# Patient Record
Sex: MALE | Race: WHITE | NOT HISPANIC OR LATINO
[De-identification: names, ages, dates, MRNs, and addresses within clinical notes are randomized per-mention and may not be internally consistent; named-entity substitution may affect disease eponyms.]

---

## 2018-11-28 ENCOUNTER — APPOINTMENT (OUTPATIENT)
Dept: GASTROENTEROLOGY | Facility: CLINIC | Age: 53
End: 2018-11-28

## 2018-12-01 ENCOUNTER — OUTPATIENT (OUTPATIENT)
Dept: OUTPATIENT SERVICES | Facility: HOSPITAL | Age: 53
LOS: 1 days | End: 2018-12-01
Payer: MEDICAID

## 2018-12-01 ENCOUNTER — EMERGENCY (EMERGENCY)
Facility: HOSPITAL | Age: 53
LOS: 1 days | Discharge: HOME | End: 2018-12-01
Attending: EMERGENCY MEDICINE
Payer: MEDICAID

## 2018-12-01 VITALS
WEIGHT: 179.9 LBS | HEIGHT: 72 IN | SYSTOLIC BLOOD PRESSURE: 148 MMHG | TEMPERATURE: 97 F | RESPIRATION RATE: 16 BRPM | DIASTOLIC BLOOD PRESSURE: 102 MMHG | OXYGEN SATURATION: 99 % | HEART RATE: 94 BPM

## 2018-12-01 PROCEDURE — 99285 EMERGENCY DEPT VISIT HI MDM: CPT

## 2018-12-01 PROCEDURE — G9001: CPT

## 2018-12-01 RX ORDER — DIPHENHYDRAMINE HCL 50 MG
25 CAPSULE ORAL ONCE
Qty: 0 | Refills: 0 | Status: COMPLETED | OUTPATIENT
Start: 2018-12-01 | End: 2018-12-01

## 2018-12-01 RX ORDER — HALOPERIDOL DECANOATE 100 MG/ML
5 INJECTION INTRAMUSCULAR ONCE
Qty: 0 | Refills: 0 | Status: COMPLETED | OUTPATIENT
Start: 2018-12-01 | End: 2018-12-01

## 2018-12-01 RX ADMIN — Medication 2 MILLIGRAM(S): at 16:17

## 2018-12-01 RX ADMIN — Medication 25 MILLIGRAM(S): at 16:17

## 2018-12-01 RX ADMIN — HALOPERIDOL DECANOATE 5 MILLIGRAM(S): 100 INJECTION INTRAMUSCULAR at 16:17

## 2018-12-01 NOTE — ED ADULT TRIAGE NOTE - CHIEF COMPLAINT QUOTE
Patient BIBA to ED today after being found on the side of the road intoxicated.  Patient has history of HTN.

## 2018-12-01 NOTE — ED PROVIDER NOTE - ENMT, MLM
Airway patent, Nasal mucosa clear. Mouth with normal mucosa. Throat has no vesicles, no oropharyngeal exudates and uvula is midline. No external signs of head trauma

## 2018-12-01 NOTE — ED ADULT NURSE NOTE - OBJECTIVE STATEMENT
Patient BIBA from the side of the road, per EMS they were called for a person laying on the side of the road. Patient intoxicated at this time, respirations are even and non labored. No signs of trauma noted. Patient states that he is homeless. Patient is screaming and cursing at staff at this time. Patient placed in a yellow gown and belongings secured and placed in locker in  by ELI Salcido. Patient continues to be loud with staff, security at the bedside for patient to be medicated. IV placed, MD at the bedside, patient medicated for agitation. Patient resting, respirations remain even and non labored. Will continue to monitor.

## 2018-12-01 NOTE — ED ADULT NURSE NOTE - NSIMPLEMENTINTERV_GEN_ALL_ED
Implemented All Universal Safety Interventions:  [place] to call system. Call bell, personal items and telephone within reach. Instruct patient to call for assistance. Room bathroom lighting operational. Non-slip footwear when patient is off stretcher. Physically safe environment: no spills, clutter or unnecessary equipment. Stretcher in lowest position, wheels locked, appropriate side rails in place.

## 2018-12-01 NOTE — ED PROVIDER NOTE - CARE PLAN
Principal Discharge DX:	Alcoholic intoxication without complication Principal Discharge DX:	Alcoholic intoxication without complication  Secondary Diagnosis:	Alcoholic hepatitis, unspecified whether ascites present

## 2018-12-01 NOTE — ED PROVIDER NOTE - OBJECTIVE STATEMENT
BIBA for public intoxication, pt uncooperative, admits to being homeless and drinking alcohol. Belligerent, cursing and agitated.

## 2018-12-01 NOTE — ED PROVIDER NOTE - PROGRESS NOTE DETAILS
now sedated 1:1 discontinued recd sign out  monitored  tolerated po well, monitored. recd librium, follow up discussed pt wanting to leave, Now indicates he is going to stay at his mother's house. pt able to stand, unsteady, gait unsteady, agreeing to stay a while longer. will feed, labs and imaging ordered by Dr. Davis pending. gait now steady, more cooperative.  reviewed labs, suggested imaging.  Pt does not want to stay.  will f/u with his pmd. given copy of labs

## 2018-12-01 NOTE — ED PROVIDER NOTE - MUSCULOSKELETAL, MLM
Spine appears normal, range of motion is not limited, no muscle or joint tenderness. no extremity trauma

## 2018-12-02 VITALS
OXYGEN SATURATION: 97 % | HEART RATE: 86 BPM | TEMPERATURE: 99 F | DIASTOLIC BLOOD PRESSURE: 79 MMHG | RESPIRATION RATE: 17 BRPM | SYSTOLIC BLOOD PRESSURE: 127 MMHG

## 2018-12-02 LAB
ALBUMIN SERPL ELPH-MCNC: 3.6 G/DL — SIGNIFICANT CHANGE UP (ref 3.3–5.2)
ALP SERPL-CCNC: 260 U/L — HIGH (ref 40–120)
ALT FLD-CCNC: 191 U/L — HIGH
AMPHET UR-MCNC: NEGATIVE — SIGNIFICANT CHANGE UP
ANION GAP SERPL CALC-SCNC: 11 MMOL/L — SIGNIFICANT CHANGE UP (ref 5–17)
APPEARANCE UR: CLEAR — SIGNIFICANT CHANGE UP
AST SERPL-CCNC: 282 U/L — HIGH
BARBITURATES UR SCN-MCNC: NEGATIVE — SIGNIFICANT CHANGE UP
BASOPHILS # BLD AUTO: 0.1 K/UL — SIGNIFICANT CHANGE UP (ref 0–0.2)
BASOPHILS NFR BLD AUTO: 1.3 % — SIGNIFICANT CHANGE UP (ref 0–2)
BENZODIAZ UR-MCNC: NEGATIVE — SIGNIFICANT CHANGE UP
BILIRUB SERPL-MCNC: 2.7 MG/DL — HIGH (ref 0.4–2)
BILIRUB UR-MCNC: NEGATIVE — SIGNIFICANT CHANGE UP
BUN SERPL-MCNC: 10 MG/DL — SIGNIFICANT CHANGE UP (ref 8–20)
CALCIUM SERPL-MCNC: 8.5 MG/DL — LOW (ref 8.6–10.2)
CHLORIDE SERPL-SCNC: 101 MMOL/L — SIGNIFICANT CHANGE UP (ref 98–107)
CO2 SERPL-SCNC: 27 MMOL/L — SIGNIFICANT CHANGE UP (ref 22–29)
COCAINE METAB.OTHER UR-MCNC: NEGATIVE — SIGNIFICANT CHANGE UP
COLOR SPEC: YELLOW — SIGNIFICANT CHANGE UP
CREAT SERPL-MCNC: 0.6 MG/DL — SIGNIFICANT CHANGE UP (ref 0.5–1.3)
DIFF PNL FLD: ABNORMAL
EOSINOPHIL # BLD AUTO: 0.2 K/UL — SIGNIFICANT CHANGE UP (ref 0–0.5)
EOSINOPHIL NFR BLD AUTO: 2.5 % — SIGNIFICANT CHANGE UP (ref 0–5)
ETHANOL SERPL-MCNC: 233 MG/DL — SIGNIFICANT CHANGE UP
GLUCOSE SERPL-MCNC: 106 MG/DL — SIGNIFICANT CHANGE UP (ref 70–115)
GLUCOSE UR QL: NEGATIVE MG/DL — SIGNIFICANT CHANGE UP
HCT VFR BLD CALC: 39.2 % — LOW (ref 42–52)
HGB BLD-MCNC: 13.3 G/DL — LOW (ref 14–18)
KETONES UR-MCNC: NEGATIVE — SIGNIFICANT CHANGE UP
LEUKOCYTE ESTERASE UR-ACNC: ABNORMAL
LIDOCAIN IGE QN: 85 U/L — HIGH (ref 22–51)
LYMPHOCYTES # BLD AUTO: 2.7 K/UL — SIGNIFICANT CHANGE UP (ref 1–4.8)
LYMPHOCYTES # BLD AUTO: 45.2 % — SIGNIFICANT CHANGE UP (ref 20–55)
MAGNESIUM SERPL-MCNC: 1.7 MG/DL — SIGNIFICANT CHANGE UP (ref 1.6–2.6)
MCHC RBC-ENTMCNC: 26.7 PG — LOW (ref 27–31)
MCHC RBC-ENTMCNC: 33.9 G/DL — SIGNIFICANT CHANGE UP (ref 32–36)
MCV RBC AUTO: 78.7 FL — LOW (ref 80–94)
METHADONE UR-MCNC: NEGATIVE — SIGNIFICANT CHANGE UP
MONOCYTES # BLD AUTO: 0.7 K/UL — SIGNIFICANT CHANGE UP (ref 0–0.8)
MONOCYTES NFR BLD AUTO: 11.1 % — HIGH (ref 3–10)
NEUTROPHILS # BLD AUTO: 2.3 K/UL — SIGNIFICANT CHANGE UP (ref 1.8–8)
NEUTROPHILS NFR BLD AUTO: 39.2 % — SIGNIFICANT CHANGE UP (ref 37–73)
NITRITE UR-MCNC: NEGATIVE — SIGNIFICANT CHANGE UP
OPIATES UR-MCNC: NEGATIVE — SIGNIFICANT CHANGE UP
PCP SPEC-MCNC: SIGNIFICANT CHANGE UP
PCP UR-MCNC: NEGATIVE — SIGNIFICANT CHANGE UP
PH UR: 5 — SIGNIFICANT CHANGE UP (ref 5–8)
PHOSPHATE SERPL-MCNC: 4.3 MG/DL — SIGNIFICANT CHANGE UP (ref 2.4–4.7)
PLATELET # BLD AUTO: 120 K/UL — LOW (ref 150–400)
POTASSIUM SERPL-MCNC: 4.6 MMOL/L — SIGNIFICANT CHANGE UP (ref 3.5–5.3)
POTASSIUM SERPL-SCNC: 4.6 MMOL/L — SIGNIFICANT CHANGE UP (ref 3.5–5.3)
PROT SERPL-MCNC: 6.8 G/DL — SIGNIFICANT CHANGE UP (ref 6.6–8.7)
PROT UR-MCNC: 30 MG/DL
RBC # BLD: 4.98 M/UL — SIGNIFICANT CHANGE UP (ref 4.6–6.2)
RBC # FLD: 16.8 % — HIGH (ref 11–15.6)
RBC CASTS # UR COMP ASSIST: SIGNIFICANT CHANGE UP /HPF (ref 0–4)
SODIUM SERPL-SCNC: 139 MMOL/L — SIGNIFICANT CHANGE UP (ref 135–145)
SP GR SPEC: 1.02 — SIGNIFICANT CHANGE UP (ref 1.01–1.02)
THC UR QL: POSITIVE
TSH SERPL-MCNC: 2.56 UIU/ML — SIGNIFICANT CHANGE UP (ref 0.27–4.2)
UROBILINOGEN FLD QL: 4 MG/DL
WBC # BLD: 6 K/UL — SIGNIFICANT CHANGE UP (ref 4.8–10.8)
WBC # FLD AUTO: 6 K/UL — SIGNIFICANT CHANGE UP (ref 4.8–10.8)
WBC UR QL: SIGNIFICANT CHANGE UP

## 2018-12-02 PROCEDURE — 93010 ELECTROCARDIOGRAM REPORT: CPT

## 2018-12-02 PROCEDURE — 70450 CT HEAD/BRAIN W/O DYE: CPT | Mod: 26

## 2018-12-02 PROCEDURE — 84100 ASSAY OF PHOSPHORUS: CPT

## 2018-12-02 PROCEDURE — 83735 ASSAY OF MAGNESIUM: CPT

## 2018-12-02 PROCEDURE — 96372 THER/PROPH/DIAG INJ SC/IM: CPT | Mod: XU

## 2018-12-02 PROCEDURE — 82962 GLUCOSE BLOOD TEST: CPT

## 2018-12-02 PROCEDURE — 72125 CT NECK SPINE W/O DYE: CPT | Mod: 26

## 2018-12-02 PROCEDURE — 83690 ASSAY OF LIPASE: CPT

## 2018-12-02 PROCEDURE — 96374 THER/PROPH/DIAG INJ IV PUSH: CPT

## 2018-12-02 PROCEDURE — 71045 X-RAY EXAM CHEST 1 VIEW: CPT | Mod: 26

## 2018-12-02 PROCEDURE — 93005 ELECTROCARDIOGRAM TRACING: CPT

## 2018-12-02 PROCEDURE — 96375 TX/PRO/DX INJ NEW DRUG ADDON: CPT

## 2018-12-02 PROCEDURE — 84443 ASSAY THYROID STIM HORMONE: CPT

## 2018-12-02 PROCEDURE — 36415 COLL VENOUS BLD VENIPUNCTURE: CPT

## 2018-12-02 PROCEDURE — 70450 CT HEAD/BRAIN W/O DYE: CPT

## 2018-12-02 PROCEDURE — 71045 X-RAY EXAM CHEST 1 VIEW: CPT

## 2018-12-02 PROCEDURE — 85027 COMPLETE CBC AUTOMATED: CPT

## 2018-12-02 PROCEDURE — 81001 URINALYSIS AUTO W/SCOPE: CPT

## 2018-12-02 PROCEDURE — 72125 CT NECK SPINE W/O DYE: CPT

## 2018-12-02 PROCEDURE — 80307 DRUG TEST PRSMV CHEM ANLYZR: CPT

## 2018-12-02 PROCEDURE — 80053 COMPREHEN METABOLIC PANEL: CPT

## 2018-12-02 PROCEDURE — 99285 EMERGENCY DEPT VISIT HI MDM: CPT | Mod: 25

## 2018-12-02 RX ORDER — SODIUM CHLORIDE 9 MG/ML
1000 INJECTION, SOLUTION INTRAVENOUS
Qty: 0 | Refills: 0 | Status: DISCONTINUED | OUTPATIENT
Start: 2018-12-02 | End: 2018-12-02

## 2018-12-02 RX ORDER — SODIUM CHLORIDE 9 MG/ML
1000 INJECTION, SOLUTION INTRAVENOUS
Qty: 0 | Refills: 0 | Status: COMPLETED | OUTPATIENT
Start: 2018-12-02 | End: 2018-12-02

## 2018-12-02 RX ORDER — SODIUM CHLORIDE 9 MG/ML
2200 INJECTION INTRAMUSCULAR; INTRAVENOUS; SUBCUTANEOUS ONCE
Qty: 0 | Refills: 0 | Status: DISCONTINUED | OUTPATIENT
Start: 2018-12-02 | End: 2018-12-02

## 2018-12-02 RX ADMIN — SODIUM CHLORIDE 250 MILLILITER(S): 9 INJECTION, SOLUTION INTRAVENOUS at 08:44

## 2018-12-02 RX ADMIN — Medication 1 MILLIGRAM(S): at 01:00

## 2018-12-02 NOTE — ED ADULT NURSE REASSESSMENT NOTE - NS ED NURSE REASSESS COMMENT FT1
plan was dc pt, pt was unable to stand and have steady gait, addition lab sent, ct and blood work results pending, pt wants to leave little restless, redirected, and reoriented explained plan of care to pt.

## 2018-12-02 NOTE — ED ADULT NURSE REASSESSMENT NOTE - NS ED NURSE REASSESS COMMENT FT1
pt appears to be awake alert oriented x 2 states he would like to go home. pt is breathing on own. lungs clear equal bilaterally urinating on own with periods of incontinence. pt unsteady on feet. shaking hands. MD Bradford called to bedside to re-evaluate patient. patient unable to understand who is speaking to him at this point. IVF to be administered and a re-evaluation of sobriety to be done.

## 2018-12-10 ENCOUNTER — INPATIENT (INPATIENT)
Facility: HOSPITAL | Age: 53
LOS: 9 days | Discharge: NURSING, REHAB OR LONG-TERM CARE | DRG: 480 | End: 2018-12-20
Attending: INTERNAL MEDICINE | Admitting: HOSPITALIST
Payer: MEDICAID

## 2018-12-10 VITALS
OXYGEN SATURATION: 97 % | RESPIRATION RATE: 20 BRPM | DIASTOLIC BLOOD PRESSURE: 74 MMHG | SYSTOLIC BLOOD PRESSURE: 134 MMHG | HEIGHT: 67 IN | HEART RATE: 84 BPM | WEIGHT: 175.05 LBS | TEMPERATURE: 97 F

## 2018-12-10 DIAGNOSIS — G45.9 TRANSIENT CEREBRAL ISCHEMIC ATTACK, UNSPECIFIED: ICD-10-CM

## 2018-12-10 PROBLEM — I10 ESSENTIAL (PRIMARY) HYPERTENSION: Chronic | Status: ACTIVE | Noted: 2018-12-01

## 2018-12-10 LAB
ALBUMIN SERPL ELPH-MCNC: 3.3 G/DL — SIGNIFICANT CHANGE UP (ref 3.3–5.2)
ALP SERPL-CCNC: 247 U/L — HIGH (ref 40–120)
ALT FLD-CCNC: 92 U/L — HIGH
AMMONIA BLD-MCNC: 43 UMOL/L — SIGNIFICANT CHANGE UP (ref 11–55)
ANION GAP SERPL CALC-SCNC: 19 MMOL/L — HIGH (ref 5–17)
ANISOCYTOSIS BLD QL: SLIGHT — SIGNIFICANT CHANGE UP
APAP SERPL-MCNC: <7.5 UG/ML — LOW (ref 10–26)
APTT BLD: 26.9 SEC — LOW (ref 27.5–36.3)
AST SERPL-CCNC: 139 U/L — HIGH
BASOPHILS # BLD AUTO: 0 K/UL — SIGNIFICANT CHANGE UP (ref 0–0.2)
BASOPHILS NFR BLD AUTO: 0.4 % — SIGNIFICANT CHANGE UP (ref 0–2)
BILIRUB SERPL-MCNC: 4.1 MG/DL — HIGH (ref 0.4–2)
BUN SERPL-MCNC: 9 MG/DL — SIGNIFICANT CHANGE UP (ref 8–20)
CALCIUM SERPL-MCNC: 8.6 MG/DL — SIGNIFICANT CHANGE UP (ref 8.6–10.2)
CHLORIDE SERPL-SCNC: 92 MMOL/L — LOW (ref 98–107)
CO2 SERPL-SCNC: 22 MMOL/L — SIGNIFICANT CHANGE UP (ref 22–29)
CREAT SERPL-MCNC: 0.84 MG/DL — SIGNIFICANT CHANGE UP (ref 0.5–1.3)
EOSINOPHIL # BLD AUTO: 0 K/UL — SIGNIFICANT CHANGE UP (ref 0–0.5)
EOSINOPHIL NFR BLD AUTO: 0.1 % — SIGNIFICANT CHANGE UP (ref 0–5)
ETHANOL SERPL-MCNC: <10 MG/DL — SIGNIFICANT CHANGE UP
GLUCOSE SERPL-MCNC: 94 MG/DL — SIGNIFICANT CHANGE UP (ref 70–115)
HCT VFR BLD CALC: 31.2 % — LOW (ref 42–52)
HCT VFR BLD CALC: 31.5 % — LOW (ref 42–52)
HGB BLD-MCNC: 9.8 G/DL — LOW (ref 14–18)
HGB BLD-MCNC: 9.8 G/DL — LOW (ref 14–18)
HYPOCHROMIA BLD QL: SLIGHT — SIGNIFICANT CHANGE UP
INR BLD: 0.97 RATIO — SIGNIFICANT CHANGE UP (ref 0.88–1.16)
LYMPHOCYTES # BLD AUTO: 19.3 % — LOW (ref 20–55)
LYMPHOCYTES # BLD AUTO: 2.4 K/UL — SIGNIFICANT CHANGE UP (ref 1–4.8)
MACROCYTES BLD QL: SLIGHT — SIGNIFICANT CHANGE UP
MCHC RBC-ENTMCNC: 26.2 PG — LOW (ref 27–31)
MCHC RBC-ENTMCNC: 31.4 G/DL — LOW (ref 32–36)
MCV RBC AUTO: 83.4 FL — SIGNIFICANT CHANGE UP (ref 80–94)
MONOCYTES # BLD AUTO: 1.6 K/UL — HIGH (ref 0–0.8)
MONOCYTES NFR BLD AUTO: 12.9 % — HIGH (ref 3–10)
NEUTROPHILS # BLD AUTO: 8 K/UL — SIGNIFICANT CHANGE UP (ref 1.8–8)
NEUTROPHILS NFR BLD AUTO: 65 % — SIGNIFICANT CHANGE UP (ref 37–73)
OB PNL STL: NEGATIVE — SIGNIFICANT CHANGE UP
PLAT MORPH BLD: NORMAL — SIGNIFICANT CHANGE UP
PLATELET # BLD AUTO: 172 K/UL — SIGNIFICANT CHANGE UP (ref 150–400)
POIKILOCYTOSIS BLD QL AUTO: SLIGHT — SIGNIFICANT CHANGE UP
POLYCHROMASIA BLD QL SMEAR: SLIGHT — SIGNIFICANT CHANGE UP
POTASSIUM SERPL-MCNC: 3.8 MMOL/L — SIGNIFICANT CHANGE UP (ref 3.5–5.3)
POTASSIUM SERPL-SCNC: 3.8 MMOL/L — SIGNIFICANT CHANGE UP (ref 3.5–5.3)
PROT SERPL-MCNC: 6.8 G/DL — SIGNIFICANT CHANGE UP (ref 6.6–8.7)
PROTHROM AB SERPL-ACNC: 11.1 SEC — SIGNIFICANT CHANGE UP (ref 10–12.9)
RBC # BLD: 3.74 M/UL — LOW (ref 4.6–6.2)
RBC # FLD: 17.5 % — HIGH (ref 11–15.6)
RBC BLD AUTO: ABNORMAL
SALICYLATES SERPL-MCNC: <0.6 MG/DL — LOW (ref 10–20)
SODIUM SERPL-SCNC: 133 MMOL/L — LOW (ref 135–145)
TARGETS BLD QL SMEAR: SIGNIFICANT CHANGE UP
TROPONIN T SERPL-MCNC: <0.01 NG/ML — SIGNIFICANT CHANGE UP (ref 0–0.06)
WBC # BLD: 12.3 K/UL — HIGH (ref 4.8–10.8)
WBC # FLD AUTO: 12.3 K/UL — HIGH (ref 4.8–10.8)

## 2018-12-10 PROCEDURE — 70450 CT HEAD/BRAIN W/O DYE: CPT | Mod: 26

## 2018-12-10 PROCEDURE — 71045 X-RAY EXAM CHEST 1 VIEW: CPT | Mod: 26

## 2018-12-10 PROCEDURE — 99223 1ST HOSP IP/OBS HIGH 75: CPT

## 2018-12-10 PROCEDURE — 73502 X-RAY EXAM HIP UNI 2-3 VIEWS: CPT | Mod: 26,RT

## 2018-12-10 PROCEDURE — 99285 EMERGENCY DEPT VISIT HI MDM: CPT

## 2018-12-10 PROCEDURE — 93010 ELECTROCARDIOGRAM REPORT: CPT

## 2018-12-10 PROCEDURE — 72125 CT NECK SPINE W/O DYE: CPT | Mod: 26

## 2018-12-10 RX ORDER — THIAMINE MONONITRATE (VIT B1) 100 MG
100 TABLET ORAL DAILY
Qty: 0 | Refills: 0 | Status: DISCONTINUED | OUTPATIENT
Start: 2018-12-11 | End: 2018-12-12

## 2018-12-10 RX ORDER — MORPHINE SULFATE 50 MG/1
4 CAPSULE, EXTENDED RELEASE ORAL ONCE
Qty: 0 | Refills: 0 | Status: DISCONTINUED | OUTPATIENT
Start: 2018-12-10 | End: 2018-12-10

## 2018-12-10 RX ORDER — MORPHINE SULFATE 50 MG/1
2 CAPSULE, EXTENDED RELEASE ORAL EVERY 4 HOURS
Qty: 0 | Refills: 0 | Status: DISCONTINUED | OUTPATIENT
Start: 2018-12-10 | End: 2018-12-12

## 2018-12-10 RX ORDER — SODIUM CHLORIDE 9 MG/ML
1000 INJECTION INTRAMUSCULAR; INTRAVENOUS; SUBCUTANEOUS
Qty: 0 | Refills: 0 | Status: DISCONTINUED | OUTPATIENT
Start: 2018-12-10 | End: 2018-12-10

## 2018-12-10 RX ORDER — FOLIC ACID 0.8 MG
1 TABLET ORAL DAILY
Qty: 0 | Refills: 0 | Status: DISCONTINUED | OUTPATIENT
Start: 2018-12-11 | End: 2018-12-12

## 2018-12-10 RX ORDER — PANTOPRAZOLE SODIUM 20 MG/1
40 TABLET, DELAYED RELEASE ORAL ONCE
Qty: 0 | Refills: 0 | Status: COMPLETED | OUTPATIENT
Start: 2018-12-11 | End: 2018-12-11

## 2018-12-10 RX ORDER — LIDOCAINE 4 G/100G
1 CREAM TOPICAL DAILY
Qty: 0 | Refills: 0 | Status: DISCONTINUED | OUTPATIENT
Start: 2018-12-10 | End: 2018-12-12

## 2018-12-10 RX ORDER — SODIUM CHLORIDE 9 MG/ML
1000 INJECTION INTRAMUSCULAR; INTRAVENOUS; SUBCUTANEOUS ONCE
Qty: 0 | Refills: 0 | Status: COMPLETED | OUTPATIENT
Start: 2018-12-10 | End: 2018-12-10

## 2018-12-10 RX ORDER — ACETAMINOPHEN 500 MG
1000 TABLET ORAL ONCE
Qty: 0 | Refills: 0 | Status: DISCONTINUED | OUTPATIENT
Start: 2018-12-10 | End: 2018-12-11

## 2018-12-10 RX ORDER — SODIUM CHLORIDE 9 MG/ML
1000 INJECTION, SOLUTION INTRAVENOUS
Qty: 0 | Refills: 0 | Status: DISCONTINUED | OUTPATIENT
Start: 2018-12-10 | End: 2018-12-11

## 2018-12-10 RX ADMIN — SODIUM CHLORIDE 1000 MILLILITER(S): 9 INJECTION INTRAMUSCULAR; INTRAVENOUS; SUBCUTANEOUS at 19:27

## 2018-12-10 RX ADMIN — MORPHINE SULFATE 4 MILLIGRAM(S): 50 CAPSULE, EXTENDED RELEASE ORAL at 18:39

## 2018-12-10 RX ADMIN — MORPHINE SULFATE 4 MILLIGRAM(S): 50 CAPSULE, EXTENDED RELEASE ORAL at 18:26

## 2018-12-10 RX ADMIN — Medication 2 MILLIGRAM(S): at 19:38

## 2018-12-10 RX ADMIN — Medication 50 MILLIGRAM(S): at 18:16

## 2018-12-10 RX ADMIN — Medication 50 MILLIGRAM(S): at 21:29

## 2018-12-10 RX ADMIN — Medication 2 MILLIGRAM(S): at 20:15

## 2018-12-10 RX ADMIN — Medication 2 MILLIGRAM(S): at 21:32

## 2018-12-10 NOTE — ED ADULT NURSE NOTE - ED STAT RN HANDOFF DETAILS
pt safely transported to [facility] on  with , RN bedside with iv infusing per md orders. Pt safely transferred from ed stretcher to hospital bed with recieveing RN bedside

## 2018-12-10 NOTE — ED ADULT NURSE NOTE - CHPI ED NUR SYMPTOMS NEG
no weakness/no tingling/no vomiting/no bleeding/no abrasion/no numbness/no loss of consciousness/no confusion/no fever

## 2018-12-10 NOTE — H&P ADULT - ASSESSMENT
Pt is a 52 yo M w/ PMH of alcohol abuse and HTN came to [facility] ER by ambulance , s/p fall , found to have R-hip fracture and alcohol withdraw syndrome .     1-Alcohol withdraw syndrome   -admit to medicine service   -falls precautions , aspiration precautions, sz precautions  -s/o librium 50 mg x 1, ativan 2 mg x 2 in the ER  -CIWA protocol   -librium sanaz   -ativan for sx trigger   -Banana bag, than c/w multivitamin and vit B1.  - consult   -NPO for now   -CT of head and C-spine negative for stroke and Fx .     2-Right hip fracture   -s/p fall at his mother house   -Right hip XR Positive for Fx.  -orhto consulted: recommend NPO for now , avoid AC and medical optimization for surgery   -bed rest   -pain control w/ morphine and lidocaine patch     3- Abnormal LFT  -most likely 2/2 to alcohol abuse and possible cirrhosis   -ast= 139 and alt= 92 ,bili = 4.1  -will order hepatitis panel   -abdominal US     4-Normocytis anemia  -previous hb= 13.3 on 12/2/18 , recent Hb= 9.8  -guaic negative   -will check anemia panel     5-Preventive measure   -DVT prophy: VCD boots for now, pt w/ R-hip fx and possible surgical intervention   - Pt is a 54 yo M w/ PMH of alcohol abuse and HTN came to [facility] ER by ambulance , s/p fall , found to have R-hip fracture and alcohol withdraw syndrome .     1-Alcohol withdraw syndrome   -admit to medicine service to SDU  -falls precautions , aspiration precautions, sz precautions  -s/o librium 50 mg x 1, ativan 2 mg x 2 in the ER  -CIWA protocol   -librium sanaz   -ativan for sx trigger   -Banana bag, then c/w multivitamin and vit B1, folic acid   - consult   -NPO for now   -CT of head and C-spine negative for stroke and Fx .     2-Right hip fracture   -s/p fall at his mother house   -Right hip XR Positive for Fx.  -orhto consulted: recommend NPO for now , avoid AC and medical optimization for surgery   -bed rest   -pain control w/ morphine and lidocaine patch     3- Abnormal LFT  -most likely 2/2 to alcohol abuse and possible cirrhosis   -ast= 139 and alt= 92 ,bili = 4.1  -will order hepatitis panel   -abdominal US     4-Normocytis anemia  -previous hb= 13.3 on 12/2/18 , recent Hb= 9.8  -guaic negative   -will check anemia panel     5-Preventive measure   -DVT prophy: VCD boots for now, pt w/ R-hip fx and possible surgical intervention   - Pt is a 52 yo M w/ PMH of alcohol abuse and HTN came to [facility] ER by ambulance , s/p fall , found to have R-hip fracture and alcohol withdraw syndrome .     1-Alcohol withdraw syndrome   -admit to medicine service to SDU  -falls precautions , aspiration precautions, sz precautions  -s/o librium 50 mg x 1, ativan 2 mg x 2 in the ER  -CIWA protocol   -librium sanaz   -ativan for sx trigger   -Banana bag, then c/w multivitamin and vit B1, folic acid   - consult   -NPO for now   -CT of head and C-spine negative for stroke and Fx .     2-Right hip fracture   -s/p fall at his mother house   -Right hip XR Positive for Fx.  -orhto consulted: recommend NPO for now , avoid AC and medical optimization for surgery   -bed rest   -pain control w/ morphine and lidocaine patch     3- Abnormal LFT  -most likely 2/2 to alcohol abuse and possible cirrhosis   -ast= 139 and alt= 92 ,bili = 4.1  Will trend LFT   -abdominal US     4-Normocytis anemia  -previous hb= 13.3 on 12/2/18 , recent Hb= 9.8  -guaic negative   -will check anemia panel     5-Preventive measure   -DVT prophy: VCD boots for now, pt w/ R-hip fx and possible surgical intervention   -

## 2018-12-10 NOTE — ED ADULT NURSE REASSESSMENT NOTE - NS ED NURSE REASSESS COMMENT FT1
pt midly anxious, with visible tremors, cooperative, a and o to self and place. ciwa now a 9. pt remains tachycardiac in 120's. dr. paul informed of pt's current state. will continue to monitor.

## 2018-12-10 NOTE — ED PROVIDER NOTE - OBJECTIVE STATEMENT
Pt states he developed weakness right side of body and fell to ground. Now complains of pain to his "right side."  Can't bare wt.  Painful to move right hip. States strength coming back in his right arm. symptoms started Saturday evening. Denies drinking alcohol at that time. did not drink yesterday.  Had 1 beer today.  Denies withdrawal.  Admits to alcoholism. Pt states he developed weakness right side of body and fell to ground. No LOC. No headache or neck pain.  Now complains of pain to his "right side."  Can't bare wt.  Painful to move right hip. States strength coming back in his right arm. symptoms started Saturday evening. Denies drinking alcohol at that time. did not drink yesterday.  Had 1 beer today.  Denies withdrawal.  Admits to alcoholism.

## 2018-12-10 NOTE — ED PROVIDER NOTE - SECONDARY DIAGNOSIS.
Fall at home, initial encounter Closed fracture of right hip, initial encounter Alcohol withdrawal syndrome, with delirium

## 2018-12-10 NOTE — CONSULT NOTE ADULT - SUBJECTIVE AND OBJECTIVE BOX
52 yr old male w alcohol abuse, HTN came to [facility] ED by ambulance c/o R sided weakness before falling on the ground.    Patient is not able to provide a hx to me or to the resident physicians.    Ambulance call report reviewed:  Pt reported that he has had hx of falls in the past.  Felt R sided weakness and then fell on 12-08.  Thought he was having a stroke then. Now unable to bear weight on R leg.  Claimed not to have been drinking at that time.  No head injury or neck pain or LOC.  Do not know why pt did not come to hospital then    ED note reviewed  In ED was found to be tachycardic w .  Given 1 L NS.  NIHSS 0.  Neuro contacted and will see pt tomorrow.  Has had morphine for pain then librium 50 mg for alcohol withdrawl given CIWA 19.  Head CT and Cspine CT pending.  Hip film + R hip fx; ortho aware    HAd been in ED when he was found intoxicated and was transported to ED 12-01 and discharges 12-      PAST MEDICAL HX  Alcohol use  HTN    PAST SURGICAL HX /FAM HX/ SOCIAL HX  unknown and unable to obtain      ROS:  unable to obtain as pt is very sedated    T(C): 36.8 (12-10-18 @ 19:14), Max: 36.8 (12-10-18 @ 19:14)  HR: 118 (12-10-18 @ 20:09) (84 - 122)  BP: 139/96 (12-10-18 @ 20:09) (134/74 - 142/103)  RR: 16 (12-10-18 @ 20:09) (16 - 20)  SpO2: 97% (12-10-18 @ 20:09) (96% - 97%)    PHYSICAL EXAM: evaluation precludes physical exam. Pertinent physical exam findings as per video conference with  resident physicians at bedside are as follows:    black male lying on stretcher in no apparent distress  +scleral icterus  HEENT no evidence for trauma as head NC/AT/ pupils reactive, TM and canals negative for blood  Neck not in collar, trachea midline  Chest clear bilaterally  Cor tachy  Abd + bowel sounds present, soft, nontender, no surgical scars noted  Extrem, R leg shortened and rotated  DERm no wounds or STS  Neuro lethargic/sedated w only occasional interaction if consistently prompted    CARDIAC MARKERS ( 10 Dec 2018 18:38 )  x     / <0.01 ng/mL / x     / x     / x                      9.8    12.3  )-----------( 172      ( 10 Dec 2018 18:38 )             31.2     10 Dec 2018 18:38    133    |  92     |  9.0    ----------------------------<  94     3.8     |  22.0   |  0.84     Ca    8.6        10 Dec 2018 18:38    TPro  6.8    /  Alb  3.3    /  TBili  4.1    /  DBili  x      /  AST  139    /  ALT  92     /  AlkPhos  247    10 Dec 2018 18:38    PT/INR - ( 10 Dec 2018 18:38 )   PT: 11.1 sec;   INR: 0.97 ratio         PTT - ( 10 Dec 2018 18:38 )  PTT:26.9 sec  CAPILLARY BLOOD GLUCOSE    LIVER FUNCTIONS - ( 10 Dec 2018 18:38 )  Alb: 3.3 g/dL / Pro: 6.8 g/dL / ALK PHOS: 247 U/L / ALT: 92 U/L / AST: 139 U/L / GGT: x               EKG ST @ 116 moderate LVH w NSST-T changes  prior ekg 12-02 SR @99 w freq PVCs          RADIOLOGY 52 yr old male w alcohol abuse, HTN came to [facility] ED by ambulance c/o R sided weakness before falling on the ground.    Patient is not able to provide a hx to me or to the resident physicians.    Ambulance call report reviewed:  Pt reported that he has had hx of falls in the past.  Felt R sided weakness and then fell on 12-08.  Thought he was having a stroke then. Now unable to bear weight on R leg.  Claimed not to have been drinking at that time.  No head injury or neck pain or LOC.  Do not know why pt did not come to hospital then    ED note reviewed  In ED was found to be tachycardic w .  Given 1 L NS.  NIHSS 0.  Neuro contacted and will see pt tomorrow.  Has had morphine for pain then librium 50 mg for alcohol withdrawl given CIWA 19.  Head CT and Cspine CT no acute change.  CXR, femur/Hip film pending; + R hip fx due to R leg shortening and rotation; ortho contacted by ED    HAd been in ED when he was found intoxicated and was transported to ED 12-01 and discharges 12-      PAST MEDICAL HX  Alcohol use  HTN    PAST SURGICAL HX /FAM HX/ SOCIAL HX  unknown and unable to obtain      ROS:  unable to obtain as pt is very sedated    T(C): 36.8 (12-10-18 @ 19:14), Max: 36.8 (12-10-18 @ 19:14)  HR: 118 (12-10-18 @ 20:09) (84 - 122)  BP: 139/96 (12-10-18 @ 20:09) (134/74 - 142/103)  RR: 16 (12-10-18 @ 20:09) (16 - 20)  SpO2: 97% (12-10-18 @ 20:09) (96% - 97%)    PHYSICAL EXAM: evaluation precludes physical exam. Pertinent physical exam findings as per video conference with  resident physicians at bedside are as follows:    black male lying on stretcher in no apparent distress  +scleral icterus  HEENT no evidence for trauma as head NC/AT/ pupils reactive, TM and canals negative for blood  Neck not in collar, trachea midline  Chest clear bilaterally  Cor tachy  Abd + bowel sounds present, soft, nontender, no surgical scars noted  Extrem, R leg shortened and rotated  DERm no wounds or STS  Neuro lethargic/sedated w only occasional interaction if consistently prompted    CARDIAC MARKERS ( 10 Dec 2018 18:38 )  x     / <0.01 ng/mL / x     / x     / x                      9.8    12.3  )-----------( 172      ( 10 Dec 2018 18:38 )             31.2     10 Dec 2018 18:38    133    |  92     |  9.0    ----------------------------<  94     3.8     |  22.0   |  0.84     Ca    8.6        10 Dec 2018 18:38    TPro  6.8    /  Alb  3.3    /  TBili  4.1    /  DBili  x      /  AST  139    /  ALT  92     /  AlkPhos  247    10 Dec 2018 18:38    PT/INR - ( 10 Dec 2018 18:38 )   PT: 11.1 sec;   INR: 0.97 ratio         PTT - ( 10 Dec 2018 18:38 )  PTT:26.9 sec  CAPILLARY BLOOD GLUCOSE    LIVER FUNCTIONS - ( 10 Dec 2018 18:38 )  Alb: 3.3 g/dL / Pro: 6.8 g/dL / ALK PHOS: 247 U/L / ALT: 92 U/L / AST: 139 U/L / GGT: x               EKG ST @ 116 moderate LVH w NSST-T changes  prior ekg 12-02 SR @99 w freq PVCs          RADIOLOGY    XAM:  CT BRAIN                         EXAM:  CT CERVICAL SPINE                          PROCEDURE DATE:  12/10/2018          INTERPRETATION:  HISTORY: Headache. Neck pain.    COMPARISON: None     TECHNIQUE: Axial noncontrast CT images of the head and cervical spine   were obtained and submitted for interpretation. Sagittal and coronal   reformatted images of the cervical spine were provided. Bone and soft   tissue windows were evaluated.    FINDINGS:     CT BRAIN:     There is no acute intracranial mass-effect, hemorrhage, midline shift, or   abnormal extra-axial fluid collection.     Moderate chronic microvascular ischemic changes are noted.    Ventricles, sulci, and cisterns are normal in size for the patient's age   without evidence of hydrocephalus. Basal cisterns are patent.     Bilateral maxillary sinus polyps or retention cysts are noted. Remaining   sinuses and mastoid air cells are clear. The calvarium is intact.     CT CERVICAL SPINE:     Reversal of the cervical lordosis is associated with advanced multilevel   spondylosis. Advanced left C3-C4 hypertrophic facet degeneration is   present. Prominent anterior osteophyte formation in the mid and lower   cervical spine with endplate irregularity is present.     There is no prevertebral soft tissue swelling. Vertebral body heights and   alignment are maintained.    The atlantodental and atlanto-occipital joints are maintained. Articular   facets and posterior elements alignment is maintained.     The partially imaged lung apices are clear. Atheromatous calcifications   along the carotid bulbs are present.    IMPRESSION:     CT BRAIN: No acute intracranial bleeding, mass effect, or shift. Moderate   chronic microvascular ischemic changes.    CT CERVICAL SPINE: No fracture     Reversed lordosis associated with advanced multilevel spondylosis. 53 yr old male w alcohol abuse, HTN came to [facility] ED by ambulance c/o R sided weakness before falling on the ground.    Patient is not able to provide a hx to me or to the resident physicians.    Ambulance call report reviewed:  Pt reported that he has had hx of falls in the past.  Felt R sided weakness and then fell on 12-08.  Thought he was having a stroke then. Now unable to bear weight on R leg.  Claimed not to have been drinking at that time.  No head injury or neck pain or LOC.  Do not know why pt did not come to hospital then    ED note reviewed  In ED was found to be tachycardic w .  Given 1 L NS.  NIHSS 0.  Neuro contacted and will see pt tomorrow.  Has had morphine for pain then librium 50 mg for alcohol withdrawl given CIWA 19.  Head CT and Cspine CT no acute change.  CXR, femur/Hip film pending; + R hip fx due to R leg shortening and rotation; ortho contacted by ED    HAd been in ED when he was found intoxicated and was transported to ED 12-01 and discharges 12-      PAST MEDICAL HX  Alcohol use  HTN    PAST SURGICAL HX /FAM HX/ SOCIAL HX  unknown and unable to obtain      ROS:  unable to obtain as pt is very sedated    T(C): 36.8 (12-10-18 @ 19:14), Max: 36.8 (12-10-18 @ 19:14)  HR: 118 (12-10-18 @ 20:09) (84 - 122)  BP: 139/96 (12-10-18 @ 20:09) (134/74 - 142/103)  RR: 16 (12-10-18 @ 20:09) (16 - 20)  SpO2: 97% (12-10-18 @ 20:09) (96% - 97%)    PHYSICAL EXAM: evaluation precludes physical exam. Pertinent physical exam findings as per video conference with  resident physicians at bedside are as follows:    black male lying on stretcher in no apparent distress  +scleral icterus  HEENT no evidence for trauma as head NC/AT/ pupils reactive, TM and canals negative for blood  Neck not in collar, trachea midline  Chest clear bilaterally  Cor tachy  Abd + bowel sounds present, soft, nontender, no surgical scars noted  Extrem, R leg shortened and rotated  DERm no wounds or STS  Neuro lethargic/sedated w only occasional interaction if consistently prompted    CARDIAC MARKERS ( 10 Dec 2018 18:38 )  x     / <0.01 ng/mL / x     / x     / x                      9.8    12.3  )-----------( 172      ( 10 Dec 2018 18:38 )             31.2     10 Dec 2018 18:38    133    |  92     |  9.0    ----------------------------<  94     3.8     |  22.0   |  0.84     Ca    8.6        10 Dec 2018 18:38    TPro  6.8    /  Alb  3.3    /  TBili  4.1    /  DBili  x      /  AST  139    /  ALT  92     /  AlkPhos  247    10 Dec 2018 18:38    PT/INR - ( 10 Dec 2018 18:38 )   PT: 11.1 sec;   INR: 0.97 ratio         PTT - ( 10 Dec 2018 18:38 )  PTT:26.9 sec  CAPILLARY BLOOD GLUCOSE    LIVER FUNCTIONS - ( 10 Dec 2018 18:38 )  Alb: 3.3 g/dL / Pro: 6.8 g/dL / ALK PHOS: 247 U/L / ALT: 92 U/L / AST: 139 U/L / GGT: x               EKG ST @ 116 moderate LVH w NSST-T changes  prior ekg 12-02 SR @99 w freq PVCs          RADIOLOGY    XAM:  CT BRAIN                         EXAM:  CT CERVICAL SPINE                          PROCEDURE DATE:  12/10/2018          INTERPRETATION:  HISTORY: Headache. Neck pain.    COMPARISON: None     TECHNIQUE: Axial noncontrast CT images of the head and cervical spine   were obtained and submitted for interpretation. Sagittal and coronal   reformatted images of the cervical spine were provided. Bone and soft   tissue windows were evaluated.    FINDINGS:     CT BRAIN:     There is no acute intracranial mass-effect, hemorrhage, midline shift, or   abnormal extra-axial fluid collection.     Moderate chronic microvascular ischemic changes are noted.    Ventricles, sulci, and cisterns are normal in size for the patient's age   without evidence of hydrocephalus. Basal cisterns are patent.     Bilateral maxillary sinus polyps or retention cysts are noted. Remaining   sinuses and mastoid air cells are clear. The calvarium is intact.     CT CERVICAL SPINE:     Reversal of the cervical lordosis is associated with advanced multilevel   spondylosis. Advanced left C3-C4 hypertrophic facet degeneration is   present. Prominent anterior osteophyte formation in the mid and lower   cervical spine with endplate irregularity is present.     There is no prevertebral soft tissue swelling. Vertebral body heights and   alignment are maintained.    The atlantodental and atlanto-occipital joints are maintained. Articular   facets and posterior elements alignment is maintained.     The partially imaged lung apices are clear. Atheromatous calcifications   along the carotid bulbs are present.    IMPRESSION:     CT BRAIN: No acute intracranial bleeding, mass effect, or shift. Moderate   chronic microvascular ischemic changes.    CT CERVICAL SPINE: No fracture     Reversed lordosis associated with advanced multilevel spondylosis.

## 2018-12-10 NOTE — ED ADULT NURSE NOTE - NSIMPLEMENTINTERV_GEN_ALL_ED
Implemented All Universal Safety Interventions:  [place] to call system. Call bell, personal items and telephone within reach. Instruct patient to call for assistance. Room bathroom lighting operational. Non-slip footwear when patient is off stretcher. Physically safe environment: no spills, clutter or unnecessary equipment. Stretcher in lowest position, wheels locked, appropriate side rails in place.

## 2018-12-10 NOTE — ED ADULT NURSE REASSESSMENT NOTE - NS ED NURSE REASSESS COMMENT FT1
dr. paul informed pt hear rate in 120's. appears to be sinus tach on cm. pt medicated as ordered. will hang ns bolus as ordered. no other new orders at this time. will continue to monitor.

## 2018-12-10 NOTE — H&P ADULT - ATTENDING COMMENTS
52 yo M w/ PMH of alcohol abuse and HTN came to [facility] ER by ambulance , s/p fall , found to have R-hip fracture and alcohol withdraw syndrome. [facility] protocol. Orthopedic on board, will probably go to OR in AM.       I was physically present for the key portions of the evaluation and management service provided, and agree with the history, physical exam and plan which I have reviewed and edited where appropriate.

## 2018-12-10 NOTE — ED ADULT NURSE REASSESSMENT NOTE - NS ED NURSE REASSESS COMMENT FT1
pt ciwa 19, mildly confused, anxious, agitated. alert and able to state name. diaphoretic. easily redirected. dr. paul called. will medicate as ordered and reassess.

## 2018-12-10 NOTE — CONSULT NOTE ADULT - ASSESSMENT
52 yr old male w alcohol abuse , HTN presented after c/o R sided weakness and mechanical fall 12-08 w/o LOC    1) R sided weakness possible TIA  altered mentation kim after medication  1. admit to stepdown  2. neuro check q 4  3. HEAD CT pending  4. CSpine CT pending  5. continuous pulse ox  6. UDS +THC  7. alcohol level  8. acetaminophen/salicylate  9. NPO until more awake  10. Neuro    2) Acute hip fracture s/p mechanical fall  1. ortho  2. bedrest  3. pain meds acetaminophen/ lidoderm/ morphine  4. fx precautions    3) alcohol withdrawl  1. librium as per protocol  2. fall and sz precations  3. thiamine /folate /MVI IV  4. BMP, Mg, P  5. can empirically give Mg 2 grams IV    4) abn LFTs w elevated alc phos and scleral icterus  1. trend LFT  2. to consider US GB    5) Anemia  Hb dropped from 13.1 to 9.8  1. stool guaiac on rectal  2. trend CBC  3. protonix    6) HTN  meds if any unknown  1. monitor  2. prn meds if BP sys > 180 or BP diast > 100 53 yr old male w alcohol abuse , HTN presented after c/o R sided weakness and mechanical fall 12-08 w/o LOC    1) R sided weakness possible TIA  altered mentation kim after medication  1. admit to stepdown  2. neuro check q 4  3. HEAD CT pending  4. CSpine CT pending  5. continuous pulse ox  6. UDS +THC  7. alcohol level  8. acetaminophen/salicylate  9. NPO until more awake  10. Neuro    2) Acute hip fracture s/p mechanical fall  1. ortho  2. bedrest  3. pain meds acetaminophen/ lidoderm/ morphine  4. fx precautions    3) alcohol withdrawl  1. librium as per protocol  2. fall and sz precations  3. thiamine /folate /MVI IV  4. BMP, Mg, P  5. can empirically give Mg 2 grams IV    4) abn LFTs w elevated alc phos and scleral icterus  1. trend LFT  2. to consider US GB    5) Anemia  Hb dropped from 13.1 to 9.8  1. stool guaiac on rectal  2. trend CBC  3. protonix    6) HTN  meds if any unknown  1. monitor  2. prn meds if BP sys > 180 or BP diast > 100      VTE  scds pending cbc trend

## 2018-12-10 NOTE — H&P ADULT - NSHPPHYSICALEXAM_GEN_ALL_CORE
Vital Signs Last 24 Hrs  T(C): 36.8 (10 Dec 2018 19:14), Max: 36.8 (10 Dec 2018 19:14)  T(F): 98.2 (10 Dec 2018 19:14), Max: 98.2 (10 Dec 2018 19:14)  HR: 109 (11 Dec 2018 01:42) (84 - 122)  BP: 143/86 (11 Dec 2018 00:30) (134/74 - 165/87)  RR: 18 (11 Dec 2018 01:42) (16 - 20)  SpO2: 99% (11 Dec 2018 01:42) (93% - 99%)    GENERAL: NAD, well-groomed, well-developed  HEAD:  Atraumatic, Normocephalic  EYES: EOMI, PERRLA, positive scleral icterus   ENMT: No tonsillar erythema, exudates, or enlargement; Moist mucous membranes,  NECK: Supple, No JVD  Respiratory: Clear to auscultation bilaterally; No rales, rhonchi, wheezing, or rubs  CV: Regular rate and rhythm; No murmurs, rubs, or gallops  Gastrointestinal: Soft, Nontender, Nondistended; Bowel sounds present  EXTREMITIES:  2+ Peripheral Pulses, No clubbing, cyanosis, or edema. RLE shorter and externally rotated , pain to palpation over R- hip and pain w/ active movement of RLE.   LYMPH: No lymphadenopathy noted  RECTAL: No masses, prostate normal size and smooth, Guiac negative    NERVOUS SYSTEM:  lethargy ; Motor Strength 5/5 B/L upper and LLE extremities; unable to asses RLE due to pain , sensation intact .  SKIN: No rashes or lesions

## 2018-12-10 NOTE — ED ADULT NURSE NOTE - OBJECTIVE STATEMENT
pt reports trip and fall from standing height. pt reports hx of frequent falls while intoxicated. - loc. - blood thinners. pt reports etoh use everyday. pt has deformity to rt leg, shortening and external rotation. no other external signs of trauma noted. pt reports rt hip and thigh pain otherwise no complaints. pt is unkempt, caked in layers of dirt, [place], sometimes stays with family. fell while in mothers house. a and o x3. breathing even and unlabored. sinus tach on cm. + and = peripheral pulses. pt has mild tremors. will continue to monitor.

## 2018-12-10 NOTE — CONSULT NOTE ADULT - ATTENDING COMMENTS
-videoconference done w residents and patient who had limited interaction  Hx, exam and data as well as A/P reviewed shortly after  -unable to do med rec as current med(s) unknown  -VTE SCD  -social work consult re discharge planning -videoconference done w resident Dr. Thurston and patient who had limited interaction  Hx, exam and data as well as A/P reviewed shortly after  -unable to do med rec as current med(s) unknown  -VTE SCD  -social work consult re discharge planning

## 2018-12-10 NOTE — H&P ADULT - HISTORY OF PRESENT ILLNESS
Pt is a 52 yo M w/ PMH of alcohol abuse and HTN. Pt was Pt is a 52 yo M w/ PMH of alcohol abuse and HTN. Pt is homeless and has been drinking alcohol for more than 20 yrs , as per ER attending last drink was today ( one beer ). Pt was visiting his mother yesterday and he developed right side of his body weakness and then he fell to the ground , no LOC or Head trauma , when pt arrived to ER was complaining of headache and back pain . After the fall pt was unable to bear wt due pain in his lower RLE. In the ER pt was found to be agitated having alcohol withdraw  sx , he was tx w/ librium 50 mg x 1 and ativan 2 mg x 2 , he also received morphine for pain . Unable to get  full ROS due to pt mental status.

## 2018-12-10 NOTE — CONSULT NOTE ADULT - SUBJECTIVE AND OBJECTIVE BOX
Asked by the ED attending to evaluate a 53 year old male who has been complaining of Right hip pain since this weekend.  Patient is found laying on the stretcher, but unable to give history or injury.  Patient is presently withdrawing from ETOH.  History is obtained from chart.  Chart states patient fell at home after complaining of weakness.  After fall was unable to bear weight on extremity.  X-rays taken by the ED shows patient to have a fracture Right HIp.  Patient will be admitted to medicine for optimization for surgery and for ETOH withdrawal treatment.      PMHx:  Alcohol abuse    Hypertension, unspecified type    PSHx:  Unable to obtain    Allergies:  Penicillin  Shellfish    Review of Systems:  Muscular Skeletal: Right Hip Pain following fall  Remaining systems were reviewed and found to be negative    Physical;  Vital Signs Last 24 Hrs  T(C): 36.8 (10 Dec 2018 19:14), Max: 36.8 (10 Dec 2018 19:14)  T(F): 98.2 (10 Dec 2018 19:14), Max: 98.2 (10 Dec 2018 19:14)  HR: 112 (10 Dec 2018 21:30) (84 - 122)  BP: 165/87 (10 Dec 2018 21:30) (134/74 - 165/87)  BP(mean): --  RR: 16 (10 Dec 2018 21:30) (16 - 20)  SpO2: 96% (10 Dec 2018 21:30) (96% - 97%)    Right Lower Extremity:  No signs of skin breakdown  Extremity in position of comfort  Deferred ROM of hip secondary to fracture  2+ Pedal Pulse, brisk capillary refill  + Sensation    X-Rays: Right HIp: + Right IT Hip Fracture    A/P: Right Hip IT Fracture  - Bed rest, NPO  - hold anti-coagulation for possible surgery 12/11/2018  - obtain medical optimization for surgery  - Reviewed case with Orthopedic Attending

## 2018-12-10 NOTE — CONSULT NOTE ADULT - ATTENDING COMMENTS
Ortho Trauma Attending:  Agree with above PA note.  Note edited where necessary.  Plan for OR when medically stable. Patient currently unsafe for surgery. Will continue to follow.    Eris Anthony MD  Orthopaedic Trauma Surgery

## 2018-12-10 NOTE — ED PROVIDER NOTE - CARDIAC, MLM
Normal rate, regular rhythm.  Heart sounds S1, S2.  No murmurs, rubs or gallops. Rapid rate, regular rhythm.  Heart sounds S1, S2.  No murmurs, rubs or gallops.

## 2018-12-10 NOTE — ED PROVIDER NOTE - CARE PLAN
Principal Discharge DX:	TIA (transient ischemic attack)  Secondary Diagnosis:	Fall at home, initial encounter  Secondary Diagnosis:	Closed fracture of right hip, initial encounter  Secondary Diagnosis:	Alcohol withdrawal syndrome, with delirium

## 2018-12-10 NOTE — ED PROVIDER NOTE - MEDICAL DECISION MAKING DETAILS
presents with mild alcohol withdrawal, tachycardia and tremor suggestive of alcohol withdrawal.  Fall possibly due to TIA with resolution of weakness on right side however right lower ext not testable due to mobility issue from likely right hip fracture, which has been confirmed on xray. will admit for stroke/TIA work up, neuro consult requested, morphine for pain, initially received librium 50mg PO on my initial eval, pt had transient delirium, improved with ativan, waiting on CT which is delayed because family reports to ems pt has and admitted to bed bugs in past, not present,  no visible insects on physical exam, does not need isolation.  Requested charge nurse and pt nurse facilitate CT scanning, ortho has been called and will see pt, Neuro Dr. Greenberg call placed by .

## 2018-12-10 NOTE — H&P ADULT - NSHPLABSRESULTS_GEN_ALL_CORE
9.8    x     )-----------( x        ( 10 Dec 2018 22:47 )             31.5       12-10    133<L>  |  92<L>  |  9.0  ----------------------------<  94  3.8   |  22.0  |  0.84    Ca    8.6      10 Dec 2018 18:38    TPro  6.8  /  Alb  3.3  /  TBili  4.1<H>  /  DBili  x   /  AST  139<H>  /  ALT  92<H>  /  AlkPhos  247<H>  12-10      PT/INR - ( 10 Dec 2018 18:38 )   PT: 11.1 sec;   INR: 0.97 ratio         PTT - ( 10 Dec 2018 18:38 )  PTT:26.9 sec    Troponin T, Serum (12.10.18 @ 18:38)    Troponin T, Serum: <0.01 ng/mL    < from: CT Head No Cont (12.10.18 @ 21:27) >    IMPRESSION:     CT BRAIN: No acute intracranial bleeding, mass effect, or shift. Moderate   chronic microvascular ischemic changes.    CT CERVICAL SPINE: No fracture       < end of copied text >    < from: Xray Chest 1 View-PORTABLE IMMEDIATE (12.02.18 @ 08:32) >    IMPRESSION:    Clear lungs. No acute disease.      < end of copied text >

## 2018-12-11 ENCOUNTER — TRANSCRIPTION ENCOUNTER (OUTPATIENT)
Age: 53
End: 2018-12-11

## 2018-12-11 DIAGNOSIS — Z71.89 OTHER SPECIFIED COUNSELING: ICD-10-CM

## 2018-12-11 LAB
AMPHET UR-MCNC: NEGATIVE — SIGNIFICANT CHANGE UP
ANION GAP SERPL CALC-SCNC: 15 MMOL/L — SIGNIFICANT CHANGE UP (ref 5–17)
APPEARANCE UR: CLEAR — SIGNIFICANT CHANGE UP
BACTERIA # UR AUTO: ABNORMAL
BARBITURATES UR SCN-MCNC: NEGATIVE — SIGNIFICANT CHANGE UP
BENZODIAZ UR-MCNC: POSITIVE
BILIRUB UR-MCNC: ABNORMAL
BUN SERPL-MCNC: 7 MG/DL — LOW (ref 8–20)
CALCIUM SERPL-MCNC: 8.7 MG/DL — SIGNIFICANT CHANGE UP (ref 8.6–10.2)
CHLORIDE SERPL-SCNC: 97 MMOL/L — LOW (ref 98–107)
CO2 SERPL-SCNC: 23 MMOL/L — SIGNIFICANT CHANGE UP (ref 22–29)
COCAINE METAB.OTHER UR-MCNC: NEGATIVE — SIGNIFICANT CHANGE UP
COLOR SPEC: ABNORMAL
CREAT SERPL-MCNC: 0.56 MG/DL — SIGNIFICANT CHANGE UP (ref 0.5–1.3)
DIFF PNL FLD: ABNORMAL
EPI CELLS # UR: SIGNIFICANT CHANGE UP
FOLATE SERPL-MCNC: >20 NG/ML — SIGNIFICANT CHANGE UP
GLUCOSE SERPL-MCNC: 105 MG/DL — SIGNIFICANT CHANGE UP (ref 70–115)
GLUCOSE UR QL: NEGATIVE MG/DL — SIGNIFICANT CHANGE UP
HCT VFR BLD CALC: 30.2 % — LOW (ref 42–52)
HGB BLD-MCNC: 9.4 G/DL — LOW (ref 14–18)
IRON SATN MFR SERPL: 41 % — SIGNIFICANT CHANGE UP (ref 16–55)
IRON SATN MFR SERPL: 76 UG/DL — SIGNIFICANT CHANGE UP (ref 59–158)
KETONES UR-MCNC: ABNORMAL
LEUKOCYTE ESTERASE UR-ACNC: ABNORMAL
MAGNESIUM SERPL-MCNC: 1.8 MG/DL — SIGNIFICANT CHANGE UP (ref 1.6–2.6)
MCHC RBC-ENTMCNC: 26.3 PG — LOW (ref 27–31)
MCHC RBC-ENTMCNC: 31.1 G/DL — LOW (ref 32–36)
MCV RBC AUTO: 84.6 FL — SIGNIFICANT CHANGE UP (ref 80–94)
METHADONE UR-MCNC: NEGATIVE — SIGNIFICANT CHANGE UP
NITRITE UR-MCNC: POSITIVE
OPIATES UR-MCNC: POSITIVE
PCP SPEC-MCNC: SIGNIFICANT CHANGE UP
PCP UR-MCNC: NEGATIVE — SIGNIFICANT CHANGE UP
PH UR: 6 — SIGNIFICANT CHANGE UP (ref 5–8)
PHOSPHATE SERPL-MCNC: 2.7 MG/DL — SIGNIFICANT CHANGE UP (ref 2.4–4.7)
PLATELET # BLD AUTO: 154 K/UL — SIGNIFICANT CHANGE UP (ref 150–400)
POTASSIUM SERPL-MCNC: 3.9 MMOL/L — SIGNIFICANT CHANGE UP (ref 3.5–5.3)
POTASSIUM SERPL-SCNC: 3.9 MMOL/L — SIGNIFICANT CHANGE UP (ref 3.5–5.3)
PREALB SERPL-MCNC: 6 MG/DL — LOW (ref 18–38)
PROCALCITONIN SERPL-MCNC: 0.62 NG/ML — HIGH (ref 0–0.04)
PROT UR-MCNC: 30 MG/DL
RBC # BLD: 3.57 M/UL — LOW (ref 4.6–6.2)
RBC # FLD: 17.7 % — HIGH (ref 11–15.6)
RBC CASTS # UR COMP ASSIST: SIGNIFICANT CHANGE UP /HPF (ref 0–4)
SODIUM SERPL-SCNC: 135 MMOL/L — SIGNIFICANT CHANGE UP (ref 135–145)
SP GR SPEC: 1.02 — SIGNIFICANT CHANGE UP (ref 1.01–1.02)
THC UR QL: POSITIVE
TIBC SERPL-MCNC: 187 UG/DL — LOW (ref 220–430)
TRANSFERRIN SERPL-MCNC: 131 MG/DL — LOW (ref 180–329)
TROPONIN T SERPL-MCNC: <0.01 NG/ML — SIGNIFICANT CHANGE UP (ref 0–0.06)
UROBILINOGEN FLD QL: 12 MG/DL
VIT B12 SERPL-MCNC: >2000 PG/ML — HIGH (ref 232–1245)
WBC # BLD: 7.5 K/UL — SIGNIFICANT CHANGE UP (ref 4.8–10.8)
WBC # FLD AUTO: 7.5 K/UL — SIGNIFICANT CHANGE UP (ref 4.8–10.8)
WBC UR QL: SIGNIFICANT CHANGE UP

## 2018-12-11 PROCEDURE — 73552 X-RAY EXAM OF FEMUR 2/>: CPT | Mod: 26,RT

## 2018-12-11 PROCEDURE — 99233 SBSQ HOSP IP/OBS HIGH 50: CPT | Mod: GC

## 2018-12-11 PROCEDURE — 99223 1ST HOSP IP/OBS HIGH 75: CPT

## 2018-12-11 PROCEDURE — 70551 MRI BRAIN STEM W/O DYE: CPT | Mod: 26

## 2018-12-11 PROCEDURE — 93306 TTE W/DOPPLER COMPLETE: CPT | Mod: 26

## 2018-12-11 PROCEDURE — 76700 US EXAM ABDOM COMPLETE: CPT | Mod: 26

## 2018-12-11 PROCEDURE — 73502 X-RAY EXAM HIP UNI 2-3 VIEWS: CPT | Mod: 26,RT

## 2018-12-11 PROCEDURE — 99024 POSTOP FOLLOW-UP VISIT: CPT

## 2018-12-11 RX ORDER — ENOXAPARIN SODIUM 100 MG/ML
40 INJECTION SUBCUTANEOUS ONCE
Qty: 0 | Refills: 0 | Status: COMPLETED | OUTPATIENT
Start: 2018-12-11 | End: 2018-12-11

## 2018-12-11 RX ORDER — SODIUM CHLORIDE 9 MG/ML
1000 INJECTION, SOLUTION INTRAVENOUS
Qty: 0 | Refills: 0 | Status: DISCONTINUED | OUTPATIENT
Start: 2018-12-11 | End: 2018-12-11

## 2018-12-11 RX ORDER — NICOTINE POLACRILEX 2 MG
1 GUM BUCCAL DAILY
Qty: 0 | Refills: 0 | Status: DISCONTINUED | OUTPATIENT
Start: 2018-12-11 | End: 2018-12-12

## 2018-12-11 RX ORDER — NITROFURANTOIN MACROCRYSTAL 50 MG
100 CAPSULE ORAL
Qty: 0 | Refills: 0 | Status: DISCONTINUED | OUTPATIENT
Start: 2018-12-11 | End: 2018-12-12

## 2018-12-11 RX ORDER — SODIUM CHLORIDE 9 MG/ML
1000 INJECTION, SOLUTION INTRAVENOUS
Qty: 0 | Refills: 0 | Status: DISCONTINUED | OUTPATIENT
Start: 2018-12-11 | End: 2018-12-12

## 2018-12-11 RX ORDER — MORPHINE SULFATE 50 MG/1
4 CAPSULE, EXTENDED RELEASE ORAL EVERY 6 HOURS
Qty: 0 | Refills: 0 | Status: DISCONTINUED | OUTPATIENT
Start: 2018-12-11 | End: 2018-12-12

## 2018-12-11 RX ADMIN — Medication 1 PATCH: at 18:07

## 2018-12-11 RX ADMIN — MORPHINE SULFATE 2 MILLIGRAM(S): 50 CAPSULE, EXTENDED RELEASE ORAL at 02:14

## 2018-12-11 RX ADMIN — Medication 1 MILLIGRAM(S): at 11:02

## 2018-12-11 RX ADMIN — Medication 1 PATCH: at 12:01

## 2018-12-11 RX ADMIN — PANTOPRAZOLE SODIUM 40 MILLIGRAM(S): 20 TABLET, DELAYED RELEASE ORAL at 00:29

## 2018-12-11 RX ADMIN — LIDOCAINE 1 PATCH: 4 CREAM TOPICAL at 23:14

## 2018-12-11 RX ADMIN — SODIUM CHLORIDE 1000 MILLILITER(S): 9 INJECTION INTRAMUSCULAR; INTRAVENOUS; SUBCUTANEOUS at 01:58

## 2018-12-11 RX ADMIN — MORPHINE SULFATE 2 MILLIGRAM(S): 50 CAPSULE, EXTENDED RELEASE ORAL at 15:05

## 2018-12-11 RX ADMIN — SODIUM CHLORIDE 100 MILLILITER(S): 9 INJECTION, SOLUTION INTRAVENOUS at 18:27

## 2018-12-11 RX ADMIN — SODIUM CHLORIDE 100 MILLILITER(S): 9 INJECTION, SOLUTION INTRAVENOUS at 02:11

## 2018-12-11 RX ADMIN — Medication 100 MILLIGRAM(S): at 11:02

## 2018-12-11 RX ADMIN — MORPHINE SULFATE 2 MILLIGRAM(S): 50 CAPSULE, EXTENDED RELEASE ORAL at 14:55

## 2018-12-11 RX ADMIN — Medication 1 TABLET(S): at 11:02

## 2018-12-11 RX ADMIN — Medication 100 MILLIGRAM(S): at 21:19

## 2018-12-11 RX ADMIN — Medication 50 MILLIGRAM(S): at 06:55

## 2018-12-11 RX ADMIN — LIDOCAINE 1 PATCH: 4 CREAM TOPICAL at 18:07

## 2018-12-11 RX ADMIN — ENOXAPARIN SODIUM 40 MILLIGRAM(S): 100 INJECTION SUBCUTANEOUS at 21:19

## 2018-12-11 RX ADMIN — Medication 50 MILLIGRAM(S): at 11:08

## 2018-12-11 RX ADMIN — Medication 50 MILLIGRAM(S): at 21:19

## 2018-12-11 RX ADMIN — LIDOCAINE 1 PATCH: 4 CREAM TOPICAL at 11:01

## 2018-12-11 RX ADMIN — Medication 2 MILLIGRAM(S): at 03:32

## 2018-12-11 NOTE — PROGRESS NOTE ADULT - ASSESSMENT
Pt is a 54 yo M w/ PMH of alcohol abuse and HTN came to [facility] ER by ambulance , s/p fall , found to have R-hip fracture and alcohol withdraw syndrome .     1-Alcohol withdraw syndrome   -falls precautions , aspiration precautions, sz precautions  -s/p librium 50 mg x 1, ativan 2 mg x 2 in the ER  -CIWA protocol   -CIWA= 0 this morning   -librium sanaz   -ativan for sx trigger   -Banana bag, then c/w multivitamin and vit B1, folic acid   - consult   -NPO for now until improving in mental status    -CT of head and C-spine negative for stroke and Fx .     2-Right hip fracture   -s/p fall at his mother house   -Right hip XR Positive for Fx.  -orhto consulted: recommend NPO for now , avoid AC and medical optimization for surgery   -bed rest   -pain control w/ morphine and lidocaine patch     3- Abnormal LFT  -most likely 2/2 to alcohol abuse and possible cirrhosis   -ast= 139 and alt= 92 ,bili = 4.1  Will trend LFT   -will consider hepatitis panel   -abdominal US ordered     4-Normocytis anemia  -previous hb= 13.3 on 12/2/18 , recent Hb= 9.8 , repeat Hb= 9.8 last night   -guaic negative   -will check anemia panel     5-Preventive measure   -DVT prophy: VCD boots for now, pt w/ R-hip fx and possible surgical intervention   -

## 2018-12-11 NOTE — PROGRESS NOTE ADULT - SUBJECTIVE AND OBJECTIVE BOX
Pt Name: CATHY REYES    MRN: [id number]      Patient is a 54 yo M being followed for R IT hip fracture. No acute events reported overnight. Patient was seen and evaluated at bedside this AM. Patient feels well. Patient denies hip pain at this time. No additional orthopaedic complaints expressed at this time.       PAST MEDICAL & SURGICAL HISTORY:  Poor historian  Alcohol abuse  Hypertension, unspecified type  No significant past surgical history      Allergies: penicillins (Unknown)  shellfish (Unknown)      Medications: chlordiazePOXIDE   Oral   chlordiazePOXIDE 50 milliGRAM(s) Oral every 8 hours  enalaprilat Injectable 1.25 milliGRAM(s) IV Push every 6 hours PRN  folic acid 1 milliGRAM(s) Oral daily  lactated ringers. 1000 milliLiter(s) IV Continuous <Continuous>  lidocaine   Patch 1 Patch Transdermal daily  LORazepam   Injectable 2 milliGRAM(s) IV Push every 1 hour PRN  morphine  - Injectable 2 milliGRAM(s) IV Push every 4 hours PRN  morphine  - Injectable 4 milliGRAM(s) IV Push every 6 hours PRN  multivitamin 1 Tablet(s) Oral daily  nicotine - 21 mG/24Hr(s) Patch 1 patch Transdermal daily  thiamine 100 milliGRAM(s) Oral daily                        9.8    x     )-----------( x        ( 10 Dec 2018 22:47 )             31.5     12-10    133<L>  |  92<L>  |  9.0  ----------------------------<  94  3.8   |  22.0  |  0.84    Ca    8.6      10 Dec 2018 18:38    TPro  6.8  /  Alb  3.3  /  TBili  4.1<H>  /  DBili  x   /  AST  139<H>  /  ALT  92<H>  /  AlkPhos  247<H>  12-10      PHYSICAL EXAM:    Vital Signs Last 24 Hrs  T(C): 36.7 (11 Dec 2018 08:00), Max: 37.1 (11 Dec 2018 03:27)  T(F): 98.1 (11 Dec 2018 08:00), Max: 98.7 (11 Dec 2018 03:27)  HR: 88 (11 Dec 2018 08:00) (84 - 122)  BP: 133/93 (11 Dec 2018 08:00) (130/95 - 178/118)  BP(mean): 105 (11 Dec 2018 08:00) (105 - 105)  RR: 14 (11 Dec 2018 08:00) (14 - 20)  SpO2: 100% (11 Dec 2018 08:) (93% - 100%)  Daily Height in cm: 170.18 (10 Dec 2018 14:22)    Daily Weight in k.4 (11 Dec 2018 04:55)    Appearance: Alert, responsive, in no acute distress.    Skin: no rash on visible skin. Skin is clean, dry and intact. No bleeding. No abrasions. No ulcerations.    Musculoskeletal:         Left Lower Extremity: Calf supple and nontender.        Right Lower Extremity: Skin warm and intact. Hip held in flexion. No gross deformity, erythema, swelling, or ecchymosis. No TTP of groin, GT, femoral shaft, or knee. Calf supple and nontender. Hip/knee ROM not assessed secondary to fracture. SILT distally throughout. TA/GSC/EHL/FHL intact.       A/P:  Pt is a 53y Male with R IT fracture.     PLAN:   -Pain control.  -NWB RLE/bedrest.  -Plan for surgical fixation when patient is medically optimized.   -Orthopaedic Imaging reviewed.  -DVT PPx as per primary team.  -Remaining care as per primary team.  -Ortho to continue to follow.

## 2018-12-11 NOTE — CONSULT NOTE ADULT - SUBJECTIVE AND OBJECTIVE BOX
[facility]                                        Neurology at [place]                                  Abbie Putnam, & D[address]                                      [address]                                           [place], [place], [address]                                                [phone]        CC: fall, alcohol withdrawal.     HISTORY:  The patient is a 53y Male who was admitted after a fall. There was some concern for right sided weakness prior to the fall. He does not recall this.   He reportedly was unable to bear weight on the right leg after the fall.   He was noted to be agitated in the ER and was felt to be in withdrawal from alcohol.   He denies any right sided difficulty today.     PAST MEDICAL & SURGICAL HISTORY:  Poor historian  Alcohol abuse  Hypertension, unspecified type  No significant past surgical history    MEDICATION PRIOR TO ADMISSION:  None.    MEDICATIONS  (STANDING):  chlordiazePOXIDE 50 milliGRAM(s) Oral every 6 hours  chlordiazePOXIDE   Oral   chlordiazePOXIDE 50 milliGRAM(s) Oral every 8 hours  folic acid 1 milliGRAM(s) Oral daily  lidocaine   Patch 1 Patch Transdermal daily  multivitamin 1 Tablet(s) Oral daily  thiamine 100 milliGRAM(s) Oral daily    MEDICATIONS  (PRN):  enalaprilat Injectable 1.25 milliGRAM(s) IV Push every 6 hours PRN BP SYS >180 or diast >100  LORazepam   Injectable 2 milliGRAM(s) IV Push every 1 hour PRN Symptom-triggered: each CIWA -Ar score 8 or GREATER  morphine  - Injectable 2 milliGRAM(s) IV Push every 4 hours PRN Moderate Pain (4 - 6)  morphine  - Injectable 4 milliGRAM(s) IV Push every 6 hours PRN Severe Pain (7 - 10)      Allergies  penicillins (Unknown)  shellfish (Unknown)    SOCIAL HISTORY:  Smoker.  Alcohol abuse.   Marijuana.    FAMILY HISTORY:  No pertinent family history in first degree relatives.  Patient unable to provide any family history.  Mother/father unknown history.     ROS:  Constitutional: The patient denies fevers or weight changes.  Neuro: As per HPI.  Eyes: Denies blurry vision.  Ears/nose/throat: Denies Tinnitus.   Cardiac: Denies chest pain. Denies palpitations.  Respiratory: Denies shortness of breath.  GI: Denies abdominal pain, nausea, or vomiting.  : Denies change in urinary pattern.  Integumentary: Denies rash.  Psych: Denies recent mood changes.  Heme: denies easy bleeding/bruising.    Exam:  Vital Signs Last 24 Hrs  T(C): 36.7 (11 Dec 2018 08:00), Max: 37.1 (11 Dec 2018 03:27)  T(F): 98.1 (11 Dec 2018 08:00), Max: 98.7 (11 Dec 2018 03:27)  HR: 88 (11 Dec 2018 08:00) (84 - 122)  BP: 133/93 (11 Dec 2018 08:00) (130/95 - 178/118)  BP(mean): 105 (11 Dec 2018 08:00) (105 - 105)  RR: 14 (11 Dec 2018 08:00) (14 - 20)  SpO2: 100% (11 Dec 2018 08:00) (93% - 100%)  General: NAD.   Carotid bruits absent.     Mental status: The patient is awake, alert, and oriented to self and hospital. Cannot give day/date. There is no aphasia. There is mild dysarthria.    Cranial nerves: There is no papilledema. Pupils react symmetrically to light. There is no visual field deficit to confrontation. Extraocular motion is full with no nystagmus. There is no ptosis. Facial sensation is intact. Facial musculature is symmetric. Palate elevates symmetrically. Tongue is midline.    Motor: There is normal bulk and tone.  Strength is 5/5 in the right arm and leg.   Strength is 5/5 in the left arm and leg.    Sensation: Intact to light touch and pin.    Reflexes: 2+ throughout and plantar responses are flexor.    Cerebellar: There is no dysmetria on finger to nose testing.    LABS:                         9.8    x     )-----------( x        ( 10 Dec 2018 22:47 )             31.5       12-10    133<L>  |  92<L>  |  9.0  ----------------------------<  94  3.8   |  22.0  |  0.84    Ca    8.6      10 Dec 2018 18:38    TPro  6.8  /  Alb  3.3  /  TBili  4.1<H>  /  DBili  x   /  AST  139<H>  /  ALT  92<H>  /  AlkPhos  247<H>  12-10      PT/INR - ( 10 Dec 2018 18:38 )   PT: 11.1 sec;   INR: 0.97 ratio         PTT - ( 10 Dec 2018 18:38 )  PTT:26.9 sec    RADIOLOGY   CT head images reviewed (and concur with report): There is no acute pathology.

## 2018-12-11 NOTE — ED ADULT NURSE REASSESSMENT NOTE - NS ED NURSE REASSESS COMMENT FT1
Pt stable for xray, Transported to ED x-ray by this RN, Pt tolerate x-ray well. Appears in no apparent distress @ this time, transported back to [address] by RN. HR 99 on cm, saO2 100%. Calm and cooperative, call bell provided, no s/s sz @ this time

## 2018-12-11 NOTE — PROVIDER CONTACT NOTE (OTHER) - REASON
Chart Reviewed. MD plans for Surgical Intervention. PT requests updated MD orders for Post op PT Intervention.

## 2018-12-11 NOTE — PROGRESS NOTE ADULT - SUBJECTIVE AND OBJECTIVE BOX
Pt is a 52 yo M w/ PMH of alcohol abuse and HTN came to [facility] ER by ambulance , s/p fall , found to have R-hip fracture and alcohol withdraw syndrome .      Subjective : Patient seen and examined at bedside, No acute overnight events. Pt is more awake this morning , report pain in his RLE.   Pt is not ambulating due to right hip fx.    ROS: Denies fever, chest pain, SOB, abdominal pain, diarrhea, constipation, calf pain.    Cardiac monitor reviewed; SR    VS:   Vital Signs Last 24 Hrs  T(C): 36.8 (11 Dec 2018 04:55), Max: 37.1 (11 Dec 2018 03:27)  T(F): 98.3 (11 Dec 2018 04:55), Max: 98.7 (11 Dec 2018 03:27)  HR: 101 (11 Dec 2018 06:53) (84 - 122)  BP: 156/117 (11 Dec 2018 06:53) (130/95 - 178/118)  RR: 14 (11 Dec 2018 04:23) (14 - 20)  SpO2: 100% (11 Dec 2018 04:23) (93% - 100%)    PHYSICAL EXAM:  GENERAL: NAD  	HEAD:  Atraumatic, Normocephalic  	EYES: EOMI, PERRLA, positive scleral icterus   	ENMT: No tonsillar erythema, exudates, or enlargement; Moist mucous membranes,  	NECK: Supple  	Respiratory: Clear to auscultation bilaterally; No rales, rhonchi, wheezing, or rubs  	CV: Regular rate and rhythm; No murmurs, rubs, or gallops  	Gastrointestinal: Soft, Nontender, Nondistended; Bowel sounds present  	EXTREMITIES:  2+ Peripheral Pulses, No clubbing, cyanosis, or edema. RLE shorter and externally rotated , pain to palpation over R- hip and pain w/ active movement of RLE.   	NERVOUS SYSTEM:  lethargy ; Motor Strength 5/5 B/L upper and LLE extremities; unable to asses RLE due to pain , sensation intact .  SKIN: No rashes or lesions        Labs:                        9.8    x     )-----------( x        ( 10 Dec 2018 22:47 )             31.5   12-10    133<L>  |  92<L>  |  9.0  ----------------------------<  94  3.8   |  22.0  |  0.84    Ca    8.6      10 Dec 2018 18:38    TPro  6.8  /  Alb  3.3  /  TBili  4.1<H>  /  DBili  x   /  AST  139<H>  /  ALT  92<H>  /  AlkPhos  247<H>  12-10      12-10 @ 07:01  -  12-11 @ 06:55  --------------------------------------------------------  IN: 300 mL / OUT: 0 mL / NET: 300 mL        Radiology:  < from: Xray Chest 1 View-PORTABLE IMMEDIATE (12.02.18 @ 08:32) >  The cardiac, hilar and mediastinal contours are unremarkable. The lungs   are clear. The osseous structures demonstrate no acute abnormality.         IMPRESSION:    Clear lungs. No acute disease.    < end of copied text >    Right hip xr: positive intertrochanteric Fx    Medications:  MEDICATIONS  (STANDING):  chlordiazePOXIDE 50 milliGRAM(s) Oral every 6 hours  chlordiazePOXIDE   Oral   chlordiazePOXIDE 50 milliGRAM(s) Oral every 8 hours  folic acid 1 milliGRAM(s) Oral daily  lidocaine   Patch 1 Patch Transdermal daily  multivitamin 1 Tablet(s) Oral daily  sodium chloride 0.9% 1000 milliLiter(s) (100 mL/Hr) IV Continuous <Continuous>  thiamine 100 milliGRAM(s) Oral daily    MEDICATIONS  (PRN):  enalaprilat Injectable 1.25 milliGRAM(s) IV Push every 6 hours PRN BP SYS >180 or diast >100  LORazepam   Injectable 2 milliGRAM(s) IV Push every 1 hour PRN Symptom-triggered: each CIWA -Ar score 8 or GREATER  morphine  - Injectable 2 milliGRAM(s) IV Push every 4 hours PRN Moderate Pain (4 - 6)  morphine  - Injectable 4 milliGRAM(s) IV Push every 6 hours PRN Severe Pain (7 - 10)

## 2018-12-11 NOTE — CONSULT NOTE ADULT - ASSESSMENT
The patient is a 53y Male with alcohol withdrawal.   He had possible right sided weakness prior to arrival.     TIA.  Agree with checking MRI brain.   If any CVA then further vascular work up will be needed.     Alcohol withdrawal   [facility] protocol.    Case discussed with PA covering Stepdown unit.

## 2018-12-12 ENCOUNTER — TRANSCRIPTION ENCOUNTER (OUTPATIENT)
Age: 53
End: 2018-12-12

## 2018-12-12 LAB
ANION GAP SERPL CALC-SCNC: 11 MMOL/L — SIGNIFICANT CHANGE UP (ref 5–17)
BASOPHILS # BLD AUTO: 0.1 K/UL — SIGNIFICANT CHANGE UP (ref 0–0.2)
BASOPHILS NFR BLD AUTO: 0.8 % — SIGNIFICANT CHANGE UP (ref 0–2)
BLD GP AB SCN SERPL QL: SIGNIFICANT CHANGE UP
BUN SERPL-MCNC: 5 MG/DL — LOW (ref 8–20)
CALCIUM SERPL-MCNC: 8.4 MG/DL — LOW (ref 8.6–10.2)
CHLORIDE SERPL-SCNC: 96 MMOL/L — LOW (ref 98–107)
CO2 SERPL-SCNC: 25 MMOL/L — SIGNIFICANT CHANGE UP (ref 22–29)
CREAT SERPL-MCNC: 0.54 MG/DL — SIGNIFICANT CHANGE UP (ref 0.5–1.3)
CULTURE RESULTS: SIGNIFICANT CHANGE UP
EOSINOPHIL # BLD AUTO: 0.1 K/UL — SIGNIFICANT CHANGE UP (ref 0–0.5)
EOSINOPHIL NFR BLD AUTO: 1.4 % — SIGNIFICANT CHANGE UP (ref 0–5)
FERRITIN SERPL-MCNC: 1836 NG/ML — HIGH (ref 30–400)
GLUCOSE SERPL-MCNC: 117 MG/DL — HIGH (ref 70–115)
HAV IGM SER-ACNC: SIGNIFICANT CHANGE UP
HBV CORE AB SER-ACNC: SIGNIFICANT CHANGE UP
HBV CORE IGM SER-ACNC: SIGNIFICANT CHANGE UP
HBV SURFACE AB SER-ACNC: 12.4 MIU/ML — SIGNIFICANT CHANGE UP
HBV SURFACE AG SER-ACNC: SIGNIFICANT CHANGE UP
HCT VFR BLD CALC: 25.5 % — LOW (ref 42–52)
HCV AB S/CO SERPL IA: 0.05 S/CO — SIGNIFICANT CHANGE UP
HCV AB SERPL-IMP: SIGNIFICANT CHANGE UP
HGB BLD-MCNC: 7.9 G/DL — LOW (ref 14–18)
LYMPHOCYTES # BLD AUTO: 1.6 K/UL — SIGNIFICANT CHANGE UP (ref 1–4.8)
LYMPHOCYTES # BLD AUTO: 21 % — SIGNIFICANT CHANGE UP (ref 20–55)
MCHC RBC-ENTMCNC: 26 PG — LOW (ref 27–31)
MCHC RBC-ENTMCNC: 31 G/DL — LOW (ref 32–36)
MCV RBC AUTO: 83.9 FL — SIGNIFICANT CHANGE UP (ref 80–94)
MONOCYTES # BLD AUTO: 1.3 K/UL — HIGH (ref 0–0.8)
MONOCYTES NFR BLD AUTO: 17.4 % — HIGH (ref 3–10)
NEUTROPHILS # BLD AUTO: 4.4 K/UL — SIGNIFICANT CHANGE UP (ref 1.8–8)
NEUTROPHILS NFR BLD AUTO: 57.6 % — SIGNIFICANT CHANGE UP (ref 37–73)
PLATELET # BLD AUTO: 137 K/UL — LOW (ref 150–400)
POTASSIUM SERPL-MCNC: 3.1 MMOL/L — LOW (ref 3.5–5.3)
POTASSIUM SERPL-SCNC: 3.1 MMOL/L — LOW (ref 3.5–5.3)
RBC # BLD: 3.04 M/UL — LOW (ref 4.6–6.2)
RBC # FLD: 17.6 % — HIGH (ref 11–15.6)
SODIUM SERPL-SCNC: 132 MMOL/L — LOW (ref 135–145)
SPECIMEN SOURCE: SIGNIFICANT CHANGE UP
TYPE + AB SCN PNL BLD: SIGNIFICANT CHANGE UP
WBC # BLD: 7.7 K/UL — SIGNIFICANT CHANGE UP (ref 4.8–10.8)
WBC # FLD AUTO: 7.7 K/UL — SIGNIFICANT CHANGE UP (ref 4.8–10.8)

## 2018-12-12 PROCEDURE — 99233 SBSQ HOSP IP/OBS HIGH 50: CPT | Mod: GC

## 2018-12-12 PROCEDURE — 27245 TREAT THIGH FRACTURE: CPT | Mod: RT

## 2018-12-12 PROCEDURE — 73502 X-RAY EXAM HIP UNI 2-3 VIEWS: CPT | Mod: 26,RT

## 2018-12-12 PROCEDURE — 27245 TREAT THIGH FRACTURE: CPT | Mod: AS,RT

## 2018-12-12 PROCEDURE — 99233 SBSQ HOSP IP/OBS HIGH 50: CPT

## 2018-12-12 PROCEDURE — 99232 SBSQ HOSP IP/OBS MODERATE 35: CPT | Mod: 57

## 2018-12-12 RX ORDER — POTASSIUM CHLORIDE 20 MEQ
40 PACKET (EA) ORAL ONCE
Qty: 0 | Refills: 0 | Status: COMPLETED | OUTPATIENT
Start: 2018-12-12 | End: 2018-12-12

## 2018-12-12 RX ORDER — HYDROMORPHONE HYDROCHLORIDE 2 MG/ML
0.5 INJECTION INTRAMUSCULAR; INTRAVENOUS; SUBCUTANEOUS
Qty: 0 | Refills: 0 | Status: DISCONTINUED | OUTPATIENT
Start: 2018-12-12 | End: 2018-12-16

## 2018-12-12 RX ORDER — HYDROMORPHONE HYDROCHLORIDE 2 MG/ML
0.5 INJECTION INTRAMUSCULAR; INTRAVENOUS; SUBCUTANEOUS
Qty: 0 | Refills: 0 | Status: DISCONTINUED | OUTPATIENT
Start: 2018-12-12 | End: 2018-12-12

## 2018-12-12 RX ORDER — OXYCODONE HYDROCHLORIDE 5 MG/1
5 TABLET ORAL
Qty: 0 | Refills: 0 | Status: DISCONTINUED | OUTPATIENT
Start: 2018-12-12 | End: 2018-12-13

## 2018-12-12 RX ORDER — DEXAMETHASONE 0.5 MG/5ML
4 ELIXIR ORAL ONCE
Qty: 0 | Refills: 0 | Status: DISCONTINUED | OUTPATIENT
Start: 2018-12-12 | End: 2018-12-12

## 2018-12-12 RX ORDER — DOCUSATE SODIUM 100 MG
100 CAPSULE ORAL THREE TIMES A DAY
Qty: 0 | Refills: 0 | Status: DISCONTINUED | OUTPATIENT
Start: 2018-12-12 | End: 2018-12-20

## 2018-12-12 RX ORDER — HYDROMORPHONE HYDROCHLORIDE 2 MG/ML
2 INJECTION INTRAMUSCULAR; INTRAVENOUS; SUBCUTANEOUS
Qty: 0 | Refills: 0 | Status: DISCONTINUED | OUTPATIENT
Start: 2018-12-12 | End: 2018-12-16

## 2018-12-12 RX ORDER — CEFAZOLIN SODIUM 1 G
2000 VIAL (EA) INJECTION
Qty: 0 | Refills: 0 | Status: COMPLETED | OUTPATIENT
Start: 2018-12-12 | End: 2018-12-13

## 2018-12-12 RX ORDER — ENOXAPARIN SODIUM 100 MG/ML
30 INJECTION SUBCUTANEOUS
Qty: 0 | Refills: 0 | Status: DISCONTINUED | OUTPATIENT
Start: 2018-12-13 | End: 2018-12-13

## 2018-12-12 RX ORDER — KETOROLAC TROMETHAMINE 30 MG/ML
15 SYRINGE (ML) INJECTION ONCE
Qty: 0 | Refills: 0 | Status: DISCONTINUED | OUTPATIENT
Start: 2018-12-12 | End: 2018-12-16

## 2018-12-12 RX ORDER — ACETAMINOPHEN 500 MG
650 TABLET ORAL EVERY 6 HOURS
Qty: 0 | Refills: 0 | Status: DISCONTINUED | OUTPATIENT
Start: 2018-12-12 | End: 2018-12-12

## 2018-12-12 RX ORDER — SODIUM CHLORIDE 9 MG/ML
1000 INJECTION, SOLUTION INTRAVENOUS
Qty: 0 | Refills: 0 | Status: DISCONTINUED | OUTPATIENT
Start: 2018-12-12 | End: 2018-12-12

## 2018-12-12 RX ORDER — POTASSIUM CHLORIDE 20 MEQ
40 PACKET (EA) ORAL ONCE
Qty: 0 | Refills: 0 | Status: DISCONTINUED | OUTPATIENT
Start: 2018-12-12 | End: 2018-12-12

## 2018-12-12 RX ORDER — OXYCODONE HYDROCHLORIDE 5 MG/1
10 TABLET ORAL
Qty: 0 | Refills: 0 | Status: DISCONTINUED | OUTPATIENT
Start: 2018-12-12 | End: 2018-12-19

## 2018-12-12 RX ORDER — MAGNESIUM HYDROXIDE 400 MG/1
30 TABLET, CHEWABLE ORAL DAILY
Qty: 0 | Refills: 0 | Status: DISCONTINUED | OUTPATIENT
Start: 2018-12-12 | End: 2018-12-20

## 2018-12-12 RX ORDER — ONDANSETRON 8 MG/1
4 TABLET, FILM COATED ORAL EVERY 6 HOURS
Qty: 0 | Refills: 0 | Status: DISCONTINUED | OUTPATIENT
Start: 2018-12-12 | End: 2018-12-20

## 2018-12-12 RX ORDER — ACETAMINOPHEN 500 MG
650 TABLET ORAL EVERY 6 HOURS
Qty: 0 | Refills: 0 | Status: DISCONTINUED | OUTPATIENT
Start: 2018-12-12 | End: 2018-12-20

## 2018-12-12 RX ORDER — SODIUM CHLORIDE 9 MG/ML
1000 INJECTION, SOLUTION INTRAVENOUS
Qty: 0 | Refills: 0 | Status: DISCONTINUED | OUTPATIENT
Start: 2018-12-12 | End: 2018-12-16

## 2018-12-12 RX ORDER — ONDANSETRON 8 MG/1
4 TABLET, FILM COATED ORAL ONCE
Qty: 0 | Refills: 0 | Status: DISCONTINUED | OUTPATIENT
Start: 2018-12-12 | End: 2018-12-12

## 2018-12-12 RX ORDER — ACETAMINOPHEN 500 MG
1000 TABLET ORAL ONCE
Qty: 0 | Refills: 0 | Status: COMPLETED | OUTPATIENT
Start: 2018-12-12 | End: 2018-12-12

## 2018-12-12 RX ORDER — FENTANYL CITRATE 50 UG/ML
25 INJECTION INTRAVENOUS
Qty: 0 | Refills: 0 | Status: DISCONTINUED | OUTPATIENT
Start: 2018-12-12 | End: 2018-12-12

## 2018-12-12 RX ADMIN — Medication 40 MILLIEQUIVALENT(S): at 11:09

## 2018-12-12 RX ADMIN — Medication 50 MILLIGRAM(S): at 07:16

## 2018-12-12 RX ADMIN — MORPHINE SULFATE 2 MILLIGRAM(S): 50 CAPSULE, EXTENDED RELEASE ORAL at 07:45

## 2018-12-12 RX ADMIN — Medication 100 MILLIGRAM(S): at 23:09

## 2018-12-12 RX ADMIN — Medication 1 PATCH: at 10:23

## 2018-12-12 RX ADMIN — SODIUM CHLORIDE 125 MILLILITER(S): 9 INJECTION, SOLUTION INTRAVENOUS at 21:07

## 2018-12-12 RX ADMIN — SODIUM CHLORIDE 100 MILLILITER(S): 9 INJECTION, SOLUTION INTRAVENOUS at 05:58

## 2018-12-12 RX ADMIN — Medication 50 MILLIGRAM(S): at 13:22

## 2018-12-12 RX ADMIN — Medication 1 PATCH: at 13:50

## 2018-12-12 RX ADMIN — Medication 400 MILLIGRAM(S): at 22:46

## 2018-12-12 RX ADMIN — Medication 1000 MILLIGRAM(S): at 23:08

## 2018-12-12 NOTE — DISCHARGE NOTE ADULT - ADDITIONAL INSTRUCTIONS
Please follow up at with your PMD at [facility] within 1 week of discharge  Please follow up with your orthopedic surgeon 2-3 weeks post op for a wound check Please follow up at with your Primary care physician at [facility] within 1 week of discharge  Please follow up with your orthopedic surgeon 2-3 weeks post op for a wound check.

## 2018-12-12 NOTE — PROGRESS NOTE ADULT - SUBJECTIVE AND OBJECTIVE BOX
Pt is a 52 yo M w/ PMH of alcohol abuse and HTN came to [facility] ER by ambulance , s/p fall , found to have R-hip fracture and alcohol withdraw syndrome .      Subjective : Patient seen and examined at bedside, No acute overnight events. Pt was wake and alert this morning. Reports he can't move his R leg but denies any pain. Understands that he is NPO for surgery today. Agrees with plan. Pt has not had a BM for 2 days but is actively voiding. No other complaints at this time  Pt is not ambulating due to right hip fx.    ROS: Denies fever, chest pain, SOB, abdominal pain, diarrhea, constipation, calf pain.    Vital Signs Last 24 Hrs  T(C): 36.3 (12 Dec 2018 05:16), Max: 37.2 (11 Dec 2018 13:21)  T(F): 97.4 (12 Dec 2018 05:16), Max: 99 (11 Dec 2018 16:26)  HR: 105 (12 Dec 2018 05:16) (80 - 109)  BP: 158/94 (12 Dec 2018 05:16) (133/93 - 170/107)  BP(mean): 110 (12 Dec 2018 00:38) (105 - 115)  RR: 18 (12 Dec 2018 05:16) (14 - 18)  SpO2: 98% (12 Dec 2018 05:16) (98% - 100%)    PHYSICAL EXAM:  GENERAL: NAD  HEAD:  Atraumatic, Normocephalic  EYES: EOMI, PERRLA, positive scleral icterus   ENMT: No tonsillar erythema, exudates, or enlargement; Moist mucous membranes,  NECK: Supple  Respiratory: Clear to auscultation bilaterally; No rales, rhonchi, wheezing, or rubs  CV: Regular rate and rhythm; No murmurs, rubs, or gallops  Gastrointestinal: Soft, Nontender, Nondistended; Bowel sounds present  EXTREMITIES:  2+ Peripheral Pulses, No clubbing, cyanosis, or edema. RLE shorter and externally rotated , pain to palpation over R- hip and pain w/ active movement of RLE.   NERVOUS SYSTEM:  lethargy ; Motor Strength 5/5 B/L upper and LLE extremities; unable to asses RLE due to pain , sensation intact .  SKIN: No rashes or lesions. Crusting and dryness of plantar aspect of b/l feet.                            9.4    7.5   )-----------( 154      ( 11 Dec 2018 11:58 )             30.2       135  |  97<L>  |  7.0<L>  ----------------------------<  105  3.9   |  23.0  |  0.56    Ca    8.7      11 Dec 2018 11:58  Phos  2.7       Mg     1.8         TPro  6.8  /  Alb  3.3  /  TBili  4.1<H>  /  DBili  x   /  AST  139<H>  /  ALT  92<H>  /  AlkPhos  247<H>  12-10    MRI head: Cerebral and cerebellar atrophy. Chronic white matter changes.  Encephalomalacia and gliosis in the mid anika from prior injury.    EE. Normal left atrial size.   2. Normal wall motion. Left ventricular ejection fraction, by visual   estimation, is 55 to 60%.Grade I diastolic dysfunction.   3. The right atrium is normal in size.   4. Normal right ventricular size and function.   5. No significant valvular abnormality.   6. There is no evidence of pericardial effusion.    Radiology:  < from: Xray Chest 1 View-PORTABLE IMMEDIATE (18 @ 08:32) >  The cardiac, hilar and mediastinal contours are unremarkable. The lungs   are clear. The osseous structures demonstrate no acute abnormality.         IMPRESSION:    Clear lungs. No acute disease.    < end of copied text >    Right hip xr: positive intertrochanteric Fx    MEDICATIONS  (STANDING):  chlordiazePOXIDE   Oral   chlordiazePOXIDE 50 milliGRAM(s) Oral every 8 hours  dextrose 5% + lactated ringers. 1000 milliLiter(s) (100 mL/Hr) IV Continuous <Continuous>  folic acid 1 milliGRAM(s) Oral daily  lidocaine   Patch 1 Patch Transdermal daily  multivitamin 1 Tablet(s) Oral daily  nicotine - 21 mG/24Hr(s) Patch 1 patch Transdermal daily  nitrofurantoin monohydrate/macrocrystals (MACROBID) 100 milliGRAM(s) Oral two times a day with meals  thiamine 100 milliGRAM(s) Oral daily    MEDICATIONS  (PRN):  enalaprilat Injectable 1.25 milliGRAM(s) IV Push every 6 hours PRN BP SYS >180 or diast >100  LORazepam   Injectable 2 milliGRAM(s) IV Push every 1 hour PRN Symptom-triggered: each CIWA -Ar score 8 or GREATER  morphine  - Injectable 2 milliGRAM(s) IV Push every 4 hours PRN Moderate Pain (4 - 6)  morphine  - Injectable 4 milliGRAM(s) IV Push every 6 hours PRN Severe Pain (7 - 10) Pt is a 52 yo M w/ PMH of alcohol abuse and HTN came to [facility] ER by ambulance , s/p fall , found to have R-hip fracture and alcohol withdraw syndrome .      Subjective : Patient seen and examined at bedside, No acute overnight events. Pt was wake and alert this morning. Reports he can't move his R leg but denies any pain. Understands that he is NPO for surgery today. Agrees with plan. Pt has not had a BM for 2 days but is actively voiding. No other complaints at this time  Pt is not ambulating due to right hip fx.    ROS: Denies fever, chest pain, SOB, abdominal pain, diarrhea, constipation, calf pain.    Vital Signs Last 24 Hrs  T(C): 36.3 (12 Dec 2018 05:16), Max: 37.2 (11 Dec 2018 13:21)  T(F): 97.4 (12 Dec 2018 05:16), Max: 99 (11 Dec 2018 16:26)  HR: 105 (12 Dec 2018 05:16) (80 - 109)  BP: 158/94 (12 Dec 2018 05:16) (133/93 - 170/107)  BP(mean): 110 (12 Dec 2018 00:38) (105 - 115)  RR: 18 (12 Dec 2018 05:16) (14 - 18)  SpO2: 98% (12 Dec 2018 05:16) (98% - 100%)    PHYSICAL EXAM:  GENERAL: NAD  HEAD:  Atraumatic, Normocephalic  EYES: EOMI, PERRLA, positive scleral icterus   ENMT: No tonsillar erythema, exudates, or enlargement; Moist mucous membranes,  NECK: Supple  Respiratory: Clear to auscultation bilaterally; No rales, rhonchi, wheezing, or rubs  CV: Regular rate and rhythm; No murmurs, rubs, or gallops  Gastrointestinal: Soft, Nontender, Nondistended; Bowel sounds present  EXTREMITIES:  2+ Peripheral Pulses, No clubbing, cyanosis, or edema. RLE shorter and externally rotated , pain to palpation over R- hip and pain w/ active movement of RLE.   NERVOUS SYSTEM:  lethargy ; Motor Strength 5/5 B/L upper and LLE extremities; unable to asses RLE due to pain , sensation intact .  SKIN: No rashes or lesions. Crusting and dryness of plantar aspect of b/l feet.                                     7.9    7.7   )-----------( 137      ( 12 Dec 2018 08:24 )             25.5     12-    132<L>  |  96<L>  |  5.0<L>  ----------------------------<  117<H>  3.1<L>   |  25.0  |  0.54    Ca    8.4<L>      12 Dec 2018 08:24  Phos  2.7       Mg     1.8         TPro  6.8  /  Alb  3.3  /  TBili  4.1<H>  /  DBili  x   /  AST  139<H>  /  ALT  92<H>  /  AlkPhos  247<H>  12-10      MRI head: Cerebral and cerebellar atrophy. Chronic white matter changes.  Encephalomalacia and gliosis in the mid anika from prior injury.    EE. Normal left atrial size.   2. Normal wall motion. Left ventricular ejection fraction, by visual   estimation, is 55 to 60%.Grade I diastolic dysfunction.   3. The right atrium is normal in size.   4. Normal right ventricular size and function.   5. No significant valvular abnormality.   6. There is no evidence of pericardial effusion.    Radiology:  < from: Xray Chest 1 View-PORTABLE IMMEDIATE (18 @ 08:32) >  The cardiac, hilar and mediastinal contours are unremarkable. The lungs   are clear. The osseous structures demonstrate no acute abnormality.         IMPRESSION:    Clear lungs. No acute disease.    < end of copied text >    Right hip xr: positive intertrochanteric Fx    MEDICATIONS  (STANDING):  chlordiazePOXIDE   Oral   chlordiazePOXIDE 50 milliGRAM(s) Oral every 8 hours  dextrose 5% + lactated ringers. 1000 milliLiter(s) (100 mL/Hr) IV Continuous <Continuous>  folic acid 1 milliGRAM(s) Oral daily  lidocaine   Patch 1 Patch Transdermal daily  multivitamin 1 Tablet(s) Oral daily  nicotine - 21 mG/24Hr(s) Patch 1 patch Transdermal daily  nitrofurantoin monohydrate/macrocrystals (MACROBID) 100 milliGRAM(s) Oral two times a day with meals  thiamine 100 milliGRAM(s) Oral daily    MEDICATIONS  (PRN):  enalaprilat Injectable 1.25 milliGRAM(s) IV Push every 6 hours PRN BP SYS >180 or diast >100  LORazepam   Injectable 2 milliGRAM(s) IV Push every 1 hour PRN Symptom-triggered: each CIWA -Ar score 8 or GREATER  morphine  - Injectable 2 milliGRAM(s) IV Push every 4 hours PRN Moderate Pain (4 - 6)  morphine  - Injectable 4 milliGRAM(s) IV Push every 6 hours PRN Severe Pain (7 - 10) Pt is a 54 yo M w/ PMH of alcohol abuse and HTN came to [facility] ER by ambulance , s/p fall , found to have R-hip fracture and alcohol withdraw syndrome .      Chief complaint: Rt hip pain    Subjective : Patient seen and examined at bedside, No acute overnight events. Pt was wake and alert this morning. Reports he can't move his R leg but denies any pain. Understands that he is NPO for surgery today. Agrees with plan. Pt has not had a BM for 2 days but is actively voiding. No other complaints at this time  Pt is not ambulating due to right hip fx.    ROS: Denies fever, chest pain, SOB, abdominal pain, diarrhea, constipation, calf pain.    Vital Signs Last 24 Hrs  T(C): 36.3 (12 Dec 2018 05:16), Max: 37.2 (11 Dec 2018 13:21)  T(F): 97.4 (12 Dec 2018 05:16), Max: 99 (11 Dec 2018 16:26)  HR: 105 (12 Dec 2018 05:16) (80 - 109)  BP: 158/94 (12 Dec 2018 05:16) (133/93 - 170/107)  BP(mean): 110 (12 Dec 2018 00:38) (105 - 115)  RR: 18 (12 Dec 2018 05:16) (14 - 18)  SpO2: 98% (12 Dec 2018 05:16) (98% - 100%)    PHYSICAL EXAM:  GENERAL: NAD  HEAD:  Atraumatic, Normocephalic  EYES: EOMI, PERRLA, positive scleral icterus   ENMT: No tonsillar erythema, exudates, or enlargement; Moist mucous membranes,  NECK: Supple  Respiratory: Clear to auscultation bilaterally; No rales, rhonchi, wheezing, or rubs  CV: Regular rate and rhythm; No murmurs, rubs, or gallops  Gastrointestinal: Soft, Nontender, Nondistended; Bowel sounds present  EXTREMITIES:  2+ Peripheral Pulses, No clubbing, cyanosis, or edema. RLE shorter and externally rotated , pain to palpation over R- hip and pain w/ active movement of RLE.   NERVOUS SYSTEM:  lethargy ; Motor Strength 5/5 B/L upper and LLE extremities; unable to asses RLE due to pain , sensation intact .  SKIN: No rashes or lesions. Crusting and dryness of plantar aspect of b/l feet.                                     7.9    7.7   )-----------( 137      ( 12 Dec 2018 08:24 )             25.5     12-    132<L>  |  96<L>  |  5.0<L>  ----------------------------<  117<H>  3.1<L>   |  25.0  |  0.54    Ca    8.4<L>      12 Dec 2018 08:24  Phos  2.7     12  Mg     1.8         TPro  6.8  /  Alb  3.3  /  TBili  4.1<H>  /  DBili  x   /  AST  139<H>  /  ALT  92<H>  /  AlkPhos  247<H>  12-10      MRI head: Cerebral and cerebellar atrophy. Chronic white matter changes.  Encephalomalacia and gliosis in the mid anika from prior injury.    EE. Normal left atrial size.   2. Normal wall motion. Left ventricular ejection fraction, by visual   estimation, is 55 to 60%.Grade I diastolic dysfunction.   3. The right atrium is normal in size.   4. Normal right ventricular size and function.   5. No significant valvular abnormality.   6. There is no evidence of pericardial effusion.    Radiology:  < from: Xray Chest 1 View-PORTABLE IMMEDIATE (18 @ 08:32) >  The cardiac, hilar and mediastinal contours are unremarkable. The lungs   are clear. The osseous structures demonstrate no acute abnormality.         IMPRESSION:    Clear lungs. No acute disease.    < end of copied text >    Right hip xr: positive intertrochanteric Fx    MEDICATIONS  (STANDING):  chlordiazePOXIDE   Oral   chlordiazePOXIDE 50 milliGRAM(s) Oral every 8 hours  dextrose 5% + lactated ringers. 1000 milliLiter(s) (100 mL/Hr) IV Continuous <Continuous>  folic acid 1 milliGRAM(s) Oral daily  lidocaine   Patch 1 Patch Transdermal daily  multivitamin 1 Tablet(s) Oral daily  nicotine - 21 mG/24Hr(s) Patch 1 patch Transdermal daily  nitrofurantoin monohydrate/macrocrystals (MACROBID) 100 milliGRAM(s) Oral two times a day with meals  thiamine 100 milliGRAM(s) Oral daily    MEDICATIONS  (PRN):  enalaprilat Injectable 1.25 milliGRAM(s) IV Push every 6 hours PRN BP SYS >180 or diast >100  LORazepam   Injectable 2 milliGRAM(s) IV Push every 1 hour PRN Symptom-triggered: each CIWA -Ar score 8 or GREATER  morphine  - Injectable 2 milliGRAM(s) IV Push every 4 hours PRN Moderate Pain (4 - 6)  morphine  - Injectable 4 milliGRAM(s) IV Push every 6 hours PRN Severe Pain (7 - 10)

## 2018-12-12 NOTE — DISCHARGE NOTE ADULT - PLAN OF CARE
Fracture Fixation, Decrease of Pain, Return Patient to Normal Activities ORTHOPEDIC DISCHARGE RECOMMENDATIONS:    The patient will be seen in the office between 2-3 weeks for wound check. Sutures/Tape will be removed at that time. Patient may shower after post-op day #5. The dressing is to be removed on day # post-op day #9(12/21/2018).  The patient will contact the office if the wound becomes red, has increasing pain, develops bleeding or discharge, an injury occurs, or has other concerns. The patient will continue PT for gait training.  The patient will continue LOVENOX for 4 weeks and then begin ASPIRIN for DVTP. The patient will take the prescribed medication for pain control and titrate according to prescription and patient needs. The patient is FULL weight bearing. Be sure to abstain completely from alcohol. Although it is a legal substance, its risk for addiction and complications can be life altering, dangerous, irreversible, and unfortunately even deadly. We recommend that you participate in either or both inpatient and outpatient rehab programs in order to start or continue your journey to sobriety. stable Be sure to follow a low salt diet. If you have been prescribed antihypertensive medications to control your blood pressure, be sure to take them every day as prescribed and do not miss any doses, the medications do not work if they are not taken consistently. Follow up with your Primary Care Doctor and have your Blood Pressure checked. ORTHOPEDIC DISCHARGE RECOMMENDATIONS:  The patient will be seen in the office between 2-3 weeks for wound check. Sutures/Tape will be removed at that time. Patient may shower after post-op day #5. The dressing is to be removed on day # post-op day #9(12/21/2018).  The patient will contact the office if the wound becomes red, has increasing pain, develops bleeding or discharge, an injury occurs, or has other concerns. The patient will continue PT for gait training.  The patient will continue LOVENOX for 4 weeks and then begin ASPIRIN for DVTP. The patient will take the prescribed medication for pain control and titrate according to prescription and patient needs. The patient is FULL weight bearing. Patient has a baseline hgb post op of 8-10. Please monitor periodically. Continue with multivitamin.

## 2018-12-12 NOTE — PROGRESS NOTE ADULT - SUBJECTIVE AND OBJECTIVE BOX
PA - Note    Ortho Post Op Check    Name: CATHY REYES    MR #: [id number]    Procedure: Right Hip IM Nailing  Surgeon: Eris Anthony    Pt comfortable without complaints, pain controlled  Denies CP, SOB, N/V, numbness/tingling     General Exam:  Vital Signs Last 24 Hrs  T(C): 36.7 (12-12-18 @ 19:59), Max: 36.9 (12-12-18 @ 17:44)  T(F): 98.1 (12-12-18 @ 19:59), Max: 98.5 (12-12-18 @ 17:44)  HR: 94 (12-12-18 @ 19:59) (91 - 96)  BP: 132/76 (12-12-18 @ 19:59) (132/76 - 167/95)  BP(mean): --  RR: 14 (12-12-18 @ 19:59) (14 - 16)  SpO2: 100% (12-12-18 @ 19:59) (96% - 100%)    General: Pt Alert and oriented, NAD, controlled pain.  Dressings C/D/I. No bleeding.  Pulses: Cap refill < 2 sec.  Sensation: Grossly intact to light touch without deficit.  Motor: + ROM of toes                          7.9    7.7   )-----------( 137      ( 12 Dec 2018 08:24 )             25.5   12 Dec 2018 08:24    132    |  96     |  5.0    ----------------------------<  117    3.1     |  25.0   |  0.54     Phos  2.7       11 Dec 2018 11:58  Mg     1.8       11 Dec 2018 11:58        Post-op X-Ray:   Pelvis & hip films reviewed. Implants are in appropriate position.      A/P: 53yMale POD#0 s/p RIght HIp IM Nailing  - Stable  - Pain Control  - DVT ppx: Lovenox	  - Post op abx: Ancef  - PT eval pending  - Weight bearing status: WBAT

## 2018-12-12 NOTE — PROGRESS NOTE ADULT - SUBJECTIVE AND OBJECTIVE BOX
Ortho Preop Note    Patient is a 53y old  Male with a Right hip IT fx and alcohol withdrawal  and fall (12 Dec 2018 07:06)    Diagnosis: Right hip IT fx  Procedure: Planned IM nail  Surgeon: Dr Bal                          9.4    7.5   )-----------( 154      ( 11 Dec 2018 11:58 )             30.2     12-11    135  |  97<L>  |  7.0<L>  ----------------------------<  105  3.9   |  23.0  |  0.56    Ca    8.7      11 Dec 2018 11:58  Phos  2.7     12-  Mg     1.8     -    TPro  6.8  /  Alb  3.3  /  TBili  4.1<H>  /  DBili  x   /  AST  139<H>  /  ALT  92<H>  /  AlkPhos  247<H>  12-10    PT/INR - ( 10 Dec 2018 18:38 )   PT: 11.1 sec;   INR: 0.97 ratio         PTT - ( 10 Dec 2018 18:38 )  PTT:26.9 sec  Urinalysis Basic - ( 11 Dec 2018 16:29 )    Color: Crystal / Appearance: Clear / S.025 / pH: x  Gluc: x / Ketone: Large  / Bili: Moderate / Urobili: 12 mg/dL   Blood: x / Protein: 30 mg/dL / Nitrite: Positive   Leuk Esterase: Trace / RBC: 0-2 /HPF / WBC 0-2   Sq Epi: x / Non Sq Epi: Occasional / Bacteria: Occasional               Assessment & Plan:  53yMale with Right Hip IT fx  -For OR today  - Medical notes reviewed and clearances appreciated   - Keep pt NPO  -1 unit PRBC on hold for OR

## 2018-12-12 NOTE — PRE-OP CHECKLIST - COMMENTS
as per floor nurse pt had full glass of water with 40 meq klor powder at 1100 today anesthesia aware

## 2018-12-12 NOTE — BRIEF OPERATIVE NOTE - COMMENTS
post-op plan: WBAT, ancef per SCIP, LMWH or equivalent x 4 weeks (or per medicine service), PT/OT, f/u ortho ~12/28

## 2018-12-12 NOTE — PROGRESS NOTE ADULT - ASSESSMENT
Pt is a 54 yo M w/ PMH of alcohol abuse and HTN came to [facility] ER by ambulance , s/p fall , found to have R-hip fracture and alcohol withdraw syndrome .     1-Alcohol withdraw syndrome   -CIWA protocol   -CIWA= 0 this morning   -librium sanaz   -ativan for sx trigger   -c/w multivitamin and vit B1, folic acid   - consult   -NPO for now surgery  -CT of head and C-spine negative for stroke and Fx .   -MRI Head noted- no acute changes  -TTE: EF 55-60%. Grade 1 diastolic dysfunction    2-Right hip fracture   -s/p fall at his mother house   -Right hip XR Positive for Fx.  -pain control w/ morphine and lidocaine patch   -NPO, Surgery likely today    3- UTI  -f/u urine cultures  -Macrobid 100mg BID x7 days (Day 2) (pcn/sulfa allergy)    4- Abnormal LFT  -most likely 2/2 to alcohol abuse and possible cirrhosis   -ast= 139 and alt= 92 ,bili = 4.1  Will trend LFT   -hepatitis panel noted     5-Normocytis anemia  -previous hb= 13.3 on 12/2/18 , recent Hb= 9.8 , repeat Hb= 9.8 last night   -guaic negative   -anemia panel noted    6-Preventive measure   -DVT prophy: VCD boots for now, pt w/ R-hip fx and possible surgical intervention   - Pt is a 54 yo M w/ PMH of alcohol abuse and HTN came to [facility] ER by ambulance , s/p fall , found to have R-hip fracture and alcohol withdraw syndrome .     1-Alcohol withdraw syndrome   -CIWA protocol   -CIWA= 0 this morning   -librium sanaz   -ativan for sx trigger   -c/w multivitamin and vit B1, folic acid   - consult   -NPO for now surgery  -CT of head and C-spine negative for stroke and Fx .   -MRI Head noted- no acute changes  -TTE: EF 55-60%. Grade 1 diastolic dysfunction    2-Right hip fracture   -s/p fall at his mother house   -Right hip XR Positive for Fx.  -pain control w/ morphine and lidocaine patch   -NPO, Surgery in afternoon as per Ortho    3- UTI  -possible colonization of urethra, no WBCs in UA  -f/u urine cultures  -s.p 1 dose Macrobid 100mg. Hold for now.  -will monitor for symptoms of UTI    4- Abnormal LFT  -most likely 2/2 to alcohol abuse and possible cirrhosis   -ast= 139 and alt= 92 ,bili = 4.1  Will trend LFT   -hepatitis panel noted     5-Normocytic anemia  -previous hb= 13.3 on 12/2/18 , recent Hb= 9.4 ,Hb today: 7.9  -scheduled for OR today. Will be given blood products for surgery.  -will continue to monitor as patient is asymptomatic  -guaic negative   -anemia panel noted, possible AOCD  -f/u ferritin levels    6- Severe protein calorie malnutrition  -f/u nutrition consult after sx  -c/w full diet    7-Preventive measure   -DVT prophy: VCD boots for now, pt w/ R-hip fx and possible surgical intervention   - Pt is a 54 yo M w/ PMH of alcohol abuse and HTN came to [facility] ER by ambulance , s/p fall , found to have R-hip fracture and alcohol withdraw syndrome .     1-Alcohol withdraw syndrome   -CIWA protocol   -CIWA= 0 this morning   -librium sanaz   -ativan for sx trigger   -c/w multivitamin and vit B1, folic acid   - consult   -NPO for now surgery  -CT of head and C-spine negative for stroke and Fx .   -MRI Head noted- no acute changes  -TTE: EF 55-60%. Grade 1 diastolic dysfunction    2-Right hip fracture   -s/p fall at his mother house   -Right hip XR Positive for Fx.  -pain control w/ morphine and lidocaine patch   -NPO, Surgery in afternoon as per Ortho    3- UTI  -possible colonization of urethra, no WBCs in UA  -f/u urine cultures  -s.p 1 dose Macrobid 100mg. Hold for now.  -will monitor for symptoms of UTI    4- Abnormal LFT  -most likely 2/2 to alcohol abuse and possible cirrhosis   -ast= 139 and alt= 92 ,bili = 4.1  Will trend LFT   -hepatitis panel noted     5-Normocytic anemia  -previous hb= 13.3 on 12/2/18 , recent Hb= 9.4 ,Hb today: 7.9  -scheduled for OR today. Will be given blood products for surgery.  -will continue to monitor as patient is asymptomatic  -guaic negative   -anemia panel noted, possible AOCD  -f/u ferritin levels    6- Severe protein calorie malnutrition  -f/u nutrition consult after sx  -c/w full diet    7- Hypokalemia (3.1)  -s/p K 40meq powder and 40meq tablet  -f/u K levels    7-Preventive measure   -DVT prophy: VCD boots for now, pt w/ R-hip fx and possible surgical intervention   - Pt is a 52 yo M w/ PMH of alcohol abuse and HTN came to [facility] ER by ambulance , s/p fall , found to have R-hip fracture and alcohol withdraw syndrome .     Post traumatic Complete Comminuted Fx of Rt Femoral Neck extending to Lesser Trochanter   -s/p fall at his mother house prior to admit   -Right hip XR Positive for Fx.  -pain control w/ morphine and lidocaine patch, tylenol, controlled   -ekg with suspicion for LVH with some leads showing potential areas of prior infarct however TTE: EF 55-60%. Grade 1 diastolic dysfunction, no signs of wall motion abn or other structural abn to preclude ability to proceed w/ sx procedure as planned  -NPO, Surgery in afternoon as per Ortho, will fu post procedure  -wt bearing per ortho post op w/ pt eval + dvt ppx per them  -pt is homeless, will likely need to go to [facility]    Normocytic anemia  -asymptomatic, worsening  -previous hb= 13.3 on 12/2/18 , recent Hb= 9.4 ,Hb today: 7.9, trending down  -scheduled for OR today, type + screen in place, may need prbc during or post op depending on blood loss  -will continue to monitor as patient is asymptomatic  -guaic negative   -anemia panel noted, thus far w/u consistent with AOCD  -serial h/h, transfuse for hgb < 7 or symptom onset    Hypokalemia (3.1)  -mild, asymptomatic  -s/p K 40meq powder and 40meq tablet  -f/u K levels    Alcohol withdraw syndrome   -improved  -CIWA protocol   -CIWA= 0 this morning   -librium sanaz   -ativan for sx trigger   -c/w multivitamin and vit B1, folic acid   - consult     Asymptomatic Bacteriuria   -possible colonization of urethra, no WBCs in UA, pt asymptoamtic  -f/u urine cultures  -s.p 1 dose Macrobid 100mg. Hold for now  -will monitor for symptoms of UTI    Abnormal LFT  -most likely 2/2 to alcohol abuse  -abd us w/ difuse steatosis, no signs of cirrhosis   -hepatitis panel neg   -etoh abstinence, low fat diet when no longer npo + outpt fu required to cont monitoring    Severe protein calorie malnutrition  -sec to poor nutrition in setting of etoh abuse  -f/u nutrition consult after sx  -low fat diet w/ prostat + ensure supplementation when taking po    Rt Sided MSK Pain  -resolved w/ exception of hip pain from fx  -pt with reports of sudden onset upper + lower Rt sided msk pain prior to admit prompting fall  -tia + cva w/u done on presentation, CTH + MR Head unremarkable, CT spine also unremarkable  -unlikely to have had a neurovascular event causing symptoms  -symptomatic pain control as above, PT as above    Preventive measure   -DVT prophy: VCD boots for now, dvt ppx post op

## 2018-12-12 NOTE — DISCHARGE NOTE ADULT - PROVIDER TOKENS
LYDIA:'[id number]' TOKEN:'[id number]',FREE:[LAST:[[facility] AIDEN],PHONE:[phone]],FAX:[(   )    -],ADDRESS:[[address], [place] [address]]]

## 2018-12-12 NOTE — PROGRESS NOTE ADULT - ASSESSMENT
The patient is a 53y Male with alcohol withdrawal.   He had possible right sided weakness prior to arrival.     TIA.  Question right side weakness, probavbly related to hip fracture  MRI brain no stroke  No further stroke work up    Alcohol withdrawal   CIWA protocol for now  long term cessation is suggested    no further neuro work up is needed    Thank you for allowing me to participate in the care of your patient    Reagan Leiva MD, PhD   [id number]

## 2018-12-12 NOTE — DISCHARGE NOTE ADULT - PATIENT PORTAL LINK FT
You can access the [facility] Patient Portal, offered by [facility], by registering with the following website: [email or web address]

## 2018-12-12 NOTE — BRIEF OPERATIVE NOTE - POST-OP DX
Closed displaced basicervical fracture of right femur, initial encounter  12/12/2018    Active  Eris Anthony

## 2018-12-12 NOTE — DISCHARGE NOTE ADULT - HOSPITAL COURSE
Pt is a 52 yo M w/ PMH of alcohol abuse and HTN came to [facility] ER by ambulance , s/p fall , and was found to have R-hip fracture and alcohol withdraw syndrome. Pt was found to have a post traumatic complete fracture of the R femoral neck extending to lesser trochanter confirmed by x ray. Ekg showed suspicion for LVH with some leads showing potential areas of prior infarct. TTE showed a EF 55-60%. Grade 1 diastolic dysfunction, no signs of wall motion abn or other structural abn to preclude ability to proceed with surgery. Pain was controlled with tylenol, morphine, and lidocaine patch. Pt had a surgery on 12/12/18 and had a pt eval afterwards. Ortho continued to follow. Pt will likely go to [facility] and follow up with ortho outpatient ~12/28.    Normocytic anemia  -asymptomatic, worsening  -previous hb= 13.3 on 12/2/18 , recent Hb= 9.4 ,Hb today: 7.9, trending down  -100 milliLiter estimated blood loss during surgery yesterday  -will continue to monitor as patient is asymptomatic  -guaic negative   -anemia panel noted, thus far w/u consistent with AOCD  -serial h/h, transfuse for hgb < 7 or symptom onset    Hypokalemia (3.1)  -mild, asymptomatic  -s/p K 40meq powder and 40meq tablet  -f/u K levels    Alcohol withdraw syndrome   -improved  -CIWA protocol   -CIWA= 0 this morning   -librium sanaz   -ativan for sx trigger   -c/w multivitamin and vit B1, folic acid   - consult     Asymptomatic Bacteriuria   -possible colonization of urethra, no WBCs in UA, pt asymptoamtic  -Urine culture: <10,000 CFR/ml Gram Negative rods  -s.p 1 dose Macrobid 100mg. Hold for now  -will monitor for symptoms of UTI 54 yo M w/ hx of alcohol abuse and HTN came to [facility] ER by ambulance s/p fall and noted on imaging to have R comminuted femoral neck fx s/p IMN, with concern for acute blood loss anemia after procedure due to hgb drop. DVT ppx was held, hgb remained stable 8-9 and dvt ppx restarted thereafter. Ortho continued to follow the pt and cleared the pt for dispo. Pt also noted with alcohol withdrawal on admission s/p ciwa protocol and librium taper now stable. Pt was initially uncooperative with PT/PMNR due to pain, now with improvement in pain and participation with PT. Recommended for [facility], pending disposition; noted insurance issues/ homeless. 52 yo M w/ hx of alcohol abuse and HTN came to [facility] ER by ambulance s/p fall and noted on imaging to have R comminuted femoral neck fx. He was followed by ortho, requiring IMN. Post op course complicated by acute blood loss and drop in hb/hct. The patient was monitored closely and DVT ppx was held. His Hgb/hct increased and remained stable at 8-9, after which DVT ppx was restarted. Ortho continued to follow the pt and cleared the pt for dispo with follow up 2-3 weeks post op for wound check. Pt also noted with alcohol withdrawal on admission s/p ciwa protocol and librium taper, thiamine, MVT and folic acid supplementation, now stable with consistent CIWA of 0. Patients blood pressure also monitored closely, with elevated BPs likely 2/2 pain at times, a trial of low dose norvasc (5 mg) was given but patient did not tolerate, noted by rapid drop in BP, norvasc was discontinued and BPs were monitored closely, with no acute changes.   Pt was initially uncooperative with PT/PMNR due to pain, pain control optimized and patient was educated about PRN medication availability, now with improvement in pain and participation with PT. Recommended for A[facility], pending disposition; noted insurance issues/ homeless.   Patient medically optimized for discharge.   Time spent 45 minutes. 54 yo M w/ hx of alcohol abuse and HTN came to [facility] ER by ambulance s/p mechanical fall and noted on imaging to have R comminuted femoral neck fx. He was followed by ortho, requiring IMN. Post op course complicated by acute blood loss and drop in hb to 7.3, after which he was transfused 1 unit PRBC, his hgb responded appropriately to transfusion. The patient was monitored closely and DVT ppx was held. His Hgb/hct increased and remained stable at 8-9, after which DVT ppx was restarted. Ortho continued to follow the pt and cleared the pt for dispo with follow up 2-3 weeks post op for wound check. Pt also noted with alcohol withdrawal on admission s/p ciwa protocol and librium taper, and continued on thiamine, MVT and folic acid supplementation, now stable with consistent CIWA of 0. Patients blood pressure also monitored closely, with elevated BPs likely 2/2 pain at times, a trial of low dose norvasc (5 mg) was given but patient did not tolerate, noted by rapid drop in BP, norvasc was discontinued and BPs were monitored closely, with no acute changes.   Pt was initially uncooperative with PT/PMNR due to pain, pain control optimized and patient was educated about PRN medication availability, now with improvement in pain and participation with PT who recommended for ABE, pending disposition; noted insurance issues/ homeless.   Patient medically optimized for discharge.   Time spent 45 minutes. 54 yo M w/ hx of alcohol abuse and HTN came to [facility] ER by ambulance s/p mechanical fall and noted on imaging to have R comminuted femoral neck fx. He was followed by ortho, requiring IMN. Post op course complicated by acute blood loss and drop in hb to 7.3, after which he was transfused 1 unit PRBC, his hgb responded appropriately to transfusion. The patient was monitored closely and DVT ppx was held. His Hgb/hct increased and remained stable at 8-9, after which DVT ppx was restarted. Ortho continued to follow the pt and cleared the pt for dispo with follow up 2-3 weeks post op for wound check. Pt also noted with alcohol withdrawal on admission s/p ciwa protocol and librium taper, and continued on thiamine, MVT and folic acid supplementation, now stable with consistent CIWA of 0. Patients blood pressure also monitored closely, with elevated BPs likely 2/2 pain at times, a trial of low dose norvasc (5 mg) was given but patient did not tolerate, noted by rapid drop in BP, norvasc was discontinued and BPs were monitored closely, with no acute changes.   Pt was initially uncooperative with PT/PMNR due to pain, pain control optimized and patient was educated about PRN medication availability, now with improvement in pain and participation with PT who recommended discharge to [facility]  Patient medically optimized for discharge.   Time spent 45 minutes. 54 yo M w/ hx of alcohol abuse and HTN came to [facility] ER by ambulance s/p mechanical fall and noted on imaging to have R comminuted femoral neck fx. He was followed by ortho, requiring IMN. Post op course complicated by acute blood loss and drop in hb to 7.3, after which he was transfused 1 unit PRBC, his hgb responded appropriately to transfusion. The patient was monitored closely and DVT ppx was held. His Hgb/hct increased and remained stable at 8-9, after which DVT ppx was restarted and should be continued for 4 more weeks. Ortho continued to follow the pt and cleared the pt for dispo with follow up 2-3 weeks post op for wound check. Pt also noted with alcohol withdrawal on admission s/p ciwa protocol and librium taper, and continued on thiamine, MVT and folic acid supplementation, now stable with consistent CIWA of 0. Patients blood pressure also monitored closely, with elevated BPs likely 2/2 pain at times, a trial of low dose norvasc (5 mg) was given but patient did not tolerate, noted by rapid drop in BP, norvasc was discontinued and BPs were monitored closely, with no acute changes. Pt to remain off of bp meds. Pt was initially uncooperative with PT/PMNR due to pain, pain control optimized and patient was educated about PRN medication availability, now with improvement in pain and participation with PT who recommended discharge to [facility]  Patient medically optimized for discharge.   Time spent 45 minutes.

## 2018-12-12 NOTE — DISCHARGE NOTE ADULT - MEDICATION SUMMARY - MEDICATIONS TO TAKE
I will START or STAY ON the medications listed below when I get home from the hospital:    enoxaparin  -- 40 milligram(s) subcutaneous once a day  -- Indication: For DVT prophylaxis    Multiple Vitamins oral tablet  -- 1 tab(s) by mouth once a day  -- Indication: For MVT    cholecalciferol oral tablet  -- 1000 unit(s) by mouth once a day  -- Indication: For vitamin    folic acid 1 mg oral tablet  -- 1 tab(s) by mouth once a day  -- Indication: For vitamin    thiamine 100 mg oral tablet  -- 1 tab(s) by mouth once a day  -- Indication: For vitamin I will START or STAY ON the medications listed below when I get home from the hospital:    enoxaparin  -- 40 milligram(s) subcutaneous once a day  -- Indication: For DVT prophylaxis x 4 weeks     Multiple Vitamins oral tablet  -- 1 tab(s) by mouth once a day  -- Indication: For supplementation     cholecalciferol oral tablet  -- 1000 unit(s) by mouth once a day  -- Indication: For vitamin    folic acid 1 mg oral tablet  -- 1 tab(s) by mouth once a day  -- Indication: For vitamin    thiamine 100 mg oral tablet  -- 1 tab(s) by mouth once a day  -- Indication: For vitamin

## 2018-12-12 NOTE — PROGRESS NOTE ADULT - SUBJECTIVE AND OBJECTIVE BOX
[facility]                                        Ne[facility] [place]                                  Abbie Putnam, & Dionicio                                      [address]                                           [place], [place], [address]                                                [phone]        CC: fall, alcohol withdrawal.     HISTORY:  The patient is a 53y Male who was admitted after a fall. There was some concern for right sided weakness prior to the fall. He does not recall this.   He reportedly was unable to bear weight on the right leg after the fall.   He was noted to be agitated in the ER and was felt to be in withdrawal from alcohol.   He denies any right sided difficulty today. (JW)    Interval history: still with right hip pain, dx with hip fracture, for OR    ROS neurology: Denies headache or dizziness. Denies weakness/numbness.  Denies speech/language deficits. Denies diplopia/blurred vision.  Denies confusion (+) right leg pain    MEDICATIONS  (STANDING):  chlordiazePOXIDE   Oral   dextrose 5% + lactated ringers. 1000 milliLiter(s) (100 mL/Hr) IV Continuous <Continuous>  folic acid 1 milliGRAM(s) Oral daily  lidocaine   Patch 1 Patch Transdermal daily  multivitamin 1 Tablet(s) Oral daily  nicotine - 21 mG/24Hr(s) Patch 1 patch Transdermal daily  potassium chloride    Tablet ER 40 milliEquivalent(s) Oral once  thiamine 100 milliGRAM(s) Oral daily    MEDICATIONS  (PRN):  acetaminophen   Tablet .. 650 milliGRAM(s) Oral every 6 hours PRN Temp greater or equal to 38C (100.4F), Mild Pain (1 - 3)  enalaprilat Injectable 1.25 milliGRAM(s) IV Push every 6 hours PRN BP SYS >180 or diast >100  LORazepam   Injectable 2 milliGRAM(s) IV Push every 1 hour PRN Symptom-triggered: each CIWA -Ar score 8 or GREATER  morphine  - Injectable 2 milliGRAM(s) IV Push every 4 hours PRN Moderate Pain (4 - 6)  morphine  - Injectable 4 milliGRAM(s) IV Push every 6 hours PRN Severe Pain (7 - 10)      Vital Signs Last 24 Hrs  T(C): 38.4 (12 Dec 2018 14:42), Max: 38.4 (12 Dec 2018 14:42)  T(F): 101.1 (12 Dec 2018 14:42), Max: 101.1 (12 Dec 2018 14:42)  HR: 97 (12 Dec 2018 14:42) (88 - 109)  BP: 148/89 (12 Dec 2018 14:42) (137/84 - 160/110)  BP(mean): 110 (12 Dec 2018 00:38) (110 - 115)  RR: 16 (12 Dec 2018 14:42) (15 - 100)  SpO2: 97% (12 Dec 2018 14:42) (97% - 100%)    Detailed neuro exam:    Mental status: The patient is awake, alert, and oriented to self and hospital. Cannot give day/date, oriented to december 2018.. There is no aphasia. There is mild dysarthria.    Cranial nerves: Pupils react symmetrically to light. There is no visual field deficit to confrontation. Extraocular motion is full with no nystagmus. There is no ptosis. Facial sensation is intact. Facial musculature is symmetric. Palate elevates symmetrically. Tongue is midline.    Motor: There is normal bulk and tone.  Strength is 5/5 in the right arm and 3/5 in proximal right and 4 to 4+/5 in distal right leg.   Strength is 5/5 in the left arm and leg.    Sensation: Intact to light touch and pin.    Reflexes: 1+ throughout and plantar responses are flexor.    Cerebellar: There is no dysmetria on finger to nose testing.    LABS:                                    7.9    7.7   )-----------( 137      ( 12 Dec 2018 08:24 )             25.5     12-12    132<L>  |  96<L>  |  5.0<L>  ----------------------------<  117<H>  3.1<L>   |  25.0  |  0.54    Ca    8.4<L>      12 Dec 2018 08:24  Phos  2.7     12-11  Mg     1.8     12-11    TPro  6.8  /  Alb  3.3  /  TBili  4.1<H>  /  DBili  x   /  AST  139<H>  /  ALT  92<H>  /  AlkPhos  247<H>  12-10    LIVER FUNCTIONS - ( 10 Dec 2018 18:38 )  Alb: 3.3 g/dL / Pro: 6.8 g/dL / ALK PHOS: 247 U/L / ALT: 92 U/L / AST: 139 U/L / GGT: x           PT/INR - ( 10 Dec 2018 18:38 )   PT: 11.1 sec;   INR: 0.97 ratio         PTT - ( 10 Dec 2018 18:38 )  PTT:26.9 sec    RADIOLOGY   Recent neurological studies (images reviewed independently):  MRI brain no acute CVA, mass or blood,  (+) cerebral and cerebellar atrophy

## 2018-12-12 NOTE — PRE-OP CHECKLIST - WEIGHT IN LBS
Cardiology 1 Progress Note    Samir Rodriguez MRN# [id number]   Age: 48 year old YOB: 1968   Date of Admission: 1/12/2017           Assessment and Plan:   Samir Rodriguez is a 48 year old  male with past medical history of Rheumatic Heart Disease s/p mechanical MVR x 2 (1980s and 1992) on warfarin (INR goal 2.5-3.5), biventricular CHF (EF 25-30%) s/p dual chamber ICD 2008, chronic afib on amiodarone and previously on digoxine (stopped 1/16/17 due to concern for toxicity), WPW ablation at age 13, CKD III, hypothyroid,  who was transferred from [facility] on 01/12 for further eval of CHF and concern of hemolysis in setting of mechanical valve.    Today:  - hold diuretics and metoprolol due to hypotension preventing EDEL  - NPO @ midnight for EDEL tomorrow  - optometry consult pending  - possible tooth extraction tomorrow; waiting to hear from dentistry  - GI consult for cirrhosis    # Non-ischemic dilated cardiomyopathy 2/2 valvular heart disease  # Acute on chronic systolic heart failure, EF 37% (12/2016) s/p dual chamber ICD 2008  NYHA class III  - d/c bumex and metoprolol due to hypotension  - lisinopril held on 1/23  - will restart neurohormonal blockers when BP can tolerate  - Daily weights; Strict I/O    # Mechanical mitral valve (MV diastolic gradient 6.5)  # Aortic Insufficiency (mod - severe)  # TR (moderate)  History of BiV failure attributed to valvular disease. Echocardiogram on admission demonstrated moderate-severe aortic insufficiency which is his most acute cardiac pathology. His tricuspid regurgitation is likely due to his signficantly fluid overloaded state. Mechanical valve maintained on warfarin w/ INR 2.5-3.5 prior to this admission. Angiogram performed on 01/20 revealed nonobstructive CAD.  - Obtain EDEL tomorrow  - Dental advising mandibular teeth extraction before valve surgery  -> procedure planning appointment scheduled for 01/25 @  1100; needs INR and Amoxicillin 2g 1 hr before procedure  - Cardiovascular surgery consulted; appreciate recommendations -> planning for outpatient rehab stay and tooth extraction prior to surgery    # Anemia and thrombocytopenia  # Right Arm Hematoma   Blood smear: blood smear- RBCs show some target cells, hypochromia, increased polychromasia, no schistocytes or spherocytes. Low haptoglobin, high LDH, and plasma free hemoglobin concerning for intra-vascular hemolysis possibly due to valvular disease. No evidence of overt infection causing DIC. EPO inappropriately low w/ recent ARF. Etiology of hematoma likely due to supratherapeutic INR- 4.6 on arrival, now improved.  - warfarin w/ goal INR 2.5-3.0    #. Cirrhosis and liver nodularity  Demonstrated on CT, although u/s was normal. CT read is concerning for amiodarone toxicity  - GI consulted, appreciate recommendations  - decreased scheduled tylenol dose    # Paroxsymal afib:  digoxin level 0.7 on 1/12 and 1/17. 1/16/17 device interrogation: atrial tachycardia to 150s with AV block. Lower rate setting changed to 80bpm from 60bmp and device changed from DDDR to VVIR on 01/16; atrial tachycardia and AV block and V pacing. The patient has been in an atrial flutter that is undersensed, hence the device was switched to VVI mode. There is no point continuing amiodarone at this stage especially with concern for toxicity  - stopped amiodarone.    # Hyperthyroid: Will need repeat TSH/T4 1-2 months post DC. Continue levothyroxine 224mcg   # Anxiety and insomnia: Increased clonazepam to bid    # Right tibia fx and R knee trauma:  Fall in November prompted decompensation. Xray tibia and knee no fx this admission.  - Ortho evaluated; planning for outpatient follow up with orthopedic surgery in 4-6 weeks  - PT/OT    FEN: cardiac diet   PPX: on warfarin    Code Status: Full CODE      Patient discussed with staff attending, Dr. Burks.     Gary Silva MD  Cardiovascular  Medicine Fellow, PGY-5  [phone]          Interval History/Subjective   No acute events overnight. Headache slightly improved. Blood pressure is low today after being NPO for half the day yesterday in anticipation for EDEL. 250ml of NS given. He remains asymptomatic denying lightheadedness, chest pain, SOB.          Objective   Temp:  [98.1  F (36.7  C)-98.4  F (36.9  C)] 98.1  F (36.7  C)  Heart Rate:  [79-80] 80  Resp:  [16] 16  BP: (69-87)/(40-52) 82/43 mmHg  SpO2:  [95 %-98 %] 95 %    Physical exam:  Gen: AA&Ox3, no acute distress  HEENT: NACT, poor dentition   PULM: Clear lungs  CV: RRR; mechanical s2 w/ 2+ systolic murmur at LUSB and LISB: JVP of 6cm  ABD:  soft, nontender, nondistended.    EXT: trace to +1 sonam edema to knees, no clubbing or cyanosis. Right arm ecchymosis significantly improved  SKIN: Right arm resolving ecchymosis outlined w/ marker   NEURO: alert and oriented; speech intact         Data:   CBC    Recent Labs  Lab 01/24/17  0726 01/23/17  0715 01/22/17  0806 01/21/17  0730   WBC 3.8* 4.5 5.4 5.6   RBC 2.75* 2.63* 2.83* 2.82*   HGB 8.7* 8.4* 8.9* 8.9*   HCT 26.7* 25.3* 27.1* 26.9*   MCV 97 96 96 95   MCH 31.6 31.9 31.4 31.6   MCHC 32.6 33.2 32.8 33.1   RDW 20.3* 19.9* 19.9* 19.7*   PLT 73* 72* 82* 87*     CMP    Recent Labs  Lab 01/24/17  0726 01/23/17  0715 01/22/17  0806 01/21/17  0730  01/18/17  0707   * 126* 127* 127*  < > 131*   POTASSIUM 4.5 4.0 3.8 3.6  < > 3.6   CHLORIDE 93* 90* 91* 91*  < > 91*   CO2 28 30 30 30  < > 31   ANIONGAP 7 6 6 7  < > 9   GLC 73 88 76 88  < > 77   BUN 16 16 16 19  < > 26   CR 1.43* 1.50* 1.48* 1.59*  < > 1.80*   GFRESTIMATED 53* 50* 51* 47*  < > 41*   GFRESTBLACK 64 61 61 57*  < > 49*   DELFINO 7.6* 7.2* 7.4* 7.4*  < > 6.9*   MAG 2.0 2.1 1.9 2.1  < >  --    PHOS  --   --  2.4* 2.2*  --  2.8   PROTTOTAL 5.1*  --   --   --   --  5.1*   ALBUMIN 1.8*  --   --   --   --  2.1*   BILITOTAL 1.9*  --   --   --   --  3.8*   ALKPHOS 121  --   --   --   --  128    AST 60*  --   --   --   --  60*   ALT 58  --   --   --   --  102*   < > = values in this interval not displayed.  INR    Recent Labs  Lab 01/24/17  0726 01/23/17  0715 01/22/17  0806 01/21/17  0730   INR 3.50* 3.47* 2.73* 2.25*             Medications:     Current Facility-Administered Medications   Medication     oxyCODONE (ROXICODONE) IR tablet 5-10 mg     acetaminophen (TYLENOL) tablet 650 mg     warfarin-No DOSE today     lidocaine (LIDODERM) 5 % Patch 1-3 patch    And     lidocaine (LIDODERM) patch REMOVAL    And     lidocaine (LIDODERM) Patch in Place     sodium chloride (PF) 0.9% PF flush 3 mL     sodium chloride (PF) 0.9% PF flush 3 mL     May take oral meds with a sip of water, the morning of EDEL procedure.     HOLD: Insulin - REGULAR AM of procedure IF diabetic     HOLD: Insulin - RAPID/SHORT acting AM of procedure IF diabetic     HOLD: Oral hypoglycemics AM of procedure IF diabetic     Give   of usual dose of LONG ACTING insulin AM of procedure IF diabetic     ondansetron (ZOFRAN-ODT) ODT tab 4 mg     Warfarin Therapy Reminder (Check START DATE - warfarin may be starting in the FUTURE)     lidocaine 1 % 1 mL     lidocaine (LMX4) kit     sodium chloride (PF) 0.9% PF flush 3 mL     sodium chloride (PF) 0.9% PF flush 3 mL     clonazePAM (klonoPIN) half-tab 0.25 mg     docusate sodium (COLACE) capsule 100 mg     potassium chloride SA (K-DUR/KLOR-CON M) CR tablet 20-40 mEq     potassium chloride (KLOR-CON) Packet 20-40 mEq     potassium chloride 10 mEq in 100 mL intermittent infusion     potassium chloride 10 mEq in 100 mL intermittent infusion with 10 mg lidocaine     potassium chloride 20 mEq in 50 mL intermittent infusion     magnesium sulfate 2 g in NS intermittent infusion (PharMEDium or FV Cmpd)     magnesium sulfate 4 g in 100 mL sterile water (premade)     potassium phosphate 15 mmol in D5W 250 mL intermittent infusion     potassium phosphate 20 mmol in D5W 500 mL intermittent infusion     potassium  phosphate 20 mmol in D5W 250 mL intermittent infusion     potassium phosphate 25 mmol in D5W 500 mL intermittent infusion     multivitamin, therapeutic with minerals (THERA-VIT-M) tablet 1 tablet     melatonin tablet 3 mg     miconazole (MICATIN; MICRO GUARD) 2 % powder     lidocaine 1 % 1 mL     lidocaine (LMX4) kit     sodium chloride (PF) 0.9% PF flush 3 mL     sodium chloride (PF) 0.9% PF flush 3 mL     medication instruction     nitroglycerin (NITROSTAT) sublingual tablet 0.4 mg     alum & mag hydroxide-simethicone (MYLANTA ES/MAALOX  ES) suspension 15-30 mL     polyethylene glycol (MIRALAX/GLYCOLAX) Packet 17 g     albuterol (PROAIR HFA/PROVENTIL HFA/VENTOLIN HFA) Inhaler 2 puff     sennosides (SENOKOT) tablet 8.6 mg     naloxone (NARCAN) injection 0.1-0.4 mg     levothyroxine (SYNTHROID/LEVOTHROID) tablet 224 mcg     omeprazole (priLOSEC) CR capsule 20 mg     methocarbamol (ROBAXIN) tablet 750 mg     I very much appreciated the opportunity to see and assess Mr Samir Rodriguez in the hospital with CV Fellow Dr Valerio Silva and Cards 1 resident team.  We reviewed the reasons for delaying EDEL further given his low BP. He may be somewhat over diuresed, He voiced understanding.  I indicated that Dr Tovar would be back the next day to discuss further plans.     I agree with the note above summarizes my findings and current recommendations.  Please do not hesitate to contact my office if you have any questions or concerns.      Enrique Burks MD  Cardiac Arrhythmia Service  [facility]  [phone]     130

## 2018-12-12 NOTE — DISCHARGE NOTE ADULT - CARE PROVIDER_API CALL
Eris Anthony), Orthopaedic Surgery  [address] [place] [address]  Phone: [phone]  Fax: [phone] Eris Anthony), Orthopaedic Surgery  [address] [place] [address]  Phone: [phone]  Fax: [phone]    [facility] [address]   [facility] [address], [place] [address]  Phone: [phone]  Fax: (   )    -

## 2018-12-12 NOTE — DISCHARGE NOTE ADULT - CARE PLAN
Principal Discharge DX:	Closed fracture of right hip, initial encounter  Goal:	Fracture Fixation, Decrease of Pain, Return Patient to Normal Activities  Assessment and plan of treatment:	ORTHOPEDIC DISCHARGE RECOMMENDATIONS:    The patient will be seen in the office between 2-3 weeks for wound check. Sutures/Tape will be removed at that time. Patient may shower after post-op day #5. The dressing is to be removed on day # post-op day #9(12/21/2018).  The patient will contact the office if the wound becomes red, has increasing pain, develops bleeding or discharge, an injury occurs, or has other concerns. The patient will continue PT for gait training.  The patient will continue LOVENOX for 4 weeks and then begin ASPIRIN for DVTP. The patient will take the prescribed medication for pain control and titrate according to prescription and patient needs. The patient is FULL weight bearing. Principal Discharge DX:	Closed fracture of right hip, initial encounter  Goal:	Fracture Fixation, Decrease of Pain, Return Patient to Normal Activities  Assessment and plan of treatment:	ORTHOPEDIC DISCHARGE RECOMMENDATIONS:    The patient will be seen in the office between 2-3 weeks for wound check. Sutures/Tape will be removed at that time. Patient may shower after post-op day #5. The dressing is to be removed on day # post-op day #9(12/21/2018).  The patient will contact the office if the wound becomes red, has increasing pain, develops bleeding or discharge, an injury occurs, or has other concerns. The patient will continue PT for gait training.  The patient will continue LOVENOX for 4 weeks and then begin ASPIRIN for DVTP. The patient will take the prescribed medication for pain control and titrate according to prescription and patient needs. The patient is FULL weight bearing.  Secondary Diagnosis:	Alcohol abuse  Assessment and plan of treatment:	Be sure to abstain completely from alcohol. Although it is a legal substance, its risk for addiction and complications can be life altering, dangerous, irreversible, and unfortunately even deadly. We recommend that you participate in either or both inpatient and outpatient rehab programs in order to start or continue your journey to sobriety.  Secondary Diagnosis:	Hypertension, unspecified type  Goal:	stable  Assessment and plan of treatment:	Be sure to follow a low salt diet. If you have been prescribed antihypertensive medications to control your blood pressure, be sure to take them every day as prescribed and do not miss any doses, the medications do not work if they are not taken consistently. Follow up with your Primary Care Doctor and have your Blood Pressure checked. Principal Discharge DX:	Closed fracture of right hip, initial encounter  Goal:	Fracture Fixation, Decrease of Pain, Return Patient to Normal Activities  Assessment and plan of treatment:	ORTHOPEDIC DISCHARGE RECOMMENDATIONS:    The patient will be seen in the office between 2-3 weeks for wound check. Sutures/Tape will be removed at that time. Patient may shower after post-op day #5. The dressing is to be removed on day # post-op day #9(12/21/2018).  The patient will contact the office if the wound becomes red, has increasing pain, develops bleeding or discharge, an injury occurs, or has other concerns. The patient will continue PT for gait training.  The patient will continue LOVENOX for 4 weeks and then begin ASPIRIN for DVTP. The patient will take the prescribed medication for pain control and titrate according to prescription and patient needs. The patient is FULL weight bearing.  Secondary Diagnosis:	Alcohol abuse  Goal:	stable  Assessment and plan of treatment:	Be sure to abstain completely from alcohol. Although it is a legal substance, its risk for addiction and complications can be life altering, dangerous, irreversible, and unfortunately even deadly. We recommend that you participate in either or both inpatient and outpatient rehab programs in order to start or continue your journey to sobriety.  Secondary Diagnosis:	Hypertension, unspecified type  Goal:	stable  Assessment and plan of treatment:	Be sure to follow a low salt diet. If you have been prescribed antihypertensive medications to control your blood pressure, be sure to take them every day as prescribed and do not miss any doses, the medications do not work if they are not taken consistently. Follow up with your Primary Care Doctor and have your Blood Pressure checked. Principal Discharge DX:	Closed fracture of right hip, initial encounter  Goal:	Fracture Fixation, Decrease of Pain, Return Patient to Normal Activities  Assessment and plan of treatment:	ORTHOPEDIC DISCHARGE RECOMMENDATIONS:  The patient will be seen in the office between 2-3 weeks for wound check. Sutures/Tape will be removed at that time. Patient may shower after post-op day #5. The dressing is to be removed on day # post-op day #9(12/21/2018).  The patient will contact the office if the wound becomes red, has increasing pain, develops bleeding or discharge, an injury occurs, or has other concerns. The patient will continue PT for gait training.  The patient will continue LOVENOX for 4 weeks and then begin ASPIRIN for DVTP. The patient will take the prescribed medication for pain control and titrate according to prescription and patient needs. The patient is FULL weight bearing.  Secondary Diagnosis:	Alcohol abuse  Goal:	stable  Assessment and plan of treatment:	Be sure to abstain completely from alcohol. Although it is a legal substance, its risk for addiction and complications can be life altering, dangerous, irreversible, and unfortunately even deadly. We recommend that you participate in either or both inpatient and outpatient rehab programs in order to start or continue your journey to sobriety.  Secondary Diagnosis:	Hypertension, unspecified type  Goal:	stable  Assessment and plan of treatment:	Be sure to follow a low salt diet. If you have been prescribed antihypertensive medications to control your blood pressure, be sure to take them every day as prescribed and do not miss any doses, the medications do not work if they are not taken consistently. Follow up with your Primary Care Doctor and have your Blood Pressure checked. Principal Discharge DX:	Closed fracture of right hip, initial encounter  Goal:	Fracture Fixation, Decrease of Pain, Return Patient to Normal Activities  Assessment and plan of treatment:	ORTHOPEDIC DISCHARGE RECOMMENDATIONS:  The patient will be seen in the office between 2-3 weeks for wound check. Sutures/Tape will be removed at that time. Patient may shower after post-op day #5. The dressing is to be removed on day # post-op day #9(12/21/2018).  The patient will contact the office if the wound becomes red, has increasing pain, develops bleeding or discharge, an injury occurs, or has other concerns. The patient will continue PT for gait training.  The patient will continue LOVENOX for 4 weeks and then begin ASPIRIN for DVTP. The patient will take the prescribed medication for pain control and titrate according to prescription and patient needs. The patient is FULL weight bearing.  Secondary Diagnosis:	Alcohol abuse  Goal:	stable  Assessment and plan of treatment:	Be sure to abstain completely from alcohol. Although it is a legal substance, its risk for addiction and complications can be life altering, dangerous, irreversible, and unfortunately even deadly. We recommend that you participate in either or both inpatient and outpatient rehab programs in order to start or continue your journey to sobriety.  Secondary Diagnosis:	Hypertension, unspecified type  Goal:	stable  Assessment and plan of treatment:	Be sure to follow a low salt diet. If you have been prescribed antihypertensive medications to control your blood pressure, be sure to take them every day as prescribed and do not miss any doses, the medications do not work if they are not taken consistently. Follow up with your Primary Care Doctor and have your Blood Pressure checked.  Secondary Diagnosis:	Anemia of chronic disease  Assessment and plan of treatment:	Patient has a baseline hgb post op of 8-10. Please monitor periodically. Continue with multivitamin.

## 2018-12-12 NOTE — BRIEF OPERATIVE NOTE - PROCEDURE
<<-----Click on this checkbox to enter Procedure Intertrochanteric hip fracture surgery  12/12/2018  TFN with cement augment  Active  MLINN

## 2018-12-12 NOTE — DISCHARGE NOTE ADULT - CARE PROVIDERS DIRECT ADDRESSES
,[email or web address],DirectAddress_Unknown

## 2018-12-13 LAB
ANION GAP SERPL CALC-SCNC: 9 MMOL/L — SIGNIFICANT CHANGE UP (ref 5–17)
BUN SERPL-MCNC: 4 MG/DL — LOW (ref 8–20)
CALCIUM SERPL-MCNC: 8.2 MG/DL — LOW (ref 8.6–10.2)
CHLORIDE SERPL-SCNC: 97 MMOL/L — LOW (ref 98–107)
CO2 SERPL-SCNC: 26 MMOL/L — SIGNIFICANT CHANGE UP (ref 22–29)
CREAT SERPL-MCNC: 0.51 MG/DL — SIGNIFICANT CHANGE UP (ref 0.5–1.3)
GLUCOSE SERPL-MCNC: 115 MG/DL — SIGNIFICANT CHANGE UP (ref 70–115)
HCT VFR BLD CALC: 25.8 % — LOW (ref 42–52)
HGB BLD-MCNC: 8 G/DL — LOW (ref 14–18)
POTASSIUM SERPL-MCNC: 3.4 MMOL/L — LOW (ref 3.5–5.3)
POTASSIUM SERPL-SCNC: 3.4 MMOL/L — LOW (ref 3.5–5.3)
SODIUM SERPL-SCNC: 132 MMOL/L — LOW (ref 135–145)

## 2018-12-13 PROCEDURE — 99232 SBSQ HOSP IP/OBS MODERATE 35: CPT | Mod: GC

## 2018-12-13 RX ORDER — POTASSIUM CHLORIDE 20 MEQ
40 PACKET (EA) ORAL EVERY 4 HOURS
Qty: 0 | Refills: 0 | Status: COMPLETED | OUTPATIENT
Start: 2018-12-13 | End: 2018-12-13

## 2018-12-13 RX ORDER — ENOXAPARIN SODIUM 100 MG/ML
40 INJECTION SUBCUTANEOUS DAILY
Qty: 0 | Refills: 0 | Status: DISCONTINUED | OUTPATIENT
Start: 2018-12-13 | End: 2018-12-14

## 2018-12-13 RX ADMIN — Medication 100 MILLIGRAM(S): at 16:16

## 2018-12-13 RX ADMIN — OXYCODONE HYDROCHLORIDE 10 MILLIGRAM(S): 5 TABLET ORAL at 10:10

## 2018-12-13 RX ADMIN — OXYCODONE HYDROCHLORIDE 5 MILLIGRAM(S): 5 TABLET ORAL at 00:49

## 2018-12-13 RX ADMIN — Medication 100 MILLIGRAM(S): at 11:57

## 2018-12-13 RX ADMIN — ENOXAPARIN SODIUM 30 MILLIGRAM(S): 100 INJECTION SUBCUTANEOUS at 06:01

## 2018-12-13 RX ADMIN — OXYCODONE HYDROCHLORIDE 10 MILLIGRAM(S): 5 TABLET ORAL at 11:54

## 2018-12-13 RX ADMIN — OXYCODONE HYDROCHLORIDE 5 MILLIGRAM(S): 5 TABLET ORAL at 21:23

## 2018-12-13 RX ADMIN — ENOXAPARIN SODIUM 40 MILLIGRAM(S): 100 INJECTION SUBCUTANEOUS at 16:16

## 2018-12-13 RX ADMIN — Medication 100 MILLIGRAM(S): at 06:00

## 2018-12-13 RX ADMIN — Medication 100 MILLIGRAM(S): at 21:23

## 2018-12-13 RX ADMIN — Medication 1 TABLET(S): at 11:57

## 2018-12-13 RX ADMIN — OXYCODONE HYDROCHLORIDE 5 MILLIGRAM(S): 5 TABLET ORAL at 10:07

## 2018-12-13 RX ADMIN — Medication 40 MILLIEQUIVALENT(S): at 16:16

## 2018-12-13 NOTE — CHART NOTE - NSCHARTNOTEFT_GEN_A_CORE
Upon Nutritional Assessment by the Registered Dietitian your patient was determined to meet criteria / has evidence of the following diagnosis/diagnoses:            [X]  Moderate Acute Protein Calorie Malnutrition        [X] Underweight / BMI <19    Findings as based on:  •  Comprehensive nutrition assessment and consultation  •  Calorie counts (nutrient intake analysis)  •  Moderate temple wasting observed  •  BMI 17.7  •  Pt meeting <75% of estimated protein-energy needs x 7 days  •  Intervention secondary to interdisciplinary rounds  •   concerns    Pt at high nutrition risk secondary to malnutrition (moderate acute) related to increased physiological demand for healing and ETOH use/abuse as evidenced by BMI 17.7, meeting <75% of estimated protein-energy needs x 7 days, moderate muscle wasting temples. Pt refused interview at this time to obtain weight history and complete full NFPE. RD to remain available and follow up.      Treatment:    The following diet has been recommended:  1) Advance diet as tolerated to regular   2) Obtain/provide food preferences to inc PO intake    PROVIDER Section:     By signing this assessment you are acknowledging and agree with the diagnosis/diagnoses assigned by the Registered Dietitian    Comments: Upon Nutritional Assessment by the Registered Dietitian your patient was determined to meet criteria / has evidence of the following diagnosis/diagnoses:            [X]  Moderate Acute Protein Calorie Malnutrition        [X] Underweight / BMI <19    Findings as based on:  •  Comprehensive nutrition assessment and consultation  •  Calorie counts (nutrient intake analysis)  •  Moderate temple wasting observed  •  BMI 17.7  •  Pt meeting <75% of estimated protein-energy needs x 7 days  •  Intervention secondary to interdisciplinary rounds  •   concerns    Pt at high nutrition risk secondary to malnutrition (moderate acute) related to increased physiological demand for healing and ETOH use/abuse as evidenced by BMI 17.7, meeting <75% of estimated protein-energy needs x 7 days, moderate muscle wasting temples. Pt refused interview at this time to obtain weight history and complete full NFPE. RD to remain available and follow up.      Treatment:    The following diet has been recommended:  1) Advance diet as tolerated to lowfat  2) Obtain/provide food preferences to inc PO intake    PROVIDER Section:     By signing this assessment you are acknowledging and agree with the diagnosis/diagnoses assigned by the Registered Dietitian    Comments:

## 2018-12-13 NOTE — OCCUPATIONAL THERAPY INITIAL EVALUATION ADULT - NS ASR FOLLOW COMMAND OT EVAL
75% of the time/able to follow single-step instructions/pt requires increased time and repetition for processing of commands; pt requires cues to attend to session with external distractions/unable to follow multi-step instructions

## 2018-12-13 NOTE — CONSULT NOTE ADULT - REASON FOR ADMISSION
alcohol withdrawal  and fall
Hip fx, alcohol withdrawl, possible TIA

## 2018-12-13 NOTE — DIETITIAN INITIAL EVALUATION ADULT. - OTHER INFO
Pt admit with cerebral ischemia, fx to R. hip. Pt noted as poor historian with hx of ETOH use and likely difficulty with procurement/preparation due to being homeless. Pt refused nutrition interview at this time unable to obtain weight history, moderate temple wasting noted.

## 2018-12-13 NOTE — PHYSICAL THERAPY INITIAL EVALUATION ADULT - CRITERIA FOR SKILLED THERAPEUTIC INTERVENTIONS
functional limitations in following categories/risk reduction/prevention/impairments found/anticipated discharge recommendation/therapy frequency/anticipated equipment needs at discharge/rehab potential

## 2018-12-13 NOTE — PHYSICAL THERAPY INITIAL EVALUATION ADULT - PASSIVE RANGE OF MOTION EXAMINATION, REHAB EVAL
bilateral upper extremity Passive ROM was WFL (within functional limits)/right hip flex to 90 degrees in sitting, unable to get right hip to neutral in supine due to guarding,knee/ankle WFL/Left LE Passive ROM was WFL (within functional limits)

## 2018-12-13 NOTE — PROGRESS NOTE ADULT - SUBJECTIVE AND OBJECTIVE BOX
Pt is a 54 yo M w/ PMH of alcohol abuse and HTN came to [facility] ER by ambulance , s/p fall , found to have R-hip fracture and alcohol withdraw syndrome .      Chief complaint: Rt hip pain    Subjective : Patient seen and examined at bedside. Overnight nurse stated that pt refused IV access initially. He received one dose of percocet and one IV tylenol. Pt was wake and alert this morning. States he had mild pain overnight for which he requested medication. Pain is currently adequately controlled. Pt has not had a BM for 2 days but is actively voiding. No other complaints at this time.  Pt is not ambulating due to right hip fx.    ROS: Denies fever, chest pain, SOB, abdominal pain, diarrhea, constipation, calf pain.    Vital Signs Last 24 Hrs  T(C): 36.9 (12 Dec 2018 23:32), Max: 38.4 (12 Dec 2018 14:42)  T(F): 98.5 (12 Dec 2018 23:32), Max: 101.1 (12 Dec 2018 14:42)  HR: 100 (12 Dec 2018 23:32) (88 - 106)  BP: 112/73 (12 Dec 2018 23:32) (112/73 - 167/95)  BP(mean): --  RR: 19 (12 Dec 2018 23:32) (14 - 100)  SpO2: 99% (12 Dec 2018 23:32) (96% - 100%)    PHYSICAL EXAM:  GENERAL: NAD  HEAD:  Atraumatic, Normocephalic  EYES: EOMI, PERRLA, positive scleral icterus   ENMT: No tonsillar erythema, exudates, or enlargement; Moist mucous membranes,  NECK: Supple  Respiratory: Clear to auscultation bilaterally; No rales, rhonchi, wheezing, or rubs  CV: Regular rate and rhythm; No murmurs, rubs, or gallops  Gastrointestinal: Soft, Nontender, Nondistended; Bowel sounds present  EXTREMITIES:  2+ Peripheral Pulses, No clubbing, cyanosis, or edema.Pain to palpation over R- hip and pain w/ active movement of RLE. Surgical dressing on R buttock  NERVOUS SYSTEM:  lethargy ; Motor Strength 5/5 B/L upper and LLE extremities; unable to asses RLE due to pain , sensation intact .  SKIN: No rashes or lesions. Crusting and dryness of plantar aspect of b/l feet.                                                         7.9    7.7   )-----------( 137      ( 12 Dec 2018 08:24 )             25.5     12-12    132<L>  |  96<L>  |  5.0<L>  ----------------------------<  117<H>  3.1<L>   |  25.0  |  0.54    Ca    8.4<L>      12 Dec 2018 08:24  Phos  2.7     12-  Mg     1.8     12-          MRI head: Cerebral and cerebellar atrophy. Chronic white matter changes.  Encephalomalacia and gliosis in the mid anika from prior injury.    EE. Normal left atrial size.   2. Normal wall motion. Left ventricular ejection fraction, by visual   estimation, is 55 to 60%.Grade I diastolic dysfunction.   3. The right atrium is normal in size.   4. Normal right ventricular size and function.   5. No significant valvular abnormality.   6. There is no evidence of pericardial effusion.    Radiology:  < from: Xray Chest 1 View-PORTABLE IMMEDIATE (18 @ 08:32) >  The cardiac, hilar and mediastinal contours are unremarkable. The lungs   are clear. The osseous structures demonstrate no acute abnormality.         IMPRESSION:    Clear lungs. No acute disease.    < end of copied text >    Right hip xr: positive intertrochanteric Fx    MEDICATIONS  (STANDING):  ceFAZolin   IVPB 2000 milliGRAM(s) IV Intermittent <User Schedule>  docusate sodium 100 milliGRAM(s) Oral three times a day  enoxaparin Injectable 30 milliGRAM(s) SubCutaneous two times a day  lactated ringers. 1000 milliLiter(s) (125 mL/Hr) IV Continuous <Continuous>  multivitamin 1 Tablet(s) Oral daily    MEDICATIONS  (PRN):  acetaminophen   Tablet .. 650 milliGRAM(s) Oral every 6 hours PRN Mild Pain (1 - 3)  HYDROmorphone   Tablet 2 milliGRAM(s) Oral every 3 hours PRN Severe Pain (7 - 10)  HYDROmorphone  IVPB 0.5 milliGRAM(s) IV Intermittent every 3 hours PRN Severe Pain (7 - 10)  ketorolac   Injectable 15 milliGRAM(s) IV Push once PRN Moderate Pain (4 - 6)  magnesium hydroxide Suspension 30 milliLiter(s) Oral daily PRN Constipation  ondansetron Injectable 4 milliGRAM(s) IV Push every 6 hours PRN Nausea and/or Vomiting  oxyCODONE    IR 10 milliGRAM(s) Oral every 3 hours PRN Moderate Pain (4 - 6)  oxyCODONE    IR 5 milliGRAM(s) Oral every 3 hours PRN Mild Pain (1 - 3) Pt is a 52 yo M w/ PMH of alcohol abuse and HTN came to [facility] ER by ambulance , s/p fall , found to have R-hip fracture and alcohol withdraw syndrome .      Chief complaint: Rt hip pain    Subjective : Patient seen and examined at bedside. Overnight nurse stated that pt refused IV access initially. He received one dose of percocet and one IV tylenol. Pt was wake and alert this morning. States he had mild pain overnight for which he requested medication. Pain is currently adequately controlled. Pt has not had a BM for 2 days but is actively voiding. No other complaints at this time. Pt is not ambulating due to right hip surgery.    ROS: Denies fever, chest pain, SOB, abdominal pain, diarrhea, constipation, calf pain.    Vital Signs Last 24 Hrs  T(C): 36.3 (13 Dec 2018 08:32), Max: 38.4 (12 Dec 2018 14:42)  T(F): 97.4 (13 Dec 2018 08:32), Max: 101.1 (12 Dec 2018 14:42)  HR: 108 (13 Dec 2018 08:32) (88 - 108)  BP: 145/92 (13 Dec 2018 08:32) (112/73 - 167/95)  RR: 18 (13 Dec 2018 08:32) (14 - 19)  SpO2: 98% (13 Dec 2018 08:32) (96% - 100%)    PHYSICAL EXAM:  GENERAL: NAD  HEAD:  Atraumatic, Normocephalic  EYES: EOMI, PERRLA, positive scleral icterus   ENMT: No tonsillar erythema, exudates, or enlargement; Moist mucous membranes,  NECK: Supple  Respiratory: Clear to auscultation bilaterally; No rales, rhonchi, wheezing, or rubs  CV: Regular rate and rhythm; No murmurs, rubs, or gallops  Gastrointestinal: Soft, Nontender, Nondistended; Bowel sounds present  EXTREMITIES:  2+ Peripheral Pulses, No clubbing, cyanosis, or edema.Pain to palpation over R- hip and pain w/ active movement of RLE. Surgical dressing on R buttock  NERVOUS SYSTEM:  lethargy ; Motor Strength 5/5 B/L upper and LLE extremities; unable to asses RLE due to pain , sensation intact .  SKIN: No rashes or lesions. Crusting and dryness of plantar aspect of b/l feet.                                                         7.9    7.7   )-----------( 137      ( 12 Dec 2018 08:24 )             25.5     12-12    132<L>  |  96<L>  |  5.0<L>  ----------------------------<  117<H>  3.1<L>   |  25.0  |  0.54    Ca    8.4<L>      12 Dec 2018 08:24  Phos  2.7     12-  Mg     1.8     12-        MRI head: Cerebral and cerebellar atrophy. Chronic white matter changes.  Encephalomalacia and gliosis in the mid anika from prior injury.    EE. Normal left atrial size.   2. Normal wall motion. Left ventricular ejection fraction, by visual   estimation, is 55 to 60%.Grade I diastolic dysfunction.   3. The right atrium is normal in size.   4. Normal right ventricular size and function.   5. No significant valvular abnormality.   6. There is no evidence of pericardial effusion.    Radiology:  < from: Xray Chest 1 View-PORTABLE IMMEDIATE (18 @ 08:32) >  The cardiac, hilar and mediastinal contours are unremarkable. The lungs   are clear. The osseous structures demonstrate no acute abnormality.         IMPRESSION:    Clear lungs. No acute disease.    < end of copied text >    Right hip xr: positive intertrochanteric Fx    MEDICATIONS  (STANDING):  ceFAZolin   IVPB 2000 milliGRAM(s) IV Intermittent <User Schedule>  docusate sodium 100 milliGRAM(s) Oral three times a day  enoxaparin Injectable 30 milliGRAM(s) SubCutaneous two times a day  lactated ringers. 1000 milliLiter(s) (125 mL/Hr) IV Continuous <Continuous>  multivitamin 1 Tablet(s) Oral daily    MEDICATIONS  (PRN):  acetaminophen   Tablet .. 650 milliGRAM(s) Oral every 6 hours PRN Mild Pain (1 - 3)  HYDROmorphone   Tablet 2 milliGRAM(s) Oral every 3 hours PRN Severe Pain (7 - 10)  HYDROmorphone  IVPB 0.5 milliGRAM(s) IV Intermittent every 3 hours PRN Severe Pain (7 - 10)  ketorolac   Injectable 15 milliGRAM(s) IV Push once PRN Moderate Pain (4 - 6)  magnesium hydroxide Suspension 30 milliLiter(s) Oral daily PRN Constipation  ondansetron Injectable 4 milliGRAM(s) IV Push every 6 hours PRN Nausea and/or Vomiting  oxyCODONE    IR 10 milliGRAM(s) Oral every 3 hours PRN Moderate Pain (4 - 6)  oxyCODONE    IR 5 milliGRAM(s) Oral every 3 hours PRN Mild Pain (1 - 3)

## 2018-12-13 NOTE — PHYSICAL THERAPY INITIAL EVALUATION ADULT - ADDITIONAL COMMENTS
pt is a questionable historian. states he is homeless, but stays in a place on the third floor with an elevator. will need to confirm with social work. states he was independent without device prior to admit.

## 2018-12-13 NOTE — OCCUPATIONAL THERAPY INITIAL EVALUATION ADULT - RANGE OF MOTION EXAMINATION
bilateral shoulders AROM to about 90 degrees (PROM WFL), bilateral elbows AROM WFL, bilateral gross grasp and digit extension AROM WFL

## 2018-12-13 NOTE — PROGRESS NOTE ADULT - SUBJECTIVE AND OBJECTIVE BOX
Ortho Progress note    Name: CATHY REYES    MR #: [id number]    Procedure: right hip IM nail  Surgeon: Dr. Anthony    Pt comfortable without complaints, pain controlled  States he has not participated in PT yet  Denies CP, SOB, N/V, numbness/tingling   No other complaints    General Exam:  Vital Signs Last 24 Hrs  T(C): 36.9 (12 Dec 2018 23:32), Max: 38.4 (12 Dec 2018 14:42)  T(F): 98.5 (12 Dec 2018 23:32), Max: 101.1 (12 Dec 2018 14:42)  HR: 100 (12 Dec 2018 23:32) (88 - 106)  BP: 112/73 (12 Dec 2018 23:32) (112/73 - 167/95)  BP(mean): --  RR: 19 (12 Dec 2018 23:32) (14 - 100)  SpO2: 99% (12 Dec 2018 23:32) (96% - 100%)    General: Pt Alert and oriented, NAD, controlled pain.  Proximal Dressing C/D/I.   distal dressing noted to have minimal dried serosanguinous drainage   Pulses: 2+ dorsalis pedis pulse. Cap refill < 2 sec.  Sensation: Grossly intact to light touch without deficit.  Motor: + EHL/FHL/TA/GS             AM labs pending    A/P: 53yMale POD#1 s/p right hip IMN    - Pain Control  - DVT ppx: Lovenox 30 BID, SCD's  - PT  - Weight bearing status: WBAT  - Continue care per primary team

## 2018-12-13 NOTE — PHYSICAL THERAPY INITIAL EVALUATION ADULT - PERTINENT HX OF CURRENT PROBLEM, REHAB EVAL
Pt is a 54 yo M w/ PMH of alcohol abuse and HTN. Pt was visiting his mother yesterday and he developed right side of his body weakness and then he fell to the ground , no LOC or Head trauma , when pt arrived to ER was complaining of headache and back pain . After the fall pt was unable to bear wt due pain in his lower RLE. Closed displaced basicervical fracture of right femur s/p IMN.

## 2018-12-13 NOTE — CONSULT NOTE ADULT - SUBJECTIVE AND OBJECTIVE BOX
HPI:  Mr. Krause is a 53 year old male with a PMHx of alcohol abuse and HTN who presented to [facility] on 12/10 with comlpaints of right body weakness causing him to fall to the ground. Denies head trauma or LOC. He complained of headache and back pain. He was found to have positive EtOH and THC on admission. CTH and c-spine CT negative for acute pathology. MRI brain showed no acute pathology. Further right hip imaging revealed an acute right comminuted displaced angulated fracture of the femoral neck involving the lesser trochanter. He underwent right hip IM nailing on  by Dr. Anthony.    REVIEW OF SYSTEMS      PAST MEDICAL & SURGICAL HISTORY  Alcohol abuse  Hypertension  Intertrochanteric hip fracture surgery    SOCIAL HISTORY  Smoking - Denied  EtOH - Denied   Drugs - Denied    FAMILY HISTORY   Reviewed and non-contributory    ALLERGIES  penicillins   shellfish     VITALS  T(C): 36.3 (18 @ 08:32)  T(F): 97.4 (18 @ 08:32), Max: 101.1 (18 @ 14:42)  HR: 108 (18 @ 08:32) (88 - 108)  BP: 145/92 (18 @ 08:32) (112/73 - 167/95)  RR:  (14 - 19)  SpO2:  (96% - 100%)  Wt(kg): --    FUNCTIONAL HISTORY  _______ hand dominant  Homeless  Independent in ambulation, ADL's, transfers prior to hospitalization    CURRENT FUNCTIONAL STATUS  Bed Mobility: Scooting/Bridging:     · Level of [place]	maximum assist (25% patients effort)	  · Physical Assist/Nonphysical Assist	2 person assist; verbal cues	    Bed Mobility: Sit to Supine:     · Level of [place]	maximum assist (25% patients effort)	  · Physical Assist/Nonphysical Assist	1 person assist; verbal cues	    Bed Mobility: Supine to Sit:     · Level of [place]	maximum assist (25% patients effort)	  · Physical Assist/Nonphysical Assist	1 person assist; verbal cues	    Transfer: Sit to Stand:     · Level of [place]	maximum assist (25% patients effort)	  · Physical Assist/Nonphysical Assist	2 person assist; verbal cues	  · Weight-Bearing Restrictions	weight-bearing as tolerated; right LE	  · Assistive Device	rolling walker	    Transfer: Stand to Sit:     · Level of [place]	maximum assist (25% patients effort)	  · Physical Assist/Nonphysical Assist	2 person assist; verbal cues	  · Weight-Bearing Restrictions	weight-bearing as tolerated; right LE	  · Assistive Device	rolling walker	    Gait Skills:     · Level of [place]	maximum assist (25% patients effort)	  · Physical Assist/Nonphysical Assist	2 person assist; verbal cues	  · Weight-Bearing Restrictions	weight-bearing as tolerated; right LE	  · Assistive Device	rolling walker	  · Gait Distance	2 sidesteps at bedside with RW	    RECENT LABS/IMAGING             8.0    x     )-----------( x        ( 13 Dec 2018 11:21 )             25.8     132<L>  |  97<L>  |  4.0<L>  ----------------------------<  115  3.4<L>   |  26.0  |  0.51    Ca    8.2<L>      13 Dec 2018 11:21    Urinalysis Basic - ( 11 Dec 2018 16:29 )  Color: Crystal / Appearance: Clear / S.025 / pH: x  Gluc: x / Ketone: Large  / Bili: Moderate / Urobili: 12 mg/dL   Blood: x / Protein: 30 mg/dL / Nitrite: Positive   Leuk Esterase: Trace / RBC: 0-2 /HPF / WBC 0-2   Sq Epi: x / Non Sq Epi: Occasional / Bacteria: Occasional    MEDICATIONS   MEDICATIONS  (STANDING):  docusate sodium 100 milliGRAM(s) Oral three times a day  enoxaparin Injectable 40 milliGRAM(s) SubCutaneous daily  lactated ringers. 1000 milliLiter(s) (125 mL/Hr) IV Continuous <Continuous>  multivitamin 1 Tablet(s) Oral daily    MEDICATIONS  (PRN):  acetaminophen   Tablet .. 650 milliGRAM(s) Oral every 6 hours PRN Mild Pain (1 - 3)  HYDROmorphone   Tablet 2 milliGRAM(s) Oral every 3 hours PRN Severe Pain (7 - 10)  HYDROmorphone  IVPB 0.5 milliGRAM(s) IV Intermittent every 3 hours PRN Severe Pain (7 - 10)  ketorolac   Injectable 15 milliGRAM(s) IV Push once PRN Moderate Pain (4 - 6)  magnesium hydroxide Suspension 30 milliLiter(s) Oral daily PRN Constipation  ondansetron Injectable 4 milliGRAM(s) IV Push every 6 hours PRN Nausea and/or Vomiting  oxyCODONE    IR 10 milliGRAM(s) Oral every 3 hours PRN Moderate Pain (4 - 6)  oxyCODONE    IR 5 milliGRAM(s) Oral every 3 hours PRN Mild Pain (1 - 3)    PHYSICAL EXAM    ASSESSMENT/PLAN  54 y/o male with a fall suffering an acute right femoral neck fracture s/p IM nailing. He is now with functional, gait, ADL, balance impairments.    Disposition -  Precautions - Falls, Cardiac  Weight bearing status - WBAT RLE  Pain control - Oxycodone PRN, Dilaudid PRN, Tylenol PRN, Toradol PRN  DVT Prophylaxis - Lovenox  Skin - Turn Q2hrs  Diet - Regular/thins HPI:  Mr. Krause is a 53 year old male with a PMHx of alcohol abuse and HTN who presented to [facility] on 12/10 with comlpaints of right body weakness causing him to fall to the ground. Denies head trauma or LOC. He complained of headache and back pain. He was found to have positive EtOH and THC on admission. CTH and c-spine CT negative for acute pathology. MRI brain showed no acute pathology. Further right hip imaging revealed an acute right comminuted displaced angulated fracture of the femoral neck involving the lesser trochanter. He underwent right hip IM nailing on  by Dr. Anthony.    REVIEW OF SYSTEMS  Difficult to fully assess secondary to lethargy but denies pain in right hip    PAST MEDICAL & SURGICAL HISTORY (per chart)  Alcohol abuse  Hypertension    SOCIAL HISTORY  Smoking - Denied  EtOH - History of alcohol abuse  Drugs - Denied    FAMILY HISTORY   Reviewed and non-contributory    ALLERGIES  penicillins   shellfish     VITALS  T(C): 36.3 (18 @ 08:32)  T(F): 97.4 (18 @ 08:32), Max: 101.1 (18 @ 14:42)  HR: 108 (18 @ 08:32) (88 - 108)  BP: 145/92 (18 @ 08:32) (112/73 - 167/95)  RR:  (14 - 19)  SpO2:  (96% - 100%)  Wt(kg): --    FUNCTIONAL HISTORY  Homeless, states that he is unable to go to his mother's house post discharge  Independent in ambulation, ADL's, transfers prior to hospitalization    CURRENT FUNCTIONAL STATUS  Bed Mobility: Scooting/Bridging:     · Level of [place]	maximum assist (25% patients effort)	  · Physical Assist/Nonphysical Assist	2 person assist; verbal cues	    Bed Mobility: Sit to Supine:     · Level of [place]	maximum assist (25% patients effort)	  · Physical Assist/Nonphysical Assist	1 person assist; verbal cues	    Bed Mobility: Supine to Sit:     · Level of [place]	maximum assist (25% patients effort)	  · Physical Assist/Nonphysical Assist	1 person assist; verbal cues	    Transfer: Sit to Stand:     · Level of [place]	maximum assist (25% patients effort)	  · Physical Assist/Nonphysical Assist	2 person assist; verbal cues	  · Weight-Bearing Restrictions	weight-bearing as tolerated; right LE	  · Assistive Device	rolling walker	    Transfer: Stand to Sit:     · Level of [place]	maximum assist (25% patients effort)	  · Physical Assist/Nonphysical Assist	2 person assist; verbal cues	  · Weight-Bearing Restrictions	weight-bearing as tolerated; right LE	  · Assistive Device	rolling walker	    Gait Skills:     · Level of [place]	maximum assist (25% patients effort)	  · Physical Assist/Nonphysical Assist	2 person assist; verbal cues	  · Weight-Bearing Restrictions	weight-bearing as tolerated; right LE	  · Assistive Device	rolling walker	  · Gait Distance	2 sidesteps at bedside with RW	    RECENT LABS/IMAGING             8.0    x     )-----------( x        ( 13 Dec 2018 11:21 )             25.8     132<L>  |  97<L>  |  4.0<L>  ----------------------------<  115  3.4<L>   |  26.0  |  0.51    Ca    8.2<L>      13 Dec 2018 11:21    Urinalysis Basic - ( 11 Dec 2018 16:29 )  Color: Crystal / Appearance: Clear / S.025 / pH: x  Gluc: x / Ketone: Large  / Bili: Moderate / Urobili: 12 mg/dL   Blood: x / Protein: 30 mg/dL / Nitrite: Positive   Leuk Esterase: Trace / RBC: 0-2 /HPF / WBC 0-2   Sq Epi: x / Non Sq Epi: Occasional / Bacteria: Occasional    MEDICATIONS   MEDICATIONS  (STANDING):  docusate sodium 100 milliGRAM(s) Oral three times a day  enoxaparin Injectable 40 milliGRAM(s) SubCutaneous daily  lactated ringers. 1000 milliLiter(s) (125 mL/Hr) IV Continuous <Continuous>  multivitamin 1 Tablet(s) Oral daily    MEDICATIONS  (PRN):  acetaminophen   Tablet .. 650 milliGRAM(s) Oral every 6 hours PRN Mild Pain (1 - 3)  HYDROmorphone   Tablet 2 milliGRAM(s) Oral every 3 hours PRN Severe Pain (7 - 10)  HYDROmorphone  IVPB 0.5 milliGRAM(s) IV Intermittent every 3 hours PRN Severe Pain (7 - 10)  ketorolac   Injectable 15 milliGRAM(s) IV Push once PRN Moderate Pain (4 - 6)  magnesium hydroxide Suspension 30 milliLiter(s) Oral daily PRN Constipation  ondansetron Injectable 4 milliGRAM(s) IV Push every 6 hours PRN Nausea and/or Vomiting  oxyCODONE    IR 10 milliGRAM(s) Oral every 3 hours PRN Moderate Pain (4 - 6)  oxyCODONE    IR 5 milliGRAM(s) Oral every 3 hours PRN Mild Pain (1 - 3)    PHYSICAL EXAM  Constitutional - NAD, Comfortable, Fatigued but arousable  HEENT - NCAT, MMM  Chest - No increased work of breathing  Cardiovascular - RRR, S1S2  Abdomen - Soft, Non-distended  Extremities - No calf tenderness   Neurologic Exam -                    Cognitive - Fatigued but arousable, Oriented to self, place, date, year, situation     Communication - Minimal verbal output secondary to fatigue     Cranial Nerves - CN 2-12 grossly intact     Motor - Limited exam secondary to patient fatigue but appears to have 5/5 strength in bilateral upper extremities                     RIGHT LE - HF 1/5 (limited by pain and recent fracture), KE 1/5 (limited by pain and recent fracture), DF 5/5, PF 5/5        Sensory - Intact to light touch diffusely  Psychiatric - Flat affect    ASSESSMENT/PLAN  54 y/o male with a fall suffering an acute right femoral neck fracture s/p IM nailing. He is now with functional, gait, ADL, balance impairments.    Disposition - Examination of patient was limited by patient's level of fatigue. He is requiring increased assistance with functional mobility and will require a course of inpatient rehabilitation. Will continue to follow to make final rehabilitation recommendations but at this point patient would likely benefit more from a course of rehabilitation at subacute rehabilitation.  Precautions - Falls, Cardiac  Weight bearing status - WBAT RLE  Pain control - Oxycodone PRN, Dilaudid PRN, Tylenol PRN, Toradol PRN  DVT Prophylaxis - Lovenox  Skin - Turn Q2hrs  Diet - Regular/thins HPI:  Mr. Krause is a 53 year old male with a PMHx of alcohol abuse and HTN who presented to [facility] on 12/10 with comlpaints of right body weakness causing him to fall to the ground. Denies head trauma or LOC. He complained of headache and back pain. He was found to have positive EtOH and THC on admission. CTH and c-spine CT negative for acute pathology. MRI brain showed no acute pathology. Further right hip imaging revealed an acute right comminuted displaced angulated fracture of the femoral neck involving the lesser trochanter. He underwent right hip IM nailing on  by Dr. Anthony.    REVIEW OF SYSTEMS  Difficult to fully assess secondary to lethargy but denies pain in right hip    PAST MEDICAL & SURGICAL HISTORY (per chart)  Alcohol abuse  Hypertension    SOCIAL HISTORY  Smoking - Denied  EtOH - History of alcohol abuse  Drugs - Denied    FAMILY HISTORY   Reviewed and non-contributory    ALLERGIES  penicillins   shellfish     VITALS  T(C): 36.3 (18 @ 08:32)  T(F): 97.4 (18 @ 08:32), Max: 101.1 (18 @ 14:42)  HR: 108 (18 @ 08:32) (88 - 108)  BP: 145/92 (18 @ 08:32) (112/73 - 167/95)  RR:  (14 - 19)  SpO2:  (96% - 100%)  Wt(kg): --    FUNCTIONAL HISTORY  Homeless, states that he is unable to go to his mother's house post discharge  Independent in ambulation, ADL's, transfers prior to hospitalization    CURRENT FUNCTIONAL STATUS  Bed Mobility: Scooting/Bridging:     · Level of [place]	maximum assist (25% patients effort)	  · Physical Assist/Nonphysical Assist	2 person assist; verbal cues	    Bed Mobility: Sit to Supine:     · Level of [place]	maximum assist (25% patients effort)	  · Physical Assist/Nonphysical Assist	1 person assist; verbal cues	    Bed Mobility: Supine to Sit:     · Level of [place]	maximum assist (25% patients effort)	  · Physical Assist/Nonphysical Assist	1 person assist; verbal cues	    Transfer: Sit to Stand:     · Level of [place]	maximum assist (25% patients effort)	  · Physical Assist/Nonphysical Assist	2 person assist; verbal cues	  · Weight-Bearing Restrictions	weight-bearing as tolerated; right LE	  · Assistive Device	rolling walker	    Transfer: Stand to Sit:     · Level of [place]	maximum assist (25% patients effort)	  · Physical Assist/Nonphysical Assist	2 person assist; verbal cues	  · Weight-Bearing Restrictions	weight-bearing as tolerated; right LE	  · Assistive Device	rolling walker	    Gait Skills:     · Level of [place]	maximum assist (25% patients effort)	  · Physical Assist/Nonphysical Assist	2 person assist; verbal cues	  · Weight-Bearing Restrictions	weight-bearing as tolerated; right LE	  · Assistive Device	rolling walker	  · Gait Distance	2 sidesteps at bedside with RW	    RECENT LABS/IMAGING             8.0    x     )-----------( x        ( 13 Dec 2018 11:21 )             25.8     132<L>  |  97<L>  |  4.0<L>  ----------------------------<  115  3.4<L>   |  26.0  |  0.51    Ca    8.2<L>      13 Dec 2018 11:21    Urinalysis Basic - ( 11 Dec 2018 16:29 )  Color: Crystal / Appearance: Clear / S.025 / pH: x  Gluc: x / Ketone: Large  / Bili: Moderate / Urobili: 12 mg/dL   Blood: x / Protein: 30 mg/dL / Nitrite: Positive   Leuk Esterase: Trace / RBC: 0-2 /HPF / WBC 0-2   Sq Epi: x / Non Sq Epi: Occasional / Bacteria: Occasional    MEDICATIONS   MEDICATIONS  (STANDING):  docusate sodium 100 milliGRAM(s) Oral three times a day  enoxaparin Injectable 40 milliGRAM(s) SubCutaneous daily  lactated ringers. 1000 milliLiter(s) (125 mL/Hr) IV Continuous <Continuous>  multivitamin 1 Tablet(s) Oral daily    MEDICATIONS  (PRN):  acetaminophen   Tablet .. 650 milliGRAM(s) Oral every 6 hours PRN Mild Pain (1 - 3)  HYDROmorphone   Tablet 2 milliGRAM(s) Oral every 3 hours PRN Severe Pain (7 - 10)  HYDROmorphone  IVPB 0.5 milliGRAM(s) IV Intermittent every 3 hours PRN Severe Pain (7 - 10)  ketorolac   Injectable 15 milliGRAM(s) IV Push once PRN Moderate Pain (4 - 6)  magnesium hydroxide Suspension 30 milliLiter(s) Oral daily PRN Constipation  ondansetron Injectable 4 milliGRAM(s) IV Push every 6 hours PRN Nausea and/or Vomiting  oxyCODONE    IR 10 milliGRAM(s) Oral every 3 hours PRN Moderate Pain (4 - 6)  oxyCODONE    IR 5 milliGRAM(s) Oral every 3 hours PRN Mild Pain (1 - 3)    PHYSICAL EXAM  Constitutional - NAD, Comfortable, Fatigued but arousable  HEENT - NCAT, MMM  Chest - No increased work of breathing  Cardiovascular - RRR, S1S2  Abdomen - Soft, Non-distended  Extremities - No calf tenderness   Neurologic Exam -                    Cognitive - Fatigued but arousable, Oriented to self, place, date, year, situation     Communication - Minimal verbal output secondary to fatigue     Cranial Nerves - CN 2-12 grossly intact     Motor - Limited exam secondary to patient fatigue but appears to have 5/5 strength in bilateral upper extremities                     RIGHT LE - HF 1/5 (limited by pain and recent fracture), KE 1/5 (limited by pain and recent fracture), DF 5/5, PF 5/5        Sensory - Intact to light touch diffusely  Psychiatric - Flat affect    ASSESSMENT/PLAN  52 y/o male with a fall suffering an acute right femoral neck fracture s/p IM nailing. He is now with functional, gait, ADL, balance impairments.    Disposition - Examination of patient was limited by patient's level of fatigue. He is requiring increased assistance with functional mobility and will require a course of inpatient rehabilitation. Will continue to follow to make final rehabilitation recommendations.  Precautions - Falls, Cardiac  Weight bearing status - WBAT RLE  Pain control - Oxycodone PRN, Dilaudid PRN, Tylenol PRN, Toradol PRN  DVT Prophylaxis - Lovenox  Skin - Turn Q2hrs  Diet - Regular/thins

## 2018-12-13 NOTE — OCCUPATIONAL THERAPY INITIAL EVALUATION ADULT - PERTINENT HX OF CURRENT PROBLEM, REHAB EVAL
Pt presents to ED with reports he developed weakness to right side of body and fell to ground. Hip X-ray reveals there is complete comminuted fracture of the right femoral neck extending to the lesser trochanter; the fracture appears comminuted, displaced and angulated; bony mineralization is grossly unremarkable; there is overlying soft tissue swelling; there is arterial calcification.

## 2018-12-13 NOTE — PROGRESS NOTE ADULT - ASSESSMENT
Pt is a 54 yo M w/ PMH of alcohol abuse and HTN came to [facility] ER by ambulance , s/p fall , found to have R-hip fracture and alcohol withdraw syndrome .     Post traumatic Complete Comminuted Fx of Rt Femoral Neck extending to Lesser Trochanter   -s/p fall at his mother house prior to admit   -Right hip XR Positive for Fx.  -pain control w/ morphine and lidocaine patch, tylenol, controlled   -ekg with suspicion for LVH with some leads showing potential areas of prior infarct however TTE: EF 55-60%. Grade 1 diastolic dysfunction, no signs of wall motion abn or other structural abn to preclude ability to proceed w/ sx procedure as planned  -s/p Surgery yesterday.  -wt bearing per ortho post op w/ pt eval + dvt ppx per them  -pt is homeless, will likely need to go to [facility]  - f/u ortho outpatient on ~12/28    Normocytic anemia  -asymptomatic, worsening  -previous hb= 13.3 on 12/2/18 , recent Hb= 9.4 ,Hb today: 7.9, trending down  -100 milliLiter estimated blood loss during surgery yesterday  -will continue to monitor as patient is asymptomatic  -guaic negative   -anemia panel noted, thus far w/u consistent with AOCD  -serial h/h, transfuse for hgb < 7 or symptom onset    Hypokalemia (3.1)  -mild, asymptomatic  -s/p K 40meq powder and 40meq tablet  -f/u K levels    Alcohol withdraw syndrome   -improved  -CIWA protocol   -CIWA= 0 this morning   -librium sanaz   -ativan for sx trigger   -c/w multivitamin and vit B1, folic acid   - consult     Asymptomatic Bacteriuria   -possible colonization of urethra, no WBCs in UA, pt asymptoamtic  -Urine culture: <10,000 CFR/ml Gram Negative rods  -s.p 1 dose Macrobid 100mg. Hold for now  -will monitor for symptoms of UTI    Abnormal LFT  -most likely 2/2 to alcohol abuse  -abd us w/ difuse steatosis, no signs of cirrhosis   -hepatitis panel neg   -etoh abstinence, low fat diet when no longer npo + outpt fu required to cont monitoring    Severe protein calorie malnutrition  -sec to poor nutrition in setting of etoh abuse  -f/u nutrition consult after sx  -low fat diet w/ prostat + ensure supplementation when taking po    Rt Sided MSK Pain  -resolved w/ exception of hip pain from fx  -pt with reports of sudden onset upper + lower Rt sided msk pain prior to admit prompting fall  -tia + cva w/u done on presentation, CTH + MR Head unremarkable, CT spine also unremarkable  -unlikely to have had a neurovascular event causing symptoms  -symptomatic pain control as above, PT as above    Preventive measure   -DVT prophy: Lovenox 30mg subcutaneous BID Pt is a 52 yo M w/ PMH of alcohol abuse and HTN came to [facility] ER by ambulance , s/p fall , found to have R-hip fracture and alcohol withdraw syndrome .     Post traumatic Complete Comminuted Fx of Rt Femoral Neck extending to Lesser Trochanter   -s/p fall at his mother house prior to admit   -Right hip XR Positive for Fx.  -pain control w/ morphine and lidocaine patch, tylenol, controlled   -ekg with suspicion for LVH with some leads showing potential areas of prior infarct however TTE: EF 55-60%. Grade 1 diastolic dysfunction, no signs of wall motion abn or other structural abn to preclude ability to proceed w/ sx procedure as planned  -s/p Surgery yesterday. POD#1  -wt bearing per ortho post op w/ pt eval + dvt ppx per them  -pt is homeless, will likely need to go to [facility]  - f/u ortho outpatient on ~12/28    Normocytic anemia  -asymptomatic, worsening  -previous hb= 13.3 on 12/2/18 , recent Hb= 9.4 ,Hb today: 7.9, trending down  -100 milliLiter estimated blood loss during surgery yesterday  -will continue to monitor as patient is asymptomatic  -guaic negative   -anemia panel noted, thus far w/u consistent with AOCD  -serial h/h, transfuse for hgb < 7 or symptom onset    Hypokalemia (3.1)  -mild, asymptomatic  -s/p K 40meq powder and 40meq tablet  -f/u K levels    Alcohol withdraw syndrome   -improved  -CIWA protocol   -CIWA= 0 this morning   -librium sanaz   -ativan for sx trigger   -c/w multivitamin and vit B1, folic acid   - consult     Asymptomatic Bacteriuria   -possible colonization of urethra, no WBCs in UA, pt asymptoamtic  -Urine culture: <10,000 CFR/ml Gram Negative rods  -s.p 1 dose Macrobid 100mg. Hold for now  -will monitor for symptoms of UTI    Abnormal LFT  -most likely 2/2 to alcohol abuse  -abd us w/ difuse steatosis, no signs of cirrhosis   -hepatitis panel neg   -etoh abstinence, low fat diet when no longer npo + outpt fu required to cont monitoring    Severe protein calorie malnutrition  -sec to poor nutrition in setting of etoh abuse  -f/u nutrition consult after sx  -low fat diet w/ prostat + ensure supplementation when taking po    Rt Sided MSK Pain  -resolved w/ exception of hip pain from fx  -pt with reports of sudden onset upper + lower Rt sided msk pain prior to admit prompting fall  -tia + cva w/u done on presentation, CTH + MR Head unremarkable, CT spine also unremarkable  -unlikely to have had a neurovascular event causing symptoms  -symptomatic pain control as above, PT as above    Preventive measure   -DVT prophy: Lovenox 30mg subcutaneous BID Pt is a 54 yo M w/ PMH of alcohol abuse and HTN came to [facility] ER by ambulance , s/p fall , found to have R-hip fracture and alcohol withdraw syndrome .     Post traumatic Complete Comminuted Fx of Rt Femoral Neck extending to Lesser Trochanter   -s/p fall at his mother house prior to admit   -Right hip XR Positive for Fx.  -pain control w/ morphine and lidocaine patch, tylenol, controlled   -ekg with suspicion for LVH with some leads showing potential areas of prior infarct however TTE: EF 55-60%. Grade 1 diastolic dysfunction, no signs of wall motion abn or other structural abn to preclude ability to proceed w/ sx procedure as planned  -s/p Surgery yesterday. POD#1  -wt bearing per ortho post op w/ pt eval + dvt ppx per them  -pt is homeless, will likely need to go to [facility]  - f/u ortho outpatient on ~12/28    Normocytic anemia  -asymptomatic, worsening  -previous hb= 13.3 on 12/2/18 , recent Hb= 9.4 ,Hb today: 7.9, trending down  -100 milliLiter estimated blood loss during surgery yesterday  -will continue to monitor as patient is asymptomatic  -guaic negative   -anemia panel noted, thus far w/u consistent with AOCD  -serial h/h, transfuse for hgb < 7 or symptom onset    Hypokalemia (3.1)  -mild, asymptomatic  -s/p K 40meq powder and 40meq tablet  -f/u K levels    Alcohol withdraw syndrome   -improved  -CIWA protocol   -CIWA= 0 this morning   -librium sanaz   -ativan for sx trigger   -c/w multivitamin and vit B1, folic acid   - consult     Asymptomatic Bacteriuria   -possible colonization of urethra, no WBCs in UA, pt asymptoamtic  -Urine culture: <10,000 CFR/ml Gram Negative rods  -s.p 1 dose Macrobid 100mg. Hold for now  -will monitor for symptoms of UTI    Abnormal LFT  -most likely 2/2 to alcohol abuse  -abd us w/ difuse steatosis, no signs of cirrhosis   -hepatitis panel neg   -etoh abstinence, low fat diet when no longer npo + outpt fu required to cont monitoring    Moderate protein calorie malnutrition  -sec to poor nutrition in setting of etoh abuse  -f/u nutrition consult after sx  -low fat diet w/ prostat + ensure supplementation when taking po    Rt Sided MSK Pain  -resolved w/ exception of hip pain from fx  -pt with reports of sudden onset upper + lower Rt sided msk pain prior to admit prompting fall  -tia + cva w/u done on presentation, CTH + MR Head unremarkable, CT spine also unremarkable  -unlikely to have had a neurovascular event causing symptoms  -symptomatic pain control as above, PT as above    Preventive measure   -DVT prophy: Lovenox 30mg subcutaneous BID

## 2018-12-13 NOTE — OCCUPATIONAL THERAPY INITIAL EVALUATION ADULT - MANUAL MUSCLE TESTING RESULTS, REHAB EVAL
bilateral shoulders grossly assessed with partial AROM against gravity 2/5, bilateral elbows grossly assessed with AROM against gravity 3/5, bilateral gross grasp 4/5

## 2018-12-13 NOTE — PHYSICAL THERAPY INITIAL EVALUATION ADULT - PATIENT/FAMILY/SIGNIFICANT OTHER GOALS STATEMENT, PT EVAL
Partial Purse String (Simple) Text: Given the location of the defect and the characteristics of the surrounding skin a simple purse string closure was deemed most appropriate.  Undermining was performed circumfirentially around the surgical defect.  A purse string suture was then placed and tightened. Wound tension only allowed a partial closure of the circular defect. "am I ever going to walk again?"

## 2018-12-13 NOTE — PHYSICAL THERAPY INITIAL EVALUATION ADULT - MANUAL MUSCLE TESTING RESULTS, REHAB EVAL
bilateral shoulder flex 3+/5, elbow flex 4/5, left hip flex 4-/5, knee ext 4-/5, ankle df 4+/5...right hip flex 2/5, knee ext 3-/5, ankle df 4/5

## 2018-12-13 NOTE — DIETITIAN INITIAL EVALUATION ADULT. - NS AS NUTRI INTERV MEALS SNACK
Advance diet as tolerated to regular/General/healthful diet Advance diet as tolerated to lowfat/General/healthful diet

## 2018-12-13 NOTE — PHYSICAL THERAPY INITIAL EVALUATION ADULT - ACTIVE RANGE OF MOTION EXAMINATION, REHAB EVAL
Left LE Active ROM was WFL (within functional limits)/bilateral upper extremity Active ROM was WFL (within functional limits)/right hip flex to 90 in sitting, unable to get right hip to neutral in supine due to guarding, knee/ankle WFL

## 2018-12-14 LAB
ANION GAP SERPL CALC-SCNC: 11 MMOL/L — SIGNIFICANT CHANGE UP (ref 5–17)
BUN SERPL-MCNC: 6 MG/DL — LOW (ref 8–20)
CALCIUM SERPL-MCNC: 8.1 MG/DL — LOW (ref 8.6–10.2)
CHLORIDE SERPL-SCNC: 95 MMOL/L — LOW (ref 98–107)
CO2 SERPL-SCNC: 26 MMOL/L — SIGNIFICANT CHANGE UP (ref 22–29)
CREAT SERPL-MCNC: 0.65 MG/DL — SIGNIFICANT CHANGE UP (ref 0.5–1.3)
GLUCOSE SERPL-MCNC: 114 MG/DL — SIGNIFICANT CHANGE UP (ref 70–115)
HCT VFR BLD CALC: 23.5 % — LOW (ref 42–52)
HGB BLD-MCNC: 7.3 G/DL — LOW (ref 14–18)
MAGNESIUM SERPL-MCNC: 1.5 MG/DL — LOW (ref 1.6–2.6)
MCHC RBC-ENTMCNC: 26.3 PG — LOW (ref 27–31)
MCHC RBC-ENTMCNC: 31.1 G/DL — LOW (ref 32–36)
MCV RBC AUTO: 84.5 FL — SIGNIFICANT CHANGE UP (ref 80–94)
PLATELET # BLD AUTO: 172 K/UL — SIGNIFICANT CHANGE UP (ref 150–400)
POTASSIUM SERPL-MCNC: 3.4 MMOL/L — LOW (ref 3.5–5.3)
POTASSIUM SERPL-SCNC: 3.4 MMOL/L — LOW (ref 3.5–5.3)
RBC # BLD: 2.78 M/UL — LOW (ref 4.6–6.2)
RBC # FLD: 18.9 % — HIGH (ref 11–15.6)
SODIUM SERPL-SCNC: 132 MMOL/L — LOW (ref 135–145)
WBC # BLD: 6.8 K/UL — SIGNIFICANT CHANGE UP (ref 4.8–10.8)
WBC # FLD AUTO: 6.8 K/UL — SIGNIFICANT CHANGE UP (ref 4.8–10.8)

## 2018-12-14 PROCEDURE — 99233 SBSQ HOSP IP/OBS HIGH 50: CPT | Mod: GC

## 2018-12-14 RX ORDER — SODIUM CHLORIDE 9 MG/ML
1000 INJECTION, SOLUTION INTRAVENOUS
Qty: 0 | Refills: 0 | Status: DISCONTINUED | OUTPATIENT
Start: 2018-12-14 | End: 2018-12-16

## 2018-12-14 RX ORDER — ALPRAZOLAM 0.25 MG
0.25 TABLET ORAL EVERY 6 HOURS
Qty: 0 | Refills: 0 | Status: DISCONTINUED | OUTPATIENT
Start: 2018-12-14 | End: 2018-12-14

## 2018-12-14 RX ORDER — ACETAMINOPHEN 500 MG
1000 TABLET ORAL ONCE
Qty: 0 | Refills: 0 | Status: DISCONTINUED | OUTPATIENT
Start: 2018-12-14 | End: 2018-12-14

## 2018-12-14 RX ORDER — MAGNESIUM SULFATE 500 MG/ML
2 VIAL (ML) INJECTION ONCE
Qty: 0 | Refills: 0 | Status: COMPLETED | OUTPATIENT
Start: 2018-12-14 | End: 2018-12-14

## 2018-12-14 RX ORDER — POLYETHYLENE GLYCOL 3350 17 G/17G
17 POWDER, FOR SOLUTION ORAL DAILY
Qty: 0 | Refills: 0 | Status: DISCONTINUED | OUTPATIENT
Start: 2018-12-14 | End: 2018-12-20

## 2018-12-14 RX ORDER — SODIUM CHLORIDE 9 MG/ML
500 INJECTION, SOLUTION INTRAVENOUS ONCE
Qty: 0 | Refills: 0 | Status: DISCONTINUED | OUTPATIENT
Start: 2018-12-14 | End: 2018-12-14

## 2018-12-14 RX ORDER — FOLIC ACID 0.8 MG
1 TABLET ORAL DAILY
Qty: 0 | Refills: 0 | Status: DISCONTINUED | OUTPATIENT
Start: 2018-12-14 | End: 2018-12-20

## 2018-12-14 RX ORDER — POTASSIUM CHLORIDE 20 MEQ
40 PACKET (EA) ORAL EVERY 4 HOURS
Qty: 0 | Refills: 0 | Status: COMPLETED | OUTPATIENT
Start: 2018-12-14 | End: 2018-12-14

## 2018-12-14 RX ORDER — THIAMINE MONONITRATE (VIT B1) 100 MG
100 TABLET ORAL DAILY
Qty: 0 | Refills: 0 | Status: DISCONTINUED | OUTPATIENT
Start: 2018-12-14 | End: 2018-12-20

## 2018-12-14 RX ADMIN — SODIUM CHLORIDE 100 MILLILITER(S): 9 INJECTION, SOLUTION INTRAVENOUS at 17:48

## 2018-12-14 RX ADMIN — Medication 100 MILLIGRAM(S): at 17:49

## 2018-12-14 RX ADMIN — OXYCODONE HYDROCHLORIDE 5 MILLIGRAM(S): 5 TABLET ORAL at 08:38

## 2018-12-14 RX ADMIN — Medication 1 MILLIGRAM(S): at 17:49

## 2018-12-14 RX ADMIN — Medication 1 TABLET(S): at 17:51

## 2018-12-14 RX ADMIN — Medication 100 MILLIGRAM(S): at 17:50

## 2018-12-14 RX ADMIN — Medication 40 MILLIEQUIVALENT(S): at 17:48

## 2018-12-14 RX ADMIN — POLYETHYLENE GLYCOL 3350 17 GRAM(S): 17 POWDER, FOR SOLUTION ORAL at 17:50

## 2018-12-14 RX ADMIN — Medication 40 MILLIEQUIVALENT(S): at 17:50

## 2018-12-14 RX ADMIN — Medication 100 MILLIGRAM(S): at 21:23

## 2018-12-14 RX ADMIN — Medication 50 GRAM(S): at 17:49

## 2018-12-14 RX ADMIN — Medication 1 TABLET(S): at 17:49

## 2018-12-14 RX ADMIN — Medication 100 MILLIGRAM(S): at 05:24

## 2018-12-14 NOTE — PROGRESS NOTE ADULT - ASSESSMENT
A/P: 53yMale POD#2 s/p right hip IMN    - Pain Control  - DVT ppx: Lovenox 30 BID, SCD's  - PT  - Weight bearing status: WBAT  - Continue care per primary team  -Dressing changed. RN can change PRN for soiling or saturation.

## 2018-12-14 NOTE — PROGRESS NOTE ADULT - SUBJECTIVE AND OBJECTIVE BOX
Subjective:  Pt comfortable without complaints, pain controlled  Has been oob with PT, standing, not much ambulation.   Denies CP, SOB, N/V, numbness/tingling   No other complaints        STATUS POST:  IMN nail femur         MEDICATIONS  (STANDING):  docusate sodium 100 milliGRAM(s) Oral three times a day  enoxaparin Injectable 40 milliGRAM(s) SubCutaneous daily  lactated ringers. 1000 milliLiter(s) (125 mL/Hr) IV Continuous <Continuous>  multivitamin 1 Tablet(s) Oral daily    MEDICATIONS  (PRN):  acetaminophen   Tablet .. 650 milliGRAM(s) Oral every 6 hours PRN Mild Pain (1 - 3)  bisacodyl Suppository 10 milliGRAM(s) Rectal daily PRN If no bowel movement  HYDROmorphone   Tablet 2 milliGRAM(s) Oral every 3 hours PRN Severe Pain (7 - 10)  HYDROmorphone  IVPB 0.5 milliGRAM(s) IV Intermittent every 3 hours PRN Severe Pain (7 - 10)  ketorolac   Injectable 15 milliGRAM(s) IV Push once PRN Moderate Pain (4 - 6)  magnesium hydroxide Suspension 30 milliLiter(s) Oral daily PRN Constipation  ondansetron Injectable 4 milliGRAM(s) IV Push every 6 hours PRN Nausea and/or Vomiting  oxyCODONE    IR 10 milliGRAM(s) Oral every 3 hours PRN Moderate Pain (4 - 6)  oxyCODONE    IR 5 milliGRAM(s) Oral every 3 hours PRN Mild Pain (1 - 3)      Vital Signs Last 24 Hrs  T(C): 36.9 (13 Dec 2018 23:30), Max: 36.9 (13 Dec 2018 23:30)  T(F): 98.5 (13 Dec 2018 23:30), Max: 98.5 (13 Dec 2018 23:30)  HR: 105 (13 Dec 2018 23:30) (105 - 110)  BP: 146/92 (13 Dec 2018 23:30) (133/90 - 146/92)  BP(mean): --  RR: 19 (13 Dec 2018 23:30) (18 - 19)  SpO2: 99% (13 Dec 2018 23:30) (98% - 99%)    Physical Exam:    Constitutional: NAD  Proximal Dressing C/D/I. Removed, incision c/d/i with prineo tape, mild hematoma noted distal thigh, no active drainage   distal dressing noted to have minimal dried serosanguinous drainage   Pulses: 2+ dorsalis pedis pulse. Cap refill < 2 sec.  Sensation: Grossly intact to light touch without deficit.  Motor: + EHL/FHL/TA/GS                   LABS:                        8.0    x     )-----------( x        ( 13 Dec 2018 11:21 )             25.8     12-13    132<L>  |  97<L>  |  4.0<L>  ----------------------------<  115  3.4<L>   |  26.0  |  0.51    Ca    8.2<L>      13 Dec 2018 11:21

## 2018-12-14 NOTE — PROGRESS NOTE ADULT - ASSESSMENT
Pt is a 52 yo M w/ PMH of alcohol abuse and HTN came to [facility] ER by ambulance , s/p fall , found to have R-hip fracture and alcohol withdraw syndrome .     Post traumatic Complete Comminuted Fx of Rt Femoral Neck extending to Lesser Trochanter   -s/p fall at his mother house prior to admit   -Right hip XR Positive for Fx.  -pain control w/ morphine and lidocaine patch, tylenol, controlled   -ekg with suspicion for LVH with some leads showing potential areas of prior infarct however TTE: EF 55-60%. Grade 1 diastolic dysfunction, no signs of wall motion abn or other structural abn to preclude ability to proceed w/ sx procedure as planned  -s/p Surgery yesterday. POD#1  -wt bearing per ortho post op w/ pt eval + dvt ppx per them  -pt is homeless, will likely need to go to [facility]  -PT note appreciated: therapy 3-5x/week. Raised toilet seat; shower chair; rolling walker (5 inch wheels). Rehabilitation facility. PM&R consult  -PM&R note appreciated: course of inpatient rehabilitation. PAIN: Oxycodone PRN, Dilaudid PRN, Tylenol PRN, Toradol PRN. WBAT RLE  - f/u ortho outpatient on ~12/28    Normocytic anemia  -asymptomatic, worsening  -previous hb= 13.3 on 12/2/18 , recent Hb= 9.4 ,Hb today: 7.9, trending down  -100 milliLiter estimated blood loss during surgery yesterday  -will continue to monitor as patient is asymptomatic  -guaic negative   -anemia panel noted, thus far w/u consistent with AOCD  -serial h/h, transfuse for hgb < 7 or symptom onset    Hypokalemia (3.1 >3.4>)  -mild, asymptomatic  -s/p K 40meq powder and 40meq tablet  -f/u K levels    Alcohol withdraw syndrome   -improved  -CIWA protocol   -CIWA= 0 this morning   -librium sanaz   -ativan for sx trigger   -c/w multivitamin and vit B1, folic acid   - consult     Asymptomatic Bacteriuria   -possible colonization of urethra, no WBCs in UA, pt asymptoamtic  -Urine culture: <10,000 CFR/ml Gram Negative rods  -s.p 1 dose Macrobid 100mg. Hold for now  -will monitor for symptoms of UTI    Abnormal LFT  -most likely 2/2 to alcohol abuse  -abd us w/ difuse steatosis, no signs of cirrhosis   -hepatitis panel neg   -etoh abstinence, low fat diet when no longer npo + outpt fu required to cont monitoring    Moderate protein calorie malnutrition  -sec to poor nutrition in setting of etoh abuse  -f/u nutrition consult after sx  -low fat diet w/ prostat + ensure supplementation when taking po    Rt Sided MSK Pain  -resolved w/ exception of hip pain from fx  -pt with reports of sudden onset upper + lower Rt sided msk pain prior to admit prompting fall  -tia + cva w/u done on presentation, CTH + MR Head unremarkable, CT spine also unremarkable  -unlikely to have had a neurovascular event causing symptoms  -symptomatic pain control as above, PT as above    Preventive measure   -DVT prophy: Lovenox 30mg subcutaneous BID Pt is a 52 yo M w/ PMH of alcohol abuse and HTN came to [facility] ER by ambulance , s/p fall , found to have R-hip fracture and alcohol withdraw syndrome .     Post traumatic Complete Comminuted Fx of Rt Femoral Neck extending to Lesser Trochanter   -s/p fall at his mother house prior to admit   -Right hip XR Positive for Fx.  -pain control w/ morphine and lidocaine patch, tylenol, controlled   -ekg with suspicion for LVH with some leads showing potential areas of prior infarct however TTE: EF 55-60%. Grade 1 diastolic dysfunction, no signs of wall motion abn or other structural abn to preclude ability to proceed w/ sx procedure as planned  -s/p Surgery yesterday. POD#2  -wt bearing per ortho post op w/ pt eval + dvt ppx per them  -pt is homeless, will likely need to go to [facility]  -PT note appreciated: therapy 3-5x/week. Raised toilet seat; shower chair; rolling walker (5 inch wheels). Rehabilitation facility. PM&R consult  -PM&R note appreciated: pt would benefit from a course of acute rehabilitation but limited by his participation. Will continue to work w/ pt and follow to make final rehabilitation recommendations.   -f/u ortho outpatient on ~12/28  -f/u with social work for placement    Normocytic anemia  -guaic negative  -asymptomatic, worsening  -previous hb= 13.3 on 12/2/18 , recent Hb= 9.4>7.3>8 ,Hb today: 7.3, trending down  -100 milliLiter estimated blood loss during surgery yesterday  -will continue to monitor as patient is asymptomatic  -anemia panel noted, thus far w/u consistent with AOCD  -c/w 1 unit pRBCs  -serial h/h, transfuse for hgb < 7 or symptom onset    Hypokalemia (3.1 >3.4>3.4)  -mild, asymptomatic  -c/w K 40meq tablet x 2 doses  -f/u K levels    Hypomagnesemia (1.5)  -replaced  -f/u repeat mg    Alcohol withdraw syndrome   -improved  -CIWA protocol   -CIWA= 0 this morning   -librium sanaz   -ativan for sx trigger   -c/w multivitamin and vit B1, folic acid   - consult     Asymptomatic Bacteriuria   -possible colonization of urethra, no WBCs in UA, pt asymptoamtic  -Urine culture: <10,000 CFR/ml Gram Negative rods  -s.p 1 dose Macrobid 100mg. Hold for now  -will monitor for symptoms of UTI    Abnormal LFT  -most likely 2/2 to alcohol abuse  -abd us w/ difuse steatosis, no signs of cirrhosis   -hepatitis panel neg   -etoh abstinence, low fat diet when no longer npo + outpt fu required to cont monitoring    Moderate protein calorie malnutrition  -sec to poor nutrition in setting of etoh abuse  -f/u nutrition consult after sx  -low fat diet w/ prostat + ensure supplementation when taking po    Rt Sided MSK Pain  -resolved w/ exception of hip pain from fx  -pt with reports of sudden onset upper + lower Rt sided msk pain prior to admit prompting fall  -tia + cva w/u done on presentation, CTH + MR Head unremarkable, CT spine also unremarkable  -unlikely to have had a neurovascular event causing symptoms  -symptomatic pain control as above, PT as above    Preventive measure   -DVT prophy: Lovenox 30mg subcutaneous BID Pt is a 54 yo M w/ PMH of alcohol abuse and HTN came to [facility] ER by ambulance , s/p fall , found to have R-hip fracture and alcohol withdraw syndrome .     Post traumatic Complete Comminuted Fx of Rt Femoral Neck extending to Lesser Trochanter   -s/p fall at his mother house prior to admit   -Right hip XR Positive for Fx.  -pain control w/ morphine and lidocaine patch, tylenol, controlled   -ekg with suspicion for LVH with some leads showing potential areas of prior infarct however TTE: EF 55-60%. Grade 1 diastolic dysfunction, no signs of wall motion abn or other structural abn to preclude ability to proceed w/ sx procedure as planned  -s/p Surgery yesterday. POD#2  -wt bearing per ortho post op w/ pt eval + dvt ppx per them  -pt is homeless, will likely need to go to [facility]  -PT note appreciated: therapy 3-5x/week. Raised toilet seat; shower chair; rolling walker (5 inch wheels). Rehabilitation facility. PM&R consult  -PM&R note appreciated: pt would benefit from a course of acute rehabilitation but limited by his participation. Will continue to work w/ pt and follow to make final rehabilitation recommendations.   -f/u ortho outpatient on ~12/28  -f/u with social work for placement    Normocytic anemia  -guaic negative  -asymptomatic, worsening  -previous hb= 13.3 on 12/2/18 , recent Hb= 9.4>7.3>8 ,Hb today: 7.3, trending down  -100 milliLiter estimated blood loss during surgery yesterday  -will continue to monitor as patient is asymptomatic  -anemia panel noted, thus far w/u consistent with AOCD  -c/w 1 unit pRBCs  -serial h/h, transfuse for hgb < 7 or symptom onset    Hypokalemia (3.1 >3.4>3.4)  -mild, asymptomatic  -c/w K 40meq tablet x 2 doses  -f/u K levels    Hypomagnesemia (1.5)  -replaced  -f/u repeat mg    Alcohol withdraw syndrome   -improved  -CIWA protocol   -CIWA= 0 this morning   -librium sanaz   -ativan for sx trigger   -c/w multivitamin and vit B1, folic acid   - consult     Asymptomatic Bacteriuria   -possible colonization of urethra, no WBCs in UA, pt asymptoamtic  -Urine culture: <10,000 CFR/ml Gram Negative rods  -s.p 1 dose Macrobid 100mg. Hold for now  -will monitor for symptoms of UTI    Abnormal LFT  -most likely 2/2 to alcohol abuse  -abd us w/ difuse steatosis, no signs of cirrhosis   -hepatitis panel neg   -etoh abstinence, low fat diet when no longer npo + outpt fu required to cont monitoring    Moderate protein calorie malnutrition  -sec to poor nutrition in setting of etoh abuse  -f/u nutrition consult after sx  -low fat diet w/ prostat + ensure supplementation when taking po    Rt Sided MSK Pain  -resolved w/ exception of hip pain from fx  -pt with reports of sudden onset upper + lower Rt sided msk pain prior to admit prompting fall  -tia + cva w/u done on presentation, CTH + MR Head unremarkable, CT spine also unremarkable  -unlikely to have had a neurovascular event causing symptoms  -symptomatic pain control as above, PT as above    Preventive measure   -DVT prophy: VCD boots. Hold lovenox for now.

## 2018-12-14 NOTE — PROGRESS NOTE ADULT - SUBJECTIVE AND OBJECTIVE BOX
Pt is a 52 yo M w/ PMH of alcohol abuse and HTN came to [facility] ER by ambulance , s/p fall , found to have R-hip fracture and alcohol withdraw syndrome .      Chief complaint: Rt hip pain    Subjective : Patient seen and examined at bedside. As per nurse no acute events overnight. He received one dose of percocet for pain. Pt was wake and alert this morning. States he is motivated to get better and wants to work with PT. Pain is currently adequately controlled. Pt has not had a BM for 3 days but is actively voiding. No other complaints at this time. Pt is not ambulating due to right hip surgery.    ROS: Denies fever, chest pain, SOB, abdominal pain, diarrhea, constipation, calf pain.    Vital Signs Last 24 Hrs  T(C): 36.9 (13 Dec 2018 23:30), Max: 36.9 (13 Dec 2018 23:30)  T(F): 98.5 (13 Dec 2018 23:30), Max: 98.5 (13 Dec 2018 23:30)  HR: 105 (13 Dec 2018 23:30) (105 - 110)  BP: 146/92 (13 Dec 2018 23:30) (133/90 - 146/92)  RR: 19 (13 Dec 2018 23:30) (18 - 19)  SpO2: 99% (13 Dec 2018 23:30) (98% - 99%)    PHYSICAL EXAM:  GENERAL: NAD  HEAD:  Atraumatic, Normocephalic  EYES: EOMI, PERRLA, positive scleral icterus   ENMT: No tonsillar erythema, exudates, or enlargement; Moist mucous membranes,  NECK: Supple  Respiratory: Clear to auscultation bilaterally; No rales, rhonchi, wheezing, or rubs  CV: Regular rate and rhythm; No murmurs, rubs, or gallops  Gastrointestinal: Soft, Nontender, Nondistended; Bowel sounds present  EXTREMITIES:  2+ Peripheral Pulses, No clubbing, cyanosis, or edema. Pain to palpation over R- hip and pain w/ active movement of RLE. Surgical dressing on R buttock  NERVOUS SYSTEM:  lethargy ; Motor Strength 5/5 B/L upper and LLE extremities; unable to asses RLE due to pain , sensation intact .  SKIN: No rashes or lesions. Crusting and dryness of plantar aspect of b/l feet.                                                                  8.0    x     )-----------( x        ( 13 Dec 2018 11:21 )             25.8     12-13    132<L>  |  97<L>  |  4.0<L>  ----------------------------<  115  3.4<L>   |  26.0  |  0.51    Ca    8.2<L>      13 Dec 2018 11:21        MRI head: Cerebral and cerebellar atrophy. Chronic white matter changes.  Encephalomalacia and gliosis in the mid anika from prior injury.    EE. Normal left atrial size.   2. Normal wall motion. Left ventricular ejection fraction, by visual   estimation, is 55 to 60%.Grade I diastolic dysfunction.   3. The right atrium is normal in size.   4. Normal right ventricular size and function.   5. No significant valvular abnormality.   6. There is no evidence of pericardial effusion.    Radiology:  < from: Xray Chest 1 View-PORTABLE IMMEDIATE (18 @ 08:32) >  The cardiac, hilar and mediastinal contours are unremarkable. The lungs   are clear. The osseous structures demonstrate no acute abnormality.         IMPRESSION:    Clear lungs. No acute disease.    < end of copied text >    Right hip xr: positive intertrochanteric Fx    MEDICATIONS  (STANDING):  docusate sodium 100 milliGRAM(s) Oral three times a day  enoxaparin Injectable 40 milliGRAM(s) SubCutaneous daily  lactated ringers. 1000 milliLiter(s) (125 mL/Hr) IV Continuous <Continuous>  multivitamin 1 Tablet(s) Oral daily    MEDICATIONS  (PRN):  acetaminophen   Tablet .. 650 milliGRAM(s) Oral every 6 hours PRN Mild Pain (1 - 3)  bisacodyl Suppository 10 milliGRAM(s) Rectal daily PRN If no bowel movement  HYDROmorphone   Tablet 2 milliGRAM(s) Oral every 3 hours PRN Severe Pain (7 - 10)  HYDROmorphone  IVPB 0.5 milliGRAM(s) IV Intermittent every 3 hours PRN Severe Pain (7 - 10)  ketorolac   Injectable 15 milliGRAM(s) IV Push once PRN Moderate Pain (4 - 6)  magnesium hydroxide Suspension 30 milliLiter(s) Oral daily PRN Constipation  ondansetron Injectable 4 milliGRAM(s) IV Push every 6 hours PRN Nausea and/or Vomiting  oxyCODONE    IR 10 milliGRAM(s) Oral every 3 hours PRN Moderate Pain (4 - 6)  oxyCODONE    IR 5 milliGRAM(s) Oral every 3 hours PRN Mild Pain (1 - 3) Pt is a 52 yo M w/ PMH of alcohol abuse and HTN came to [facility] ER by ambulance , s/p fall , found to have R-hip fracture and alcohol withdraw syndrome .      Chief complaint: Rt hip pain    Subjective : Patient seen and examined at bedside. As per nurse no acute events overnight. He received one dose of percocet for pain. Pt was wake and alert this morning. States he is motivated to get better and wants to work with PT. Pain is currently adequately controlled. Pt has not had a BM for 3 days but is actively voiding. No other complaints at this time. Pt is not ambulating due to right hip surgery.    ROS: Denies fever, chest pain, SOB, abdominal pain, diarrhea, constipation, calf pain.    Vital Signs Last 24 Hrs  T(C): 36.9 (14 Dec 2018 08:19), Max: 36.9 (13 Dec 2018 23:30)  T(F): 98.5 (14 Dec 2018 08:19), Max: 98.5 (13 Dec 2018 23:30)  HR: 105 (13 Dec 2018 23:30) (105 - 110)  BP: 124/80 (14 Dec 2018 08:19) (124/80 - 146/92)  RR: 18 (14 Dec 2018 08:19) (18 - 19)  SpO2: 99% (14 Dec 2018 08:19) (98% - 99%)    PHYSICAL EXAM:  GENERAL: NAD  HEAD:  Atraumatic, Normocephalic  EYES: EOMI, PERRLA, positive scleral icterus   ENMT: No tonsillar erythema, exudates, or enlargement; Moist mucous membranes,  NECK: Supple  Respiratory: Clear to auscultation bilaterally; No rales, rhonchi, wheezing, or rubs  CV: Regular rate and rhythm; No murmurs, rubs, or gallops  Gastrointestinal: Soft, Nontender, Nondistended; Bowel sounds present  EXTREMITIES:  2+ Peripheral Pulses, No clubbing, cyanosis, or edema. Pain to palpation over R- hip and pain w/ active movement of RLE. Surgical dressing on R buttock  NERVOUS SYSTEM:  lethargy ; Motor Strength 5/5 B/L upper and LLE extremities; unable to asses RLE due to pain , sensation intact .  SKIN: No rashes or lesions. Crusting and dryness of plantar aspect of b/l feet.                                                                 7.3    6.8   )-----------( 172      ( 14 Dec 2018 07:28 )             23.5     12-14    132<L>  |  95<L>  |  6.0<L>  ----------------------------<  114  3.4<L>   |  26.0  |  0.65    Ca    8.1<L>      14 Dec 2018 07:28      MRI head: Cerebral and cerebellar atrophy. Chronic white matter changes.  Encephalomalacia and gliosis in the mid anika from prior injury.    EE. Normal left atrial size.   2. Normal wall motion. Left ventricular ejection fraction, by visual   estimation, is 55 to 60%.Grade I diastolic dysfunction.   3. The right atrium is normal in size.   4. Normal right ventricular size and function.   5. No significant valvular abnormality.   6. There is no evidence of pericardial effusion.    Radiology:  < from: Xray Chest 1 View-PORTABLE IMMEDIATE (18 @ 08:32) >  The cardiac, hilar and mediastinal contours are unremarkable. The lungs   are clear. The osseous structures demonstrate no acute abnormality.         IMPRESSION:    Clear lungs. No acute disease.    < end of copied text >    Right hip xr: positive intertrochanteric Fx    MEDICATIONS  (STANDING):  docusate sodium 100 milliGRAM(s) Oral three times a day  enoxaparin Injectable 40 milliGRAM(s) SubCutaneous daily  folic acid 1 milliGRAM(s) Oral daily  lactated ringers. 1000 milliLiter(s) (125 mL/Hr) IV Continuous <Continuous>  multiple electrolytes Injection Type 1 1000 milliLiter(s) (100 mL/Hr) IV Continuous <Continuous>  multivitamin 1 Tablet(s) Oral daily  multivitamin 1 Tablet(s) Oral daily  polyethylene glycol 3350 17 Gram(s) Oral daily  potassium chloride    Tablet ER 40 milliEquivalent(s) Oral every 4 hours  thiamine 100 milliGRAM(s) Oral daily    MEDICATIONS  (PRN):  acetaminophen   Tablet .. 650 milliGRAM(s) Oral every 6 hours PRN Mild Pain (1 - 3)  bisacodyl Suppository 10 milliGRAM(s) Rectal daily PRN If no bowel movement  HYDROmorphone   Tablet 2 milliGRAM(s) Oral every 3 hours PRN Severe Pain (7 - 10)  HYDROmorphone  IVPB 0.5 milliGRAM(s) IV Intermittent every 3 hours PRN Severe Pain (7 - 10)  ketorolac   Injectable 15 milliGRAM(s) IV Push once PRN Moderate Pain (4 - 6)  magnesium hydroxide Suspension 30 milliLiter(s) Oral daily PRN Constipation  ondansetron Injectable 4 milliGRAM(s) IV Push every 6 hours PRN Nausea and/or Vomiting  oxyCODONE    IR 10 milliGRAM(s) Oral every 3 hours PRN Moderate Pain (4 - 6)  oxyCODONE    IR 5 milliGRAM(s) Oral every 3 hours PRN Mild Pain (1 - 3)

## 2018-12-14 NOTE — PROGRESS NOTE ADULT - SUBJECTIVE AND OBJECTIVE BOX
SUBJECTIVE  Patient seen and examined. Reports that he had some decent sleep last night but still fatigued this AM. Denies any shakiness or tremors. Still with pain in the right hip/thigh. Encouraged to participate with therapy and to work on stretching and strengthening on his own while in bed. Denies numbness/tingling.     TODAY'S REVIEW OF SYMPTOMS  [  ] Constitutional WNL           [X] Cardio WNL               [X] Resp WNL  [X] GI WNL                            [X]  WNL                    [X] Heme WNL  [X] Endo WNL                       [X] Skin WNL                   [  ] MSK WNL  [X] Neuro WNL                     [X] Cognitive WNL           [X] Psych WNL    CURRENT FUNCTIONAL STATUS  Bed Mobility: Rolling/Turning:     · Level of [place]	dependent (less than 25% patients effort)	  · Physical Assist/Nonphysical Assist	1 person assist; nonverbal cues (demo/gestures); verbal cues	    Grooming Training:     · Level of [place]	minimum assist (75% patients effort)	  · Physical Assist/Nonphysical Assist	1 person assist; nonverbal cues (demo/gestures); verbal cues; set-up required	    Eating/Self-Feeding Training:     · Level of [place]	minimum assist (75% patients effort)	  · Physical Assist/Nonphysical Assist	1 person assist; nonverbal cues (demo/gestures); verbal cues; set-up required	    VITALS  T(C): 36.9 (12-14-18 @ 08:19)  T(F): 98.5 (12-14-18 @ 08:19), Max: 98.5 (12-13-18 @ 23:30)  HR: 105 (12-13-18 @ 23:30) (105 - 110)  BP: 124/80 (12-14-18 @ 08:19) (124/80 - 146/92)  RR:  (18 - 19)  SpO2:  (98% - 99%)  Wt(kg): --    RECENT LABS/IMAGING             8.0    x     )-----------( x        ( 13 Dec 2018 11:21 )             25.8    132<L>  |  95<L>  |  6.0<L>  ----------------------------<  114  3.4<L>   |  26.0  |  0.65    Ca    8.1<L>      14 Dec 2018 07:28    MEDICATIONS   MEDICATIONS  (STANDING):  docusate sodium 100 milliGRAM(s) Oral three times a day  enoxaparin Injectable 40 milliGRAM(s) SubCutaneous daily  lactated ringers. 1000 milliLiter(s) (125 mL/Hr) IV Continuous <Continuous>  multivitamin 1 Tablet(s) Oral daily    MEDICATIONS  (PRN):  acetaminophen   Tablet .. 650 milliGRAM(s) Oral every 6 hours PRN Mild Pain (1 - 3)  bisacodyl Suppository 10 milliGRAM(s) Rectal daily PRN If no bowel movement  HYDROmorphone   Tablet 2 milliGRAM(s) Oral every 3 hours PRN Severe Pain (7 - 10)  HYDROmorphone  IVPB 0.5 milliGRAM(s) IV Intermittent every 3 hours PRN Severe Pain (7 - 10)  ketorolac   Injectable 15 milliGRAM(s) IV Push once PRN Moderate Pain (4 - 6)  magnesium hydroxide Suspension 30 milliLiter(s) Oral daily PRN Constipation  ondansetron Injectable 4 milliGRAM(s) IV Push every 6 hours PRN Nausea and/or Vomiting  oxyCODONE    IR 10 milliGRAM(s) Oral every 3 hours PRN Moderate Pain (4 - 6)  oxyCODONE    IR 5 milliGRAM(s) Oral every 3 hours PRN Mild Pain (1 - 3)    PHYSICAL EXAM  Constitutional - NAD, Comfortable, More awake than yesterday  HEENT - NCAT, MMM  Chest - No increased work of breathing  Cardiovascular - RRR, S1S2  Abdomen - Soft, Non-distended  Extremities - No calf tenderness   Neurologic Exam -                    Cognitive - Arousal level improved as compared to yesterday, Oriented to self, place, date, year, situation     Communication - Fluent, No dysarthria     Cranial Nerves - CN 2-12 grossly intact     Motor - Limited ROM with right knee extension secondary to pain, Right ADF/APF 4/5 (limited by pain)                 5/5 muscle strength in bilateral upper extremities and left lower extremity     Sensory - Intact to light touch diffusely  Psychiatric - Flat affect    ASSESSMENT/PLAN  54 y/o male with a fall suffering an acute right femoral neck fracture s/p IM nailing. He is now with functional, gait, ADL, balance impairments.    Disposition - Patient with limited participation with bedside therapy yesterday, encouraged patient to participate and showed patient stretches to work on knee extension while in bed. Patient would benefit from a course of acute rehabilitation but limited by his participation. Will continue to encourage patient to work with bedside therapy and will follow to make final rehabilitation recommendations.   Precautions - Falls, Cardiac  Weight bearing status - WBAT RLE  Pain control - Oxycodone PRN, Dilaudid PRN, Tylenol PRN, Toradol PRN  DVT Prophylaxis - Lovenox  Skin - Turn Q2hrs  Diet - Regular/thins

## 2018-12-15 LAB
ANION GAP SERPL CALC-SCNC: 9 MMOL/L — SIGNIFICANT CHANGE UP (ref 5–17)
BUN SERPL-MCNC: 5 MG/DL — LOW (ref 8–20)
CALCIUM SERPL-MCNC: 8.5 MG/DL — LOW (ref 8.6–10.2)
CHLORIDE SERPL-SCNC: 102 MMOL/L — SIGNIFICANT CHANGE UP (ref 98–107)
CO2 SERPL-SCNC: 24 MMOL/L — SIGNIFICANT CHANGE UP (ref 22–29)
CREAT SERPL-MCNC: 0.55 MG/DL — SIGNIFICANT CHANGE UP (ref 0.5–1.3)
GLUCOSE SERPL-MCNC: 96 MG/DL — SIGNIFICANT CHANGE UP (ref 70–115)
HCT VFR BLD CALC: 25.6 % — LOW (ref 42–52)
HGB BLD-MCNC: 8.1 G/DL — LOW (ref 14–18)
MAGNESIUM SERPL-MCNC: 1.9 MG/DL — SIGNIFICANT CHANGE UP (ref 1.6–2.6)
MCHC RBC-ENTMCNC: 26.3 PG — LOW (ref 27–31)
MCHC RBC-ENTMCNC: 31.6 G/DL — LOW (ref 32–36)
MCV RBC AUTO: 83.1 FL — SIGNIFICANT CHANGE UP (ref 80–94)
PHOSPHATE SERPL-MCNC: 2.1 MG/DL — LOW (ref 2.4–4.7)
PLATELET # BLD AUTO: 198 K/UL — SIGNIFICANT CHANGE UP (ref 150–400)
POTASSIUM SERPL-MCNC: 4 MMOL/L — SIGNIFICANT CHANGE UP (ref 3.5–5.3)
POTASSIUM SERPL-SCNC: 4 MMOL/L — SIGNIFICANT CHANGE UP (ref 3.5–5.3)
RBC # BLD: 3.08 M/UL — LOW (ref 4.6–6.2)
RBC # FLD: 18 % — HIGH (ref 11–15.6)
SODIUM SERPL-SCNC: 135 MMOL/L — SIGNIFICANT CHANGE UP (ref 135–145)
WBC # BLD: 7.2 K/UL — SIGNIFICANT CHANGE UP (ref 4.8–10.8)
WBC # FLD AUTO: 7.2 K/UL — SIGNIFICANT CHANGE UP (ref 4.8–10.8)

## 2018-12-15 PROCEDURE — 99233 SBSQ HOSP IP/OBS HIGH 50: CPT | Mod: GC

## 2018-12-15 RX ORDER — MAGNESIUM SULFATE 500 MG/ML
1 VIAL (ML) INJECTION ONCE
Qty: 0 | Refills: 0 | Status: DISCONTINUED | OUTPATIENT
Start: 2018-12-15 | End: 2018-12-15

## 2018-12-15 RX ORDER — CHOLECALCIFEROL (VITAMIN D3) 125 MCG
1000 CAPSULE ORAL DAILY
Qty: 0 | Refills: 0 | Status: DISCONTINUED | OUTPATIENT
Start: 2018-12-15 | End: 2018-12-20

## 2018-12-15 RX ORDER — POTASSIUM PHOSPHATE, MONOBASIC POTASSIUM PHOSPHATE, DIBASIC 236; 224 MG/ML; MG/ML
15 INJECTION, SOLUTION INTRAVENOUS ONCE
Qty: 0 | Refills: 0 | Status: COMPLETED | OUTPATIENT
Start: 2018-12-15 | End: 2018-12-15

## 2018-12-15 RX ORDER — POTASSIUM CHLORIDE 20 MEQ
40 PACKET (EA) ORAL ONCE
Qty: 0 | Refills: 0 | Status: DISCONTINUED | OUTPATIENT
Start: 2018-12-15 | End: 2018-12-15

## 2018-12-15 RX ADMIN — POLYETHYLENE GLYCOL 3350 17 GRAM(S): 17 POWDER, FOR SOLUTION ORAL at 17:10

## 2018-12-15 RX ADMIN — Medication 1 MILLIGRAM(S): at 17:10

## 2018-12-15 RX ADMIN — Medication 100 MILLIGRAM(S): at 20:45

## 2018-12-15 RX ADMIN — Medication 100 MILLIGRAM(S): at 17:09

## 2018-12-15 RX ADMIN — Medication 100 MILLIGRAM(S): at 17:10

## 2018-12-15 RX ADMIN — SODIUM CHLORIDE 100 MILLILITER(S): 9 INJECTION, SOLUTION INTRAVENOUS at 20:46

## 2018-12-15 RX ADMIN — Medication 1 TABLET(S): at 17:10

## 2018-12-15 RX ADMIN — POTASSIUM PHOSPHATE, MONOBASIC POTASSIUM PHOSPHATE, DIBASIC 62.5 MILLIMOLE(S): 236; 224 INJECTION, SOLUTION INTRAVENOUS at 17:11

## 2018-12-15 RX ADMIN — Medication 1000 UNIT(S): at 20:46

## 2018-12-15 RX ADMIN — Medication 100 MILLIGRAM(S): at 05:26

## 2018-12-15 NOTE — CHART NOTE - NSCHARTNOTEFT_GEN_A_CORE
Patient seen and examined at the bedside with resident team. I was physically present for the key portions of the evaluation and management services provided.  history, physical, assessment, and plan per their note dated for today with the necessary amendments/elaborations below:    clinically stable. still complaining of feeling fatigued and debilitated from recent sx. expressed need for pt to be compliant with PT and PMR when they come to work with him, encouraged him to mobilize more out of bed to avoid further wasting and weakness. start vitamin d supplementation, check level, change to ergocalciferol if levels warrant. encourage po intake. dietary supplements for protein dede malnutrition. will need acute rehab, pending case management input regarding placement options in light of suboptimal insurance.     regarding anemia, may be contributing as well to weakness and fatigue as well, cont holding lovenox today, follow repeat h/h in am, if stable or improving, resume lovenox, if worsening, screen for hematoma formation at sx site or other site of acute/active bleeding. prior iron studies with anemia of chronic disesase, but can consider started oral fe if needed.

## 2018-12-15 NOTE — PROGRESS NOTE ADULT - SUBJECTIVE AND OBJECTIVE BOX
Pt is a 52 yo M w/ PMH of alcohol abuse and HTN came to [facility] ER by ambulance , s/p fall , found to have R-hip fracture and alcohol withdraw syndrome .      Chief complaint: Rt hip pain    Subjective : Patient seen and examined at bedside. As per nurse no acute events overnight. Pt was wake and alert this morning. Pt was concerned about recovering after surgery and being able to walk agian. Discussed plan to continue working with PT and increasing his nutritional intake. Pain is currently adequately controlled. Pt has not had a BM for 4 days but is actively voiding. No other complaints at this time. Pt is not ambulating due to right hip surgery.    ROS: Denies fever, chest pain, SOB, abdominal pain, diarrhea, constipation, calf pain.    Vital Signs Last 24 Hrs  T(C): 37 (15 Dec 2018 15:42), Max: 37.1 (14 Dec 2018 23:35)  T(F): 98.6 (15 Dec 2018 15:42), Max: 98.8 (14 Dec 2018 23:35)  HR: 96 (15 Dec 2018 15:42) (82 - 99)  BP: 150/93 (15 Dec 2018 15:42) (140/72 - 150/93)  RR: 18 (15 Dec 2018 15:42) (18 - 19)  SpO2: 97% (15 Dec 2018 15:42) (97% - 98%)    PHYSICAL EXAM:  GENERAL: NAD  HEAD:  Atraumatic, Normocephalic  EYES: EOMI, PERRLA, positive scleral icterus   ENMT: No tonsillar erythema, exudates, or enlargement; Moist mucous membranes,  NECK: Supple  Respiratory: Clear to auscultation bilaterally; No rales, rhonchi, wheezing, or rubs  CV: Regular rate and rhythm; No murmurs, rubs, or gallops  Gastrointestinal: Soft, Nontender, Nondistended; Bowel sounds present  EXTREMITIES:  2+ Peripheral Pulses, No clubbing, cyanosis, or edema. Pain to palpation over R- hip and pain w/ active movement of RLE. Surgical dressing on R buttock  NERVOUS SYSTEM:  lethargy ; Motor Strength 5/5 B/L upper and LLE extremities; unable to asses RLE due to pain , sensation intact .  SKIN: No rashes or lesions. Crusting and dryness of plantar aspect of b/l feet.                                                                                 8.1    7.2   )-----------( 198      ( 15 Dec 2018 08:57 )             25.6     12-15    135  |  102  |  5.0<L>  ----------------------------<  96  4.0   |  24.0  |  0.55    Ca    8.5<L>      15 Dec 2018 08:57  Phos  2.1     12-15  Mg     1.9     12-15      MRI head: Cerebral and cerebellar atrophy. Chronic white matter changes.  Encephalomalacia and gliosis in the mid anika from prior injury.    EE. Normal left atrial size.   2. Normal wall motion. Left ventricular ejection fraction, by visual   estimation, is 55 to 60%.Grade I diastolic dysfunction.   3. The right atrium is normal in size.   4. Normal right ventricular size and function.   5. No significant valvular abnormality.   6. There is no evidence of pericardial effusion.    Radiology:  < from: Xray Chest 1 View-PORTABLE IMMEDIATE (18 @ 08:32) >  The cardiac, hilar and mediastinal contours are unremarkable. The lungs   are clear. The osseous structures demonstrate no acute abnormality.         IMPRESSION:    Clear lungs. No acute disease.    < end of copied text >    Right hip xr: positive intertrochanteric Fx    MEDICATIONS  (STANDING):  cholecalciferol 1000 Unit(s) Oral daily  docusate sodium 100 milliGRAM(s) Oral three times a day  folic acid 1 milliGRAM(s) Oral daily  lactated ringers. 1000 milliLiter(s) (125 mL/Hr) IV Continuous <Continuous>  multiple electrolytes Injection Type 1 1000 milliLiter(s) (100 mL/Hr) IV Continuous <Continuous>  multivitamin 1 Tablet(s) Oral daily  multivitamin 1 Tablet(s) Oral daily  polyethylene glycol 3350 17 Gram(s) Oral daily  thiamine 100 milliGRAM(s) Oral daily    MEDICATIONS  (PRN):  acetaminophen   Tablet .. 650 milliGRAM(s) Oral every 6 hours PRN Mild Pain (1 - 3)  bisacodyl Suppository 10 milliGRAM(s) Rectal daily PRN If no bowel movement  HYDROmorphone   Tablet 2 milliGRAM(s) Oral every 3 hours PRN Severe Pain (7 - 10)  HYDROmorphone  IVPB 0.5 milliGRAM(s) IV Intermittent every 3 hours PRN Severe Pain (7 - 10)  ketorolac   Injectable 15 milliGRAM(s) IV Push once PRN Moderate Pain (4 - 6)  magnesium hydroxide Suspension 30 milliLiter(s) Oral daily PRN Constipation  ondansetron Injectable 4 milliGRAM(s) IV Push every 6 hours PRN Nausea and/or Vomiting  oxyCODONE    IR 10 milliGRAM(s) Oral every 3 hours PRN Moderate Pain (4 - 6)  oxyCODONE    IR 5 milliGRAM(s) Oral every 3 hours PRN Mild Pain (1 - 3)

## 2018-12-15 NOTE — PROGRESS NOTE ADULT - ASSESSMENT
Pt is a 52 yo M w/ PMH of alcohol abuse and HTN came to [facility] ER by ambulance , s/p fall , found to have R-hip fracture and alcohol withdraw syndrome .     Post traumatic Complete Comminuted Fx of Rt Femoral Neck extending to Lesser Trochanter   -s/p fall at his mother house prior to admit   -Right hip XR Positive for Fx.  -pain control w/ morphine and lidocaine patch, tylenol, controlled   -ekg with suspicion for LVH with some leads showing potential areas of prior infarct however TTE: EF 55-60%. Grade 1 diastolic dysfunction, no signs of wall motion abn or other structural abn to preclude ability to proceed w/ sx procedure as planned  -s/p Surgery yesterday. POD#3  -wt bearing per ortho post op w/ pt eval + dvt ppx per them  -PT note appreciated: therapy 3-5x/week. Raised toilet seat; shower chair; rolling walker (5 inch wheels). Rehabilitation facility. PM&R consult  -PM&R note appreciated: pt would benefit from a course of acute rehabilitation but limited by his participation. Will continue to work w/ pt and follow to make final rehabilitation recommendations.   -f/u ortho outpatient on ~12/28  -pt is homeless  -f/u Vit D levels. C/w cholecalciferol 1000 units daily. Change to ergocalciferol if levels warrant.  -SW pending placement to go to Acute Rehab      Normocytic anemia  -guaic negative  -asymptomatic, worsening  -previous hb= 13.3 on 12/2/18 , recent Hb= 9.4>7.3>8 ,Hb today: 7.3, trending down  -100 milliLiter estimated blood loss during surgery yesterday  -will continue to monitor as patient is asymptomatic  -anemia panel noted, thus far w/u consistent with AOCD  -s/p 1 unit pRBCs  -serial h/h, transfuse for hgb < 7 or symptom onset  -may be contributing as well to weakness and fatigue   -held dvt ppx. F/u Hb levels in am. If stable will restart Lovenox 40qd as per ortho.    Hypokalemia (4.0)  -resolved  -f/u K levels    Hypomagnesemia (1.9)  -resolved  -f/u  mg levels    Hypophosphatemia (2.1)  -replaced  -f/u phos levels     Alcohol withdraw syndrome   -improved  -CIWA protocol   -CIWA= 0 this morning   -librium sanaz   -ativan for sx trigger   -c/w multivitamin and vit B1, folic acid   - consult     Asymptomatic Bacteriuria   -possible colonization of urethra, no WBCs in UA, pt asymptoamtic  -Urine culture: <10,000 CFR/ml Gram Negative rods  -s.p 1 dose Macrobid 100mg. Hold for now  -will monitor for symptoms of UTI    Abnormal LFT  -most likely 2/2 to alcohol abuse  -abd us w/ difuse steatosis, no signs of cirrhosis   -hepatitis panel neg   -etoh abstinence, low fat diet when no longer npo + outpt fu required to cont monitoring    Moderate protein calorie malnutrition  -sec to poor nutrition in setting of etoh abuse  -f/u nutrition consult after sx  -low fat diet w/ prostat + ensure supplementation when taking po    Rt Sided MSK Pain  -resolved w/ exception of hip pain from fx  -pt with reports of sudden onset upper + lower Rt sided msk pain prior to admit prompting fall  -tia + cva w/u done on presentation, CTH + MR Head unremarkable, CT spine also unremarkable  -unlikely to have had a neurovascular event causing symptoms  -symptomatic pain control as above, PT as above    Preventive measure   -DVT prophy: VCD boots. Hold lovenox for now

## 2018-12-16 LAB
ANION GAP SERPL CALC-SCNC: 11 MMOL/L — SIGNIFICANT CHANGE UP (ref 5–17)
ANISOCYTOSIS BLD QL: SLIGHT — SIGNIFICANT CHANGE UP
BASOPHILS NFR BLD AUTO: 1 % — SIGNIFICANT CHANGE UP (ref 0–2)
BUN SERPL-MCNC: 6 MG/DL — LOW (ref 8–20)
CALCIUM SERPL-MCNC: 9.2 MG/DL — SIGNIFICANT CHANGE UP (ref 8.6–10.2)
CHLORIDE SERPL-SCNC: 100 MMOL/L — SIGNIFICANT CHANGE UP (ref 98–107)
CO2 SERPL-SCNC: 23 MMOL/L — SIGNIFICANT CHANGE UP (ref 22–29)
CREAT SERPL-MCNC: 0.58 MG/DL — SIGNIFICANT CHANGE UP (ref 0.5–1.3)
GLUCOSE SERPL-MCNC: 85 MG/DL — SIGNIFICANT CHANGE UP (ref 70–115)
HCT VFR BLD CALC: 31.6 % — LOW (ref 42–52)
HGB BLD-MCNC: 9.9 G/DL — LOW (ref 14–18)
HYPOCHROMIA BLD QL: SLIGHT — SIGNIFICANT CHANGE UP
LYMPHOCYTES # BLD AUTO: 15 % — LOW (ref 20–55)
MACROCYTES BLD QL: SLIGHT — SIGNIFICANT CHANGE UP
MAGNESIUM SERPL-MCNC: 1.7 MG/DL — SIGNIFICANT CHANGE UP (ref 1.6–2.6)
MCHC RBC-ENTMCNC: 26.8 PG — LOW (ref 27–31)
MCHC RBC-ENTMCNC: 31.3 G/DL — LOW (ref 32–36)
MCV RBC AUTO: 85.6 FL — SIGNIFICANT CHANGE UP (ref 80–94)
MICROCYTES BLD QL: SLIGHT — SIGNIFICANT CHANGE UP
MONOCYTES NFR BLD AUTO: 10 % — SIGNIFICANT CHANGE UP (ref 3–10)
MYELOCYTES NFR BLD: 1 % — HIGH (ref 0–0)
NEUTROPHILS NFR BLD AUTO: 73 % — SIGNIFICANT CHANGE UP (ref 37–73)
OVALOCYTES BLD QL SMEAR: SLIGHT — SIGNIFICANT CHANGE UP
PHOSPHATE SERPL-MCNC: 3.6 MG/DL — SIGNIFICANT CHANGE UP (ref 2.4–4.7)
PLAT MORPH BLD: NORMAL — SIGNIFICANT CHANGE UP
PLATELET # BLD AUTO: 269 K/UL — SIGNIFICANT CHANGE UP (ref 150–400)
POIKILOCYTOSIS BLD QL AUTO: SLIGHT — SIGNIFICANT CHANGE UP
POTASSIUM SERPL-MCNC: 4.3 MMOL/L — SIGNIFICANT CHANGE UP (ref 3.5–5.3)
POTASSIUM SERPL-SCNC: 4.3 MMOL/L — SIGNIFICANT CHANGE UP (ref 3.5–5.3)
RBC # BLD: 3.69 M/UL — LOW (ref 4.6–6.2)
RBC # FLD: 18 % — HIGH (ref 11–15.6)
RBC BLD AUTO: ABNORMAL
SODIUM SERPL-SCNC: 134 MMOL/L — LOW (ref 135–145)
WBC # BLD: 6.9 K/UL — SIGNIFICANT CHANGE UP (ref 4.8–10.8)
WBC # FLD AUTO: 6.9 K/UL — SIGNIFICANT CHANGE UP (ref 4.8–10.8)

## 2018-12-16 PROCEDURE — 99233 SBSQ HOSP IP/OBS HIGH 50: CPT | Mod: GC

## 2018-12-16 RX ORDER — KETOROLAC TROMETHAMINE 30 MG/ML
15 SYRINGE (ML) INJECTION ONCE
Qty: 0 | Refills: 0 | Status: DISCONTINUED | OUTPATIENT
Start: 2018-12-16 | End: 2018-12-20

## 2018-12-16 RX ORDER — ENOXAPARIN SODIUM 100 MG/ML
40 INJECTION SUBCUTANEOUS DAILY
Qty: 0 | Refills: 0 | Status: DISCONTINUED | OUTPATIENT
Start: 2018-12-16 | End: 2018-12-20

## 2018-12-16 RX ADMIN — POLYETHYLENE GLYCOL 3350 17 GRAM(S): 17 POWDER, FOR SOLUTION ORAL at 11:53

## 2018-12-16 RX ADMIN — Medication 100 MILLIGRAM(S): at 14:10

## 2018-12-16 RX ADMIN — Medication 1 TABLET(S): at 11:53

## 2018-12-16 RX ADMIN — ENOXAPARIN SODIUM 40 MILLIGRAM(S): 100 INJECTION SUBCUTANEOUS at 17:49

## 2018-12-16 RX ADMIN — Medication 1000 UNIT(S): at 12:43

## 2018-12-16 RX ADMIN — Medication 100 MILLIGRAM(S): at 05:18

## 2018-12-16 RX ADMIN — Medication 100 MILLIGRAM(S): at 11:53

## 2018-12-16 RX ADMIN — Medication 1 MILLIGRAM(S): at 11:53

## 2018-12-16 NOTE — PROGRESS NOTE ADULT - ASSESSMENT
Pt is a 54 yo M w/ PMH of alcohol abuse and HTN came to [facility] ER by ambulance , s/p fall , found to have R-hip fracture and alcohol withdraw syndrome.     Post traumatic Complete Comminuted Fx of Rt Femoral Neck extending to Lesser Trochanter   - s/p fall at his mother house prior to admit   - Right hip XR Positive for Fx.  - pain control w/ oxycodone 5mg/10mg for mild/moderate pain, Dilaudid 0.5mg for severe pain. Controlled   - s/p Surgery yesterday. POD#4  - Per ortho, may bear weight .  - PT note appreciated: therapy 3-5x/week. Raised toilet seat; shower chair; rolling walker (5 inch wheels). Rehabilitation facility. PM&R consult  - PM&R note appreciated: pt would benefit from a course of acute rehabilitation but limited by his participation. Will continue to work w/ pt and follow to make final rehabilitation recommendations.   - Patient homeless without insurance complicating placement diposition  - plan to f/u ortho outpatient on ~12/28  - pending Vit D levels. C/w cholecalciferol 1000 units daily. Change to ergocalciferol if levels warrant.  - SW pending placement to go to Acute Rehab    Normocytic anemia  - guaic negative  - asymptomatic, worsening   - previous hb= 13.3 on 12/2/18 , recent Hb= 9.4>7.3>8 ,Hb yesterday: 7.3,    - 100 milliLiter estimated blood loss during surgery yesterday  - will continue to monitor as patient is asymptomatic  - anemia panel noted, thus far w/u consistent with AOCD  - s/p 1 unit pRBCs  - serial h/h, transfuse for hgb < 7 or symptom onset  - may be contributing as well to weakness and fatigue   - held dvt ppx. F/u Hb levels this am. If stable will restart Lovenox 40qd dvt ppx    Hypokalemia (4.0)  -resolved  -f/u K levels    Hypomagnesemia (1.9)  -resolved  -f/u  mg levels    Hypophosphatemia (2.1)  -replaced  -f/u phos levels     Alcohol withdraw syndrome   -improved  -CIWA protocol   -CIWA= 0 this morning   -librium sanaz   -ativan for sx trigger   -c/w multivitamin and vit B1, folic acid   - consult     Asymptomatic Bacteriuria   -possible colonization of urethra, no WBCs in UA, pt asymptoamtic  -Urine culture: <10,000 CFR/ml Gram Negative rods  -s.p 1 dose Macrobid 100mg. Hold for now  -will monitor for symptoms of UTI    Abnormal LFT  -most likely 2/2 to alcohol abuse  -abd us w/ diffuse steatosis, no signs of cirrhosis   -hepatitis panel neg   -etoh abstinence, low fat diet when no longer npo + outpt fu required to cont monitoring    Moderate protein calorie malnutrition  -sec to poor nutrition in setting of etoh abuse  -f/u nutrition consult after sx  -low fat diet w/ prostat + ensure supplementation when taking po    Rt Sided MSK Pain  -resolved w/ exception of hip pain from fx  -pt with reports of sudden onset upper + lower Rt sided msk pain prior to admit prompting fall  -tia + cva w/u done on presentation, CTH + MR Head unremarkable, CT spine also unremarkable  -unlikely to have had a neurovascular event causing symptoms  -symptomatic pain control as above, PT as above    Preventive measure   -DVT: VCD boots. Hold lovenox for now  due to concern for bleeding with falling H/H 52 yo M w/ hx of alcohol abuse and HTN came to [facility] ER by ambulance s/p fall and noted on imaging to have R comminuted femoral neck fx s/p IMN 12/12/18, thereafter with concern for acute blood loss anemia due to hgb drop. DVT ppx was held, hgb remained stable 8-9 and dvt ppx restarted today. Ortho continues to follow the pt. Pt also noted with alcohol withdrawal on admission s/p ciwa protocol and librium taper now stable. Pt continues to be uncooperative with PT/PMNR, pending disposition; noted insurance issues/ homeless.     Rt Femoral Neck fracture s/p IMN   - s/p fall at home   - Pt reports he has not been cooperative with PT due to pain, but will be more cooperative in order to be dispo   - c/w pain control w/ oxycodone 5mg/10mg for mild/moderate pain, Dilaudid 0.5mg for severe pain. Controlled    - D/c toradol and higher doses of dilaudid   - WBAT   - Ortho f/u noted and appreciated   - plan to f/u ortho outpatient on ~12/28  - Vit D levels noted~39  - C/w cholecalciferol 1000 units daily.    - PT note appreciated: therapy 3-5x/week. Raised toilet seat; shower chair; rolling walker (5 inch wheels). Rehabilitation facility. PM&R consult  - PM&R note appreciated: pt would benefit from a course of acute rehabilitation but limited by his participation. Will continue to work w/ pt and follow to make final rehabilitation recommendations.   - Patient homeless without insurance complicating placement disposition    Normocytic anemia  - guaic negative  - asymptomatic  - previous hb= 13.3 on 12/2/18 could have been hemoconcentrated, hgb today ~9 today   - 100 milliLiter estimated blood loss during surgery    - anemia panel noted, thus far w/u consistent with AOCD  - s/p 1 unit pRBCs during hospitalization thus far   - serial h/h, transfuse for hgb < 7 or symptom onset  - Initial dvt ppx held but now restarted     Alcohol withdraw syndrome - resolved   -No evidence of withdrawal on exam   -CIWA protocol discontinued   -s/p librium sanaz   -c/w multivitamin, thiamine and folic acid   - Noted sequela of alcohol abuse with transaminitis -abd us w/ diffuse steatosis, no signs of cirrhosis    -hepatitis panel neg   - consult noted and appreciated   - pt counseled on abstinence from alcohol abuse     Asymptomatic Bacteriuria   -possible colonization of urethra, no WBCs in UA, pt asymptomatic  -Urine culture: <10,000 CFR/ml Gram Negative rods  -s/p 1 dose Macrobid 100mg     Moderate protein calorie malnutrition  -sec to poor nutrition in setting of alcohol abuse  -nutrition consult noted and appreciated   -c/w low fat diet w/ prostat + ensure supplementation when taking po    Preventive measure   - DVT ppx - restarted lovenox 40mg sq qd     Dispo: Pt is homeless, given fx and repair will need rehab, pending decision for ABE vs acute and insurance related issues

## 2018-12-16 NOTE — PROGRESS NOTE ADULT - SUBJECTIVE AND OBJECTIVE BOX
CC: Patient is a 53y old  Male who presents with a chief complaint of alcohol withdrawal  and fall (15 Dec 2018 17:35)      Interval/Overnight events  Patient seen and examined at bedside, No acute overnight events.  Patient not currently ambulating, eating well, voiding and last BM yesterday.  Not on tele.     This morning patient says that has been eating fine. Wants to be able to move. Says that he has not worked with PT or rehab and wants someone to come by to help him because he has not moved from bed in some time. Denies fever, chills, chest pain, cough, SOB, abdominal pain, nausea, vomiting, diarrhea, constipation, calf pain, bleeding or bruising.     VS:   Vital Signs Last 24 Hrs  T(C): 36.7 (16 Dec 2018 00:35), Max: 37 (15 Dec 2018 15:42)  T(F): 98.1 (16 Dec 2018 00:35), Max: 98.6 (15 Dec 2018 15:42)  HR: 86 (16 Dec 2018 00:35) (82 - 96)  BP: 158/94 (16 Dec 2018 00:35) (143/95 - 158/94)  BP(mean): --  RR: 19 (16 Dec 2018 00:35) (18 - 19)  SpO2: 97% (16 Dec 2018 00:35) (97% - 98%)      Physical Exam:   General: NAD  Head:  Atraumatic, Normocephalic  Eyes: EOMI, PERRLA, positive scleral icterus   ENMT: No tonsillar erythema, exudates, or enlargement; Moist mucous membranes,  Neck: Supple  Respiratory: Clear to auscultation bilaterally; No rales, rhonchi, wheezing, or rubs  CV: Regular rate and rhythm; No murmurs, rubs, or gallops  Gastrointestinal: Soft, Nontender, Nondistended; Bowel sounds present  Ext:  2+ Peripheral Pulses, No clubbing, cyanosis, or edema. No calf tenderness. Tenderness to palpation over R- hip and pain w/ active movement of RLE. Surgical dressing on R buttock, mild ecchymosis. Dressing clean/dry/intact  MSK: muscle wasting noted in all 4 extremities  Neuro: Arousable. AAO to self, time, place, but not to situation/circumstance of hospitalization.; Motor Strength 4/5 B/L upper and LLE extremities; unable to asses RLE secondary pain , sensation intact .          Labs:                        8.1    7.2   )-----------( 198      ( 15 Dec 2018 08:57 )             25.6   12-15    135  |  102  |  5.0<L>  ----------------------------<  96  4.0   |  24.0  |  0.55    Ca    8.5<L>      15 Dec 2018 08:57  Phos  3.6     12-15  Mg     1.9     12-15          Radiology:  No new studies     Medications:  MEDICATIONS  (STANDING):  cholecalciferol 1000 Unit(s) Oral daily  docusate sodium 100 milliGRAM(s) Oral three times a day  folic acid 1 milliGRAM(s) Oral daily  multivitamin 1 Tablet(s) Oral daily  multivitamin 1 Tablet(s) Oral daily  polyethylene glycol 3350 17 Gram(s) Oral daily  thiamine 100 milliGRAM(s) Oral daily    MEDICATIONS  (PRN):  acetaminophen   Tablet .. 650 milliGRAM(s) Oral every 6 hours PRN Mild Pain (1 - 3)  bisacodyl Suppository 10 milliGRAM(s) Rectal daily PRN If no bowel movement  HYDROmorphone  IVPB 0.5 milliGRAM(s) IV Intermittent every 3 hours PRN Severe Pain (7 - 10)  ketorolac   Injectable 15 milliGRAM(s) IV Push once PRN Moderate Pain (4 - 6)  magnesium hydroxide Suspension 30 milliLiter(s) Oral daily PRN Constipation  ondansetron Injectable 4 milliGRAM(s) IV Push every 6 hours PRN Nausea and/or Vomiting  oxyCODONE    IR 10 milliGRAM(s) Oral every 3 hours PRN Moderate Pain (4 - 6)  oxyCODONE    IR 5 milliGRAM(s) Oral every 3 hours PRN Mild Pain (1 - 3) CC: Patient is a 53y old  Male who presents with a chief complaint of alcohol withdrawal  and fall (15 Dec 2018 17:35)      Interval/Overnight events  Patient seen and examined at bedside, No acute overnight events. This morning patient says that has been eating fine. Wants to be able to move. Says that he has not worked with PT or rehab and wants someone to come by to help him because he has not moved from bed in some time.   Patient not currently ambulating, eating well, voiding and last BM yesterday. Denies fever, chills, chest pain, cough, SOB, abdominal pain, nausea, vomiting, diarrhea, constipation, calf pain, bleeding or bruising.       VS:   Vital Signs Last 24 Hrs  T(C): 36.7 (16 Dec 2018 00:35), Max: 37 (15 Dec 2018 15:42)  T(F): 98.1 (16 Dec 2018 00:35), Max: 98.6 (15 Dec 2018 15:42)  HR: 86 (16 Dec 2018 00:35) (82 - 96)  BP: 158/94 (16 Dec 2018 00:35) (143/95 - 158/94)  BP(mean): --  RR: 19 (16 Dec 2018 00:35) (18 - 19)  SpO2: 97% (16 Dec 2018 00:35) (97% - 98%)      Physical Exam:   General: NAD  Head:  Atraumatic, Normocephalic  Neck: Supple  Respiratory: Clear to auscultation bilaterally; No rales, rhonchi, wheezing, or rubs  CV: Regular rate and rhythm; No murmurs, rubs, or gallops  Gastrointestinal: Soft, Nontender, Nondistended; Bowel sounds present  Ext:  2+ Peripheral Pulses, No clubbing, cyanosis, or edema. No calf tenderness. Tenderness to palpation over R- hip and pain w/ active movement of RLE. Surgical dressing on R HIP, mild ecchymosis. Dressing clean/dry/intact   No evidence of withdrawal   MSK: muscle wasting noted in all 4 extremities  Neuro: AAOX3 Motor Strength 4/5 B/L upper and LLE extremities; unable to asses RLE secondary pain , sensation intact .          Labs:                        8.1    7.2   )-----------( 198      ( 15 Dec 2018 08:57 )             25.6   12-15    135  |  102  |  5.0<L>  ----------------------------<  96  4.0   |  24.0  |  0.55    Ca    8.5<L>      15 Dec 2018 08:57  Phos  3.6     12-15  Mg     1.9     12-15          Radiology:  No new studies     Medications:  MEDICATIONS  (STANDING):  cholecalciferol 1000 Unit(s) Oral daily  docusate sodium 100 milliGRAM(s) Oral three times a day  folic acid 1 milliGRAM(s) Oral daily  multivitamin 1 Tablet(s) Oral daily  multivitamin 1 Tablet(s) Oral daily  polyethylene glycol 3350 17 Gram(s) Oral daily  thiamine 100 milliGRAM(s) Oral daily    MEDICATIONS  (PRN):  acetaminophen   Tablet .. 650 milliGRAM(s) Oral every 6 hours PRN Mild Pain (1 - 3)  bisacodyl Suppository 10 milliGRAM(s) Rectal daily PRN If no bowel movement  HYDROmorphone  IVPB 0.5 milliGRAM(s) IV Intermittent every 3 hours PRN Severe Pain (7 - 10)  ketorolac   Injectable 15 milliGRAM(s) IV Push once PRN Moderate Pain (4 - 6)  magnesium hydroxide Suspension 30 milliLiter(s) Oral daily PRN Constipation  ondansetron Injectable 4 milliGRAM(s) IV Push every 6 hours PRN Nausea and/or Vomiting  oxyCODONE    IR 10 milliGRAM(s) Oral every 3 hours PRN Moderate Pain (4 - 6)  oxyCODONE    IR 5 milliGRAM(s) Oral every 3 hours PRN Mild Pain (1 - 3)

## 2018-12-17 LAB
ANION GAP SERPL CALC-SCNC: 12 MMOL/L — SIGNIFICANT CHANGE UP (ref 5–17)
BUN SERPL-MCNC: 8 MG/DL — SIGNIFICANT CHANGE UP (ref 8–20)
CALCIUM SERPL-MCNC: 8.9 MG/DL — SIGNIFICANT CHANGE UP (ref 8.6–10.2)
CHLORIDE SERPL-SCNC: 98 MMOL/L — SIGNIFICANT CHANGE UP (ref 98–107)
CO2 SERPL-SCNC: 23 MMOL/L — SIGNIFICANT CHANGE UP (ref 22–29)
CREAT SERPL-MCNC: 0.62 MG/DL — SIGNIFICANT CHANGE UP (ref 0.5–1.3)
GLUCOSE SERPL-MCNC: 102 MG/DL — SIGNIFICANT CHANGE UP (ref 70–115)
POTASSIUM SERPL-MCNC: 4.1 MMOL/L — SIGNIFICANT CHANGE UP (ref 3.5–5.3)
POTASSIUM SERPL-SCNC: 4.1 MMOL/L — SIGNIFICANT CHANGE UP (ref 3.5–5.3)
SODIUM SERPL-SCNC: 133 MMOL/L — LOW (ref 135–145)
VIT D25+D1,25 OH+D1,25 PNL SERPL-MCNC: 39.5 PG/ML — SIGNIFICANT CHANGE UP (ref 19.9–79.3)

## 2018-12-17 PROCEDURE — 99232 SBSQ HOSP IP/OBS MODERATE 35: CPT | Mod: GC

## 2018-12-17 RX ORDER — AMLODIPINE BESYLATE 2.5 MG/1
5 TABLET ORAL DAILY
Qty: 0 | Refills: 0 | Status: DISCONTINUED | OUTPATIENT
Start: 2018-12-17 | End: 2018-12-17

## 2018-12-17 RX ORDER — SODIUM CHLORIDE 9 MG/ML
1000 INJECTION INTRAMUSCULAR; INTRAVENOUS; SUBCUTANEOUS ONCE
Qty: 0 | Refills: 0 | Status: COMPLETED | OUTPATIENT
Start: 2018-12-17 | End: 2018-12-17

## 2018-12-17 RX ORDER — POLYETHYLENE GLYCOL 3350 17 G/17G
17 POWDER, FOR SOLUTION ORAL DAILY
Qty: 0 | Refills: 0 | Status: DISCONTINUED | OUTPATIENT
Start: 2018-12-17 | End: 2018-12-20

## 2018-12-17 RX ADMIN — OXYCODONE HYDROCHLORIDE 10 MILLIGRAM(S): 5 TABLET ORAL at 12:40

## 2018-12-17 RX ADMIN — Medication 1000 UNIT(S): at 11:13

## 2018-12-17 RX ADMIN — SODIUM CHLORIDE 1333.33 MILLILITER(S): 9 INJECTION INTRAMUSCULAR; INTRAVENOUS; SUBCUTANEOUS at 17:49

## 2018-12-17 RX ADMIN — OXYCODONE HYDROCHLORIDE 10 MILLIGRAM(S): 5 TABLET ORAL at 15:40

## 2018-12-17 RX ADMIN — OXYCODONE HYDROCHLORIDE 10 MILLIGRAM(S): 5 TABLET ORAL at 16:21

## 2018-12-17 RX ADMIN — Medication 1 MILLIGRAM(S): at 11:13

## 2018-12-17 RX ADMIN — AMLODIPINE BESYLATE 5 MILLIGRAM(S): 2.5 TABLET ORAL at 11:53

## 2018-12-17 RX ADMIN — Medication 100 MILLIGRAM(S): at 11:13

## 2018-12-17 RX ADMIN — Medication 100 MILLIGRAM(S): at 13:09

## 2018-12-17 RX ADMIN — Medication 1 TABLET(S): at 11:13

## 2018-12-17 RX ADMIN — OXYCODONE HYDROCHLORIDE 10 MILLIGRAM(S): 5 TABLET ORAL at 11:53

## 2018-12-17 RX ADMIN — ENOXAPARIN SODIUM 40 MILLIGRAM(S): 100 INJECTION SUBCUTANEOUS at 11:13

## 2018-12-17 RX ADMIN — POLYETHYLENE GLYCOL 3350 17 GRAM(S): 17 POWDER, FOR SOLUTION ORAL at 11:13

## 2018-12-17 NOTE — PROGRESS NOTE ADULT - SUBJECTIVE AND OBJECTIVE BOX
Patient is a 53y old  Male who presents with a chief complaint of alcohol withdrawal and fall s/p IMN    HOSPITALIZATION DAY: 7  POD#5    INTERVAL/OVERNIGHT EVENTS:  Patient examined at beside. No acute events over night. As per nurse, patient did not request pain medication overnight. Patient reports feeling better, states that pain is 7/10 and is improving although not yet able to ambulate out of bed, states he is willing to try today. Patient is tolerating PO diet w/o nausea/vomiting/diarrhea/abdominal pain. Patient is voiding actively no BM overnight. Patient denies chest pain, calf pain, abdominal pain, headaches, fever, chills, dysuria, hematuria, diarrhea, constipation, SOB, palpitations. Rest of ROS not contributory except for above.    I&O's Summary:  16 Dec 2018 07:01  -  17 Dec 2018 07:00  --------------------------------------------------------  IN: 420 mL / OUT: 350 mL / NET: 70 mL    Physical Exam:   General: NAD  Head:  Atraumatic, Normocephalic  Respiratory: Clear to auscultation bilaterally; No rales, rhonchi, wheezing, or rubs  CV: Regular rate and rhythm; No murmurs, rubs, or gallops  Gastrointestinal: Soft, Nontender, Nondistended; Bowel sounds present  Ext:  2+ Peripheral Pulses, No clubbing, cyanosis, or edema. No calf tenderness. Tenderness to palpation over R- hip and pain w/ active movement of RLE. Surgical dressing on R HIP, mild ecchymosis. Dressing clean/dry/intact   No evidence of withdrawal    Neuro: AAOX3 Motor Strength 4/5 B/L upper and LLE extremities; unable to asses RLE secondary pain , sensation intact     LABS                          9.9    6.9   )-----------( 269      ( 16 Dec 2018 12:13 )             31.6         12-16    134<L>  |  100  |  6.0<L>  ----------------------------<  85  4.3   |  23.0  |  0.58    Ca    9.2      16 Dec 2018 12:13  Phos  3.6     12-15  Mg     1.7     12-16    IMAGING    DIET:    MEDICATIONS  (STANDING):  cholecalciferol 1000 Unit(s) Oral daily  docusate sodium 100 milliGRAM(s) Oral three times a day  enoxaparin Injectable 40 milliGRAM(s) SubCutaneous daily  folic acid 1 milliGRAM(s) Oral daily  multivitamin 1 Tablet(s) Oral daily  polyethylene glycol 3350 17 Gram(s) Oral daily  thiamine 100 milliGRAM(s) Oral daily    MEDICATIONS  (PRN):  acetaminophen   Tablet .. 650 milliGRAM(s) Oral every 6 hours PRN Mild Pain (1 - 3)  bisacodyl Suppository 10 milliGRAM(s) Rectal daily PRN If no bowel movement  ketorolac   Injectable 15 milliGRAM(s) IV Push once PRN Severe Pain (7 - 10)  magnesium hydroxide Suspension 30 milliLiter(s) Oral daily PRN Constipation  ondansetron Injectable 4 milliGRAM(s) IV Push every 6 hours PRN Nausea and/or Vomiting  oxyCODONE    IR 10 milliGRAM(s) Oral every 3 hours PRN Moderate Pain (4 - 6)  oxyCODONE    IR 5 milliGRAM(s) Oral every 3 hours PRN Mild Pain (1 - 3)

## 2018-12-17 NOTE — PROGRESS NOTE ADULT - ASSESSMENT
Assesment:   52 yo M w/ hx of alcohol abuse and HTN came to [facility] ER by ambulance s/p fall and noted on imaging to have R comminuted femoral neck fx s/p IMN, POD #5, with concern for acute blood loss anemia after procedure due to hgb drop. DVT ppx was held, hgb remained stable 8-9 and dvt ppx restarted yesterday. Ortho continues to follow the pt. Pt also noted with alcohol withdrawal on admission s/p ciwa protocol and librium taper now stable. Pt continues to be uncooperative with PT/PMNR due to pain, pending disposition; noted insurance issues/ homeless.     Rt Femoral Neck fracture s/p IMN   - s/p fall at home  - Pt reports he has not been cooperative with PT due to pain, but will be more cooperative in order to be dispo   - c/w pain control w/ oxycodone 5mg/10mg for mild/moderate pain, Dilaudid 0.5mg for severe pain. Controlled    - WBAT   - Ortho f/u noted and appreciated   - plan to f/u ortho outpatient on ~12/28  - Vit D levels noted~39  - C/w cholecalciferol 1000 units daily.    - PT note appreciated: therapy 3-5x/week. Raised toilet seat; shower chair; rolling walker (5 inch wheels). Rehabilitation facility. PM&R consult  - PM&R note appreciated: pt would benefit from a course of acute rehabilitation but limited by his participation. Will continue to work w/ pt and follow to make final rehabilitation recommendations.   - Patient homeless without insurance complicating placement disposition    Normocytic anemia  - guaic negative  - asymptomatic  - previous hb= 13.3 on 12/2/18 could have been hemoconcentrated, hgb today ~9 today   - 100 milliLiter estimated blood loss during surgery    - anemia panel noted, thus far w/u consistent with AOCD  - s/p 1 unit pRBCs during hospitalization thus far   - serial h/h, transfuse for hgb < 7 or symptom onset  - Initial dvt ppx held but now restarted     Alcohol withdraw syndrome - resolved   -No evidence of withdrawal on exam   -CIWA protocol discontinued   -s/p librium sanaz   -c/w multivitamin, thiamine and folic acid   - Noted sequela of alcohol abuse with transaminitis -abd us w/ diffuse steatosis, no signs of cirrhosis    -hepatitis panel neg   - consult noted and appreciated   - pt counseled on abstinence from alcohol abuse     Asymptomatic Bacteriuria   -possible colonization of urethra, no WBCs in UA, pt asymptomatic  -Urine culture: <10,000 CFR/ml Gram Negative rods  -s/p 1 dose Macrobid 100mg     Moderate protein calorie malnutrition  -sec to poor nutrition in setting of alcohol abuse  -nutrition consult noted and appreciated   -c/w low fat diet w/ prostat + ensure supplementation when taking po    Preventive measure   - DVT ppx - restarted lovenox 40mg sq qd     Dispo: Pt is homeless, given fx and repair will need rehab, pending decision for ABE vs acute and insurance related issues Assesment:   52 yo M w/ hx of alcohol abuse and HTN came to [facility] ER by ambulance s/p fall and noted on imaging to have R comminuted femoral neck fx s/p IMN, POD #5, with concern for acute blood loss anemia after procedure due to hgb drop. DVT ppx was held, hgb remained stable 8-9 and dvt ppx restarted yesterday. Ortho continues to follow the pt. Pt also noted with alcohol withdrawal on admission s/p ciwa protocol and librium taper now stable. Pt was initially uncooperative with PT/PMNR due to pain, now with improvement in pain and participation with PT. Recommended for ABE, pending disposition; noted insurance issues/ homeless.     Rt Femoral Neck fracture s/p IMN   - s/p fall at home  - Pt reports he has not been cooperative with PT due to pain, but will be more cooperative in order to be dispo. More cooperative today.   - c/w pain control w/ oxycodone 5mg/10mg for mild/moderate pain   - d/c Dilaudid 0.5mg for severe pain   - WBAT   - Ortho f/u noted and appreciated   - plan to f/u ortho outpatient on ~12/28  - Vit D levels noted~39  - C/w cholecalciferol 1000 units daily.    - PT note appreciated: therapy 3-5x/week. Raised toilet seat; shower chair; rolling walker (5 inch wheels). Rehabilitation facility. PM&R consult  - PM&R note appreciated: pt would benefit from a course of acute rehabilitation but limited by his participation. Will continue to work w/ pt and follow to make final rehabilitation recommendations.   - Patient homeless without insurance complicating placement disposition    Normocytic anemia  - guaic negative  - asymptomatic  - previous hb= 13.3 on 12/2/18 could have been hemoconcentrated, hgb today ~9 today   - 100 milliLiter estimated blood loss during surgery    - anemia panel noted, thus far w/u consistent with AOCD  - s/p 1 unit pRBCs during hospitalization thus far   - serial h/h, transfuse for hgb < 7 or symptom onset  - Initial dvt ppx held but now restarted     Alcohol withdraw syndrome - resolved   -No evidence of withdrawal on exam   -CIWA protocol discontinued   -s/p librium sanaz   -c/w multivitamin, thiamine and folic acid   - Noted sequela of alcohol abuse with transaminitis -abd us w/ diffuse steatosis, no signs of cirrhosis    -hepatitis panel neg   - consult noted and appreciated   - pt counseled on abstinence from alcohol abuse     Asymptomatic Bacteriuria   -possible colonization of urethra, no WBCs in UA, pt asymptomatic  -Urine culture: <10,000 CFR/ml Gram Negative rods  -s/p 1 dose Macrobid 100mg     Moderate protein calorie malnutrition  -sec to poor nutrition in setting of alcohol abuse  -nutrition consult noted and appreciated   -c/w low fat diet w/ prostat + ensure supplementation when taking po    Preventive measure   - DVT ppx - restarted lovenox 40mg sq qd     Dispo: Pt is homeless, given fx and repair will need rehab, pending decision for ABE vs acute and insurance related issues 52 yo M w/ hx of alcohol abuse and HTN came to [facility] ER by ambulance s/p fall and noted on imaging to have R comminuted femoral neck fx s/p IMN, POD #5, with concern for acute blood loss anemia after procedure due to hgb drop. DVT ppx was held, hgb remained stable 8-9 and dvt ppx restarted yesterday. Ortho continues to follow the pt. Pt also noted with alcohol withdrawal on admission s/p ciwa protocol and librium taper now stable. Pt was initially uncooperative with PT/PMNR due to pain, now with improvement in pain and participation with PT. Recommended for ABE, pending disposition; noted insurance issues/ homeless.     Rt Femoral Neck fracture s/p IMN   - s/p fall at home  - Pt reports he has not been cooperative with PT due to pain, but will be more cooperative in order to be dispo. More cooperative today.   - c/w pain control w/ oxycodone 5mg/10mg for mild/moderate pain   - d/c Dilaudid 0.5mg for severe pain   - WBAT   - Ortho f/u noted and appreciated   - plan to f/u ortho outpatient on ~12/28  - Vit D levels noted~39  - C/w cholecalciferol 1000 units daily.    - Pt reports he will not be able to perform aggressive rehab, recommended for ABE pending placement   - Patient homeless without insurance complicating placement disposition    Normocytic anemia  - guaic negative  - asymptomatic  - previous hb= 13.3 on 12/2/18 could have been hemoconcentrated, hgb stable  - 100 ml estimated blood loss during surgery    - anemia panel noted, thus far w/u consistent with AOCD  - s/p 1 unit pRBCs during hospitalization thus far   - serial h/h, transfuse for hgb < 7 or symptom onset  - Initial dvt ppx held but now restarted     Alcohol withdraw syndrome - resolved   -No evidence of withdrawal on exam   -CIWA protocol discontinued   -s/p librium sanaz   -c/w multivitamin, thiamine and folic acid   - Noted sequela of alcohol abuse with transaminitis -abd us w/ diffuse steatosis, no signs of cirrhosis    -hepatitis panel neg   - consult noted and appreciated   - pt counseled on abstinence from alcohol abuse     Hypotension likely multifactorial orthostatic and recent addition of bp meds  - restarted norvasc 5mg po qd today bc sbp was in the 140-150 range but thereafter pt worked with PT and took norvasc with drop in sbp to 90 range   - c/w monitoring bp closely  - d/c norvasc  - will give fluid bolus     Asymptomatic Bacteriuria   -possible colonization of urethra, no WBCs in UA, pt asymptomatic  -Urine culture: <10,000 CFR/ml Gram Negative rods  -s/p 1 dose Macrobid 100mg     Moderate protein calorie malnutrition  -sec to poor nutrition in setting of alcohol abuse  -nutrition consult noted and appreciated   -c/w low fat diet w/ prostat + ensure supplementation when taking po    Tobacco Smoker  - pt counseled on smoking cessation     Preventive measure   - DVT ppx - restarted lovenox 40mg sq qd     Dispo: Pt is homeless, given fx and repair will need rehab, pending placement to [facility].

## 2018-12-17 NOTE — CHART NOTE - NSCHARTNOTEFT_GEN_A_CORE
Source: Patient [ ]  Family [ ]   other [ ]    Current Diet:     Patient reports [ ] nausea  [ ] vomiting [ ] diarrhea [ ] constipation  [ ]chewing problems [ ] swallowing issues  [ ] other:     PO intake:  < 50% [ ]   50-75%  [ ]   %  [ ]  other :    Source for PO intake [ ] Patient [ ] family [ ] chart [ ] staff [ ] other    Enteral /Parenteral Nutrition:     Current Weight:     % Weight Change     Pertinent Medications: MEDICATIONS  (STANDING):  cholecalciferol 1000 Unit(s) Oral daily  docusate sodium 100 milliGRAM(s) Oral three times a day  enoxaparin Injectable 40 milliGRAM(s) SubCutaneous daily  folic acid 1 milliGRAM(s) Oral daily  multivitamin 1 Tablet(s) Oral daily  polyethylene glycol 3350 17 Gram(s) Oral daily  thiamine 100 milliGRAM(s) Oral daily    MEDICATIONS  (PRN):  acetaminophen   Tablet .. 650 milliGRAM(s) Oral every 6 hours PRN Mild Pain (1 - 3)  bisacodyl Suppository 10 milliGRAM(s) Rectal daily PRN If no bowel movement  ketorolac   Injectable 15 milliGRAM(s) IV Push once PRN Severe Pain (7 - 10)  magnesium hydroxide Suspension 30 milliLiter(s) Oral daily PRN Constipation  ondansetron Injectable 4 milliGRAM(s) IV Push every 6 hours PRN Nausea and/or Vomiting  oxyCODONE    IR 10 milliGRAM(s) Oral every 3 hours PRN Moderate Pain (4 - 6)  oxyCODONE    IR 5 milliGRAM(s) Oral every 3 hours PRN Mild Pain (1 - 3)    Pertinent Labs: CBC Full  -  ( 16 Dec 2018 12:13 )  WBC Count : 6.9 K/uL  Hemoglobin : 9.9 g/dL  Hematocrit : 31.6 %  Platelet Count - Automated : 269 K/uL  Mean Cell Volume : 85.6 fl  Mean Cell Hemoglobin : 26.8 pg  Mean Cell Hemoglobin Concentration : 31.3 g/dL  Auto Neutrophil # : x  Auto Lymphocyte # : x  Auto Monocyte # : x  Auto Eosinophil # : x  Auto Basophil # : x  Auto Neutrophil % : 73.0 %  Auto Lymphocyte % : 15.0 %  Auto Monocyte % : 10.0 %  Auto Eosinophil % : x  Auto Basophil % : 1.0 %    Na 133 <L>    Skin: Surgical incision R. hip; 1+ edema R. hip    Nutrition focused physical exam conducted - found signs of malnutrition [ ]absent [X]present    Subcutaneous fat loss: [ ] Orbital fat pads region, [ ]Buccal fat region, [X]Triceps region,  [ ]Ribs region    Muscle wasting: [X]Temples region, [ ]Clavicle region, [ ]Shoulder region, [ ]Scapula region, [ ]Interosseous region,  [ ]thigh region, [ ]Calf region    Estimated Needs:   [X] no change since previous assessment  [ ] recalculated:     Current Nutrition Diagnosis:  Pt at high nutrition risk secondary to malnutrition (moderate acute) related to increased physiological demand for healing and ETOH use/abuse as evidenced by BMI 17.7, meeting <75% of estimated protein-energy needs x 7 days, moderate muscle wasting temples. Pt states appetite is improving, taking Ensure well. Pt unable to participate in full NFPE due to pain. RD to remain available and follow up.     Recommendations:   c/w lowfat diet as tolerated   Obtain/provide food preferences to inc PO    Monitoring and Evaluation:   [X] PO intake [X] Tolerance to diet prescription [X] Weights  [X] Follow up per protocol [X] Labs:

## 2018-12-18 PROCEDURE — 99232 SBSQ HOSP IP/OBS MODERATE 35: CPT | Mod: GC

## 2018-12-18 RX ORDER — LACTULOSE 10 G/15ML
20 SOLUTION ORAL ONCE
Qty: 0 | Refills: 0 | Status: COMPLETED | OUTPATIENT
Start: 2018-12-18 | End: 2018-12-18

## 2018-12-18 RX ADMIN — Medication 1 MILLIGRAM(S): at 11:27

## 2018-12-18 RX ADMIN — ENOXAPARIN SODIUM 40 MILLIGRAM(S): 100 INJECTION SUBCUTANEOUS at 11:31

## 2018-12-18 RX ADMIN — Medication 100 MILLIGRAM(S): at 21:05

## 2018-12-18 RX ADMIN — Medication 1 TABLET(S): at 11:27

## 2018-12-18 RX ADMIN — Medication 100 MILLIGRAM(S): at 11:27

## 2018-12-18 RX ADMIN — OXYCODONE HYDROCHLORIDE 10 MILLIGRAM(S): 5 TABLET ORAL at 11:27

## 2018-12-18 RX ADMIN — OXYCODONE HYDROCHLORIDE 10 MILLIGRAM(S): 5 TABLET ORAL at 16:59

## 2018-12-18 RX ADMIN — POLYETHYLENE GLYCOL 3350 17 GRAM(S): 17 POWDER, FOR SOLUTION ORAL at 11:27

## 2018-12-18 RX ADMIN — Medication 1000 UNIT(S): at 11:27

## 2018-12-18 RX ADMIN — OXYCODONE HYDROCHLORIDE 10 MILLIGRAM(S): 5 TABLET ORAL at 08:30

## 2018-12-18 RX ADMIN — OXYCODONE HYDROCHLORIDE 10 MILLIGRAM(S): 5 TABLET ORAL at 16:12

## 2018-12-18 RX ADMIN — LACTULOSE 20 GRAM(S): 10 SOLUTION ORAL at 16:12

## 2018-12-18 RX ADMIN — OXYCODONE HYDROCHLORIDE 10 MILLIGRAM(S): 5 TABLET ORAL at 12:14

## 2018-12-18 RX ADMIN — OXYCODONE HYDROCHLORIDE 10 MILLIGRAM(S): 5 TABLET ORAL at 07:51

## 2018-12-18 NOTE — PROGRESS NOTE ADULT - SUBJECTIVE AND OBJECTIVE BOX
Patient is a 53y old  Male who presents with a chief complaint of alcohol withdrawal  and fall (17 Dec 2018 07:41)    HOSPITALIZATION DAY:8  POD: 6  INTERVAL/OVERNIGHT EVENTS:  Patient examined at beside. No acute events over night. Patient reports feeling  better, states burning pain at site, 6/10, requesting pain medication. Patient is tolerating PO diet w/o nausea/vomiting/diarrhea/abdominal pain. Patient is voiding actively and last BM was 2 days ago. Patient ambulated yesterday with PT, and moves OOB to chair with assistance. Patient denies chest pain, calf pain, abdominal pain, headaches, fever, chills, dysuria, hematuria, diarrhea, constipation, SOB, palpitations. Rest of ROS not contributory except for above.    I&O's Summary  17 Dec 2018 07:01  -  18 Dec 2018 07:00  --------------------------------------------------------  IN: 1660 mL / OUT: 0 mL / NET: 1660 mL    Physical Exam:   General: NAD  Head:  Atraumatic, Normocephalic  Respiratory: Clear to auscultation bilaterally; No rales, rhonchi, wheezing, or rubs  CV: Regular rate and rhythm; No murmurs, rubs, or gallops  Gastrointestinal: Soft, Nontender, Nondistended; Bowel sounds present  Ext:  2+ Peripheral Pulses, No clubbing, cyanosis, or edema. No calf tenderness. Tenderness to palpation over R- hip and pain w/ active movement of RLE. Surgical dressing on R HIP, mild ecchymosis. Dressing clean/dry/intact   No evidence of withdrawal    Neuro: AAOX3 Motor Strength 4/5 B/L upper and LLE extremities; unable to asses RLE secondary pain , sensation intact     LABS                          9.9    6.9   )-----------( 269      ( 16 Dec 2018 12:13 )             31.6         12-17    133<L>  |  98  |  8.0  ----------------------------<  102  4.1   |  23.0  |  0.62    Ca    8.9      17 Dec 2018 08:14  Mg     1.7     12-16      IMAGING      DIET:    MEDICATIONS  (STANDING):  cholecalciferol 1000 Unit(s) Oral daily  docusate sodium 100 milliGRAM(s) Oral three times a day  enoxaparin Injectable 40 milliGRAM(s) SubCutaneous daily  folic acid 1 milliGRAM(s) Oral daily  multivitamin 1 Tablet(s) Oral daily  polyethylene glycol 3350 17 Gram(s) Oral daily  thiamine 100 milliGRAM(s) Oral daily    MEDICATIONS  (PRN):  acetaminophen   Tablet .. 650 milliGRAM(s) Oral every 6 hours PRN Mild Pain (1 - 3)  bisacodyl Suppository 10 milliGRAM(s) Rectal daily PRN If no bowel movement  ketorolac   Injectable 15 milliGRAM(s) IV Push once PRN Severe Pain (7 - 10)  magnesium hydroxide Suspension 30 milliLiter(s) Oral daily PRN Constipation  ondansetron Injectable 4 milliGRAM(s) IV Push every 6 hours PRN Nausea and/or Vomiting  oxyCODONE    IR 10 milliGRAM(s) Oral every 3 hours PRN Moderate Pain (4 - 6)  oxyCODONE    IR 5 milliGRAM(s) Oral every 3 hours PRN Mild Pain (1 - 3)  polyethylene glycol 3350 17 Gram(s) Oral daily PRN Constipation Patient is a 53y old  Male who presents with a chief complaint of alcohol withdrawal  and fall (17 Dec 2018 07:41)    HOSPITALIZATION DAY:8  POD: 6  INTERVAL/OVERNIGHT EVENTS:  Patient examined at beside. No acute events over night. Patient reports feeling  better, states burning pain at site, 6/10, requesting pain medication. Patient is tolerating PO diet w/o nausea/vomiting/diarrhea/abdominal pain. Patient is voiding actively and last BM was 2 days ago. Patient ambulated yesterday with PT, and moves OOB to chair with assistance. Continues to forget to take his pain meds. Understand he is pending ABE and agreeable. Patient denies chest pain, calf pain, abdominal pain, headaches, fever, chills, dysuria, hematuria, diarrhea, constipation, SOB, palpitations. Rest of ROS not contributory except for above.    I&O's Summary  17 Dec 2018 07:01  -  18 Dec 2018 07:00  --------------------------------------------------------  IN: 1660 mL / OUT: 0 mL / NET: 1660 mL    Physical Exam:   General: NAD  Head:  Atraumatic, Normocephalic  Respiratory: Clear to auscultation bilaterally; No rales, rhonchi, wheezing, or rubs  CV: Regular rate and rhythm; No murmurs, rubs, or gallops  Gastrointestinal: Soft, Nontender, Nondistended; Bowel sounds present  Ext:  2+ Peripheral Pulses, No clubbing, cyanosis, or edema. No calf tenderness. Tenderness to palpation over R- hip and pain w/ active movement of RLE. Surgical dressing on R HIP, mild ecchymosis. Dressing clean/dry/intact   No evidence of withdrawal    Neuro: AAOX3 Motor Strength 4/5 B/L upper and LLE extremities; unable to asses RLE secondary pain , sensation intact     LABS                          9.9    6.9   )-----------( 269      ( 16 Dec 2018 12:13 )             31.6         12-17    133<L>  |  98  |  8.0  ----------------------------<  102  4.1   |  23.0  |  0.62    Ca    8.9      17 Dec 2018 08:14  Mg     1.7     12-16      IMAGING      DIET:    MEDICATIONS  (STANDING):  cholecalciferol 1000 Unit(s) Oral daily  docusate sodium 100 milliGRAM(s) Oral three times a day  enoxaparin Injectable 40 milliGRAM(s) SubCutaneous daily  folic acid 1 milliGRAM(s) Oral daily  multivitamin 1 Tablet(s) Oral daily  polyethylene glycol 3350 17 Gram(s) Oral daily  thiamine 100 milliGRAM(s) Oral daily    MEDICATIONS  (PRN):  acetaminophen   Tablet .. 650 milliGRAM(s) Oral every 6 hours PRN Mild Pain (1 - 3)  bisacodyl Suppository 10 milliGRAM(s) Rectal daily PRN If no bowel movement  ketorolac   Injectable 15 milliGRAM(s) IV Push once PRN Severe Pain (7 - 10)  magnesium hydroxide Suspension 30 milliLiter(s) Oral daily PRN Constipation  ondansetron Injectable 4 milliGRAM(s) IV Push every 6 hours PRN Nausea and/or Vomiting  oxyCODONE    IR 10 milliGRAM(s) Oral every 3 hours PRN Moderate Pain (4 - 6)  oxyCODONE    IR 5 milliGRAM(s) Oral every 3 hours PRN Mild Pain (1 - 3)  polyethylene glycol 3350 17 Gram(s) Oral daily PRN Constipation

## 2018-12-18 NOTE — PROGRESS NOTE ADULT - ASSESSMENT
54 yo M w/ hx of alcohol abuse and HTN came to [facility] ER by ambulance s/p fall and noted on imaging to have R comminuted femoral neck fx s/p IMN, POD #6, with concern for acute blood loss anemia after procedure due to hgb drop. DVT ppx was held, hgb remained stable 8-9 and dvt ppx restarted yesterday. Ortho continues to follow the pt. Pt also noted with alcohol withdrawal on admission s/p ciwa protocol and librium taper now stable. Pt was initially uncooperative with PT/PMNR due to pain, now with improvement in pain and participation with PT. Recommended for ABE, pending disposition; noted insurance issues/ homeless.     Rt Femoral Neck fracture s/p IMN   - s/p fall at home  - Pt reports he has not been cooperative with PT due to pain, but will be more cooperative in order to be dispo. More cooperative today.   - c/w pain control w/ oxycodone 5mg/10mg for mild/moderate pain   - d/c Dilaudid 0.5mg for severe pain   - WBAT   - Ortho f/u noted and appreciated   - plan to f/u ortho outpatient on ~12/28  - Vit D levels noted~39  - C/w cholecalciferol 1000 units daily.    - Pt reports he will not be able to perform aggressive rehab, recommended for ABE pending placement   - Patient homeless without insurance complicating placement disposition    Normocytic anemia  - guaic negative  - asymptomatic  - previous hb= 13.3 on 12/2/18 could have been hemoconcentrated, hgb stable  - 100 ml estimated blood loss during surgery    - anemia panel noted, thus far w/u consistent with AOCD  - s/p 1 unit pRBCs during hospitalization thus far   - serial h/h, transfuse for hgb < 7 or symptom onset  - Initial dvt ppx held but now restarted     Alcohol withdraw syndrome - resolved   -No evidence of withdrawal on exam   -CIWA protocol discontinued   -s/p librium sanaz   -c/w multivitamin, thiamine and folic acid   - Noted sequela of alcohol abuse with transaminitis -abd us w/ diffuse steatosis, no signs of cirrhosis    -hepatitis panel neg   - consult noted and appreciated   - pt counseled on abstinence from alcohol abuse     Hypotension likely multifactorial orthostatic and recent addition of bp meds  - restarted norvasc 5mg po qd today bc sbp was in the 140-150 range but thereafter pt worked with PT and took norvasc with drop in sbp to 90 range   - c/w monitoring bp closely  - d/c norvasc  - will give fluid bolus     Asymptomatic Bacteriuria   -possible colonization of urethra, no WBCs in UA, pt asymptomatic  -Urine culture: <10,000 CFR/ml Gram Negative rods  -s/p 1 dose Macrobid 100mg     Moderate protein calorie malnutrition  -sec to poor nutrition in setting of alcohol abuse  -nutrition consult noted and appreciated   -c/w low fat diet w/ prostat + ensure supplementation when taking po    Tobacco Smoker  - pt counseled on smoking cessation     Preventive measure   - DVT ppx - restarted lovenox 40mg sq qd     Dispo: Pt is homeless, given fx and repair will need rehab, pending placement to [facility]. 54 yo M w/ hx of alcohol abuse and HTN came to [facility] ER by ambulance s/p fall and noted on imaging to have R comminuted femoral neck fx s/p IMN, POD #6, with concern for acute blood loss anemia after procedure due to hgb drop. DVT ppx was held, hgb remained stable 8-9 and dvt ppx restarted yesterday. Ortho continues to follow the pt. Pt also noted with alcohol withdrawal on admission s/p ciwa protocol and librium taper now stable. Pt was initially uncooperative with PT/PMNR due to pain, now with improvement in pain and participation with PT. Recommended for ABE, pending disposition; noted insurance issues/ homeless.     Rt Femoral Neck fracture s/p IMN   - s/p fall at home  - Pt reports he has not been cooperative with PT due to pain, but will be more cooperative in order to be dispo. More cooperative today.   - c/w pain control w/ oxycodone 5mg/10mg for mild/moderate pain   - d/c Dilaudid 0.5mg for severe pain   - WBAT   - Ortho f/u noted and appreciated   - plan to f/u ortho outpatient on ~12/28  - Vit D levels noted~39  - C/w cholecalciferol 1000 units daily.    - Pt reports he will not be able to perform aggressive rehab, recommended for ABE pending placement   - Patient homeless without insurance complicating placement disposition  - d/c with lovenox 40 mg SQ x 6 weeks     Normocytic anemia  - guaic negative  - asymptomatic  - previous hb= 13.3 on 12/2/18 could have been hemoconcentrated, hgb stable  - 100 ml estimated blood loss during surgery    - anemia panel noted, thus far w/u consistent with AOCD  - s/p 1 unit pRBCs during hospitalization   - serial h/h, transfuse for hgb < 7 or symptom onset  - Initial dvt ppx held but now restarted     Alcohol withdraw syndrome - resolved   -No evidence of withdrawal on exam   -CIWA protocol discontinued   -s/p librium sanaz   -c/w multivitamin, thiamine and folic acid   - Noted sequela of alcohol abuse with transaminitis -abd us w/ diffuse steatosis, no signs of cirrhosis    -hepatitis panel neg   - consult noted and appreciated   - pt counseled on abstinence from alcohol abuse     Hypotension likely multifactorial orthostatic and recent addition of bp meds  - restarted norvasc 5mg po qd today bc sbp was in the 140-150 range but thereafter pt worked with PT and took norvasc with drop in sbp to 90 range   - c/w monitoring bp closely, s/p fluid bolus  - d/c norvasc    Asymptomatic Bacteriuria   -possible colonization of urethra, no WBCs in UA, pt asymptomatic  -Urine culture: <10,000 CFR/ml Gram Negative rods  -s/p 1 dose Macrobid 100mg     Moderate protein calorie malnutrition  -sec to poor nutrition in setting of alcohol abuse  -nutrition consult noted and appreciated   -c/w low fat diet w/ prostat + ensure supplementation when taking po    Tobacco Smoker  - pt counseled on smoking cessation     Preventive measure   - DVT ppx - restarted lovenox 40mg sq qd     Dispo: Pt is homeless, given fx and repair will need rehab, pending placement to [facility]. 54 yo M w/ hx of alcohol abuse and HTN came to [facility] ER by ambulance s/p fall and noted on imaging to have R comminuted femoral neck fx s/p IMN, with concern for acute blood loss anemia after procedure due to hgb drop. DVT ppx was held, hgb remained stable 8-9 and dvt ppx restarted thereafter. Ortho continued to follow the pt and cleared the pt for dispo. Pt also noted with alcohol withdrawal on admission s/p ciwa protocol and librium taper now stable. Pt was initially uncooperative with PT/PMNR due to pain, now with improvement in pain and participation with PT. Recommended for ABE, pending disposition; noted insurance issues/ homeless.     Rt Femoral Neck fracture s/p IMN   - s/p fall at home  - Pt OOB to chair and cooperating with PT   - c/w pain control w/ oxycodone 5mg/10mg for mild/moderate pain   - WBAT   - Vit D levels noted~39  - C/w cholecalciferol 1000 units daily.    - d/c pt with lovenox 40 mg SQ x 6 weeks   - Ortho f/u noted and appreciated   - plan to f/u ortho outpatient on ~12/28  - Pt reports he will not be able to perform aggressive rehab, recommended for ABE pending placement     Normocytic anemia  - guaic negative  - asymptomatic  - previous hb= 13.3 on 12/2/18 could have been hemoconcentrated, hgb stable  - 100 ml estimated blood loss during surgery    - anemia panel noted, thus far w/u consistent with AOCD  - s/p 1 unit pRBCs during hospitalization   - serial h/h, transfuse for hgb < 7 or symptom onset    Alcohol withdraw syndrome - resolved   - No evidence of withdrawal on exam   - CIWA protocol discontinued   - s/p librium sanaz   - c/w multivitamin, thiamine and folic acid   - Noted sequela of alcohol abuse with transaminitis -abd us w/ diffuse steatosis, no signs of cirrhosis    -hepatitis panel neg   -  consult noted and appreciated   - pt counseled on abstinence from alcohol abuse     Hypotension likely multifactorial orthostatic and recent addition of bp meds  - Low/ normal bp   - c/w monitoring bp closely     Asymptomatic Bacteriuria   -possible colonization of urethra, no WBCs in UA, pt asymptomatic  -Urine culture: <10,000 CFR/ml Gram Negative rods  -s/p 1 dose Macrobid 100mg     Moderate protein calorie malnutrition  -sec to poor nutrition in setting of alcohol abuse  -nutrition consult noted and appreciated   -c/w low fat diet w/ prostat + ensure supplementation when taking po    Tobacco Smoker  - pt counseled on smoking cessation     Preventive measure   - DVT ppx - restarted lovenox 40mg sq qd     Dispo: Pt is homeless, given fx and repair will need rehab, pending placement to [facility] ([place] Anticipated discharge in 24 hours pending authorization. D/w LORRI and BORIS.

## 2018-12-19 PROCEDURE — 99232 SBSQ HOSP IP/OBS MODERATE 35: CPT | Mod: GC

## 2018-12-19 RX ORDER — CHOLECALCIFEROL (VITAMIN D3) 125 MCG
1000 CAPSULE ORAL
Qty: 0 | Refills: 0 | COMMUNITY
Start: 2018-12-19

## 2018-12-19 RX ORDER — ENOXAPARIN SODIUM 100 MG/ML
40 INJECTION SUBCUTANEOUS
Qty: 0 | Refills: 0 | COMMUNITY
Start: 2018-12-19 | End: 2019-01-09

## 2018-12-19 RX ORDER — ENOXAPARIN SODIUM 100 MG/ML
40 INJECTION SUBCUTANEOUS
Qty: 22 | Refills: 0 | OUTPATIENT
Start: 2018-12-19 | End: 2019-01-09

## 2018-12-19 RX ORDER — THIAMINE MONONITRATE (VIT B1) 100 MG
1 TABLET ORAL
Qty: 0 | Refills: 0 | COMMUNITY
Start: 2018-12-19

## 2018-12-19 RX ORDER — FOLIC ACID 0.8 MG
1 TABLET ORAL
Qty: 0 | Refills: 0 | COMMUNITY
Start: 2018-12-19

## 2018-12-19 RX ADMIN — OXYCODONE HYDROCHLORIDE 10 MILLIGRAM(S): 5 TABLET ORAL at 17:27

## 2018-12-19 RX ADMIN — OXYCODONE HYDROCHLORIDE 10 MILLIGRAM(S): 5 TABLET ORAL at 07:19

## 2018-12-19 RX ADMIN — Medication 100 MILLIGRAM(S): at 10:56

## 2018-12-19 RX ADMIN — OXYCODONE HYDROCHLORIDE 10 MILLIGRAM(S): 5 TABLET ORAL at 16:57

## 2018-12-19 RX ADMIN — OXYCODONE HYDROCHLORIDE 10 MILLIGRAM(S): 5 TABLET ORAL at 06:19

## 2018-12-19 RX ADMIN — Medication 100 MILLIGRAM(S): at 21:24

## 2018-12-19 RX ADMIN — POLYETHYLENE GLYCOL 3350 17 GRAM(S): 17 POWDER, FOR SOLUTION ORAL at 10:55

## 2018-12-19 RX ADMIN — Medication 1 TABLET(S): at 10:55

## 2018-12-19 RX ADMIN — Medication 1 MILLIGRAM(S): at 10:55

## 2018-12-19 RX ADMIN — OXYCODONE HYDROCHLORIDE 10 MILLIGRAM(S): 5 TABLET ORAL at 13:35

## 2018-12-19 RX ADMIN — Medication 100 MILLIGRAM(S): at 10:55

## 2018-12-19 RX ADMIN — OXYCODONE HYDROCHLORIDE 10 MILLIGRAM(S): 5 TABLET ORAL at 10:54

## 2018-12-19 RX ADMIN — Medication 1000 UNIT(S): at 10:55

## 2018-12-19 RX ADMIN — ENOXAPARIN SODIUM 40 MILLIGRAM(S): 100 INJECTION SUBCUTANEOUS at 10:55

## 2018-12-19 NOTE — PROGRESS NOTE ADULT - ASSESSMENT
52 yo M w/ hx of alcohol abuse and HTN came to [facility] ER by ambulance s/p fall and noted on imaging to have R comminuted femoral neck fx s/p IMN, with concern for acute blood loss anemia after procedure due to hgb drop. DVT ppx was held, hgb remained stable 8-9 and dvt ppx restarted thereafter. Ortho continued to follow the pt and cleared the pt for dispo. Pt also noted with alcohol withdrawal on admission s/p ciwa protocol and librium taper now stable. Pt was initially uncooperative with PT/PMNR due to pain, now with improvement in pain and participation with PT. Recommended for AEB, pending disposition; noted insurance issues/ homeless.     Rt Femoral Neck fracture s/p IMN   - s/p fall at home  - Pt OOB to chair and cooperating with PT   - c/w pain control w/ oxycodone 5mg/10mg for mild/moderate pain   - WBAT   - Vit D levels noted~39  - C/w cholecalciferol 1000 units daily.    - d/c pt with lovenox 40 mg SQ x 4 weeks   - Ortho f/u noted and appreciated   - plan to f/u ortho outpatient on ~12/28  - Pt reports he will not be able to perform aggressive rehab, recommended for ABE pending placement     Normocytic anemia  - guaic negative  - asymptomatic  - previous hb= 13.3 on 12/2/18 could have been hemoconcentrated, hgb stable  - 100 ml estimated blood loss during surgery    - anemia panel noted, thus far w/u consistent with AOCD  - s/p 1 unit pRBCs during hospitalization   - serial h/h, transfuse for hgb < 7 or symptom onset    Alcohol withdraw syndrome - resolved   - No evidence of withdrawal on exam   - CIWA protocol discontinued   - s/p librium sanaz   - c/w multivitamin, thiamine and folic acid   - Noted sequela of alcohol abuse with transaminitis -abd us w/ diffuse steatosis, no signs of cirrhosis    -hepatitis panel neg   -  consult noted and appreciated   - pt counseled on abstinence from alcohol abuse     Hypotension likely multifactorial orthostatic and recent addition of bp meds  - Low/ normal bp   - c/w monitoring bp closely     Asymptomatic Bacteriuria   -possible colonization of urethra, no WBCs in UA, pt asymptomatic  -Urine culture: <10,000 CFR/ml Gram Negative rods  -s/p 1 dose Macrobid 100mg     Moderate protein calorie malnutrition  -sec to poor nutrition in setting of alcohol abuse  -nutrition consult noted and appreciated   -c/w low fat diet w/ prostat + ensure supplementation when taking po    Tobacco Smoker  - pt counseled on smoking cessation     Preventive measure   - DVT ppx - restarted lovenox 40mg sq qd     Dispo: Pt is homeless, given fx and repair will need rehab, pending placement to [facility]. Anticipated discharge in 24 hours pending authorization. D/w LORRI and BORIS. 52 yo M w/ hx of alcohol abuse and HTN came to [facility] ER by ambulance s/p fall and noted on imaging to have R comminuted femoral neck fx s/p IMN, with concern for acute blood loss anemia after procedure due to hgb drop. DVT ppx was held, hgb remained stable 8-9 and dvt ppx restarted thereafter. Ortho continued to follow the pt and cleared the pt for dispo. Pt also noted with alcohol withdrawal on admission s/p ciwa protocol and librium taper now stable. Pt was initially uncooperative with PT/PMNR due to pain, now with improvement in pain and participation with PT. Recommended for ABE, pending disposition; noted insurance issues/ homeless.     Rt Femoral Neck fracture s/p IMN   - s/p fall at home  - Pt OOB to chair and cooperating with PT   - c/w pain control w/ oxycodone 5mg/10mg for mild/moderate pain   - WBAT   - Vit D levels noted~39  - C/w cholecalciferol 1000 units daily.    - d/c pt with lovenox 40 mg SQ x 4 weeks   - Ortho f/u noted and appreciated   - plan to f/u ortho outpatient on ~12/28  - Pt agreeable to ABE, anticipated dc today    Normocytic anemia  - guaic negative  - asymptomatic  - previous hb= 13.3 on 12/2/18 could have been hemoconcentrated, hgb stable  - 100 ml estimated blood loss during surgery    - anemia panel noted, thus far w/u consistent with AOCD  - s/p 1 unit pRBCs during hospitalization   - serial h/h, transfuse for hgb < 7 or symptom onset    Alcohol withdraw syndrome - resolved   - No evidence of withdrawal on exam   - CIWA protocol discontinued   - s/p librium sanaz   - c/w multivitamin, thiamine and folic acid   - Noted sequela of alcohol abuse with transaminitis -abd us w/ diffuse steatosis, no signs of cirrhosis    -hepatitis panel neg   -  consult noted and appreciated   - pt counseled on abstinence from alcohol abuse     Hypotension likely multifactorial orthostatic and recent addition of bp meds  - Low/ normal bp   - c/w monitoring bp closely     Asymptomatic Bacteriuria   -possible colonization of urethra, no WBCs in UA, pt asymptomatic  -Urine culture: <10,000 CFR/ml Gram Negative rods  -s/p 1 dose Macrobid 100mg     Moderate protein calorie malnutrition  -sec to poor nutrition in setting of alcohol abuse  -nutrition consult noted and appreciated   -c/w low fat diet w/ prostat + ensure supplementation when taking po    Tobacco Smoker  - pt counseled on smoking cessation     Preventive measure   - DVT ppx - restarted lovenox 40mg sq qd     Dispo: Pt is homeless, given fx and repair will need rehab, discharge to [facility] per PT recomendations. 54 yo M w/ hx of alcohol abuse and HTN came to [facility] ER by ambulance s/p fall and noted on imaging to have R comminuted femoral neck fx s/p IMN, with concern for acute blood loss anemia after procedure due to hgb drop. DVT ppx was held, hgb remained stable 8-9 and dvt ppx restarted thereafter. Ortho continued to follow the pt and cleared the pt for dispo. Pt also noted with alcohol withdrawal on admission s/p ciwa protocol and librium taper now stable. Pt was initially uncooperative with PT/PMNR due to pain, now with improvement in pain and participation with PT. Recommended for ABE, pending disposition; noted insurance issues/ homeless.     Rt Femoral Neck fracture s/p IMN   - s/p fall at home  - Pt OOB to chair and cooperating with PT   - c/w pain control w/ oxycodone 5mg/10mg for mild/moderate pain   - WBAT   - Vit D levels noted~39  - C/w cholecalciferol 1000 units daily.    - c/w lovenox 40 mg SQ x 4 weeks   - Ortho f/u noted and appreciated   - plan to f/u ortho outpatient on ~12/28  - Pt agreeable to ABE to c/w PT progressing to goals was re evaluated by PT today     Normocytic anemia  - guaic negative  - asymptomatic  - previous hb= 13.3 on 12/2/18 could have been hemoconcentrated, hgb stable  - 100 ml estimated blood loss during surgery    - anemia panel noted, thus far w/u consistent with AOCD  - s/p 1 unit pRBCs during hospitalization   - serial h/h, transfuse for hgb < 7 or symptom onset    Alcohol withdraw syndrome - resolved   - No evidence of withdrawal on exam   - CIWA protocol discontinued   - s/p librium sanaz   - c/w multivitamin, thiamine and folic acid   - Noted sequela of alcohol abuse with transaminitis -abd us w/ diffuse steatosis, no signs of cirrhosis    -hepatitis panel neg   -  consult noted and appreciated   - pt counseled on abstinence from alcohol abuse     Hypotension likely multifactorial orthostatic and recent addition of bp meds- resolved   - pt now normotensive   - Low/ normal bp   - c/w monitoring bp closely     Asymptomatic Bacteriuria   -possible colonization of urethra, no WBCs in UA, pt asymptomatic  -Urine culture: <10,000 CFR/ml Gram Negative rods  -s/p 1 dose Macrobid 100mg     Moderate protein calorie malnutrition  -sec to poor nutrition in setting of alcohol abuse  -nutrition consult noted and appreciated   -c/w low fat diet w/ prostat + ensure supplementation when taking po    Tobacco Smoker  - pt counseled on smoking cessation     Preventive measure   - DVT ppx - restarted lovenox 40mg sq qd     Dispo: Pt is homeless, given fx and repair will need rehab, discharge to [facility] per PT recommendations possibly today pending authorization for [facility] rehab.

## 2018-12-20 VITALS
RESPIRATION RATE: 18 BRPM | HEART RATE: 90 BPM | DIASTOLIC BLOOD PRESSURE: 70 MMHG | TEMPERATURE: 98 F | SYSTOLIC BLOOD PRESSURE: 112 MMHG | OXYGEN SATURATION: 95 %

## 2018-12-20 PROCEDURE — 99239 HOSP IP/OBS DSCHRG MGMT >30: CPT

## 2018-12-20 RX ADMIN — Medication 1 TABLET(S): at 11:47

## 2018-12-20 RX ADMIN — Medication 15 MILLIGRAM(S): at 09:01

## 2018-12-20 RX ADMIN — Medication 100 MILLIGRAM(S): at 05:06

## 2018-12-20 RX ADMIN — ENOXAPARIN SODIUM 40 MILLIGRAM(S): 100 INJECTION SUBCUTANEOUS at 11:48

## 2018-12-20 RX ADMIN — Medication 100 MILLIGRAM(S): at 11:47

## 2018-12-20 RX ADMIN — Medication 1 MILLIGRAM(S): at 11:47

## 2018-12-20 RX ADMIN — Medication 15 MILLIGRAM(S): at 09:14

## 2018-12-20 RX ADMIN — Medication 1000 UNIT(S): at 11:47

## 2018-12-20 NOTE — PROGRESS NOTE ADULT - ASSESSMENT
54 yo M w/ hx of alcohol abuse and HTN came to [facility] ER by ambulance s/p fall and noted on imaging to have R comminuted femoral neck fx s/p IMN, with concern for acute blood loss anemia after procedure due to hgb drop. DVT ppx was held, hgb remained stable 8-9 and dvt ppx restarted thereafter. Ortho continued to follow the pt and cleared the pt for dispo. Pt also noted with alcohol withdrawal on admission s/p ciwa protocol and librium taper now stable. Pt was initially uncooperative with PT/PMNR due to pain, now with improvement in pain and participation with PT. Recommended for ABE, pending disposition; noted insurance issues/ homeless.     Rt Femoral Neck fracture s/p IMN   - s/p fall at home  - Pt OOB to chair and cooperating with PT   - c/w pain control w/ oxycodone 5mg/10mg for mild/moderate pain   - WBAT   - Vit D levels noted~39  - C/w cholecalciferol 1000 units daily.    - c/w lovenox 40 mg SQ x 4 weeks   - Ortho f/u noted and appreciated   - plan to f/u ortho outpatient on ~12/28  - Pt agreeable to ABE to c/w PT progressing to goals was re evaluated by PT today     Normocytic anemia  - guaic negative  - asymptomatic  - previous hb= 13.3 on 12/2/18 could have been hemoconcentrated, hgb stable  - 100 ml estimated blood loss during surgery    - anemia panel noted, thus far w/u consistent with AOCD  - s/p 1 unit pRBCs during hospitalization   - serial h/h, transfuse for hgb < 7 or symptom onset    Alcohol withdraw syndrome - resolved   - No evidence of withdrawal on exam   - CIWA protocol discontinued   - s/p librium sanaz   - c/w multivitamin, thiamine and folic acid   - Noted sequela of alcohol abuse with transaminitis -abd us w/ diffuse steatosis, no signs of cirrhosis    -hepatitis panel neg   -  consult noted and appreciated   - pt counseled on abstinence from alcohol abuse     Hypotension likely multifactorial orthostatic and recent addition of bp meds- resolved   - pt now normotensive   - Low/ normal bp   - c/w monitoring bp closely     Asymptomatic Bacteriuria   -possible colonization of urethra, no WBCs in UA, pt asymptomatic  -Urine culture: <10,000 CFR/ml Gram Negative rods  -s/p 1 dose Macrobid 100mg     Moderate protein calorie malnutrition  -sec to poor nutrition in setting of alcohol abuse  -nutrition consult noted and appreciated   -c/w low fat diet w/ prostat + ensure supplementation when taking po    Tobacco Smoker  - pt counseled on smoking cessation     Preventive measure   - DVT ppx - restarted lovenox 40mg sq qd     Dispo: Pt is homeless, given fx and repair will need rehab, discharge to [facility] per PT recommendations possibly today pending authorization for [facility]. 52 yo M w/ hx of alcohol abuse and HTN came to [facility] ER by ambulance s/p fall and noted on imaging to have R comminuted femoral neck fx s/p IMN, with concern for acute blood loss anemia after procedure due to hgb drop. DVT ppx was held, hgb remained stable 8-9 and dvt ppx restarted thereafter. Ortho continued to follow the pt and cleared the pt for dispo. Pt also noted with alcohol withdrawal on admission s/p ciwa protocol and librium taper now stable. Pt was initially uncooperative with PT/PMNR due to pain, now with improvement in pain and participation with PT. Recommended for ABE, for discharge today.    Rt Femoral Neck fracture s/p IMN   - s/p fall at home  - Pt OOB to chair and cooperating with PT   - c/w pain control w/ oxycodone 5mg/10mg for mild/moderate pain   - WBAT   - Vit D levels noted~39  - C/w cholecalciferol 1000 units daily.    - c/w lovenox 40 mg SQ x 4 weeks   - Ortho f/u noted and appreciated   - plan to f/u ortho outpatient on ~12/28  - Pt agreeable to ABE to c/w PT progressing to goals was re evaluated by PT today     Normocytic anemia  - guaic negative  - asymptomatic  - previous hb= 13.3 on 12/2/18 could have been hemoconcentrated, hgb stable  - 100 ml estimated blood loss during surgery    - anemia panel noted, thus far w/u consistent with AOCD  - s/p 1 unit pRBCs during hospitalization   - serial h/h, transfuse for hgb < 7 or symptom onset    Alcohol withdraw syndrome - resolved   - No evidence of withdrawal on exam   - CIWA protocol discontinued   - s/p librium sanaz   - c/w multivitamin, thiamine and folic acid   - Noted sequela of alcohol abuse with transaminitis -abd us w/ diffuse steatosis, no signs of cirrhosis    -hepatitis panel neg   -  consult noted and appreciated   - pt counseled on abstinence from alcohol abuse     Hypotension likely multifactorial orthostatic and recent addition of bp meds- resolved   - pt now normotensive   - Low/ normal bp   - c/w monitoring bp closely     Asymptomatic Bacteriuria   -possible colonization of urethra, no WBCs in UA, pt asymptomatic  -Urine culture: <10,000 CFR/ml Gram Negative rods  -s/p 1 dose Macrobid 100mg     Moderate protein calorie malnutrition  -sec to poor nutrition in setting of alcohol abuse  -nutrition consult noted and appreciated   -c/w low fat diet w/ prostat + ensure supplementation when taking po    Tobacco Smoker  - pt counseled on smoking cessation     Preventive measure   - DVT ppx - c/w lovenox 40mg sq qd     Dispo: Patient to be transferred to [facility] today.

## 2018-12-20 NOTE — PROGRESS NOTE ADULT - NSHPATTENDINGPLANDISCUSS_GEN_ALL_CORE
the patient.  All imaging and results of lab/other studies reviewed by me. All questions answered to the satisfaction of the patient. At this time they agree with the current plan of therapy.
Patient, RN, PGY-1-2-3
Patient, RN PGY-1-2-3
Patient, RN pgy-1-2-3 - PT
Patient, rn, pgy 1-2 residents
the patient.  All imaging and results of lab/other studies reviewed by me. All questions answered to the satisfaction of the patient. At this time they agree with the current plan of therapy.
Patient

## 2018-12-20 NOTE — PROGRESS NOTE ADULT - ATTENDING COMMENTS
Ortho Trauma Attending:  Agree with above PA note.  Note edited where necessary.  Plan for OR today as doing better medically.  Appreciate medicine team excellent care.    Eris Anthony MD  Orthopaedic Trauma Surgery
Ortho Trauma Attending:  Agree with above PA note.  Note edited where necessary.  Plan for OR when medically stable. Patient currently unsafe for surgery. Will continue to follow.    Eris Anthony MD  Orthopaedic Trauma Surgery
Patient seen and examined at the bedside. I was physically present for key portions of the evaluation and management services provided. Agree with the above history, physical, assessment, and plan with the necessary amendments/elaborations below;    pt sp fall in home. ? neurologic event on presentation however on my interview, pt DENIES rt sided weakness, states that he had pain causing difficulty with ambulation prompting fall. cth + mr head negative. cva/tia ruled out. downgrade to any bed.    regarding rt femoral neck comminuted fx, post traumatic sec to aforementioned fall. ortho eval appreciated. ekg reviewed, no acute ischemic findings but pt with lvh, and likely qwaves present in several leads, namely lateral, will perform echo. He denies chest pain/sob/dyspnea at rest or with exertion. Denies PND, orthopnea. No hx of MI, CVA. No actively uncontrolled or exacerbated comorbid conditions. Good functional status, has > 4METS without symptom development. No hx of adverse reaction or complications during surgical procedures in the past. if echo unremarkable, plan for OR in am. npo @ midnight.    regarding etoh withdrawal, last ciwa 5, much improved from 16 on arrival. clinical features of withdrawal noted on exam, tongue fasciculations, tremors, minimal cognitive impairment. cont ciwa. librium taper + ativan prn. close follow up. etoh cessation counseling when more aware + alert. sw eval. diffuse steatosis noted on imaging w/o cirrhosis, etoh withdrawal to help prevent progression to hepatic failure. will discuss w/ patient at length.     regarding tobacco use, severe, 2 1/2 ppd. nicotine inpatient. counseling to be provided when more aware of situation.     regarding protein dede malnutrition, appears severe on exam. likely sec to chronic etoh use + poor intake. nutrition eval. dysphagia screening pending given toxic metabolic encephalopathy on arrival from etoh withdrawal, when taking po, add nutritional supplements inc vitamins + ensure.    regarding anemia, likely chronic, no signs acute/active bleeding, likely sec to poor nutritional status. cont to monitor pre + post op. anemia w/u if falling inpatient, otherwise outpt fu.
Note addended where needed
Patient seen and examined at the bedside. I was physically present for key portions of the evaluation and management services provided. Agree with the above history, physical, assessment, and plan with the necessary amendments/elaborations below:    clinically pt stable however noted to have persistent lethargy, fatigue. unclear if related to symptomatic anemia in light of downtrending hgb but this seems to be most likely explanation. no signs of acute infectious process, afebrile, no clinical complaints, physical exam w/o pertinent findings. no clear signs of acute/active bleeding. may be combo of acute on chronic anemia in setting of already low blood counts for etoh-ism + poor nutrition as well as blood loss from orif. prbc x1. reassess for improvement clinically afterwards, repeat h/h in am. hold lovenox today. if h/h improves in am and pt stable, resume dosing per ortho recs at 30mg bid for dvt ppx in setting of sx. otherwise monitor orif site for hematoma formation and/or excess bleeding, thus far none appreciated.     regarding malnutrition, nutrition eval appreciated, inc glucerna to tid, cont prostat tid, encourage po intake.    regarding activity, limited thus far. pmr eval appreciated, likely to need andre. will need case management input regarding placement options as pt homeless and unlikely with active medical coverage to pay for that.    regarding etoh withdrawal, resolved, no tremors noted, ciwa 0. keep overnight, if still unremarkable stop ciwa in am.
note addended where needed
Note addended where needed
Patient seen and examined at the bedside. I was physically present for key portions of the evaluation and management services provided. Agree with the above history, physical, assessment, and plan with the necessary amendments/elaborations below:    clinically stable. sp Rt hip ORIF 12/12, well tolerated, uncomplicated procedure. pain controlled. pt eval appreciated rec acute rehab. OT/PMR eval appreciated, pending final recs but will need andre vs. acute rehab. pt homeless, unclear if insured, may present dispo complication.     cont ciwa, pt doing well, no signs of active withdrawal at this time, tremors + fasciculations from admissions appear significantly improved.
Patient seen and examined at the bedside. I was physically present for key portions of the evaluation and management services provided. Agree with the above history, physical, assessment, and plan with the necessary amendments/elaborations already made above.

## 2018-12-20 NOTE — PROGRESS NOTE ADULT - REASON FOR ADMISSION
alcohol withdrawal  and fall

## 2018-12-20 NOTE — PROGRESS NOTE ADULT - SUBJECTIVE AND OBJECTIVE BOX
Patient is a 53y old  Male who presents with a chief complaint of alcohol withdrawal  and fall (19 Dec 2018 07:22)    HOSPITALIZATION DAY:10  INTERVAL/OVERNIGHT EVENTS:  Patient seen and examined at bedside, no acute events over night. States he is looking forward to going to rehab and getting his mobility back.  As per nurse, patient did not request medication over night and has been improving with ROM exercises. Patient is tolerating PO diet w/o nausea/vomiting/diarrhea/abdominal pain. Patient is voiding actively and last BM was normal. Patient is ambulating with assistance minimally but making improvements. Patient denies chest pain, calf pain, abdominal pain, headaches, fever, chills, dysuria, hematuria, diarrhea, constipation, SOB, palpitations. Rest of ROS not contributory except for above.    Physical Exam:   General: NAD  Head:  Atraumatic, Normocephalic  Respiratory: Clear to auscultation bilaterally; No rales, rhonchi, wheezing, or rubs  CV: Regular rate and rhythm; No murmurs, rubs, or gallops  Gastrointestinal: Soft, Nontender, Nondistended; Bowel sounds present  Ext:  2+ Peripheral Pulses, No clubbing, cyanosis, or edema. No calf tenderness. Tenderness to palpation over R- hip and pain w/ active movement of RLE. Surgical dressing on R HIP, mild ecchymosis. Dressing clean/dry/intact. ROM of right leg improving but still limited due to some pain. No evidence of withdrawal    Neuro: AAOX3 Motor Strength 4/5 B/L upper and LLE extremities; strength 2/5 on RLE limited assesment 2/2 pain , sensation intact    MEDICATIONS  (STANDING):  cholecalciferol 1000 Unit(s) Oral daily  docusate sodium 100 milliGRAM(s) Oral three times a day  enoxaparin Injectable 40 milliGRAM(s) SubCutaneous daily  folic acid 1 milliGRAM(s) Oral daily  multivitamin 1 Tablet(s) Oral daily  polyethylene glycol 3350 17 Gram(s) Oral daily  thiamine 100 milliGRAM(s) Oral daily    MEDICATIONS  (PRN):  acetaminophen   Tablet .. 650 milliGRAM(s) Oral every 6 hours PRN Mild Pain (1 - 3)  bisacodyl Suppository 10 milliGRAM(s) Rectal daily PRN If no bowel movement  ketorolac   Injectable 15 milliGRAM(s) IV Push once PRN Severe Pain (7 - 10)  magnesium hydroxide Suspension 30 milliLiter(s) Oral daily PRN Constipation  ondansetron Injectable 4 milliGRAM(s) IV Push every 6 hours PRN Nausea and/or Vomiting  polyethylene glycol 3350 17 Gram(s) Oral daily PRN Constipation

## 2018-12-24 PROBLEM — Z78.9 OTHER SPECIFIED HEALTH STATUS: Chronic | Status: ACTIVE | Noted: 2018-12-10

## 2018-12-24 PROBLEM — F10.10 ALCOHOL ABUSE, UNCOMPLICATED: Chronic | Status: ACTIVE | Noted: 2018-12-10

## 2019-01-09 PROCEDURE — 80053 COMPREHEN METABOLIC PANEL: CPT

## 2019-01-09 PROCEDURE — 82728 ASSAY OF FERRITIN: CPT

## 2019-01-09 PROCEDURE — 36415 COLL VENOUS BLD VENIPUNCTURE: CPT

## 2019-01-09 PROCEDURE — 86704 HEP B CORE ANTIBODY TOTAL: CPT

## 2019-01-09 PROCEDURE — 86900 BLOOD TYPING SEROLOGIC ABO: CPT

## 2019-01-09 PROCEDURE — 96376 TX/PRO/DX INJ SAME DRUG ADON: CPT

## 2019-01-09 PROCEDURE — 82652 VIT D 1 25-DIHYDROXY: CPT

## 2019-01-09 PROCEDURE — 85730 THROMBOPLASTIN TIME PARTIAL: CPT

## 2019-01-09 PROCEDURE — 71045 X-RAY EXAM CHEST 1 VIEW: CPT

## 2019-01-09 PROCEDURE — 85018 HEMOGLOBIN: CPT

## 2019-01-09 PROCEDURE — 72125 CT NECK SPINE W/O DYE: CPT

## 2019-01-09 PROCEDURE — 81001 URINALYSIS AUTO W/SCOPE: CPT

## 2019-01-09 PROCEDURE — 80074 ACUTE HEPATITIS PANEL: CPT

## 2019-01-09 PROCEDURE — 84466 ASSAY OF TRANSFERRIN: CPT

## 2019-01-09 PROCEDURE — 80048 BASIC METABOLIC PNL TOTAL CA: CPT

## 2019-01-09 PROCEDURE — 76000 FLUOROSCOPY <1 HR PHYS/QHP: CPT

## 2019-01-09 PROCEDURE — 96375 TX/PRO/DX INJ NEW DRUG ADDON: CPT

## 2019-01-09 PROCEDURE — P9016: CPT

## 2019-01-09 PROCEDURE — 80307 DRUG TEST PRSMV CHEM ANLYZR: CPT

## 2019-01-09 PROCEDURE — 86850 RBC ANTIBODY SCREEN: CPT

## 2019-01-09 PROCEDURE — C1713: CPT

## 2019-01-09 PROCEDURE — 85610 PROTHROMBIN TIME: CPT

## 2019-01-09 PROCEDURE — 70450 CT HEAD/BRAIN W/O DYE: CPT

## 2019-01-09 PROCEDURE — 76700 US EXAM ABDOM COMPLETE: CPT

## 2019-01-09 PROCEDURE — 82140 ASSAY OF AMMONIA: CPT

## 2019-01-09 PROCEDURE — 82272 OCCULT BLD FECES 1-3 TESTS: CPT

## 2019-01-09 PROCEDURE — 73502 X-RAY EXAM HIP UNI 2-3 VIEWS: CPT

## 2019-01-09 PROCEDURE — 83550 IRON BINDING TEST: CPT

## 2019-01-09 PROCEDURE — 84134 ASSAY OF PREALBUMIN: CPT

## 2019-01-09 PROCEDURE — 82746 ASSAY OF FOLIC ACID SERUM: CPT

## 2019-01-09 PROCEDURE — 85027 COMPLETE CBC AUTOMATED: CPT

## 2019-01-09 PROCEDURE — 84484 ASSAY OF TROPONIN QUANT: CPT

## 2019-01-09 PROCEDURE — 97163 PT EVAL HIGH COMPLEX 45 MIN: CPT

## 2019-01-09 PROCEDURE — C8929: CPT

## 2019-01-09 PROCEDURE — 36430 TRANSFUSION BLD/BLD COMPNT: CPT

## 2019-01-09 PROCEDURE — 83735 ASSAY OF MAGNESIUM: CPT

## 2019-01-09 PROCEDURE — 86706 HEP B SURFACE ANTIBODY: CPT

## 2019-01-09 PROCEDURE — 97535 SELF CARE MNGMENT TRAINING: CPT

## 2019-01-09 PROCEDURE — 83540 ASSAY OF IRON: CPT

## 2019-01-09 PROCEDURE — 99285 EMERGENCY DEPT VISIT HI MDM: CPT | Mod: 25

## 2019-01-09 PROCEDURE — 70551 MRI BRAIN STEM W/O DYE: CPT

## 2019-01-09 PROCEDURE — 85014 HEMATOCRIT: CPT

## 2019-01-09 PROCEDURE — 86901 BLOOD TYPING SEROLOGIC RH(D): CPT

## 2019-01-09 PROCEDURE — 96374 THER/PROPH/DIAG INJ IV PUSH: CPT

## 2019-01-09 PROCEDURE — 87086 URINE CULTURE/COLONY COUNT: CPT

## 2019-01-09 PROCEDURE — 97116 GAIT TRAINING THERAPY: CPT

## 2019-01-09 PROCEDURE — 97167 OT EVAL HIGH COMPLEX 60 MIN: CPT

## 2019-01-09 PROCEDURE — 93005 ELECTROCARDIOGRAM TRACING: CPT

## 2019-01-09 PROCEDURE — 97530 THERAPEUTIC ACTIVITIES: CPT

## 2019-01-09 PROCEDURE — 84100 ASSAY OF PHOSPHORUS: CPT

## 2019-01-09 PROCEDURE — 97110 THERAPEUTIC EXERCISES: CPT

## 2019-01-09 PROCEDURE — 86923 COMPATIBILITY TEST ELECTRIC: CPT

## 2019-01-09 PROCEDURE — 82607 VITAMIN B-12: CPT

## 2019-01-09 PROCEDURE — 84145 PROCALCITONIN (PCT): CPT

## 2019-01-09 PROCEDURE — 73552 X-RAY EXAM OF FEMUR 2/>: CPT

## 2019-01-15 ENCOUNTER — OTHER (OUTPATIENT)
Age: 54
End: 2019-01-15

## 2019-01-16 ENCOUNTER — APPOINTMENT (OUTPATIENT)
Dept: ORTHOPEDIC SURGERY | Facility: CLINIC | Age: 54
End: 2019-01-16

## 2019-03-01 ENCOUNTER — OUTPATIENT (OUTPATIENT)
Dept: OUTPATIENT SERVICES | Facility: HOSPITAL | Age: 54
LOS: 1 days | End: 2019-03-01
Payer: MEDICAID

## 2019-03-24 ENCOUNTER — INPATIENT (INPATIENT)
Facility: HOSPITAL | Age: 54
LOS: 2 days | Discharge: STILL ADMITTED OR ADMINISTRATIVE | DRG: 563 | End: 2019-03-27
Attending: STUDENT IN AN ORGANIZED HEALTH CARE EDUCATION/TRAINING PROGRAM | Admitting: STUDENT IN AN ORGANIZED HEALTH CARE EDUCATION/TRAINING PROGRAM
Payer: MEDICAID

## 2019-03-24 VITALS
OXYGEN SATURATION: 96 % | HEART RATE: 128 BPM | RESPIRATION RATE: 20 BRPM | DIASTOLIC BLOOD PRESSURE: 114 MMHG | SYSTOLIC BLOOD PRESSURE: 154 MMHG | TEMPERATURE: 99 F

## 2019-03-24 DIAGNOSIS — Z87.81 PERSONAL HISTORY OF (HEALED) TRAUMATIC FRACTURE: Chronic | ICD-10-CM

## 2019-03-24 DIAGNOSIS — S32.9XXA FRACTURE OF UNSPECIFIED PARTS OF LUMBOSACRAL SPINE AND PELVIS, INITIAL ENCOUNTER FOR CLOSED FRACTURE: ICD-10-CM

## 2019-03-24 LAB
ALBUMIN SERPL ELPH-MCNC: 4.2 G/DL — SIGNIFICANT CHANGE UP (ref 3.3–5.2)
ALP SERPL-CCNC: 83 U/L — SIGNIFICANT CHANGE UP (ref 40–120)
ALT FLD-CCNC: 24 U/L — SIGNIFICANT CHANGE UP
ANION GAP SERPL CALC-SCNC: 19 MMOL/L — HIGH (ref 5–17)
APPEARANCE UR: CLEAR — SIGNIFICANT CHANGE UP
APTT BLD: 28.5 SEC — SIGNIFICANT CHANGE UP (ref 27.5–36.3)
AST SERPL-CCNC: 57 U/L — HIGH
BACTERIA # UR AUTO: NEGATIVE — SIGNIFICANT CHANGE UP
BASOPHILS # BLD AUTO: 0.1 K/UL — SIGNIFICANT CHANGE UP (ref 0–0.2)
BASOPHILS NFR BLD AUTO: 1.5 % — SIGNIFICANT CHANGE UP (ref 0–2)
BILIRUB SERPL-MCNC: 0.5 MG/DL — SIGNIFICANT CHANGE UP (ref 0.4–2)
BILIRUB UR-MCNC: NEGATIVE — SIGNIFICANT CHANGE UP
BLD GP AB SCN SERPL QL: SIGNIFICANT CHANGE UP
BUN SERPL-MCNC: 9 MG/DL — SIGNIFICANT CHANGE UP (ref 8–20)
CALCIUM SERPL-MCNC: 9.3 MG/DL — SIGNIFICANT CHANGE UP (ref 8.6–10.2)
CHLORIDE SERPL-SCNC: 103 MMOL/L — SIGNIFICANT CHANGE UP (ref 98–107)
CO2 SERPL-SCNC: 19 MMOL/L — LOW (ref 22–29)
COLOR SPEC: YELLOW — SIGNIFICANT CHANGE UP
CREAT SERPL-MCNC: 0.77 MG/DL — SIGNIFICANT CHANGE UP (ref 0.5–1.3)
DIFF PNL FLD: NEGATIVE — SIGNIFICANT CHANGE UP
EOSINOPHIL # BLD AUTO: 0.1 K/UL — SIGNIFICANT CHANGE UP (ref 0–0.5)
EOSINOPHIL NFR BLD AUTO: 1.1 % — SIGNIFICANT CHANGE UP (ref 0–5)
EPI CELLS # UR: SIGNIFICANT CHANGE UP
GLUCOSE SERPL-MCNC: 80 MG/DL — SIGNIFICANT CHANGE UP (ref 70–115)
GLUCOSE UR QL: NEGATIVE MG/DL — SIGNIFICANT CHANGE UP
HCT VFR BLD CALC: 38.6 % — LOW (ref 42–52)
HGB BLD-MCNC: 13 G/DL — LOW (ref 14–18)
INR BLD: 0.99 RATIO — SIGNIFICANT CHANGE UP (ref 0.88–1.16)
KETONES UR-MCNC: ABNORMAL
LEUKOCYTE ESTERASE UR-ACNC: NEGATIVE — SIGNIFICANT CHANGE UP
LYMPHOCYTES # BLD AUTO: 2 K/UL — SIGNIFICANT CHANGE UP (ref 1–4.8)
LYMPHOCYTES # BLD AUTO: 24 % — SIGNIFICANT CHANGE UP (ref 20–55)
MCHC RBC-ENTMCNC: 25.7 PG — LOW (ref 27–31)
MCHC RBC-ENTMCNC: 33.7 G/DL — SIGNIFICANT CHANGE UP (ref 32–36)
MCV RBC AUTO: 76.4 FL — LOW (ref 80–94)
MONOCYTES # BLD AUTO: 0.8 K/UL — SIGNIFICANT CHANGE UP (ref 0–0.8)
MONOCYTES NFR BLD AUTO: 9.5 % — SIGNIFICANT CHANGE UP (ref 3–10)
NEUTROPHILS # BLD AUTO: 5.4 K/UL — SIGNIFICANT CHANGE UP (ref 1.8–8)
NEUTROPHILS NFR BLD AUTO: 63.8 % — SIGNIFICANT CHANGE UP (ref 37–73)
NITRITE UR-MCNC: NEGATIVE — SIGNIFICANT CHANGE UP
PCP SPEC-MCNC: SIGNIFICANT CHANGE UP
PH UR: 6 — SIGNIFICANT CHANGE UP (ref 5–8)
PLATELET # BLD AUTO: 293 K/UL — SIGNIFICANT CHANGE UP (ref 150–400)
POTASSIUM SERPL-MCNC: 4.8 MMOL/L — SIGNIFICANT CHANGE UP (ref 3.5–5.3)
POTASSIUM SERPL-SCNC: 4.8 MMOL/L — SIGNIFICANT CHANGE UP (ref 3.5–5.3)
PROT SERPL-MCNC: 7.9 G/DL — SIGNIFICANT CHANGE UP (ref 6.6–8.7)
PROT UR-MCNC: 30 MG/DL
PROTHROM AB SERPL-ACNC: 11.4 SEC — SIGNIFICANT CHANGE UP (ref 10–12.9)
RBC # BLD: 5.05 M/UL — SIGNIFICANT CHANGE UP (ref 4.6–6.2)
RBC # FLD: 16.6 % — HIGH (ref 11–15.6)
RBC CASTS # UR COMP ASSIST: SIGNIFICANT CHANGE UP /HPF (ref 0–4)
SODIUM SERPL-SCNC: 141 MMOL/L — SIGNIFICANT CHANGE UP (ref 135–145)
SP GR SPEC: 1.01 — SIGNIFICANT CHANGE UP (ref 1.01–1.02)
TYPE + AB SCN PNL BLD: SIGNIFICANT CHANGE UP
UROBILINOGEN FLD QL: NEGATIVE MG/DL — SIGNIFICANT CHANGE UP
WBC # BLD: 8.5 K/UL — SIGNIFICANT CHANGE UP (ref 4.8–10.8)
WBC # FLD AUTO: 8.5 K/UL — SIGNIFICANT CHANGE UP (ref 4.8–10.8)
WBC UR QL: SIGNIFICANT CHANGE UP

## 2019-03-24 PROCEDURE — 71045 X-RAY EXAM CHEST 1 VIEW: CPT | Mod: 26

## 2019-03-24 PROCEDURE — 93010 ELECTROCARDIOGRAM REPORT: CPT

## 2019-03-24 PROCEDURE — 99291 CRITICAL CARE FIRST HOUR: CPT

## 2019-03-24 PROCEDURE — 73130 X-RAY EXAM OF HAND: CPT | Mod: 26,RT

## 2019-03-24 PROCEDURE — 99221 1ST HOSP IP/OBS SF/LOW 40: CPT

## 2019-03-24 PROCEDURE — 24670 CLTX ULNAR FX PROX W/O MNPJ: CPT | Mod: RT

## 2019-03-24 PROCEDURE — 73521 X-RAY EXAM HIPS BI 2 VIEWS: CPT | Mod: 26

## 2019-03-24 PROCEDURE — 73070 X-RAY EXAM OF ELBOW: CPT | Mod: 26,RT

## 2019-03-24 PROCEDURE — 72192 CT PELVIS W/O DYE: CPT | Mod: 26

## 2019-03-24 PROCEDURE — 76377 3D RENDER W/INTRP POSTPROCES: CPT | Mod: 26

## 2019-03-24 PROCEDURE — 73110 X-RAY EXAM OF WRIST: CPT | Mod: 26,RT

## 2019-03-24 PROCEDURE — 27197 CLSD TX PELVIC RING FX: CPT | Mod: LT

## 2019-03-24 RX ORDER — SODIUM CHLORIDE 9 MG/ML
3 INJECTION INTRAMUSCULAR; INTRAVENOUS; SUBCUTANEOUS ONCE
Qty: 0 | Refills: 0 | Status: COMPLETED | OUTPATIENT
Start: 2019-03-24 | End: 2019-03-24

## 2019-03-24 RX ORDER — OXYCODONE HYDROCHLORIDE 5 MG/1
5 TABLET ORAL EVERY 4 HOURS
Qty: 0 | Refills: 0 | Status: DISCONTINUED | OUTPATIENT
Start: 2019-03-24 | End: 2019-03-27

## 2019-03-24 RX ORDER — SODIUM CHLORIDE 9 MG/ML
1000 INJECTION INTRAMUSCULAR; INTRAVENOUS; SUBCUTANEOUS ONCE
Qty: 0 | Refills: 0 | Status: COMPLETED | OUTPATIENT
Start: 2019-03-24 | End: 2019-03-24

## 2019-03-24 RX ORDER — ONDANSETRON 8 MG/1
8 TABLET, FILM COATED ORAL
Qty: 0 | Refills: 0 | Status: DISCONTINUED | OUTPATIENT
Start: 2019-03-24 | End: 2019-03-27

## 2019-03-24 RX ORDER — MORPHINE SULFATE 50 MG/1
4 CAPSULE, EXTENDED RELEASE ORAL ONCE
Qty: 0 | Refills: 0 | Status: DISCONTINUED | OUTPATIENT
Start: 2019-03-24 | End: 2019-03-24

## 2019-03-24 RX ORDER — SODIUM CHLORIDE 9 MG/ML
1000 INJECTION, SOLUTION INTRAVENOUS
Qty: 0 | Refills: 0 | Status: DISCONTINUED | OUTPATIENT
Start: 2019-03-24 | End: 2019-03-25

## 2019-03-24 RX ORDER — THIAMINE MONONITRATE (VIT B1) 100 MG
100 TABLET ORAL ONCE
Qty: 0 | Refills: 0 | Status: COMPLETED | OUTPATIENT
Start: 2019-03-24 | End: 2019-03-24

## 2019-03-24 RX ORDER — AMLODIPINE BESYLATE 2.5 MG/1
5 TABLET ORAL ONCE
Qty: 0 | Refills: 0 | Status: COMPLETED | OUTPATIENT
Start: 2019-03-24 | End: 2019-03-24

## 2019-03-24 RX ORDER — OXYCODONE HYDROCHLORIDE 5 MG/1
10 TABLET ORAL EVERY 4 HOURS
Qty: 0 | Refills: 0 | Status: DISCONTINUED | OUTPATIENT
Start: 2019-03-24 | End: 2019-03-27

## 2019-03-24 RX ORDER — THIAMINE MONONITRATE (VIT B1) 100 MG
100 TABLET ORAL DAILY
Qty: 0 | Refills: 0 | Status: COMPLETED | OUTPATIENT
Start: 2019-03-24 | End: 2019-03-27

## 2019-03-24 RX ORDER — FOLIC ACID 0.8 MG
1 TABLET ORAL DAILY
Qty: 0 | Refills: 0 | Status: DISCONTINUED | OUTPATIENT
Start: 2019-03-24 | End: 2019-03-27

## 2019-03-24 RX ORDER — ACETAMINOPHEN 500 MG
650 TABLET ORAL EVERY 6 HOURS
Qty: 0 | Refills: 0 | Status: DISCONTINUED | OUTPATIENT
Start: 2019-03-24 | End: 2019-03-27

## 2019-03-24 RX ORDER — DOCUSATE SODIUM 100 MG
100 CAPSULE ORAL THREE TIMES A DAY
Qty: 0 | Refills: 0 | Status: DISCONTINUED | OUTPATIENT
Start: 2019-03-24 | End: 2019-03-27

## 2019-03-24 RX ORDER — ENOXAPARIN SODIUM 100 MG/ML
30 INJECTION SUBCUTANEOUS EVERY 12 HOURS
Qty: 0 | Refills: 0 | Status: DISCONTINUED | OUTPATIENT
Start: 2019-03-24 | End: 2019-03-27

## 2019-03-24 RX ORDER — SENNA PLUS 8.6 MG/1
2 TABLET ORAL AT BEDTIME
Qty: 0 | Refills: 0 | Status: DISCONTINUED | OUTPATIENT
Start: 2019-03-24 | End: 2019-03-27

## 2019-03-24 RX ORDER — LABETALOL HCL 100 MG
10 TABLET ORAL ONCE
Qty: 0 | Refills: 0 | Status: COMPLETED | OUTPATIENT
Start: 2019-03-24 | End: 2019-03-24

## 2019-03-24 RX ADMIN — Medication 50 MILLIGRAM(S): at 14:06

## 2019-03-24 RX ADMIN — SODIUM CHLORIDE 125 MILLILITER(S): 9 INJECTION, SOLUTION INTRAVENOUS at 20:59

## 2019-03-24 RX ADMIN — SODIUM CHLORIDE 1000 MILLILITER(S): 9 INJECTION INTRAMUSCULAR; INTRAVENOUS; SUBCUTANEOUS at 12:36

## 2019-03-24 RX ADMIN — Medication 2 MILLIGRAM(S): at 15:06

## 2019-03-24 RX ADMIN — SODIUM CHLORIDE 1000 MILLILITER(S): 9 INJECTION INTRAMUSCULAR; INTRAVENOUS; SUBCUTANEOUS at 13:36

## 2019-03-24 RX ADMIN — Medication 100 MILLIGRAM(S): at 22:46

## 2019-03-24 RX ADMIN — Medication 1 TABLET(S): at 20:58

## 2019-03-24 RX ADMIN — Medication 50 MILLIGRAM(S): at 20:58

## 2019-03-24 RX ADMIN — SODIUM CHLORIDE 1000 MILLILITER(S): 9 INJECTION INTRAMUSCULAR; INTRAVENOUS; SUBCUTANEOUS at 15:06

## 2019-03-24 RX ADMIN — SODIUM CHLORIDE 3 MILLILITER(S): 9 INJECTION INTRAMUSCULAR; INTRAVENOUS; SUBCUTANEOUS at 12:37

## 2019-03-24 RX ADMIN — MORPHINE SULFATE 4 MILLIGRAM(S): 50 CAPSULE, EXTENDED RELEASE ORAL at 13:09

## 2019-03-24 RX ADMIN — Medication 2 MILLIGRAM(S): at 14:06

## 2019-03-24 RX ADMIN — MORPHINE SULFATE 4 MILLIGRAM(S): 50 CAPSULE, EXTENDED RELEASE ORAL at 12:37

## 2019-03-24 RX ADMIN — Medication 1 MILLIGRAM(S): at 20:58

## 2019-03-24 RX ADMIN — AMLODIPINE BESYLATE 5 MILLIGRAM(S): 2.5 TABLET ORAL at 16:39

## 2019-03-24 RX ADMIN — Medication 100 MILLIGRAM(S): at 15:06

## 2019-03-24 RX ADMIN — Medication 10 MILLIGRAM(S): at 16:40

## 2019-03-24 NOTE — ED PROVIDER NOTE - CLINICAL SUMMARY MEDICAL DECISION MAKING FREE TEXT BOX
The patient presents with a fall and has left sided hip and pelvic pain and also has right elbow pain with CT and x-ray showing right olecrenon fx and pelvic fracture and Ortho seen patient and trauma called and will be admitted for further management

## 2019-03-24 NOTE — H&P ADULT - HISTORY OF PRESENT ILLNESS
53y Male BIB BLS s/p fall from standing. Stated he fell tripping due to R hip just being repaired walking with a cane. Denies HS or LOC. Denies presyncopal episode. Complaining of R elbow and L hip pain. Drinks etoh daily, had withdrawal tremors but deniese seizures.    A airway intact, speaking full sentences  B equal breath sounds bilaterally  C radial/DP/femoral pulses intact bilaterally   D GCS15 [id number], moving all fours, no focal deficits, pupils R 3mm reactive/L 3mm reactive  E patient fully exposed, no gross deformities or bleeding, provided warm blankets    ROS:   General: denies fatigue, unintended weight loss or night sweats  Neuro: denies focal weakness, numbness or tingling  CV: denies chest pain, chest pressure or palpitations  Resp: denies shortness of breath, pleuritic pain, cough, or wheezing  GI: denies changes in bowel movement, melena or BRBPR  : denies dysuria, hematuria, urinary frequency or urinary urgency  MSK: denies myalgias    PAST MEDICAL & SURGICAL HISTORY:  Poor historian  Alcohol abuse  Hypertension, unspecified type  H/O fracture of right hip    FAMILY HISTORY:  No pertinent family history in first degree relatives      SOCIAL HISTORY:  1/2 pint liquor + beers per day    ALLERGIES: penicillins (Unknown)  shellfish (Unknown)    HOME MEDICATIONS: denies    --------------------------------------------------------------------------------------------    PHYSICAL EXAM:   General: NAD, Lying in bed comfortably  Neuro: A+Ox3  HEENT: NC/AT, EOMI  Neck: Soft, supple  Cardio: RRR, nml S1/S2  Resp: Good effort, CTA b/l  Thorax: No chest wall tenderness  GI/Abd: Soft, NT/ND, no rebound/guarding, no masses palpated  Vascular: All 4 extremities warm.  Skin: Intact, no breakdown. R knee ecchymosis  Lymphatic/Nodes: No palpable lymphadenopathy  Pelvis: stable, L hip ttp  Musculoskeletal: All 4 extremities moving spontaneously, only limitation is +R elbow ROM and decreased R hip flexion, no spinal tenderness or stepoffs  --------------------------------------------------------------------------------------------    LABS                 13.0   8.5    )----------(  293       ( 24 Mar 2019 12:49 )               38.6      141    |  103    |  9.0    ----------------------------<  80         ( 24 Mar 2019 12:49 )  4.8     |  19.0   |  0.77     Ca    9.3        ( 24 Mar 2019 12:49 )    TPro  7.9    /  Alb  4.2    /  TBili  0.5    /  DBili  x      /  AST  57     /  ALT  24     /  AlkPhos  83     ( 24 Mar 2019 12:49 )    LIVER FUNCTIONS - ( 24 Mar 2019 12:49 )  Alb: 4.2 g/dL / Pro: 7.9 g/dL / ALK PHOS: 83 U/L / ALT: 24 U/L / AST: 57 U/L / GGT: x           PT/INR -  11.4 sec / 0.99 ratio   ( 24 Mar 2019 12:49 )       PTT -  28.5 sec   ( 24 Mar 2019 12:49 )    --------------------------------------------------------------------------------------------  IMAGING  CXR: neg  P/hipXR: neg  CT pelvis: Acute left parasymphyseal superior pubic ramus fracture.  No acute fracture of the proximal left femur noted.  If there is a clinical suspicion for occult left hip fracture, recommend   further evaluation with MRI if there are no clinical contraindications.      ASSESSMENT: Patient is a 53y old male hx etoh abuse/withdrawal and R femoral head fx s/p repair, s/p fall from standing with L pubic ramus fx and L olecranon fx. High risk for withdrawal.    PLAN:    - admit to Trauma surgery floor with monitor and   - f/u ortho  - f/u NSx  - NWB RUE, WBAT LLE  - CIWA  - reg diet  - pain control  - DVT ppx  - PT for OOB/ambulate/OT/PMR  - strict I/Os  - Patient seen/examined with attending.  - Plan discussed with Attending, Dr. Frazier 53y Male BIB BLS s/p fall from standing. Stated he fell tripping due to R hip just being repaired walking with a cane. Denies HS or LOC. Denies presyncopal episode. Complaining of R elbow and L hip pain. Drinks etoh daily, had withdrawal tremors but deniese seizures.    A airway intact, speaking full sentences  B equal breath sounds bilaterally  C radial/DP/femoral pulses intact bilaterally   D GCS15 E4V5M6, moving all fours, no focal deficits, pupils R 3mm reactive/L 3mm reactive  E patient fully exposed, no gross deformities or bleeding, provided warm blankets    ROS:   General: denies fatigue, unintended weight loss or night sweats  Neuro: denies focal weakness, numbness or tingling  CV: denies chest pain, chest pressure or palpitations  Resp: denies shortness of breath, pleuritic pain, cough, or wheezing  GI: denies changes in bowel movement, melena or BRBPR  : denies dysuria, hematuria, urinary frequency or urinary urgency  MSK: denies myalgias    PAST MEDICAL & SURGICAL HISTORY:  Poor historian  Alcohol abuse  Hypertension, unspecified type  H/O fracture of right hip    FAMILY HISTORY:  No pertinent family history in first degree relatives      SOCIAL HISTORY:  1/2 pint liquor + beers per day    ALLERGIES: penicillins (Unknown)  shellfish (Unknown)    HOME MEDICATIONS: denies    --------------------------------------------------------------------------------------------    PHYSICAL EXAM:   General: NAD, Lying in bed comfortably  Neuro: A+Ox3  HEENT: NC/AT, EOMI  Neck: Soft, supple  Cardio: RRR, nml S1/S2  Resp: Good effort, CTA b/l  Thorax: No chest wall tenderness  GI/Abd: Soft, NT/ND, no rebound/guarding, no masses palpated  Vascular: All 4 extremities warm.  Skin: Intact, no breakdown. R knee ecchymosis  Lymphatic/Nodes: No palpable lymphadenopathy  Pelvis: stable, L hip ttp  Musculoskeletal: All 4 extremities moving spontaneously, only limitation is +R elbow ROM and decreased R hip flexion, no spinal tenderness or stepoffs  --------------------------------------------------------------------------------------------    LABS                 13.0   8.5    )----------(  293       ( 24 Mar 2019 12:49 )               38.6      141    |  103    |  9.0    ----------------------------<  80         ( 24 Mar 2019 12:49 )  4.8     |  19.0   |  0.77     Ca    9.3        ( 24 Mar 2019 12:49 )    TPro  7.9    /  Alb  4.2    /  TBili  0.5    /  DBili  x      /  AST  57     /  ALT  24     /  AlkPhos  83     ( 24 Mar 2019 12:49 )    LIVER FUNCTIONS - ( 24 Mar 2019 12:49 )  Alb: 4.2 g/dL / Pro: 7.9 g/dL / ALK PHOS: 83 U/L / ALT: 24 U/L / AST: 57 U/L / GGT: x           PT/INR -  11.4 sec / 0.99 ratio   ( 24 Mar 2019 12:49 )       PTT -  28.5 sec   ( 24 Mar 2019 12:49 )    --------------------------------------------------------------------------------------------  IMAGING  CXR: neg  P/hipXR: neg  CT pelvis: Acute left parasymphyseal superior pubic ramus fracture.  No acute fracture of the proximal left femur noted.  If there is a clinical suspicion for occult left hip fracture, recommend   further evaluation with MRI if there are no clinical contraindications.      ASSESSMENT: Patient is a 53y old male hx etoh abuse/withdrawal and R femoral head fx s/p repair, s/p fall from standing with L pubic ramus fx and L olecranon fx. High risk for withdrawal.    PLAN:    - admit to Trauma surgery floor with monitor and   - f/u ortho  - NWB RUE, WBAT LLE  - CIWA  - reg diet  - pain control  - DVT ppx  - PT for OOB/ambulate/OT/PMR  - strict I/Os  - Patient seen/examined with attending.  - Plan discussed with Attending, Dr. Frazier

## 2019-03-24 NOTE — ED ADULT TRIAGE NOTE - CHIEF COMPLAINT QUOTE
Pt BIBA from home for mechanical trip and fall, c/o left hip and knee pain and right elbow pain. Unable to bare weight on left leg, 10/10 pain. Hx Right Broken Hip and right broken patella. Denies LOC or hitting head or blood thinners.

## 2019-03-24 NOTE — ED ADULT NURSE NOTE - OBJECTIVE STATEMENT
recd pt  A/Ox3, pt states trip and fall yesterday, landed on concrete, c/o left hip and right elbow, states unable to unable, denies hitting head, denies LOC, denies CP/SOB, states has hairline fracture on right knee and states had right hip sx in december, states frequent falls, appears malnourished with foul odor. Respirations are even and unlabored, lungs cta, +bowel x4 quads, abdomen soft, nontender/nondistended, no guarding, rebound or rigidity noted, skin w/d/i.

## 2019-03-24 NOTE — ED ADULT NURSE REASSESSMENT NOTE - NS ED NURSE REASSESS COMMENT FT1
pt resting comfortably, in no acute distress. pt hemodynamically stable, Respirations are even and unlabored. pt voices no complaints at this time. will CTM

## 2019-03-24 NOTE — ED PROVIDER NOTE - SKIN, MLM
Wheelchair/Stroller/Ambulatory
Skin normal color for race, warm, dry and intact. No evidence of rash.

## 2019-03-24 NOTE — ED ADULT NURSE NOTE - NSIMPLEMENTINTERV_GEN_ALL_ED
Implemented All Fall Risk Interventions:  [place] to call system. Call bell, personal items and telephone within reach. Instruct patient to call for assistance. Room bathroom lighting operational. Non-slip footwear when patient is off stretcher. Physically safe environment: no spills, clutter or unnecessary equipment. Stretcher in lowest position, wheels locked, appropriate side rails in place. Provide visual cue, wrist band, yellow gown, etc. Monitor gait and stability. Monitor for mental status changes and reorient to person, place, and time. Review medications for side effects contributing to fall risk. Reinforce activity limits and safety measures with patient and family.

## 2019-03-24 NOTE — ED PROVIDER NOTE - CRITICAL CARE PROVIDED
documentation/interpretation of diagnostic studies/additional history taking/consultation with other physicians/direct patient care (not related to procedure)

## 2019-03-24 NOTE — ED PROVIDER NOTE - OBJECTIVE STATEMENT
The patient is a 53 year old male presents with tripped and fall complaining of L hip and right wrist pain. No LOC, No HA, No Neck pain, No abd pain, No CP, No motor No sensory loss

## 2019-03-24 NOTE — ED PROVIDER NOTE - CHPI ED SYMPTOMS NEG
no loss of consciousness/no numbness/no abrasion/no fever/no tingling/no weakness/no bleeding/no vomiting

## 2019-03-24 NOTE — ED PROVIDER NOTE - CARE PLAN
Principal Discharge DX:	Pelvic fracture  Secondary Diagnosis:	Alcohol abuse  Secondary Diagnosis:	Hypertension, unspecified type  Secondary Diagnosis:	Elbow fracture, right

## 2019-03-24 NOTE — ED ADULT NURSE REASSESSMENT NOTE - NS ED NURSE REASSESS COMMENT FT1
MD Otoole made aware of persistent HTN. as Per Dr. Otoole highly suspects HTN secondary to ETOH withdraw and pain, MD Otoole will come revual pt. pending orders.

## 2019-03-24 NOTE — ED ADULT NURSE REASSESSMENT NOTE - NS ED NURSE REASSESS COMMENT FT1
pt resting comfortably, in no acute distress. Respirations are even and unlabored. pt voices no complaints at this time. pt updated and aware of plan of care. will cont to monitor. pt hemodynamically stable.

## 2019-03-24 NOTE — CONSULT NOTE ADULT - SUBJECTIVE AND OBJECTIVE BOX
Pt Name: CATHY REYES    MRN: [id number]      Patient is a 53y Male presenting to the emergency department s/p fall while intoxicated yesterday. Pt is a poor historian and falls asleep while talking. Complains of right elbow pain and left groin pain. No wrist or shoulder pain. No numbness in extremities. No knee or ankle pain. No back pain. No other complains.    HEALTH ISSUES - PROBLEM Dx:      REVIEW OF SYSTEMS  Poor historian    General: No fevers	    Musculoskeletal:	 see hpi    Neurological:	No numbness    ROS is otherwise negative.    PAST MEDICAL & SURGICAL HISTORY:  PAST MEDICAL & SURGICAL HISTORY:  Poor historian  Alcohol abuse  Hypertension, unspecified type  H/O fracture of right hip      Allergies: penicillins (Unknown)  shellfish (Unknown)      Medications:     FAMILY HISTORY:  No pertinent family history in first degree relatives  : non-contributory    Social History:     Ambulation: Walking independently [X] With Cane [ ] With Walker [ ]  Bedbound [ ]                           13.0   8.5   )-----------( 293      ( 24 Mar 2019 12:49 )             38.6     03-24    141  |  103  |  9.0  ----------------------------<  80  4.8   |  19.0<L>  |  0.77    Ca    9.3      24 Mar 2019 12:49    TPro  7.9  /  Alb  4.2  /  TBili  0.5  /  DBili  x   /  AST  57<H>  /  ALT  24  /  AlkPhos  83  03-24      PHYSICAL EXAM:    Vital Signs Last 24 Hrs  T(C): 37 (24 Mar 2019 11:57), Max: 37 (24 Mar 2019 11:57)  T(F): 98.6 (24 Mar 2019 11:57), Max: 98.6 (24 Mar 2019 11:57)  HR: 100 (24 Mar 2019 16:42) (100 - 128)  BP: 197/93 (24 Mar 2019 16:42) (154/114 - 197/93)  BP(mean): --  RR: 18 (24 Mar 2019 16:42) (18 - 20)  SpO2: 98% (24 Mar 2019 16:42) (96% - 98%)  Daily     Daily     Appearance: Arousable, responsive, in no acute distress.    Neurological: Sensation is grossly intact to light touch. 5/5 motor function of all extremities. No focal deficits or weaknesses found.    Skin: no rash on visible skin. Skin is clean, dry and intact. No bleeding. No abrasions. No ulcerations.    Vascular: 2+ distal pulses. Cap refill < 2 sec. No signs of venous insuffiency or stasis. No extremity ulcerations. No cyanosis.    Musculoskeletal:         Left Upper Extremity: No deformity. NTTP. Motor/sens intact. NVI       Right Upper Extremity: No deformity. Skin intact. +TTP olecranon. Wrist old deformity at ulna. NTTP. +ROM shoulder/elbow/wrist/fingers. No pain with supination/pronation. Radial pulse 2+. Sensation intact. Brisk cap refill. IV in forearm.       Left Lower Extremity: Pelvis stable. +groin pain with SLR. +ehl/fhl/df/pf. Knee NTTP, no effusion. No pain with PROM hip. DP 2+. Sensation intact.       Right Lower Extremity: No deformity. NTTP. +ROM hip/knee/ankle. Able to SLR.    Imaging Studies:  < from: Xray Elbow AP + Lateral, Right (03.24.19 @ 13:27) >  INTERPRETATION:  History: Pain after fall.    Findings:     Frontal, lateral and oblique projections of the right elbow:  Frontal, lateral, oblique and scaphoid projections of the right wrist:  Frontal, lateral and oblique projections of the right hand:    Posterior fat pad seen on the lateral projection of the elbow in   extension compatible with joint effusion. Subtle lucency in the   olecranon. Apparent adjacent soft tissue swelling.    No other definite fractures or dislocation.     Vascular calcifications noted    Impression:    Elbow joint effusion. Nondisplaced fracture in the olecranon.    < end of copied text >    < from: CT Pelvis Bony Only No Cont (03.24.19 @ 17:50) >  Impression:    Acute left parasymphyseal superior pubic ramus fracture.  No acute fracture of the proximal left femur noted.  If there is a clinical suspicion for occult left hip fracture, recommend   further evaluation with MRI if there are no clinical contraindications.    Internal fixation right intertrochanteric fracture as discussed.  Recommend further clinical correlation and comparison with prior   postoperative imaging.    < end of copied text >    A/P:  Pt is a  53y Male with right nondisplaced olecranon fx and left superior rami fx    PLAN:   -Sling applied to RUE  -PT  -NWB RUE  -WBAT LLE  -Pain control  -med/trauma management  D/W Dr. Alvares Pt Name: CATHY REYES    MRN: [id number]      Patient is a 53y Male presenting to the emergency department s/p fall while intoxicated yesterday. Pt is a poor historian and falls asleep while talking. Complains of right elbow pain and left groin pain. No wrist or shoulder pain. No numbness in extremities. No knee or ankle pain. No back pain. No other complains.    HEALTH ISSUES - PROBLEM Dx:      REVIEW OF SYSTEMS  Poor historian    General: No fevers	    Musculoskeletal:	 see hpi    Neurological:	No numbness    ROS is otherwise negative.    PAST MEDICAL & SURGICAL HISTORY:  PAST MEDICAL & SURGICAL HISTORY:  Poor historian  Alcohol abuse  Hypertension, unspecified type  H/O fracture of right hip      Allergies: penicillins (Unknown)  shellfish (Unknown)      Medications:     FAMILY HISTORY:  No pertinent family history in first degree relatives  : non-contributory    Social History:     Ambulation: Walking independently [X] With Cane [ ] With Walker [ ]  Bedbound [ ]                           13.0   8.5   )-----------( 293      ( 24 Mar 2019 12:49 )             38.6     03-24    141  |  103  |  9.0  ----------------------------<  80  4.8   |  19.0<L>  |  0.77    Ca    9.3      24 Mar 2019 12:49    TPro  7.9  /  Alb  4.2  /  TBili  0.5  /  DBili  x   /  AST  57<H>  /  ALT  24  /  AlkPhos  83  03-24      PHYSICAL EXAM:    Vital Signs Last 24 Hrs  T(C): 37 (24 Mar 2019 11:57), Max: 37 (24 Mar 2019 11:57)  T(F): 98.6 (24 Mar 2019 11:57), Max: 98.6 (24 Mar 2019 11:57)  HR: 100 (24 Mar 2019 16:42) (100 - 128)  BP: 197/93 (24 Mar 2019 16:42) (154/114 - 197/93)  BP(mean): --  RR: 18 (24 Mar 2019 16:42) (18 - 20)  SpO2: 98% (24 Mar 2019 16:42) (96% - 98%)  Daily     Daily     Appearance: Arousable, responsive, in no acute distress.    Neurological: Sensation is grossly intact to light touch. 5/5 motor function of all extremities. No focal deficits or weaknesses found.    Skin: no rash on visible skin. Skin is clean, dry and intact. No bleeding. No abrasions. No ulcerations.    Vascular: 2+ distal pulses. Cap refill < 2 sec. No signs of venous insuffiency or stasis. No extremity ulcerations. No cyanosis.    Musculoskeletal:         Left Upper Extremity: No deformity. NTTP. Motor/sens intact. NVI       Right Upper Extremity: No deformity. Skin intact. +TTP olecranon. Wrist old deformity at ulna. NTTP. +ROM shoulder/elbow/wrist/fingers. No pain with supination/pronation. Radial pulse 2+. Sensation intact. Brisk cap refill. IV in forearm.       Left Lower Extremity: Pelvis stable. +groin pain with SLR. +ehl/fhl/df/pf. Knee NTTP, no effusion. No pain with PROM hip. DP 2+. Sensation intact.       Right Lower Extremity: No deformity. NTTP. +ROM hip/knee/ankle. Able to SLR.    Imaging Studies:  < from: Xray Elbow AP + Lateral, Right (03.24.19 @ 13:27) >  INTERPRETATION:  History: Pain after fall.    Findings:     Frontal, lateral and oblique projections of the right elbow:  Frontal, lateral, oblique and scaphoid projections of the right wrist:  Frontal, lateral and oblique projections of the right hand:    Posterior fat pad seen on the lateral projection of the elbow in   extension compatible with joint effusion. Subtle lucency in the   olecranon. Apparent adjacent soft tissue swelling.    No other definite fractures or dislocation.     Vascular calcifications noted    Impression:    Elbow joint effusion. Nondisplaced fracture in the olecranon.    < end of copied text >    < from: CT Pelvis Bony Only No Cont (03.24.19 @ 17:50) >  Impression:    Acute left parasymphyseal superior pubic ramus fracture.  No acute fracture of the proximal left femur noted.  If there is a clinical suspicion for occult left hip fracture, recommend   further evaluation with MRI if there are no clinical contraindications.    Internal fixation right intertrochanteric fracture as discussed.  Recommend further clinical correlation and comparison with prior   postoperative imaging.    < end of copied text >    A/P:  Pt is a  53y Male with right nondisplaced olecranon fx and left superior rami fx    PLAN:   -Sling applied to RUE for comfort  -PT  -NWB RUE  -WBAT LLE  -Pain control  -med/trauma management  D/W Dr. Alvares,

## 2019-03-25 LAB
ALBUMIN SERPL ELPH-MCNC: 3.8 G/DL — SIGNIFICANT CHANGE UP (ref 3.3–5.2)
ALP SERPL-CCNC: 73 U/L — SIGNIFICANT CHANGE UP (ref 40–120)
ALT FLD-CCNC: 18 U/L — SIGNIFICANT CHANGE UP
ANION GAP SERPL CALC-SCNC: 14 MMOL/L — SIGNIFICANT CHANGE UP (ref 5–17)
AST SERPL-CCNC: 34 U/L — SIGNIFICANT CHANGE UP
BASOPHILS # BLD AUTO: 0.1 K/UL — SIGNIFICANT CHANGE UP (ref 0–0.2)
BASOPHILS NFR BLD AUTO: 1.4 % — SIGNIFICANT CHANGE UP (ref 0–2)
BILIRUB DIRECT SERPL-MCNC: 0.2 MG/DL — SIGNIFICANT CHANGE UP (ref 0–0.3)
BILIRUB INDIRECT FLD-MCNC: 0.4 MG/DL — SIGNIFICANT CHANGE UP (ref 0.2–1)
BILIRUB SERPL-MCNC: 0.6 MG/DL — SIGNIFICANT CHANGE UP (ref 0.4–2)
BUN SERPL-MCNC: 6 MG/DL — LOW (ref 8–20)
CALCIUM SERPL-MCNC: 8.6 MG/DL — SIGNIFICANT CHANGE UP (ref 8.6–10.2)
CHLORIDE SERPL-SCNC: 99 MMOL/L — SIGNIFICANT CHANGE UP (ref 98–107)
CO2 SERPL-SCNC: 24 MMOL/L — SIGNIFICANT CHANGE UP (ref 22–29)
CREAT SERPL-MCNC: 0.77 MG/DL — SIGNIFICANT CHANGE UP (ref 0.5–1.3)
EOSINOPHIL # BLD AUTO: 0.2 K/UL — SIGNIFICANT CHANGE UP (ref 0–0.5)
EOSINOPHIL NFR BLD AUTO: 4.1 % — SIGNIFICANT CHANGE UP (ref 0–5)
GLUCOSE SERPL-MCNC: 115 MG/DL — SIGNIFICANT CHANGE UP (ref 70–115)
HCT VFR BLD CALC: 34.5 % — LOW (ref 42–52)
HGB BLD-MCNC: 11.6 G/DL — LOW (ref 14–18)
LYMPHOCYTES # BLD AUTO: 1.1 K/UL — SIGNIFICANT CHANGE UP (ref 1–4.8)
LYMPHOCYTES # BLD AUTO: 21.9 % — SIGNIFICANT CHANGE UP (ref 20–55)
MAGNESIUM SERPL-MCNC: 1.5 MG/DL — LOW (ref 1.6–2.6)
MCHC RBC-ENTMCNC: 25.6 PG — LOW (ref 27–31)
MCHC RBC-ENTMCNC: 33.6 G/DL — SIGNIFICANT CHANGE UP (ref 32–36)
MCV RBC AUTO: 76.2 FL — LOW (ref 80–94)
MONOCYTES # BLD AUTO: 0.5 K/UL — SIGNIFICANT CHANGE UP (ref 0–0.8)
MONOCYTES NFR BLD AUTO: 10.7 % — HIGH (ref 3–10)
NEUTROPHILS # BLD AUTO: 3.1 K/UL — SIGNIFICANT CHANGE UP (ref 1.8–8)
NEUTROPHILS NFR BLD AUTO: 61.7 % — SIGNIFICANT CHANGE UP (ref 37–73)
PHOSPHATE SERPL-MCNC: 3.3 MG/DL — SIGNIFICANT CHANGE UP (ref 2.4–4.7)
PLATELET # BLD AUTO: 224 K/UL — SIGNIFICANT CHANGE UP (ref 150–400)
POTASSIUM SERPL-MCNC: 3.3 MMOL/L — LOW (ref 3.5–5.3)
POTASSIUM SERPL-SCNC: 3.3 MMOL/L — LOW (ref 3.5–5.3)
PROT SERPL-MCNC: 6.8 G/DL — SIGNIFICANT CHANGE UP (ref 6.6–8.7)
RBC # BLD: 4.53 M/UL — LOW (ref 4.6–6.2)
RBC # FLD: 16.5 % — HIGH (ref 11–15.6)
SODIUM SERPL-SCNC: 137 MMOL/L — SIGNIFICANT CHANGE UP (ref 135–145)
WBC # BLD: 5.1 K/UL — SIGNIFICANT CHANGE UP (ref 4.8–10.8)
WBC # FLD AUTO: 5.1 K/UL — SIGNIFICANT CHANGE UP (ref 4.8–10.8)

## 2019-03-25 PROCEDURE — 99231 SBSQ HOSP IP/OBS SF/LOW 25: CPT

## 2019-03-25 PROCEDURE — 93010 ELECTROCARDIOGRAM REPORT: CPT

## 2019-03-25 PROCEDURE — 99223 1ST HOSP IP/OBS HIGH 75: CPT | Mod: GC

## 2019-03-25 RX ORDER — POTASSIUM CHLORIDE 20 MEQ
40 PACKET (EA) ORAL EVERY 4 HOURS
Qty: 0 | Refills: 0 | Status: COMPLETED | OUTPATIENT
Start: 2019-03-25 | End: 2019-03-25

## 2019-03-25 RX ORDER — POTASSIUM CHLORIDE 20 MEQ
20 PACKET (EA) ORAL
Qty: 0 | Refills: 0 | Status: COMPLETED | OUTPATIENT
Start: 2019-03-25 | End: 2019-03-25

## 2019-03-25 RX ORDER — MAGNESIUM SULFATE 500 MG/ML
2 VIAL (ML) INJECTION ONCE
Qty: 0 | Refills: 0 | Status: COMPLETED | OUTPATIENT
Start: 2019-03-25 | End: 2019-03-25

## 2019-03-25 RX ADMIN — Medication 650 MILLIGRAM(S): at 23:21

## 2019-03-25 RX ADMIN — Medication 650 MILLIGRAM(S): at 06:20

## 2019-03-25 RX ADMIN — Medication 40 MILLIEQUIVALENT(S): at 11:52

## 2019-03-25 RX ADMIN — ONDANSETRON 8 MILLIGRAM(S): 8 TABLET, FILM COATED ORAL at 05:49

## 2019-03-25 RX ADMIN — Medication 50 GRAM(S): at 08:45

## 2019-03-25 RX ADMIN — Medication 1 MILLIGRAM(S): at 11:45

## 2019-03-25 RX ADMIN — Medication 50 GRAM(S): at 11:50

## 2019-03-25 RX ADMIN — Medication 50 MILLIGRAM(S): at 22:43

## 2019-03-25 RX ADMIN — ONDANSETRON 8 MILLIGRAM(S): 8 TABLET, FILM COATED ORAL at 17:42

## 2019-03-25 RX ADMIN — Medication 40 MILLIEQUIVALENT(S): at 08:51

## 2019-03-25 RX ADMIN — Medication 650 MILLIGRAM(S): at 02:28

## 2019-03-25 RX ADMIN — Medication 100 MILLIGRAM(S): at 05:51

## 2019-03-25 RX ADMIN — Medication 50 MILLIGRAM(S): at 01:37

## 2019-03-25 RX ADMIN — Medication 100 MILLIGRAM(S): at 11:50

## 2019-03-25 RX ADMIN — Medication 100 MILLIGRAM(S): at 11:45

## 2019-03-25 RX ADMIN — OXYCODONE HYDROCHLORIDE 10 MILLIGRAM(S): 5 TABLET ORAL at 17:50

## 2019-03-25 RX ADMIN — Medication 100 MILLIGRAM(S): at 22:43

## 2019-03-25 RX ADMIN — Medication 650 MILLIGRAM(S): at 17:42

## 2019-03-25 RX ADMIN — Medication 650 MILLIGRAM(S): at 12:45

## 2019-03-25 RX ADMIN — OXYCODONE HYDROCHLORIDE 10 MILLIGRAM(S): 5 TABLET ORAL at 05:55

## 2019-03-25 RX ADMIN — ENOXAPARIN SODIUM 30 MILLIGRAM(S): 100 INJECTION SUBCUTANEOUS at 05:51

## 2019-03-25 RX ADMIN — Medication 50 MILLIGRAM(S): at 05:51

## 2019-03-25 RX ADMIN — Medication 650 MILLIGRAM(S): at 01:37

## 2019-03-25 RX ADMIN — Medication 1 TABLET(S): at 11:45

## 2019-03-25 RX ADMIN — ENOXAPARIN SODIUM 30 MILLIGRAM(S): 100 INJECTION SUBCUTANEOUS at 17:42

## 2019-03-25 RX ADMIN — Medication 650 MILLIGRAM(S): at 11:45

## 2019-03-25 RX ADMIN — SODIUM CHLORIDE 125 MILLILITER(S): 9 INJECTION, SOLUTION INTRAVENOUS at 05:48

## 2019-03-25 RX ADMIN — Medication 650 MILLIGRAM(S): at 05:51

## 2019-03-25 RX ADMIN — OXYCODONE HYDROCHLORIDE 10 MILLIGRAM(S): 5 TABLET ORAL at 06:30

## 2019-03-25 NOTE — PROGRESS NOTE ADULT - SUBJECTIVE AND OBJECTIVE BOX
INTERVAL HPI/OVERNIGHT EVENTS: Patient admitted overnight.     SUBJECTIVE: No complaints this AM      MEDICATIONS  (STANDING):  acetaminophen   Tablet .. 650 milliGRAM(s) Oral every 6 hours  chlordiazePOXIDE 50 milliGRAM(s) Oral every 8 hours  chlordiazePOXIDE   Oral   docusate sodium 100 milliGRAM(s) Oral three times a day  enoxaparin Injectable 30 milliGRAM(s) SubCutaneous every 12 hours  folic acid 1 milliGRAM(s) Oral daily  multivitamin 1 Tablet(s) Oral daily  ondansetron Injectable 8 milliGRAM(s) IV Push two times a day  potassium chloride    Tablet ER 20 milliEquivalent(s) Oral every 2 hours  thiamine 100 milliGRAM(s) Oral daily    MEDICATIONS  (PRN):  LORazepam   Injectable 2 milliGRAM(s) IV Push every 1 hour PRN Symptom-triggered: each CIWA -Ar score 8 or GREATER  LORazepam   Injectable 2 milliGRAM(s) IV Push every 2 hours PRN CIWA-Ar score increase by 2 points and a total score of 7 or less  oxyCODONE    IR 5 milliGRAM(s) Oral every 4 hours PRN Moderate Pain (4 - 6)  oxyCODONE    IR 10 milliGRAM(s) Oral every 4 hours PRN Severe Pain (7 - 10)  senna 2 Tablet(s) Oral at bedtime PRN Constipation      Vital Signs Last 24 Hrs  T(C): 37.1 (25 Mar 2019 07:27), Max: 37.1 (24 Mar 2019 22:06)  T(F): 98.7 (25 Mar 2019 07:27), Max: 98.8 (24 Mar 2019 22:06)  HR: 87 (25 Mar 2019 07:27) (87 - 112)  BP: 124/85 (25 Mar 2019 07:27) (124/85 - 197/93)  BP(mean): --  RR: 18 (25 Mar 2019 07:27) (17 - 20)  SpO2: 97% (25 Mar 2019 07:27) (96% - 99%)    PHYSICAL EXAM:   General: NAD, Lying in bed comfortably  Neuro: A+Ox3  Neck: Soft, supple  Cardio: RRR, nml S1/S2  Resp: Good effort, CTA b/l  GI/Abd: Soft, NT/ND, no rebound/guarding, no masses palpated  Vascular: All 4 extremities warm.  Pelvis: stable, L hip ttp  Musculoskeletal: All 4 extremities moving spontaneously, only limitation is +R elbow ROM (in sling) and decreased R hip flexion, no spinal tenderness or stepoffs      I&O's Detail    24 Mar 2019 07:01  -  25 Mar 2019 07:00  --------------------------------------------------------  IN:    multiple electrolytes Injection Type 1multiple electrolytes Injection Type 1: 875 mL  Total IN: 875 mL    OUT:    Voided: 800 mL  Total OUT: 800 mL    Total NET: 75 mL          LABS:                        11.6   5.1   )-----------( 224      ( 25 Mar 2019 07:25 )             34.5     03-25    137  |  99  |  6.0<L>  ----------------------------<  115  3.3<L>   |  24.0  |  0.77    Ca    8.6      25 Mar 2019 07:25  Phos  3.3     03-25  Mg     1.5     03-25    TPro  6.8  /  Alb  3.8  /  TBili  0.6  /  DBili  0.2  /  AST  34  /  ALT  18  /  AlkPhos  73  03-25    PT/INR - ( 24 Mar 2019 12:49 )   PT: 11.4 sec;   INR: 0.99 ratio         PTT - ( 24 Mar 2019 12:49 )  PTT:28.5 sec  Urinalysis Basic - ( 24 Mar 2019 23:00 )    Color: Yellow / Appearance: Clear / S.015 / pH: x  Gluc: x / Ketone: Moderate  / Bili: Negative / Urobili: Negative mg/dL   Blood: x / Protein: 30 mg/dL / Nitrite: Negative   Leuk Esterase: Negative / RBC: 0-2 /HPF / WBC 0-2   Sq Epi: x / Non Sq Epi: Occasional / Bacteria: Negative        RADIOLOGY & ADDITIONAL STUDIES:

## 2019-03-25 NOTE — PROGRESS NOTE ADULT - ATTENDING COMMENTS
Appears sober on morning rounds.  Tolerating diet.      No signs of withdrawal.      Continue CIWA and librium taper.  PT/OT, dispo planning.

## 2019-03-25 NOTE — CONSULT NOTE ADULT - ATTENDING COMMENTS
Patient seen and examined and cased discussed with resident. Agree with recommendations.     Will continue to follow. Functional progress will determine rehab dispo recommendations.     Continue bedside therapy as well as OOB throughout the day with mobilization by staff to maintain cardiopulmonary function and prevention of secondary complications related to debility.

## 2019-03-25 NOTE — PROGRESS NOTE ADULT - ASSESSMENT
Patient is a 53y old male hx etoh abuse/withdrawal and R femoral head fx s/p repair, s/p fall from standing with L pubic ramus fx and L olecranon fx. High risk for withdrawal.    PLAN:    - Per ortho: non-op, NWB RUE, WBAT LLE  - CIWA  - reg diet  - pain control  - DVT ppx  - PT for OOB/ambulate/OT/PMR

## 2019-03-25 NOTE — CONSULT NOTE ADULT - SUBJECTIVE AND OBJECTIVE BOX
HPI:  53y Male BIB BLS on 3/24 s/p fall from standing. Patient very fatigued and not able to participate fully with exam. Reports that he took oxycontin. Per chart review, patient tripped and fell while walking with a cane. He was found to have right nondisplaced olecranon fracture and left superior rami fracture. Ortho was consulted and recommended NWB of RUE and WBAT of LLE.     Upon evaluation, patient very fatigued and limited with answering questions. Reports that he knows he fell and got fractures.     Imaging showed:  CT Pelvis - Acute left parasymphyseal superior pubic ramus fracture. No acute fracture of the proximal left femur noted. If there is a clinical suspicion for occult left hip fracture, recommend further evaluation with MRI if there are no clinical contraindications. Internal fixation right intertrochanteric fracture as discussed. Recommend further clinical correlation and comparison with prior postoperative imaging.    Right elbow Xray - Elbow joint effusion. Nondisplaced fracture in the olecranon.    Right hip Xray - History of MIF right femoral neck. No acute fracture or dislocation appreciated.    REVIEW OF SYSTEMS:  Limited due to fatigue and participation with exam.   Denies pain    VITALS  T(C): 37.1 (19 @ 07:27), Max: 37.1 (19 @ 22:06)  HR: 87 (19 @ 07:27) (87 - 112)  BP: 124/85 (19 @ 07:27) (124/85 - 197/93)  RR: 18 (19 @ 07:27) (17 - 20)  SpO2: 97% (19 @ 07:27) (96% - 99%)  Wt(kg): --    PAST MEDICAL & SURGICAL HISTORY  Poor historian  Alcohol abuse  Hypertension, unspecified type  H/O fracture of right hip  No significant past surgical history      SOCIAL HISTORY  Unable to assess due to fatigue and participation.   Per chart review, active alcohol use.    FUNCTIONAL HISTORY  Unable to assess due to fatigue and participation.     CURRENT FUNCTIONAL STATUS  Pending evaluation    FAMILY HISTORY   No pertinent family history in first degree relatives      RECENT LABS/IMAGING  CBC Full  -  ( 25 Mar 2019 07:25 )  WBC Count : 5.1 K/uL  Hemoglobin : 11.6 g/dL  Hematocrit : 34.5 %  Platelet Count - Automated : 224 K/uL  Mean Cell Volume : 76.2 fl  Mean Cell Hemoglobin : 25.6 pg  Mean Cell Hemoglobin Concentration : 33.6 g/dL  Auto Neutrophil # : 3.1 K/uL  Auto Lymphocyte # : 1.1 K/uL  Auto Monocyte # : 0.5 K/uL  Auto Eosinophil # : 0.2 K/uL  Auto Basophil # : 0.1 K/uL  Auto Neutrophil % : 61.7 %  Auto Lymphocyte % : 21.9 %  Auto Monocyte % : 10.7 %  Auto Eosinophil % : 4.1 %  Auto Basophil % : 1.4 %        137  |  99  |  6.0<L>  ----------------------------<  115  3.3<L>   |  24.0  |  0.77    Ca    8.6      25 Mar 2019 07:25  Phos  3.3       Mg     1.5         TPro  6.8  /  Alb  3.8  /  TBili  0.6  /  DBili  0.2  /  AST  34  /  ALT  18  /  AlkPhos  73      Urinalysis Basic - ( 24 Mar 2019 23:00 )    Color: Yellow / Appearance: Clear / S.015 / pH: x  Gluc: x / Ketone: Moderate  / Bili: Negative / Urobili: Negative mg/dL   Blood: x / Protein: 30 mg/dL / Nitrite: Negative   Leuk Esterase: Negative / RBC: 0-2 /HPF / WBC 0-2   Sq Epi: x / Non Sq Epi: Occasional / Bacteria: Negative        ALLERGIES  penicillins (Unknown)  shellfish (Unknown)      MEDICATIONS   acetaminophen   Tablet .. 650 milliGRAM(s) Oral every 6 hours  chlordiazePOXIDE 50 milliGRAM(s) Oral every 8 hours  chlordiazePOXIDE   Oral   docusate sodium 100 milliGRAM(s) Oral three times a day  enoxaparin Injectable 30 milliGRAM(s) SubCutaneous every 12 hours  folic acid 1 milliGRAM(s) Oral daily  LORazepam   Injectable 2 milliGRAM(s) IV Push every 1 hour PRN  LORazepam   Injectable 2 milliGRAM(s) IV Push every 2 hours PRN  multivitamin 1 Tablet(s) Oral daily  ondansetron Injectable 8 milliGRAM(s) IV Push two times a day  oxyCODONE    IR 5 milliGRAM(s) Oral every 4 hours PRN  oxyCODONE    IR 10 milliGRAM(s) Oral every 4 hours PRN  potassium chloride    Tablet ER 20 milliEquivalent(s) Oral every 2 hours  senna 2 Tablet(s) Oral at bedtime PRN  thiamine 100 milliGRAM(s) Oral daily    ----------------------------------------------------------------------------------------  PHYSICAL EXAM  Constitutional - Fatigued, falling asleep repeatedly during exam  HEENT - NC/AT  Chest - No increased work of breathing, breathing comfortably on room air  Cardiovascular - RRR, S1S2  Abdomen - BS+, Soft, NTND  Extremities - No LE edema  Neurologic Exam -                    Cognitive - Fatigued, falling asleep repeatedly during exam     Communication - Fluent, +dysarthria     Cranial Nerves - CN 2-12 grossly intact but unable to fully assess due to fatigue     Motor - Limited due to fatigue and participation. Grossly moving BL UE and LE  Psychiatric - Fatigued  ----------------------------------------------------------------------------------------  ASSESSMENT/PLAN  53y Male with functional deficits after fall found to have olecranon fracture and pelvic fracture.   Pain - Tylenol, Oxycodone  Alcohol Abuse - Librium, CIWA protocol, Thiamine  Constipation - Senna, Colace  DVT PPX - Lovenox, SCDs  Rehab - Continue bedside therapy as well as OOB throughout the day with mobilization by staff to maintain cardiopulmonary function and prevention of secondary complications related to debility. Will continue to follow for ongoing rehab needs and recommendations. Rehab dispo pending therapy evaluations. HPI:  53y Male BIB BLS on 3/24 s/p fall from standing. Patient very fatigued and not able to participate fully with exam. Reports that he took oxycontin. Per chart review, patient tripped and fell while walking with a cane. He was found to have right nondisplaced olecranon fracture and left superior rami fracture. Ortho was consulted and recommended NWB of RUE and WBAT of LLE.     Upon evaluation, patient very fatigued and limited with answering questions. Reports that he knows he fell and got fractures.     Imaging showed:  CT Pelvis - Acute left parasymphyseal superior pubic ramus fracture. No acute fracture of the proximal left femur noted. If there is a clinical suspicion for occult left hip fracture, recommend further evaluation with MRI if there are no clinical contraindications. Internal fixation right intertrochanteric fracture as discussed. Recommend further clinical correlation and comparison with prior postoperative imaging.    Right elbow Xray - Elbow joint effusion. Nondisplaced fracture in the olecranon.    Right hip Xray - History of MIF right femoral neck. No acute fracture or dislocation appreciated.    REVIEW OF SYSTEMS:  Limited due to fatigue and participation with exam.   Denies pain    VITALS  T(C): 37.1 (19 @ 07:27), Max: 37.1 (19 @ 22:06)  HR: 87 (19 @ 07:27) (87 - 112)  BP: 124/85 (19 @ 07:27) (124/85 - 197/93)  RR: 18 (19 @ 07:27) (17 - 20)  SpO2: 97% (19 @ 07:27) (96% - 99%)  Wt(kg): --    PAST MEDICAL & SURGICAL HISTORY  Poor historian  Alcohol abuse  Hypertension, unspecified type  H/O fracture of right hip  No significant past surgical history      SOCIAL HISTORY  Unable to assess due to fatigue and participation.   Per chart review, active alcohol use.    FUNCTIONAL HISTORY  Unable to assess due to fatigue and participation.     CURRENT FUNCTIONAL STATUS  Pending evaluation    FAMILY HISTORY   No pertinent family history in first degree relatives      RECENT LABS/IMAGING  CBC Full  -  ( 25 Mar 2019 07:25 )  WBC Count : 5.1 K/uL  Hemoglobin : 11.6 g/dL  Hematocrit : 34.5 %  Platelet Count - Automated : 224 K/uL  Mean Cell Volume : 76.2 fl  Mean Cell Hemoglobin : 25.6 pg  Mean Cell Hemoglobin Concentration : 33.6 g/dL  Auto Neutrophil # : 3.1 K/uL  Auto Lymphocyte # : 1.1 K/uL  Auto Monocyte # : 0.5 K/uL  Auto Eosinophil # : 0.2 K/uL  Auto Basophil # : 0.1 K/uL  Auto Neutrophil % : 61.7 %  Auto Lymphocyte % : 21.9 %  Auto Monocyte % : 10.7 %  Auto Eosinophil % : 4.1 %  Auto Basophil % : 1.4 %        137  |  99  |  6.0<L>  ----------------------------<  115  3.3<L>   |  24.0  |  0.77    Ca    8.6      25 Mar 2019 07:25  Phos  3.3       Mg     1.5         TPro  6.8  /  Alb  3.8  /  TBili  0.6  /  DBili  0.2  /  AST  34  /  ALT  18  /  AlkPhos  73      Urinalysis Basic - ( 24 Mar 2019 23:00 )    Color: Yellow / Appearance: Clear / S.015 / pH: x  Gluc: x / Ketone: Moderate  / Bili: Negative / Urobili: Negative mg/dL   Blood: x / Protein: 30 mg/dL / Nitrite: Negative   Leuk Esterase: Negative / RBC: 0-2 /HPF / WBC 0-2   Sq Epi: x / Non Sq Epi: Occasional / Bacteria: Negative        ALLERGIES  penicillins (Unknown)  shellfish (Unknown)      MEDICATIONS   acetaminophen   Tablet .. 650 milliGRAM(s) Oral every 6 hours  chlordiazePOXIDE 50 milliGRAM(s) Oral every 8 hours  chlordiazePOXIDE   Oral   docusate sodium 100 milliGRAM(s) Oral three times a day  enoxaparin Injectable 30 milliGRAM(s) SubCutaneous every 12 hours  folic acid 1 milliGRAM(s) Oral daily  LORazepam   Injectable 2 milliGRAM(s) IV Push every 1 hour PRN  LORazepam   Injectable 2 milliGRAM(s) IV Push every 2 hours PRN  multivitamin 1 Tablet(s) Oral daily  ondansetron Injectable 8 milliGRAM(s) IV Push two times a day  oxyCODONE    IR 5 milliGRAM(s) Oral every 4 hours PRN  oxyCODONE    IR 10 milliGRAM(s) Oral every 4 hours PRN  potassium chloride    Tablet ER 20 milliEquivalent(s) Oral every 2 hours  senna 2 Tablet(s) Oral at bedtime PRN  thiamine 100 milliGRAM(s) Oral daily    ----------------------------------------------------------------------------------------  PHYSICAL EXAM  Constitutional - Fatigued, falling asleep repeatedly during exam  HEENT - NC/AT  Chest - No increased work of breathing, breathing comfortably on room air  Cardiovascular - RRR, S1S2  Abdomen - BS+, Soft, NTND  Extremities - No LE edema  Neurologic Exam -                    Cognitive - Fatigued, falling asleep repeatedly during exam     Communication - Fluent, +dysarthria     Cranial Nerves - CN 2-12 grossly intact but unable to fully assess due to fatigue     Motor - Limited due to fatigue and participation. Grossly moving BL UE and LE  Psychiatric - Fatigued  ----------------------------------------------------------------------------------------  ASSESSMENT/PLAN  53y Male with functional deficits after a multiple trauma via a fall found to have olecranon fracture and pelvic fracture.   Pain - Tylenol, Oxycodone  Alcohol Abuse - Librium, CIWA protocol, Thiamine, Ativan, Folate, MVI  HypoK+ - KCl  Constipation - Senna, Colace  DVT PPX - Lovenox, SCDs  Rehab - Patient somnolent and has limited exam/participation -- related to medications. Therapy evals pending. Recommend aggressive mobilization as well as OOB throughout the day with mobilization by staff to maintain cardiopulmonary function and prevention of secondary complications related to debility. Will continue to follow for ongoing rehab needs and recommendations. Rehab dispo pending therapy evaluations.

## 2019-03-26 ENCOUNTER — TRANSCRIPTION ENCOUNTER (OUTPATIENT)
Age: 54
End: 2019-03-26

## 2019-03-26 DIAGNOSIS — Z71.89 OTHER SPECIFIED COUNSELING: ICD-10-CM

## 2019-03-26 LAB
ANION GAP SERPL CALC-SCNC: 12 MMOL/L — SIGNIFICANT CHANGE UP (ref 5–17)
BASOPHILS # BLD AUTO: 0.1 K/UL — SIGNIFICANT CHANGE UP (ref 0–0.2)
BASOPHILS NFR BLD AUTO: 1.8 % — SIGNIFICANT CHANGE UP (ref 0–2)
BUN SERPL-MCNC: 5 MG/DL — LOW (ref 8–20)
CALCIUM SERPL-MCNC: 9.4 MG/DL — SIGNIFICANT CHANGE UP (ref 8.6–10.2)
CHLORIDE SERPL-SCNC: 105 MMOL/L — SIGNIFICANT CHANGE UP (ref 98–107)
CO2 SERPL-SCNC: 22 MMOL/L — SIGNIFICANT CHANGE UP (ref 22–29)
CREAT SERPL-MCNC: 0.65 MG/DL — SIGNIFICANT CHANGE UP (ref 0.5–1.3)
EOSINOPHIL # BLD AUTO: 0.3 K/UL — SIGNIFICANT CHANGE UP (ref 0–0.5)
EOSINOPHIL NFR BLD AUTO: 8 % — HIGH (ref 0–5)
GLUCOSE SERPL-MCNC: 85 MG/DL — SIGNIFICANT CHANGE UP (ref 70–115)
HCT VFR BLD CALC: 35.7 % — LOW (ref 42–52)
HGB BLD-MCNC: 11.8 G/DL — LOW (ref 14–18)
LYMPHOCYTES # BLD AUTO: 1.3 K/UL — SIGNIFICANT CHANGE UP (ref 1–4.8)
LYMPHOCYTES # BLD AUTO: 33.4 % — SIGNIFICANT CHANGE UP (ref 20–55)
MAGNESIUM SERPL-MCNC: 1.8 MG/DL — SIGNIFICANT CHANGE UP (ref 1.6–2.6)
MCHC RBC-ENTMCNC: 25.3 PG — LOW (ref 27–31)
MCHC RBC-ENTMCNC: 33.1 G/DL — SIGNIFICANT CHANGE UP (ref 32–36)
MCV RBC AUTO: 76.6 FL — LOW (ref 80–94)
MONOCYTES # BLD AUTO: 0.4 K/UL — SIGNIFICANT CHANGE UP (ref 0–0.8)
MONOCYTES NFR BLD AUTO: 10.6 % — HIGH (ref 3–10)
NEUTROPHILS # BLD AUTO: 1.8 K/UL — SIGNIFICANT CHANGE UP (ref 1.8–8)
NEUTROPHILS NFR BLD AUTO: 45.7 % — SIGNIFICANT CHANGE UP (ref 37–73)
PHOSPHATE SERPL-MCNC: 3.7 MG/DL — SIGNIFICANT CHANGE UP (ref 2.4–4.7)
PLATELET # BLD AUTO: 205 K/UL — SIGNIFICANT CHANGE UP (ref 150–400)
POTASSIUM SERPL-MCNC: 4.1 MMOL/L — SIGNIFICANT CHANGE UP (ref 3.5–5.3)
POTASSIUM SERPL-SCNC: 4.1 MMOL/L — SIGNIFICANT CHANGE UP (ref 3.5–5.3)
RBC # BLD: 4.66 M/UL — SIGNIFICANT CHANGE UP (ref 4.6–6.2)
RBC # FLD: 16.8 % — HIGH (ref 11–15.6)
SODIUM SERPL-SCNC: 139 MMOL/L — SIGNIFICANT CHANGE UP (ref 135–145)
WBC # BLD: 3.9 K/UL — LOW (ref 4.8–10.8)
WBC # FLD AUTO: 3.9 K/UL — LOW (ref 4.8–10.8)

## 2019-03-26 PROCEDURE — 99232 SBSQ HOSP IP/OBS MODERATE 35: CPT

## 2019-03-26 PROCEDURE — 99231 SBSQ HOSP IP/OBS SF/LOW 25: CPT

## 2019-03-26 RX ORDER — DOCUSATE SODIUM 100 MG
1 CAPSULE ORAL
Qty: 0 | Refills: 0 | DISCHARGE
Start: 2019-03-26

## 2019-03-26 RX ORDER — THIAMINE MONONITRATE (VIT B1) 100 MG
1 TABLET ORAL
Qty: 0 | Refills: 0 | DISCHARGE
Start: 2019-03-26

## 2019-03-26 RX ORDER — OXYCODONE HYDROCHLORIDE 5 MG/1
1 TABLET ORAL
Qty: 0 | Refills: 0 | DISCHARGE
Start: 2019-03-26

## 2019-03-26 RX ORDER — ACETAMINOPHEN 500 MG
2 TABLET ORAL
Qty: 0 | Refills: 0 | DISCHARGE
Start: 2019-03-26

## 2019-03-26 RX ORDER — FOLIC ACID 0.8 MG
1 TABLET ORAL
Qty: 0 | Refills: 0 | DISCHARGE
Start: 2019-03-26

## 2019-03-26 RX ORDER — SENNA PLUS 8.6 MG/1
2 TABLET ORAL
Qty: 0 | Refills: 0 | DISCHARGE
Start: 2019-03-26

## 2019-03-26 RX ADMIN — ENOXAPARIN SODIUM 30 MILLIGRAM(S): 100 INJECTION SUBCUTANEOUS at 17:17

## 2019-03-26 RX ADMIN — ONDANSETRON 8 MILLIGRAM(S): 8 TABLET, FILM COATED ORAL at 06:14

## 2019-03-26 RX ADMIN — Medication 650 MILLIGRAM(S): at 17:17

## 2019-03-26 RX ADMIN — Medication 650 MILLIGRAM(S): at 00:10

## 2019-03-26 RX ADMIN — Medication 1 TABLET(S): at 12:05

## 2019-03-26 RX ADMIN — Medication 650 MILLIGRAM(S): at 05:57

## 2019-03-26 RX ADMIN — Medication 100 MILLIGRAM(S): at 21:29

## 2019-03-26 RX ADMIN — Medication 100 MILLIGRAM(S): at 14:54

## 2019-03-26 RX ADMIN — ENOXAPARIN SODIUM 30 MILLIGRAM(S): 100 INJECTION SUBCUTANEOUS at 06:14

## 2019-03-26 RX ADMIN — Medication 50 MILLIGRAM(S): at 06:14

## 2019-03-26 RX ADMIN — Medication 100 MILLIGRAM(S): at 12:05

## 2019-03-26 RX ADMIN — Medication 650 MILLIGRAM(S): at 12:05

## 2019-03-26 RX ADMIN — Medication 650 MILLIGRAM(S): at 06:30

## 2019-03-26 RX ADMIN — Medication 100 MILLIGRAM(S): at 05:57

## 2019-03-26 RX ADMIN — Medication 50 MILLIGRAM(S): at 14:54

## 2019-03-26 RX ADMIN — Medication 650 MILLIGRAM(S): at 18:09

## 2019-03-26 RX ADMIN — Medication 1 MILLIGRAM(S): at 12:06

## 2019-03-26 RX ADMIN — Medication 650 MILLIGRAM(S): at 12:56

## 2019-03-26 NOTE — PHYSICAL THERAPY INITIAL EVALUATION ADULT - ACTIVE RANGE OF MOTION EXAMINATION, REHAB EVAL
bilateral  lower extremity Active ROM was WFL (within functional limits)/R fingers and wrist WFL/Left UE Active ROM was WFL (within functional limits)

## 2019-03-26 NOTE — PROGRESS NOTE ADULT - SUBJECTIVE AND OBJECTIVE BOX
INTERVAL HPI/OVERNIGHT EVENTS: No acute events. Evaluated by PMR.     SUBJECTIVE: Feeling well this AM. No fevers/chills, no N/V.       MEDICATIONS  (STANDING):  acetaminophen   Tablet .. 650 milliGRAM(s) Oral every 6 hours  chlordiazePOXIDE 50 milliGRAM(s) Oral every 8 hours  chlordiazePOXIDE   Oral   docusate sodium 100 milliGRAM(s) Oral three times a day  enoxaparin Injectable 30 milliGRAM(s) SubCutaneous every 12 hours  folic acid 1 milliGRAM(s) Oral daily  multivitamin 1 Tablet(s) Oral daily  ondansetron Injectable 8 milliGRAM(s) IV Push two times a day  thiamine 100 milliGRAM(s) Oral daily    MEDICATIONS  (PRN):  LORazepam   Injectable 2 milliGRAM(s) IV Push every 1 hour PRN Symptom-triggered: each CIWA -Ar score 8 or GREATER  LORazepam   Injectable 2 milliGRAM(s) IV Push every 2 hours PRN CIWA-Ar score increase by 2 points and a total score of 7 or less  oxyCODONE    IR 5 milliGRAM(s) Oral every 4 hours PRN Moderate Pain (4 - 6)  oxyCODONE    IR 10 milliGRAM(s) Oral every 4 hours PRN Severe Pain (7 - 10)  senna 2 Tablet(s) Oral at bedtime PRN Constipation      Vital Signs Last 24 Hrs  T(C): 36.8 (26 Mar 2019 04:39), Max: 37.1 (25 Mar 2019 07:27)  T(F): 98.2 (26 Mar 2019 04:39), Max: 98.7 (25 Mar 2019 07:27)  HR: 85 (26 Mar 2019 04:39) (80 - 98)  BP: 122/73 (26 Mar 2019 04:39) (122/73 - 163/75)  BP(mean): --  RR: 18 (25 Mar 2019 23:40) (18 - 18)  SpO2: 93% (26 Mar 2019 04:39) (93% - 98%)    PHYSICAL EXAM:   General: NAD, Lying in bed comfortably  Neuro: A+Ox3  Neck: Soft, supple  Cardio: RRR, nml S1/S2  Resp: Good effort, CTA b/l  GI/Abd: Soft, NT/ND, no rebound/guarding, no masses palpated  Vascular: All 4 extremities warm.  Pelvis: stable, L hip ttp  Musculoskeletal: All 4 extremities moving spontaneously, only limitation is +R elbow ROM (in sling) and decreased R hip flexion, no spinal tenderness or stepoffs      I&O's Detail    24 Mar 2019 07:01  -  25 Mar 2019 07:00  --------------------------------------------------------  IN:    multiple electrolytes Injection Type 1multiple electrolytes Injection Type 1: 875 mL  Total IN: 875 mL    OUT:    Voided: 800 mL  Total OUT: 800 mL    Total NET: 75 mL      25 Mar 2019 07:01  -  26 Mar 2019 05:02  --------------------------------------------------------  IN:  Total IN: 0 mL    OUT:    Voided: 300 mL  Total OUT: 300 mL    Total NET: -300 mL          LABS:                        11.6   5.1   )-----------( 224      ( 25 Mar 2019 07:25 )             34.5     03-25    137  |  99  |  6.0<L>  ----------------------------<  115  3.3<L>   |  24.0  |  0.77    Ca    8.6      25 Mar 2019 07:25  Phos  3.3     03-25  Mg     1.5     03-25    TPro  6.8  /  Alb  3.8  /  TBili  0.6  /  DBili  0.2  /  AST  34  /  ALT  18  /  AlkPhos  73  03-25    PT/INR - ( 24 Mar 2019 12:49 )   PT: 11.4 sec;   INR: 0.99 ratio         PTT - ( 24 Mar 2019 12:49 )  PTT:28.5 sec  Urinalysis Basic - ( 24 Mar 2019 23:00 )    Color: Yellow / Appearance: Clear / S.015 / pH: x  Gluc: x / Ketone: Moderate  / Bili: Negative / Urobili: Negative mg/dL   Blood: x / Protein: 30 mg/dL / Nitrite: Negative   Leuk Esterase: Negative / RBC: 0-2 /HPF / WBC 0-2   Sq Epi: x / Non Sq Epi: Occasional / Bacteria: Negative        RADIOLOGY & ADDITIONAL STUDIES:

## 2019-03-26 NOTE — DISCHARGE NOTE PROVIDER - NSDCCPCAREPLAN_GEN_ALL_CORE_FT
PRINCIPAL DISCHARGE DIAGNOSIS  Diagnosis: Pelvic fracture  Assessment and Plan of Treatment:       SECONDARY DISCHARGE DIAGNOSES  Diagnosis: Elbow fracture, right  Assessment and Plan of Treatment:     Diagnosis: Hypertension, unspecified type  Assessment and Plan of Treatment:     Diagnosis: Alcohol abuse  Assessment and Plan of Treatment: PRINCIPAL DISCHARGE DIAGNOSIS  Diagnosis: Pelvic fracture  Assessment and Plan of Treatment: Pt may resume regular diet as tolerated, weight bearing as tolerated.  Follow up Dr nava 2 weeks from discharge.  Return to the ED immediately for worsening pain, chest pain, shortness of breath, leg swelling or wrosening of your conditions.      SECONDARY DISCHARGE DIAGNOSES  Diagnosis: Elbow fracture, right  Assessment and Plan of Treatment: None weight bearing Right upper extremitiy.  Sling    Diagnosis: Hypertension, unspecified type  Assessment and Plan of Treatment:     Diagnosis: Alcohol abuse  Assessment and Plan of Treatment:

## 2019-03-26 NOTE — PHYSICAL THERAPY INITIAL EVALUATION ADULT - PERTINENT HX OF CURRENT PROBLEM, REHAB EVAL
Pt is a 52 y/o male who presented s/p fall with L pubic rami fx and R olecranon fx. Pt has hx/o recent R femoral head fx s/p repair and ABE placement.

## 2019-03-26 NOTE — PROGRESS NOTE ADULT - SUBJECTIVE AND OBJECTIVE BOX
Patient in bed, more alert and awake.   Able to state he fell, but doesnt know why.  Reports he had a previous fall sustaining a hip fracture.   Patient also reports that he is "in between" homes.  Cant return to his mothers house.     FUNCTIONAL PROGRESS  3/26  Bed Mobility: Scooting/Bridging:     · Level of [place]	moderate assist (50% patients effort)	  · Physical Assist/Nonphysical Assist	1 person assist	    Bed Mobility: Supine to Sit:     · Level of [place]	moderate assist (50% patients effort)	  · Physical Assist/Nonphysical Assist	1 person assist	    Bed Mobility Analysis:     · Bed Mobility Limitations	decreased ability to use arms for pushing/pulling; decreased ability to use legs for bridging/pushing	  · Impairments Contributing to Impaired Bed Mobility	impaired postural control	    Transfer: Sit to Stand:     · Level of [place]	minimum assist (75% patients effort)	  · Physical Assist/Nonphysical Assist	2 person assist	  · Weight-Bearing Restrictions	nonweight-bearing; RUE	  · Assistive Device	LUE hand held assist	    Transfer: Stand to Sit:     · Level of [place]	minimum assist (75% patients effort)	  · Physical Assist/Nonphysical Assist	2 person assist	  · Weight-Bearing Restrictions	nonweight-bearing; RUE	    Gait Skills:     · Level of [place]	moderate assist (50% patients effort)	  · Physical Assist/Nonphysical Assist	2 person assist	  · Weight-Bearing Restrictions	RUE NWB, b/l LE WBAT	  · Assistive Device	hand held assist	  · Gait Distance	3 feet to chair and then 5 feet forward and backward	        REVIEW OF SYSTEMS  Constitutional - No fever,  No fatigue  HEENT - No vertigo, No neck pain  Neurological - No headaches, +memory loss, +loss of strength, No tremors  Musculoskeletal - +joint pain, +muscle pain  Psychiatric - No depression, No anxiety    VITALS  T(C): 37.2 (19 @ 16:00), Max: 37.2 (19 @ 16:00)  HR: 98 (19 @ 16:00) (80 - 98)  BP: 135/94 (19 @ 16:00) (122/73 - 153/95)  RR: 18 (19 @ 16:00) (18 - 19)  SpO2: 91% (19 @ 11:44) (91% - 98%)  Wt(kg): --    MEDICATIONS   acetaminophen   Tablet .. 650 milliGRAM(s) every 6 hours  chlordiazePOXIDE     docusate sodium 100 milliGRAM(s) three times a day  enoxaparin Injectable 30 milliGRAM(s) every 12 hours  folic acid 1 milliGRAM(s) daily  LORazepam   Injectable 2 milliGRAM(s) every 1 hour PRN  LORazepam   Injectable 2 milliGRAM(s) every 2 hours PRN  multivitamin 1 Tablet(s) daily  ondansetron Injectable 8 milliGRAM(s) two times a day  oxyCODONE    IR 5 milliGRAM(s) every 4 hours PRN  oxyCODONE    IR 10 milliGRAM(s) every 4 hours PRN  senna 2 Tablet(s) at bedtime PRN  thiamine 100 milliGRAM(s) daily      RECENT LABS - Reviewed                        11.8   3.9   )-----------( 205      ( 26 Mar 2019 06:12 )             35.7     -    139  |  105  |  5.0<L>  ----------------------------<  85  4.1   |  22.0  |  0.65    Ca    9.4      26 Mar 2019 06:12  Phos  3.7       Mg     1.8         TPro  6.8  /  Alb  3.8  /  TBili  0.6  /  DBili  0.2  /  AST  34  /  ALT  18  /  AlkPhos  73        Urinalysis Basic - ( 24 Mar 2019 23:00 )    Color: Yellow / Appearance: Clear / S.015 / pH: x  Gluc: x / Ketone: Moderate  / Bili: Negative / Urobili: Negative mg/dL   Blood: x / Protein: 30 mg/dL / Nitrite: Negative   Leuk Esterase: Negative / RBC: 0-2 /HPF / WBC 0-2   Sq Epi: x / Non Sq Epi: Occasional / Bacteria: Negative          -------------------------------------------------------------------------------  PHYSICAL EXAM  Constitutional - NAD, Comfrotable  Extremities - No LE edema  Neurologic Exam -                    Cognitive - AAO self, part of situation     Motor - Limited due to participation. Grossly moving BL UE and LE  Psychiatric - Stable  ----------------------------------------------------------------------------------------  ASSESSMENT/PLAN  53y Male with functional deficits after a multiple trauma via a fall found to have olecranon fracture and pelvic fracture.   Pain - Tylenol, Oxycodone  Alcohol Abuse - Librium, CIWA protocol, Thiamine, Ativan, Folate, MVI  HypoK+ - KCl  Constipation - Senna, Colace  DVT PPX - Lovenox, SCDs  Rehab - Given patient's dispo uncertainty	, recommend DC ABE. Continue bedside therapy as well as OOB throughout the day with mobilization by staff to maintain cardiopulmonary function and prevention of secondary complications related to debility.     Will sign off, please reconsult for additional rehab dispo needs if functional status changes.

## 2019-03-26 NOTE — DISCHARGE NOTE PROVIDER - HOSPITAL COURSE
53y Male BIB BLS s/p fall from standing. Stated he fell tripping due to R hip just being repaired walking with a cane. Denies HS or LOC. Denies presyncopal episode. Complaining of R elbow and L hip pain. Drinks etoh daily, had withdrawal tremors but denies seizures.        A airway intact, speaking full sentences    B equal breath sounds bilaterally    C radial/DP/femoral pulses intact bilaterally     D GCS15 [id number], moving all fours, no focal deficits, pupils R 3mm reactive/L 3mm reactive    E patient fully exposed, no gross deformities or bleeding, provided warm blankets            XR Pelvis with Rt femoral neck fx.  Ct Pelvis acute Lt parasymphyseal superior pubic ramus fx, no acute fx of prox Lt femur.  Internal fixation R intertrochanteric fx.  XR Rt elbow non displaced fx olecranon.   Orthopedics consulted.  Non operative management of injuries, sling for RUE.  Eval by PT recs for d/c to ABE pt amendable to plan.

## 2019-03-26 NOTE — PHYSICAL THERAPY INITIAL EVALUATION ADULT - PLANNED THERAPY INTERVENTIONS, PT EVAL
ROM/strengthening/postural re-education/transfer training/gait training/balance training/bed mobility training

## 2019-03-26 NOTE — OCCUPATIONAL THERAPY INITIAL EVALUATION ADULT - ADDITIONAL COMMENTS
Pt is currently homeless, but was staying with his mother and at a rehab facility prior to admission  Pt owns a cane

## 2019-03-26 NOTE — DISCHARGE NOTE PROVIDER - CARE PROVIDER_API CALL
Eris Alvares)  Orthopaedic Surgery  [address]  [place] [address]  Phone: [phone]  Fax: [phone]  Follow Up Time:

## 2019-03-26 NOTE — OCCUPATIONAL THERAPY INITIAL EVALUATION ADULT - LIVES WITH, PROFILE
pt was staying with his mother prior to admission; however, pt is not able to return to her home and is currently homeless

## 2019-03-26 NOTE — PHYSICAL THERAPY INITIAL EVALUATION ADULT - ADDITIONAL COMMENTS
Following ABE pt was living with his mother in an apartment with an elevator, no stairs. Pt states he can only stay there until April 9th and then he has no where to go.

## 2019-03-26 NOTE — PROGRESS NOTE ADULT - ASSESSMENT
Patient is a 53y old male hx etoh abuse/withdrawal and R femoral head fx s/p repair, s/p fall from standing with L pubic ramus fx and L olecranon fx. High risk for withdrawal. Being managed non-operatively for fractures    PLAN:    - Per ortho: non-op, NWB RUE, WBAT LLE  - CIWA  - reg diet  - pain control  - DVT ppx  - F/u PT/OT today

## 2019-03-26 NOTE — OCCUPATIONAL THERAPY INITIAL EVALUATION ADULT - PLANNED THERAPY INTERVENTIONS, OT EVAL
transfer training/ROM/strengthening/bed mobility training/ADL retraining/motor coordination training/balance training

## 2019-03-27 ENCOUNTER — TRANSCRIPTION ENCOUNTER (OUTPATIENT)
Age: 54
End: 2019-03-27

## 2019-03-27 VITALS
TEMPERATURE: 98 F | DIASTOLIC BLOOD PRESSURE: 87 MMHG | HEART RATE: 92 BPM | SYSTOLIC BLOOD PRESSURE: 122 MMHG | RESPIRATION RATE: 18 BRPM | OXYGEN SATURATION: 98 %

## 2019-03-27 PROCEDURE — 99231 SBSQ HOSP IP/OBS SF/LOW 25: CPT

## 2019-03-27 RX ADMIN — ENOXAPARIN SODIUM 30 MILLIGRAM(S): 100 INJECTION SUBCUTANEOUS at 05:23

## 2019-03-27 RX ADMIN — Medication 100 MILLIGRAM(S): at 11:08

## 2019-03-27 RX ADMIN — Medication 100 MILLIGRAM(S): at 05:23

## 2019-03-27 RX ADMIN — Medication 1 MILLIGRAM(S): at 11:08

## 2019-03-27 RX ADMIN — Medication 50 MILLIGRAM(S): at 06:27

## 2019-03-27 RX ADMIN — Medication 650 MILLIGRAM(S): at 00:10

## 2019-03-27 RX ADMIN — Medication 650 MILLIGRAM(S): at 05:23

## 2019-03-27 RX ADMIN — Medication 650 MILLIGRAM(S): at 06:10

## 2019-03-27 RX ADMIN — ONDANSETRON 8 MILLIGRAM(S): 8 TABLET, FILM COATED ORAL at 05:23

## 2019-03-27 RX ADMIN — Medication 650 MILLIGRAM(S): at 01:03

## 2019-03-27 RX ADMIN — Medication 650 MILLIGRAM(S): at 11:08

## 2019-03-27 RX ADMIN — Medication 1 TABLET(S): at 11:08

## 2019-03-27 NOTE — PROGRESS NOTE ADULT - ASSESSMENT
Patient is a 53y old male hx etoh abuse/withdrawal and R femoral head fx s/p repair, s/p fall from standing with L pubic ramus fx and L olecranon fx. High risk for withdrawal. Being managed non-operatively for fractures    PLAN:    - Per ortho: non-op, NWB RUE, WBAT LLE  - CIWA  - reg diet  - pain control  - DVT ppx  - dispo 3/27

## 2019-03-27 NOTE — PROGRESS NOTE ADULT - SUBJECTIVE AND OBJECTIVE BOX
No acute events over night. Tolerating regular diet. Pain well controlled on current regimen. Voiding spontaneously. Continues to work with PT. Dispo planning    MEDICATIONS  (STANDING):  acetaminophen   Tablet .. 650 milliGRAM(s) Oral every 6 hours  chlordiazePOXIDE 50 milliGRAM(s) Oral every 12 hours  chlordiazePOXIDE 50 milliGRAM(s) Oral once  chlordiazePOXIDE   Oral   docusate sodium 100 milliGRAM(s) Oral three times a day  enoxaparin Injectable 30 milliGRAM(s) SubCutaneous every 12 hours  folic acid 1 milliGRAM(s) Oral daily  multivitamin 1 Tablet(s) Oral daily  ondansetron Injectable 8 milliGRAM(s) IV Push two times a day  thiamine 100 milliGRAM(s) Oral daily    MEDICATIONS  (PRN):  LORazepam   Injectable 2 milliGRAM(s) IV Push every 1 hour PRN Symptom-triggered: each CIWA -Ar score 8 or GREATER  LORazepam   Injectable 2 milliGRAM(s) IV Push every 2 hours PRN CIWA-Ar score increase by 2 points and a total score of 7 or less  oxyCODONE    IR 5 milliGRAM(s) Oral every 4 hours PRN Moderate Pain (4 - 6)  oxyCODONE    IR 10 milliGRAM(s) Oral every 4 hours PRN Severe Pain (7 - 10)  senna 2 Tablet(s) Oral at bedtime PRN Constipation      Vital Signs Last 24 Hrs  T(C): 36.9 (26 Mar 2019 20:09), Max: 37.2 (26 Mar 2019 16:00)  T(F): 98.5 (26 Mar 2019 20:09), Max: 98.9 (26 Mar 2019 16:00)  HR: 93 (26 Mar 2019 20:09) (85 - 98)  BP: 115/73 (26 Mar 2019 20:09) (115/73 - 141/96)  BP(mean): --  RR: 18 (26 Mar 2019 20:09) (18 - 19)  SpO2: 95% (26 Mar 2019 20:09) (91% - 95%)      PHYSICAL EXAM:   General: NAD, Lying in bed comfortably  Neuro: A+Ox3  Neck: Soft, supple  Cardio: RRR, nml S1/S2  Resp: Good effort, CTA b/l  GI/Abd: Soft, NT/ND, no rebound/guarding, no masses palpated  Vascular: All 4 extremities warm.  Pelvis: stable, L hip ttp  Musculoskeletal: All 4 extremities moving spontaneously, only limitation is +R elbow ROM (in sling) and decreased R hip flexion, no spinal tenderness or stepoffs          I&O's Detail    25 Mar 2019 07:01  -  26 Mar 2019 07:00  --------------------------------------------------------  IN:  Total IN: 0 mL    OUT:    Voided: 300 mL  Total OUT: 300 mL    Total NET: -300 mL      26 Mar 2019 07:01  -  27 Mar 2019 00:42  --------------------------------------------------------  IN:  Total IN: 0 mL    OUT:    Stool: 1 mL    Voided: 200 mL  Total OUT: 201 mL    Total NET: -201 mL          LABS:                        11.8   3.9   )-----------( 205      ( 26 Mar 2019 06:12 )             35.7     03-26    139  |  105  |  5.0<L>  ----------------------------<  85  4.1   |  22.0  |  0.65    Ca    9.4      26 Mar 2019 06:12  Phos  3.7     03-26  Mg     1.8     03-26    TPro  6.8  /  Alb  3.8  /  TBili  0.6  /  DBili  0.2  /  AST  34  /  ALT  18  /  AlkPhos  73  03-25          RADIOLOGY & ADDITIONAL STUDIES:

## 2019-03-27 NOTE — DISCHARGE NOTE NURSING/CASE MANAGEMENT/SOCIAL WORK - NSDCDPATPORTLINK_GEN_ALL_CORE
You can access the [facility] Patient Portal, offered by [facility], by registering with the following website: [email or web address]

## 2019-03-29 NOTE — OCCUPATIONAL THERAPY INITIAL EVALUATION ADULT - LEVEL OF INDEPENDENCE: EATING, OT EVAL
"Requested Prescriptions   Pending Prescriptions Disp Refills     valACYclovir (VALTREX) 1000 mg tablet [Pharmacy Med Name: VALACYCLOVIR 1GM TABLETS] 12 tablet 0     Sig: TAKE 2 TABLETS BY MOUTH TWICE DAILY FOR 1 DAY    Antivirals for Herpes Protocol Failed - 3/29/2019 10:43 AM       Failed - Recent (12 mo) or future (30 days) visit within the authorizing provider's specialty    Patient had office visit in the last 12 months or has a visit in the next 30 days with authorizing provider or within the authorizing provider's specialty.  See \"Patient Info\" tab in inbasket, or \"Choose Columns\" in Meds & Orders section of the refill encounter.             Failed - Normal serum creatinine on file in past 12 months    No lab results found.         Passed - Patient is age 12 or older       Passed - Medication is active on med list        Prescription approved per [facility] Refill Protocol.  Rosario Renee RN on 3/29/2019 at 11:27 AM    " supervision

## 2019-04-04 DIAGNOSIS — Z76.89 PERSONS ENCOUNTERING HEALTH SERVICES IN OTHER SPECIFIED CIRCUMSTANCES: ICD-10-CM

## 2019-04-08 ENCOUNTER — APPOINTMENT (OUTPATIENT)
Age: 54
End: 2019-04-08
Payer: MEDICAID

## 2019-04-08 ENCOUNTER — APPOINTMENT (OUTPATIENT)
Dept: ORTHOPEDIC SURGERY | Facility: CLINIC | Age: 54
End: 2019-04-08
Payer: MEDICAID

## 2019-04-08 VITALS
HEIGHT: 69 IN | HEART RATE: 90 BPM | SYSTOLIC BLOOD PRESSURE: 122 MMHG | WEIGHT: 125 LBS | BODY MASS INDEX: 18.51 KG/M2 | DIASTOLIC BLOOD PRESSURE: 77 MMHG

## 2019-04-08 DIAGNOSIS — Z87.81 PERSONAL HISTORY OF (HEALED) TRAUMATIC FRACTURE: ICD-10-CM

## 2019-04-08 DIAGNOSIS — Z78.9 OTHER SPECIFIED HEALTH STATUS: ICD-10-CM

## 2019-04-08 DIAGNOSIS — M25.521 PAIN IN RIGHT ELBOW: ICD-10-CM

## 2019-04-08 PROCEDURE — 73521 X-RAY EXAM HIPS BI 2 VIEWS: CPT

## 2019-04-08 PROCEDURE — 99214 OFFICE O/P EST MOD 30 MIN: CPT

## 2019-04-08 PROCEDURE — 73080 X-RAY EXAM OF ELBOW: CPT | Mod: RT

## 2019-04-08 PROCEDURE — 73522 X-RAY EXAM HIPS BI 3-4 VIEWS: CPT

## 2019-04-08 PROCEDURE — 99204 OFFICE O/P NEW MOD 45 MIN: CPT

## 2019-04-08 NOTE — REVIEW OF SYSTEMS
[Joint Pain] : joint pain [Feeling Weak] : feeling weak [Muscle Weakness] : muscle weakness [Negative] : Heme/Lymph

## 2019-04-08 NOTE — DISCUSSION/SUMMARY
[de-identified] : 53-year-old male status post right intertrochanteric hip fracture. He has significant weakness in the bilateral lower extremities today and this is likely a cause for his recent recurrent falls. He needs significant physical therapy to work on his atrophy as he is on minimal ambulation for the last several months. He has no orthopedic restrictions today and I would recommend aggressive physical therapy to increase his exercise tolerance. As he is well healed, he may follow up p.r.n.\par \par The patient was given the opportunity to ask questions and all questions were answered to their satisfaction.\par \par Eris Anthony MD\par Orthopaedic Trauma Surgeon\par [facility]\par [facility] [place]\par \par \par \par

## 2019-04-08 NOTE — REASON FOR VISIT
[Initial Visit] : an initial visit for [Femur Fracture] : femur fracture [FreeTextEntry2] : S/P Intramedullary nail, right basicervical / intertrochanteric femur fracture. DOS:12/12/18\par \par  Intramedullary nail, right basicervical/intertrochanteric femur fracture DOS: 12/12/18\par

## 2019-04-08 NOTE — HISTORY OF PRESENT ILLNESS
[de-identified] : The patient is a 53-year-old gentleman who SA for his first postoperative followup after intramedullary nail for right intertrochanteric hip fracture. Original date of surgery was December 12, 2018. He has been at rehabilitation since that time. His history is somewhat unreliable. He states multiple recent falls and states he was at [facility] recently. He does not specifically remember his surgery but also states he had a recent elbow injury. He is wearing a sling today. He denies any significant pain today. He does have significant weakness however. He has difficulty ambulating due to weakness and balance. The patient states the pain is made worse with activity and relieved with rest. Radha, 22/10 [Bending] : worsened by bending [Lifting] : worsened by lifting [Recumbency] : relieved by recumbency [Rest] : relieved by rest

## 2019-04-08 NOTE — PHYSICAL EXAM
[de-identified] : Physical Exam:\par General: Well appearing, no acute distress, A&O\par Neurologic: A&Ox3, No focal deficits\par Head: NCAT without abrasions, lacerations, or ecchymosis to head, face, or scalp\par Respiratory: Equal chest wall expansion bilaterally, no accessory muscle use\par Lymphatic: No lymphadenopathy palpated\par Skin: Warm and dry\par Psychiatric: Normal mood and affect\par \par Right upper extremity:\par No tenderness to palpation along elbow. Full elbow range of motion.\par 4/5 Biceps, triceps\par SILT r/m/u\par \par Lower Extremities:\par RIGHT LE: No deformities, abrasions, or other signs of trauma at hip, thigh, knee, leg, ankle or foot.  Full ROM without pain at hip, knee, ankle and toe with negative log-roll and Stinchfield tests.\par \par RLE strength: 		Hip flexion		4/5\par 			Quads			4/5\par 			Hamstrings		4/5\par 			Tib Anterior		4/5\par 			Gastroc		4/5\par 			\par RLE sensation intact to sural/saphenous/sup peroneal/deep peroneal/post tib distributions\par 2+ DP/PT pulses with good cap refill distally\par \par LEFT LE: No deformities, abrasions, or other signs of trauma at hip, thigh, knee, leg, ankle or foot.  Full ROM without pain at hip, knee, ankle and toe with negative log-roll and Stinchfield tests.\par \par LLE strength: 		Hip flexion		4/5\par 			Quads			4/5\par 			Hamstrings		4/5\par 			Tib Anterior		4/5\par 			Gastroc		4/5\par 			\par LLE sensation intact to sural/saphenous/sup peroneal/deep peroneal/post tib distributions\par 2+ DP/PT pulses with good cap refill distally [de-identified] : Plain films of the bilateral hips and right elbow were obtained today. They show a healing intertrochanteric hip fracture on the right. There is evidence of anterior pelvic ring fracture with callus formation. No other signs of acute fractures are noted. Right elbow shows no sign of acute pathology

## 2019-07-09 ENCOUNTER — EMERGENCY (EMERGENCY)
Facility: HOSPITAL | Age: 54
LOS: 1 days | Discharge: OTHER INSTITUTION | End: 2019-07-09
Attending: EMERGENCY MEDICINE
Payer: MEDICAID

## 2019-07-09 VITALS
RESPIRATION RATE: 18 BRPM | HEART RATE: 88 BPM | TEMPERATURE: 99 F | DIASTOLIC BLOOD PRESSURE: 74 MMHG | SYSTOLIC BLOOD PRESSURE: 114 MMHG | HEIGHT: 70 IN | OXYGEN SATURATION: 98 % | WEIGHT: 149.91 LBS

## 2019-07-09 DIAGNOSIS — Z87.81 PERSONAL HISTORY OF (HEALED) TRAUMATIC FRACTURE: Chronic | ICD-10-CM

## 2019-07-09 LAB — ETHANOL SERPL-MCNC: 293 MG/DL — SIGNIFICANT CHANGE UP

## 2019-07-09 PROCEDURE — 99285 EMERGENCY DEPT VISIT HI MDM: CPT

## 2019-07-09 NOTE — ED ADULT TRIAGE NOTE - CHIEF COMPLAINT QUOTE
pt BIBA, ALOOB c/o alcohol intox. pt deines any pain or discomfort, denies chest pain. belongings secured, pt placed in yellow gown.

## 2019-07-09 NOTE — ED PROVIDER NOTE - PROGRESS NOTE DETAILS
Pt observed in ED.  Awake and alert at this time and stable for d/c at this time Pt observed in ED.  Awake and alert at this time but tremulous, tachycardic and hypertensive.  Signed iut to Dr. Otoole pending re-evaluation, Rx and final disposition

## 2019-07-09 NOTE — ED PROVIDER NOTE - OBJECTIVE STATEMENT
54 year old male with pmh etoh abuse presents with alcohol intoxication. Pt states that he "drank a lot" today. he denies complaints at this time, including no chest pain, headache, blurry vision, numbness, tingling, weakness, abd pain

## 2019-07-09 NOTE — ED ADULT NURSE NOTE - NSIMPLEMENTINTERV_GEN_ALL_ED
Implemented All Fall Risk Interventions:  [place] to call system. Call bell, personal items and telephone within reach. Instruct patient to call for assistance. Room bathroom lighting operational. Non-slip footwear when patient is off stretcher. Physically safe environment: no spills, clutter or unnecessary equipment. Stretcher in lowest position, wheels locked, appropriate side rails in place. Provide visual cue, wrist band, yellow gown, etc. Monitor gait and stability. Monitor for mental status changes and reorient to person, place, and time. Review medications for side effects contributing to fall risk. Reinforce activity limits and safety measures with patient and family.

## 2019-07-10 VITALS
RESPIRATION RATE: 20 BRPM | OXYGEN SATURATION: 98 % | DIASTOLIC BLOOD PRESSURE: 90 MMHG | HEART RATE: 99 BPM | SYSTOLIC BLOOD PRESSURE: 159 MMHG

## 2019-07-10 LAB
ALBUMIN SERPL ELPH-MCNC: 4.3 G/DL — SIGNIFICANT CHANGE UP (ref 3.3–5.2)
ALP SERPL-CCNC: 110 U/L — SIGNIFICANT CHANGE UP (ref 40–120)
ALT FLD-CCNC: 98 U/L — HIGH
ANION GAP SERPL CALC-SCNC: 15 MMOL/L — SIGNIFICANT CHANGE UP (ref 5–17)
AST SERPL-CCNC: 253 U/L — HIGH
BASOPHILS # BLD AUTO: 0.07 K/UL — SIGNIFICANT CHANGE UP (ref 0–0.2)
BASOPHILS NFR BLD AUTO: 1 % — SIGNIFICANT CHANGE UP (ref 0–2)
BILIRUB SERPL-MCNC: 0.5 MG/DL — SIGNIFICANT CHANGE UP (ref 0.4–2)
BUN SERPL-MCNC: 8 MG/DL — SIGNIFICANT CHANGE UP (ref 8–20)
CALCIUM SERPL-MCNC: 9.6 MG/DL — SIGNIFICANT CHANGE UP (ref 8.6–10.2)
CHLORIDE SERPL-SCNC: 102 MMOL/L — SIGNIFICANT CHANGE UP (ref 98–107)
CO2 SERPL-SCNC: 23 MMOL/L — SIGNIFICANT CHANGE UP (ref 22–29)
CREAT SERPL-MCNC: 0.83 MG/DL — SIGNIFICANT CHANGE UP (ref 0.5–1.3)
EOSINOPHIL # BLD AUTO: 0.46 K/UL — SIGNIFICANT CHANGE UP (ref 0–0.5)
EOSINOPHIL NFR BLD AUTO: 6.5 % — HIGH (ref 0–6)
ETHANOL SERPL-MCNC: <10 MG/DL — SIGNIFICANT CHANGE UP
GLUCOSE SERPL-MCNC: 115 MG/DL — SIGNIFICANT CHANGE UP (ref 70–115)
HCT VFR BLD CALC: 38.2 % — LOW (ref 39–50)
HGB BLD-MCNC: 12.9 G/DL — LOW (ref 13–17)
IMM GRANULOCYTES NFR BLD AUTO: 0.3 % — SIGNIFICANT CHANGE UP (ref 0–1.5)
LYMPHOCYTES # BLD AUTO: 2.33 K/UL — SIGNIFICANT CHANGE UP (ref 1–3.3)
LYMPHOCYTES # BLD AUTO: 32.7 % — SIGNIFICANT CHANGE UP (ref 13–44)
MCHC RBC-ENTMCNC: 25.6 PG — LOW (ref 27–34)
MCHC RBC-ENTMCNC: 33.8 GM/DL — SIGNIFICANT CHANGE UP (ref 32–36)
MCV RBC AUTO: 75.8 FL — LOW (ref 80–100)
MONOCYTES # BLD AUTO: 0.77 K/UL — SIGNIFICANT CHANGE UP (ref 0–0.9)
MONOCYTES NFR BLD AUTO: 10.8 % — SIGNIFICANT CHANGE UP (ref 2–14)
NEUTROPHILS # BLD AUTO: 3.48 K/UL — SIGNIFICANT CHANGE UP (ref 1.8–7.4)
NEUTROPHILS NFR BLD AUTO: 48.7 % — SIGNIFICANT CHANGE UP (ref 43–77)
PLATELET # BLD AUTO: 153 K/UL — SIGNIFICANT CHANGE UP (ref 150–400)
POTASSIUM SERPL-MCNC: 4.3 MMOL/L — SIGNIFICANT CHANGE UP (ref 3.5–5.3)
POTASSIUM SERPL-SCNC: 4.3 MMOL/L — SIGNIFICANT CHANGE UP (ref 3.5–5.3)
PROT SERPL-MCNC: 7.5 G/DL — SIGNIFICANT CHANGE UP (ref 6.6–8.7)
RBC # BLD: 5.04 M/UL — SIGNIFICANT CHANGE UP (ref 4.2–5.8)
RBC # FLD: 16 % — HIGH (ref 10.3–14.5)
SODIUM SERPL-SCNC: 140 MMOL/L — SIGNIFICANT CHANGE UP (ref 135–145)
WBC # BLD: 7.13 K/UL — SIGNIFICANT CHANGE UP (ref 3.8–10.5)
WBC # FLD AUTO: 7.13 K/UL — SIGNIFICANT CHANGE UP (ref 3.8–10.5)

## 2019-07-10 PROCEDURE — 97167 OT EVAL HIGH COMPLEX 60 MIN: CPT

## 2019-07-10 PROCEDURE — 72192 CT PELVIS W/O DYE: CPT

## 2019-07-10 PROCEDURE — 36415 COLL VENOUS BLD VENIPUNCTURE: CPT

## 2019-07-10 PROCEDURE — 73130 X-RAY EXAM OF HAND: CPT

## 2019-07-10 PROCEDURE — 80053 COMPREHEN METABOLIC PANEL: CPT

## 2019-07-10 PROCEDURE — 86900 BLOOD TYPING SEROLOGIC ABO: CPT

## 2019-07-10 PROCEDURE — 85610 PROTHROMBIN TIME: CPT

## 2019-07-10 PROCEDURE — 85027 COMPLETE CBC AUTOMATED: CPT

## 2019-07-10 PROCEDURE — 96374 THER/PROPH/DIAG INJ IV PUSH: CPT

## 2019-07-10 PROCEDURE — 73521 X-RAY EXAM HIPS BI 2 VIEWS: CPT

## 2019-07-10 PROCEDURE — 86901 BLOOD TYPING SEROLOGIC RH(D): CPT

## 2019-07-10 PROCEDURE — 83735 ASSAY OF MAGNESIUM: CPT

## 2019-07-10 PROCEDURE — 86850 RBC ANTIBODY SCREEN: CPT

## 2019-07-10 PROCEDURE — 99285 EMERGENCY DEPT VISIT HI MDM: CPT | Mod: 25

## 2019-07-10 PROCEDURE — 85730 THROMBOPLASTIN TIME PARTIAL: CPT

## 2019-07-10 PROCEDURE — 93005 ELECTROCARDIOGRAM TRACING: CPT

## 2019-07-10 PROCEDURE — 84100 ASSAY OF PHOSPHORUS: CPT

## 2019-07-10 PROCEDURE — 73070 X-RAY EXAM OF ELBOW: CPT

## 2019-07-10 PROCEDURE — 76377 3D RENDER W/INTRP POSTPROCES: CPT

## 2019-07-10 PROCEDURE — 80076 HEPATIC FUNCTION PANEL: CPT

## 2019-07-10 PROCEDURE — 97116 GAIT TRAINING THERAPY: CPT

## 2019-07-10 PROCEDURE — 80307 DRUG TEST PRSMV CHEM ANLYZR: CPT

## 2019-07-10 PROCEDURE — 81001 URINALYSIS AUTO W/SCOPE: CPT

## 2019-07-10 PROCEDURE — 96375 TX/PRO/DX INJ NEW DRUG ADDON: CPT

## 2019-07-10 PROCEDURE — 71045 X-RAY EXAM CHEST 1 VIEW: CPT

## 2019-07-10 PROCEDURE — 97110 THERAPEUTIC EXERCISES: CPT

## 2019-07-10 PROCEDURE — 80048 BASIC METABOLIC PNL TOTAL CA: CPT

## 2019-07-10 PROCEDURE — 73110 X-RAY EXAM OF WRIST: CPT

## 2019-07-10 RX ORDER — THIAMINE MONONITRATE (VIT B1) 100 MG
100 TABLET ORAL ONCE
Refills: 0 | Status: COMPLETED | OUTPATIENT
Start: 2019-07-10 | End: 2019-07-10

## 2019-07-10 RX ORDER — ONDANSETRON 8 MG/1
4 TABLET, FILM COATED ORAL ONCE
Refills: 0 | Status: COMPLETED | OUTPATIENT
Start: 2019-07-10 | End: 2019-07-10

## 2019-07-10 RX ADMIN — Medication 100 MILLIGRAM(S): at 08:30

## 2019-07-10 RX ADMIN — ONDANSETRON 4 MILLIGRAM(S): 8 TABLET, FILM COATED ORAL at 07:01

## 2019-07-10 RX ADMIN — Medication 2 MILLIGRAM(S): at 08:30

## 2019-07-10 RX ADMIN — Medication 50 MILLIGRAM(S): at 07:19

## 2019-07-10 NOTE — SBIRT NOTE ADULT - NSSBIRTALCNAMETXFAC_GEN_A_CORE
FELIPE - Doc to Doc - [phone] - "Dial "0" - Dr Otoole to Dr Porter . Nurse to nurse [phone] - TRANSFER via ELIAN SW

## 2019-07-10 NOTE — SBIRT NOTE ADULT - NSSBIRTALCACTIVEREFTXDET_GEN_A_CORE
Provided SBIRT services: Referral to Treatment Performed. Screening results reviewed w pt and pt provided info re healthy guidelines and potential negative consequences associated with level of risk. Motivation and readiness to reduce or stop use was discussed and goals and activities to make changes were suggested/offered. Referral for complete assessment and level of care determination at a certified treatment facility was completed by contacting the treatment facility via phone, and add apt info as noted below:

## 2019-07-10 NOTE — CHART NOTE - NSCHARTNOTEFT_GEN_A_CORE
SOCIAL WORK NOTE:  WAS NOTIFIED BY SBIRT THAT DR DESIR COMPLETED MD TO MD AND DR VEGA FROM [facility] ACCEPTED THE PATIENT FOR ADMISSION INTO INPATIENT DETOX UNIT TODAY.  THIS WORKER FAXED ALL CLINICALS TO INTAKE RN AND DR VEGA TO # [phone] AND AMBULANCE ARRANGED FOR A 12:30-1:00 PM PICKUP AND TRANSFER TO [facility].  RN, MD AND SBIRT NOTIFIED

## 2019-07-10 NOTE — ED ADULT NURSE REASSESSMENT NOTE - NS ED NURSE REASSESS COMMENT FT1
Pt awake, actively vomiting, findings reported to MD Bla[place]in, awaiting further orders.
Pt laying on stretcher, appears comfortable, requesting a meal, meal tray provided as requested, pt repositioned for comfort, safety and comfort maintained.
Pt recvd ambulating halls in yellow gown, with steady gait, pt calm and cooperative, reoriented to situation, redirected back to stretcher, television and call bell given to pt, siderails raised, bed in low locked position pt safety and comfort maintained.
Pt resting comfortably on stretcher watching television, offers no complaints, calm and cooperative to care, safety and comfort maintained.
pt care assumed at 0720, no apparent distress noted at this time, charting as noted. pt received Alert and Oriented to person, place, situation and time sitting on chair at bedside. CIWA=9. pt s/p vomiting episode. pt given librium as per orders. pt assisted to bathroom. pt placed on cardiac monitor. pt is sinus tach. pt remains in yellow gown with no belongings at the bedside. MD GREGG advised of pt vital signs, awaiting further orders. pt educated on plan of care, pt able to successfully teach back plan of care to RN, RN will continue to reeducate pt during hospital stay.
pt able to tolerate PO challenge. pt remains sinus tach on cardiac monitor. pt is asking for lunch a this. RN told pt he will get meal tray when lunch is served. pt is watching tv at this time. pt awaiting transfer to detox. pt educated on plan of care, pt able to successfully teach back plan of care to RN, RN will continue to reeducate pt during hospital stay.
RN gave report to Frederic dougherty [facility] via telephone. Pt awaiting ambulance at this time. Pt safety maintained. pt educated on plan of care, pt able to successfully teach back plan of care to RN, RN will continue to reeducate pt during hospital stay.

## 2019-07-29 ENCOUNTER — EMERGENCY (EMERGENCY)
Facility: HOSPITAL | Age: 54
LOS: 1 days | Discharge: HOME | End: 2019-07-29
Attending: EMERGENCY MEDICINE
Payer: MEDICAID

## 2019-07-29 VITALS
DIASTOLIC BLOOD PRESSURE: 80 MMHG | SYSTOLIC BLOOD PRESSURE: 119 MMHG | HEART RATE: 83 BPM | OXYGEN SATURATION: 95 % | TEMPERATURE: 98 F | RESPIRATION RATE: 16 BRPM

## 2019-07-29 DIAGNOSIS — Z87.81 PERSONAL HISTORY OF (HEALED) TRAUMATIC FRACTURE: Chronic | ICD-10-CM

## 2019-07-29 PROCEDURE — 99285 EMERGENCY DEPT VISIT HI MDM: CPT

## 2019-07-29 PROCEDURE — 82962 GLUCOSE BLOOD TEST: CPT

## 2019-07-29 PROCEDURE — 99284 EMERGENCY DEPT VISIT MOD MDM: CPT

## 2019-07-29 NOTE — ED ADULT NURSE NOTE - OBJECTIVE STATEMENT
Assumed pt care, pt received in yellow gown and red nonskid socks. Pt with ALOOB. Pt is A+Ox1 at this time, oriented to person. Pt is noncooperative with RN assessment. Pt does not answer any questions asked by RN. Pt is moving all extremities without issue, no obvious signs of trauma noted. Pt respirations are spontaneous even and unlabored. VS as noted.

## 2019-07-29 NOTE — ED ADULT NURSE NOTE - CHIEF COMPLAINT QUOTE
Patient found laying in the middle of the road, admits to drinking 6 beers.  Pt slurring words, does not respond appropriately to commands.  No signs of distress at this time. Pt changed into yellow gown and red socks, belongings secured in  per policy.

## 2019-07-29 NOTE — ED ADULT NURSE NOTE - CAS ELECT INFOMATION PROVIDED
DC instructions/Pt ambulated independently out of ED  with steady gait, and left safely at this time.

## 2019-07-29 NOTE — ED ADULT TRIAGE NOTE - CHIEF COMPLAINT QUOTE
Patient found laying in the middle of the road, admits to drinking 6 beers.  Pt slurring words, does not respond appropriately to commands but isPt changed into yellow gown, belongings secured in  per policy. Patient found laying in the middle of the road, admits to drinking 6 beers.  Pt slurring words, does not respond appropriately to commands.  No signs of distress at this time. Pt changed into yellow gown and red socks, belongings secured in  per policy.

## 2019-07-29 NOTE — ED ADULT NURSE NOTE - NSIMPLEMENTINTERV_GEN_ALL_ED
Implemented All Fall Risk Interventions:  [place] to call system. Call bell, personal items and telephone within reach. Instruct patient to call for assistance. Room bathroom lighting operational. Non-slip footwear when patient is off stretcher. Physically safe environment: no spills, clutter or unnecessary equipment. Stretcher in lowest position, wheels locked, appropriate side rails in place. Provide visual cue, wrist band, yellow gown, etc. Monitor gait and stability. Monitor for mental status changes and reorient to person, place, and time. Review medications for side effects contributing to fall risk. Reinforce activity limits and safety measures with patient and family.

## 2019-07-29 NOTE — ED ADULT NURSE NOTE - NS TRANSFER PATIENT BELONGINGS
Other belongings/Clothing/pants,shirt,socks,sneakers,belt and wallet, no money in wallet,license and multiple business cards

## 2019-07-30 VITALS
RESPIRATION RATE: 18 BRPM | OXYGEN SATURATION: 98 % | SYSTOLIC BLOOD PRESSURE: 140 MMHG | HEART RATE: 89 BPM | DIASTOLIC BLOOD PRESSURE: 68 MMHG

## 2019-07-30 NOTE — ED ADULT NURSE REASSESSMENT NOTE - NS ED NURSE REASSESS COMMENT FT1
MD THOMAS at bedside for reeval and dispo.
Pt assisted out of bed to assess gait. Pt able to ambulate independently, appears to be at pt baseline. Ambulates slowly with steady gait.
Pt more coherent at this time, awake and alert. Pt is A+Ox2, disoriented to time/situation. Pt watching TV in stretcher. Pt safety maintained, side rails up, stretcher in lowest position and wheels locked, call bell within reach and use reinforced by RN.
Pt provided with ED meal box and fluids. Pt able to tolerate PO intake without issue.

## 2019-07-30 NOTE — ED PROVIDER NOTE - CLINICAL SUMMARY MEDICAL DECISION MAKING FREE TEXT BOX
clincally sober ambulating in nad refusing etoh rehab states will take a taxi back to his apartment no si or hi return to ed for intractable HA, persistent vomiting, or new onset motor/sensory deficits

## 2019-08-01 PROCEDURE — G9005: CPT

## 2020-02-16 ENCOUNTER — EMERGENCY (EMERGENCY)
Facility: HOSPITAL | Age: 55
LOS: 1 days | Discharge: HOME | End: 2020-02-16
Attending: EMERGENCY MEDICINE
Payer: MEDICAID

## 2020-02-16 VITALS
HEIGHT: 72 IN | SYSTOLIC BLOOD PRESSURE: 155 MMHG | WEIGHT: 149.91 LBS | TEMPERATURE: 98 F | HEART RATE: 72 BPM | DIASTOLIC BLOOD PRESSURE: 96 MMHG | RESPIRATION RATE: 17 BRPM | OXYGEN SATURATION: 95 %

## 2020-02-16 DIAGNOSIS — Z87.81 PERSONAL HISTORY OF (HEALED) TRAUMATIC FRACTURE: Chronic | ICD-10-CM

## 2020-02-16 PROCEDURE — 99285 EMERGENCY DEPT VISIT HI MDM: CPT

## 2020-02-16 PROCEDURE — 99284 EMERGENCY DEPT VISIT MOD MDM: CPT

## 2020-02-16 PROCEDURE — 82962 GLUCOSE BLOOD TEST: CPT

## 2020-02-16 NOTE — ED PROVIDER NOTE - CONSTITUTIONAL, MLM
normal... ALOOB, awake, alert, oriented to person, place, time/situation and in no apparent distress.

## 2020-02-16 NOTE — ED PROVIDER NOTE - PATIENT PORTAL LINK FT
You can access the FollowMyHealth Patient Portal offered by [facility] by registering at the following website: [email or web address]. By joining [employer]’s FollowMyHealth portal, you will also be able to view your health information using other applications (apps) compatible with our system. You can access the FollowMyHealth Patient Portal offered by [facility] by registering at the following website: [email or web address]. By joining [employer]’s FollowMyHealth portal, you will also be able to view your health information using other applications (apps) compatible with our system.

## 2020-02-16 NOTE — ED PROVIDER NOTE - OBJECTIVE STATEMENT
55 y/o M pt with hx of EtOH abuse, and HTN presents to ED after being found stumbling along the side of the road. Pt admits to drinking alcohol today. denies fever. denies HA or neck pain. no chest pain or sob. no abd pain. no n/v/d. no urinary f/u/d. no back pain. no motor or sensory deficits. denies illicit drug use. no recent travel. no rash. no other acute issues symptoms or concerns.

## 2020-02-16 NOTE — ED PROVIDER NOTE - CLINICAL SUMMARY MEDICAL DECISION MAKING FREE TEXT BOX
clincally sober no external sings trauma return to ed for intractable HA, persistent vomiting, or new onset motor/sensory deficits

## 2020-02-16 NOTE — ED ADULT TRIAGE NOTE - CHIEF COMPLAINT QUOTE
BIBA c/o found stumbling in street. Patient is mumbling admits to drinking, no obvious signs of trauma, bleeding or injury. Placed in a yellow gown belongings secured. MD Bradford evaluated

## 2020-02-17 VITALS
OXYGEN SATURATION: 98 % | HEART RATE: 74 BPM | TEMPERATURE: 98 F | SYSTOLIC BLOOD PRESSURE: 154 MMHG | RESPIRATION RATE: 18 BRPM | DIASTOLIC BLOOD PRESSURE: 66 MMHG

## 2020-04-02 ENCOUNTER — APPOINTMENT (OUTPATIENT)
Dept: CARDIOLOGY | Facility: CLINIC | Age: 55
End: 2020-04-02
Payer: MEDICAID

## 2020-04-02 VITALS
WEIGHT: 132 LBS | BODY MASS INDEX: 19.55 KG/M2 | HEIGHT: 69 IN | DIASTOLIC BLOOD PRESSURE: 79 MMHG | HEART RATE: 61 BPM | SYSTOLIC BLOOD PRESSURE: 144 MMHG

## 2020-04-02 VITALS
DIASTOLIC BLOOD PRESSURE: 83 MMHG | SYSTOLIC BLOOD PRESSURE: 132 MMHG | RESPIRATION RATE: 16 BRPM | OXYGEN SATURATION: 100 %

## 2020-04-02 DIAGNOSIS — Z82.49 FAMILY HISTORY OF ISCHEMIC HEART DISEASE AND OTHER DISEASES OF THE CIRCULATORY SYSTEM: ICD-10-CM

## 2020-04-02 DIAGNOSIS — R07.89 OTHER CHEST PAIN: ICD-10-CM

## 2020-04-02 DIAGNOSIS — Z00.00 ENCOUNTER FOR GENERAL ADULT MEDICAL EXAMINATION W/OUT ABNORMAL FINDINGS: ICD-10-CM

## 2020-04-02 DIAGNOSIS — Z56.0 UNEMPLOYMENT, UNSPECIFIED: ICD-10-CM

## 2020-04-02 PROCEDURE — 99204 OFFICE O/P NEW MOD 45 MIN: CPT

## 2020-04-02 PROCEDURE — 93000 ELECTROCARDIOGRAM COMPLETE: CPT

## 2020-04-02 SDOH — ECONOMIC STABILITY - INCOME SECURITY: UNEMPLOYMENT, UNSPECIFIED: Z56.0

## 2020-04-02 NOTE — PHYSICAL EXAM
[General Appearance - Well Developed] : well developed [General Appearance - In No Acute Distress] : no acute distress [Normal Conjunctiva] : the conjunctiva exhibited no abnormalities [Eyelids - No Xanthelasma] : the eyelids demonstrated no xanthelasmas [Normal Oral Mucosa] : normal oral mucosa [Normal Jugular Venous V Waves Present] : normal jugular venous V waves present [FreeTextEntry1] : no bruits  [Heart Rate And Rhythm] : heart rate and rhythm were normal [Heart Sounds] : normal S1 and S2 [Murmurs] : no murmurs present [Arterial Pulses Normal] : the arterial pulses were normal [Edema] : no peripheral edema present [Respiration, Rhythm And Depth] : normal respiratory rhythm and effort [Auscultation Breath Sounds / Voice Sounds] : lungs were clear to auscultation bilaterally [Bowel Sounds] : normal bowel sounds [Abdomen Soft] : soft [Abdomen Tenderness] : non-tender [Abnormal Walk] : normal gait [Nail Clubbing] : no clubbing of the fingernails [Cyanosis, Localized] : no localized cyanosis [Skin Color & Pigmentation] : normal skin color and pigmentation [Skin Turgor] : normal skin turgor [No Venous Stasis] : no venous stasis [Oriented To Time, Place, And Person] : oriented to person, place, and time [Impaired Insight] : insight and judgment were intact [Affect] : the affect was normal

## 2020-04-02 NOTE — REVIEW OF SYSTEMS
[Headache] : no headache [Recent Weight Gain (___ Lbs)] : recent [unfilled] ~Ulb weight gain [Feeling Fatigued] : not feeling fatigued [Recent Weight Loss (___ Lbs)] : no recent weight loss [Blurry Vision] : no blurred vision [Seeing Double (Diplopia)] : no diplopia [Eyeglasses] : not currently wearing eyeglasses [Earache] : no earache [Discharge From The Ears] : no discharge from the ears [Mouth Sores] : no mouth sores [Sore Throat] : no sore throat [see HPI] : see HPI [Shortness Of Breath] : no shortness of breath [Dyspnea on exertion] : dyspnea during exertion [Lower Ext Edema] : no extremity edema [Cough] : cough [Wheezing] : no wheezing [Coughing Up Blood] : no hemoptysis [Abdominal Pain] : no abdominal pain [Nausea] : no nausea [Vomiting] : no vomiting [Heartburn] : no heartburn [Urinary Frequency] : no change in urinary frequency [Hematuria] : no hematuria [Joint Pain] : joint pain [Joint Swelling] : no joint swelling [Skin: A Rash] : no rash: [Skin Lesions] : no skin lesions [Dizziness] : no dizziness [Tremor] : no tremor was seen [Confusion] : no confusion was observed [Memory Lapses Or Loss] : no memory lapses or loss [Anxiety] : no anxiety [Excessive Thirst] : no polydipsia [Easy Bleeding] : no tendency for easy bleeding [Easy Bruising] : no tendency for easy bruising

## 2020-04-02 NOTE — ASSESSMENT
[FreeTextEntry1] : This is a 54 year old male with extensive smoking history who present with burning in the left side of chest not related to activity. Patient is currently CP free and has a normal EKG.\par He smokes 2.5 packs of cigs per day and uses hard liquor on the weekends. \par His BP is normal today on examination. \par We spoke in length regarding smoking cessation and available pharmacologic intervention to aid in quitting. These were offered to  him.\par He is advised not to exercise.\par Obtain CXR, PA and lateral.\par Echocardiogram and stress test with burning chest discomfort.\par Low dose aspirin for now until we can exclude cardiac etiology for his symptoms\par patient was advised to call 911 and go to the nearest emergency room with recurrent chest pain.\par He will see me once the above are completed. \par \par

## 2020-04-02 NOTE — HISTORY OF PRESENT ILLNESS
[FreeTextEntry1] : 53 yo male who is seen today due to left sided chest burning. \par Pt denies any history of hypertension or hyperlipidemia. He has felt burning sensation close to left nipple for the past few weeks, at times related to activity but also present at rest. He does not exercise. There is no radiation of this pain no palpitations, syncope or presyncope. No aggravating or alleviating symptoms, Symptoms last for less than 5 minutes.\par He smokes 2.5 packs of cigs per day for over 35 years. Drinks heavily on Saturday and Sunday. Denies any chest discomfort related to food intake. \par \par EKG: NSR, normal axis and intervals, no st/t changes.

## 2020-04-06 ENCOUNTER — APPOINTMENT (OUTPATIENT)
Dept: CARDIOLOGY | Facility: CLINIC | Age: 55
End: 2020-04-06

## 2020-04-08 ENCOUNTER — APPOINTMENT (OUTPATIENT)
Dept: RADIOLOGY | Facility: CLINIC | Age: 55
End: 2020-04-08
Payer: MEDICAID

## 2020-04-08 ENCOUNTER — OUTPATIENT (OUTPATIENT)
Dept: OUTPATIENT SERVICES | Facility: HOSPITAL | Age: 55
LOS: 1 days | End: 2020-04-08
Payer: MEDICAID

## 2020-04-08 DIAGNOSIS — Z87.81 PERSONAL HISTORY OF (HEALED) TRAUMATIC FRACTURE: Chronic | ICD-10-CM

## 2020-04-08 DIAGNOSIS — R07.89 OTHER CHEST PAIN: ICD-10-CM

## 2020-04-08 PROCEDURE — 71046 X-RAY EXAM CHEST 2 VIEWS: CPT | Mod: 26

## 2020-04-08 PROCEDURE — 71046 X-RAY EXAM CHEST 2 VIEWS: CPT

## 2020-04-09 ENCOUNTER — APPOINTMENT (OUTPATIENT)
Dept: CARDIOLOGY | Facility: CLINIC | Age: 55
End: 2020-04-09

## 2020-04-13 ENCOUNTER — APPOINTMENT (OUTPATIENT)
Dept: CARDIOLOGY | Facility: CLINIC | Age: 55
End: 2020-04-13

## 2020-06-29 NOTE — PATIENT PROFILE ADULT - BRADEN SCORE
Pt given DC instructions. PT verbalized understanding. Pt ambulated out of ED with steady gait in stable condition with all personal items.     13

## 2020-09-24 ENCOUNTER — APPOINTMENT (OUTPATIENT)
Dept: PULMONOLOGY | Facility: CLINIC | Age: 55
End: 2020-09-24
Payer: MEDICAID

## 2020-09-24 VITALS
DIASTOLIC BLOOD PRESSURE: 72 MMHG | WEIGHT: 138 LBS | RESPIRATION RATE: 16 BRPM | SYSTOLIC BLOOD PRESSURE: 126 MMHG | OXYGEN SATURATION: 98 % | HEIGHT: 69 IN | HEART RATE: 98 BPM | BODY MASS INDEX: 20.44 KG/M2

## 2020-09-24 PROCEDURE — 99204 OFFICE O/P NEW MOD 45 MIN: CPT | Mod: 25

## 2020-09-24 PROCEDURE — 99406 BEHAV CHNG SMOKING 3-10 MIN: CPT

## 2020-09-24 NOTE — COUNSELING
[Risk of tobacco use and health benefits of smoking cessation discussed] : Risk of tobacco use and health benefits of smoking cessation discussed [Cessation strategies including cessation program discussed] : Cessation strategies including cessation program discussed [Use of nicotine replacement therapies and other medications discussed] : Use of nicotine replacement therapies and other medications discussed [Encouraged to pick a quit date and identify support needed to quit] : Encouraged to pick a quit date and identify support needed to quit [Willing to Quit Smoking] : Not willing to quit smoking [Tobacco Use Cessation Intermediate Greater Than 3 Minutes Up to 10 Minutes] : Tobacco Use Cessation Intermediate Greater Than 3 Minutes Up to 10 Minutes [FreeTextEntry1] : 5

## 2020-09-24 NOTE — CONSULT LETTER
[Dear  ___] : Dear  [unfilled], [Consult Letter:] : I had the pleasure of evaluating your patient, [unfilled]. [Please see my note below.] : Please see my note below. [Consult Closing:] : Thank you very much for allowing me to participate in the care of this patient.  If you have any questions, please do not hesitate to contact me. [Sincerely,] : Sincerely, [FreeTextEntry3] : April Lam MD FCCP\par D-ABSM\par ABIM board certified in  Pulmonary diseases, Sleep medicine\par Internal medicine\par

## 2020-09-24 NOTE — ASSESSMENT
[FreeTextEntry1] : Patient with possible interstitial lung disease. His exam is not terribly significant. We'll get a CT scan of the chest performed. Also pulmonary function studies have been scheduled. Followup will be in 3 weeks. The patient is not sure if he wishes to quit smoking. We will discuss it again at the next visit.

## 2020-10-09 ENCOUNTER — APPOINTMENT (OUTPATIENT)
Dept: CT IMAGING | Facility: CLINIC | Age: 55
End: 2020-10-09
Payer: MEDICAID

## 2020-10-09 ENCOUNTER — OUTPATIENT (OUTPATIENT)
Dept: OUTPATIENT SERVICES | Facility: HOSPITAL | Age: 55
LOS: 1 days | End: 2020-10-09
Payer: MEDICAID

## 2020-10-09 DIAGNOSIS — Z87.81 PERSONAL HISTORY OF (HEALED) TRAUMATIC FRACTURE: Chronic | ICD-10-CM

## 2020-10-09 DIAGNOSIS — R05 COUGH: ICD-10-CM

## 2020-10-09 PROCEDURE — 71250 CT THORAX DX C-: CPT

## 2020-10-09 PROCEDURE — 71250 CT THORAX DX C-: CPT | Mod: 26

## 2020-10-12 ENCOUNTER — APPOINTMENT (OUTPATIENT)
Dept: DISASTER EMERGENCY | Facility: CLINIC | Age: 55
End: 2020-10-12

## 2020-10-14 LAB — SARS-COV-2 N GENE NPH QL NAA+PROBE: NOT DETECTED

## 2020-10-15 ENCOUNTER — APPOINTMENT (OUTPATIENT)
Dept: PULMONOLOGY | Facility: CLINIC | Age: 55
End: 2020-10-15
Payer: MEDICAID

## 2020-10-15 VITALS — BODY MASS INDEX: 20.92 KG/M2 | WEIGHT: 138 LBS | HEIGHT: 68 IN

## 2020-10-15 VITALS — HEART RATE: 93 BPM | SYSTOLIC BLOOD PRESSURE: 120 MMHG | OXYGEN SATURATION: 98 % | DIASTOLIC BLOOD PRESSURE: 70 MMHG

## 2020-10-15 PROCEDURE — 99214 OFFICE O/P EST MOD 30 MIN: CPT | Mod: 25

## 2020-10-15 PROCEDURE — 94727 GAS DIL/WSHOT DETER LNG VOL: CPT

## 2020-10-15 PROCEDURE — 85018 HEMOGLOBIN: CPT | Mod: QW

## 2020-10-15 PROCEDURE — 94010 BREATHING CAPACITY TEST: CPT

## 2020-10-15 PROCEDURE — 94729 DIFFUSING CAPACITY: CPT

## 2020-10-15 RX ORDER — AMLODIPINE BESYLATE 10 MG/1
10 TABLET ORAL
Refills: 0 | Status: ACTIVE | COMMUNITY

## 2020-10-15 NOTE — REASON FOR VISIT
[Follow-Up] : a follow-up visit [Abnormal CXR/ Chest CT] : an abnormal CXR/ chest CT [Cough] : cough

## 2020-10-15 NOTE — ASSESSMENT
[FreeTextEntry1] : Patient without COPD or interstitial disease. He has a lesion that must be followed in the right upper lobe. A followup CT will be done in 2 months. I will see him back after that. If the lesion is unchanged or growing a PET CT scan will be obtained.

## 2020-10-15 NOTE — HISTORY OF PRESENT ILLNESS
[TextBox_4] : The patient came in today to review his recent CT scan and his pulmonary function studies. Has occasional cough and denies shortness of breath.

## 2020-10-15 NOTE — CONSULT LETTER
[Dear  ___] : Dear  [unfilled], [Courtesy Letter:] : I had the pleasure of seeing your patient, [unfilled], in my office today. [Please see my note below.] : Please see my note below. [Sincerely,] : Sincerely, [Consult Closing:] : Thank you very much for allowing me to participate in the care of this patient.  If you have any questions, please do not hesitate to contact me. [FreeTextEntry3] : April Lam MD FCCP\par D-ABSM\par ABIM board certified in  Pulmonary diseases, Sleep medicine\par Internal medicine\par

## 2021-01-01 ENCOUNTER — OUTPATIENT (OUTPATIENT)
Dept: OUTPATIENT SERVICES | Facility: HOSPITAL | Age: 56
LOS: 1 days | End: 2021-01-01
Payer: MEDICAID

## 2021-01-01 ENCOUNTER — RESULT REVIEW (OUTPATIENT)
Age: 56
End: 2021-01-01

## 2021-01-01 ENCOUNTER — OUTPATIENT (OUTPATIENT)
Dept: OUTPATIENT SERVICES | Facility: HOSPITAL | Age: 56
LOS: 1 days | End: 2021-01-01

## 2021-01-01 ENCOUNTER — APPOINTMENT (OUTPATIENT)
Dept: PULMONOLOGY | Facility: CLINIC | Age: 56
End: 2021-01-01

## 2021-01-01 ENCOUNTER — APPOINTMENT (OUTPATIENT)
Dept: CT IMAGING | Facility: CLINIC | Age: 56
End: 2021-01-01
Payer: MEDICAID

## 2021-01-01 ENCOUNTER — APPOINTMENT (OUTPATIENT)
Dept: PULMONOLOGY | Facility: CLINIC | Age: 56
End: 2021-01-01
Payer: MEDICAID

## 2021-01-01 ENCOUNTER — APPOINTMENT (OUTPATIENT)
Dept: NUCLEAR MEDICINE | Facility: CLINIC | Age: 56
End: 2021-01-01
Payer: MEDICAID

## 2021-01-01 ENCOUNTER — OUTPATIENT (OUTPATIENT)
Dept: OUTPATIENT SERVICES | Facility: HOSPITAL | Age: 56
LOS: 1 days | Discharge: HOME | End: 2021-01-01

## 2021-01-01 ENCOUNTER — APPOINTMENT (OUTPATIENT)
Dept: HEMATOLOGY ONCOLOGY | Facility: CLINIC | Age: 56
End: 2021-01-01
Payer: MEDICAID

## 2021-01-01 ENCOUNTER — TRANSCRIPTION ENCOUNTER (OUTPATIENT)
Age: 56
End: 2021-01-01

## 2021-01-01 ENCOUNTER — NON-APPOINTMENT (OUTPATIENT)
Age: 56
End: 2021-01-01

## 2021-01-01 ENCOUNTER — LABORATORY RESULT (OUTPATIENT)
Age: 56
End: 2021-01-01

## 2021-01-01 ENCOUNTER — APPOINTMENT (OUTPATIENT)
Dept: MRI IMAGING | Facility: CLINIC | Age: 56
End: 2021-01-01
Payer: MEDICAID

## 2021-01-01 VITALS
HEART RATE: 69 BPM | BODY MASS INDEX: 20 KG/M2 | WEIGHT: 132 LBS | OXYGEN SATURATION: 98 % | RESPIRATION RATE: 16 BRPM | DIASTOLIC BLOOD PRESSURE: 74 MMHG | SYSTOLIC BLOOD PRESSURE: 122 MMHG | HEIGHT: 68 IN

## 2021-01-01 VITALS
WEIGHT: 128 LBS | RESPIRATION RATE: 16 BRPM | DIASTOLIC BLOOD PRESSURE: 78 MMHG | SYSTOLIC BLOOD PRESSURE: 114 MMHG | HEART RATE: 72 BPM | OXYGEN SATURATION: 99 % | BODY MASS INDEX: 19.46 KG/M2

## 2021-01-01 VITALS
HEART RATE: 73 BPM | DIASTOLIC BLOOD PRESSURE: 84 MMHG | SYSTOLIC BLOOD PRESSURE: 126 MMHG | WEIGHT: 133.02 LBS | BODY MASS INDEX: 20.16 KG/M2 | HEIGHT: 68 IN | OXYGEN SATURATION: 100 %

## 2021-01-01 VITALS
RESPIRATION RATE: 16 BRPM | WEIGHT: 126 LBS | BODY MASS INDEX: 19.16 KG/M2 | HEART RATE: 68 BPM | SYSTOLIC BLOOD PRESSURE: 124 MMHG | DIASTOLIC BLOOD PRESSURE: 76 MMHG | OXYGEN SATURATION: 96 %

## 2021-01-01 DIAGNOSIS — Z87.81 PERSONAL HISTORY OF (HEALED) TRAUMATIC FRACTURE: Chronic | ICD-10-CM

## 2021-01-01 DIAGNOSIS — C34.90 MALIGNANT NEOPLASM OF UNSPECIFIED PART OF UNSPECIFIED BRONCHUS OR LUNG: ICD-10-CM

## 2021-01-01 DIAGNOSIS — R91.8 OTHER NONSPECIFIC ABNORMAL FINDING OF LUNG FIELD: ICD-10-CM

## 2021-01-01 DIAGNOSIS — Z01.818 ENCOUNTER FOR OTHER PREPROCEDURAL EXAMINATION: ICD-10-CM

## 2021-01-01 LAB
ALBUMIN SERPL ELPH-MCNC: 4.7 G/DL
ALP BLD-CCNC: 58 U/L
ALT SERPL-CCNC: 11 U/L
ANION GAP SERPL CALC-SCNC: 11 MMOL/L
ANION GAP SERPL CALC-SCNC: 12 MMOL/L
APTT BLD: 33.1 SEC
APTT BLD: 34.8 SEC
AST SERPL-CCNC: 16 U/L
BASOPHILS # BLD AUTO: 0.08 K/UL
BASOPHILS # BLD AUTO: 0.1 K/UL — SIGNIFICANT CHANGE UP (ref 0–0.2)
BASOPHILS NFR BLD AUTO: 1.3 %
BASOPHILS NFR BLD AUTO: 2.2 % — HIGH (ref 0–2)
BILIRUB SERPL-MCNC: 0.3 MG/DL
BUN SERPL-MCNC: 11 MG/DL
BUN SERPL-MCNC: 7 MG/DL
CALCIUM SERPL-MCNC: 9.5 MG/DL
CALCIUM SERPL-MCNC: 9.6 MG/DL
CEA SERPL-MCNC: 10.2 NG/ML
CHLORIDE SERPL-SCNC: 105 MMOL/L
CHLORIDE SERPL-SCNC: 105 MMOL/L
CO2 SERPL-SCNC: 23 MMOL/L
CO2 SERPL-SCNC: 25 MMOL/L
CREAT SERPL-MCNC: 0.98 MG/DL
CREAT SERPL-MCNC: 1 MG/DL
EOSINOPHIL # BLD AUTO: 0.2 K/UL — SIGNIFICANT CHANGE UP (ref 0–0.5)
EOSINOPHIL # BLD AUTO: 0.39 K/UL
EOSINOPHIL NFR BLD AUTO: 3.5 % — SIGNIFICANT CHANGE UP (ref 0–6)
EOSINOPHIL NFR BLD AUTO: 6.2 %
GLUCOSE SERPL-MCNC: 103 MG/DL
GLUCOSE SERPL-MCNC: 84 MG/DL
HBV CORE IGG+IGM SER QL: NONREACTIVE
HBV SURFACE AB SER QL: REACTIVE
HBV SURFACE AG SER QL: NONREACTIVE
HCT VFR BLD CALC: 40.4 %
HCT VFR BLD CALC: 45.4 % — SIGNIFICANT CHANGE UP (ref 39–50)
HCV AB SER QL: NONREACTIVE
HCV S/CO RATIO: 0.1 S/CO
HGB BLD-MCNC: 12.8 G/DL
HGB BLD-MCNC: 13.7 G/DL — SIGNIFICANT CHANGE UP (ref 13–17)
IMM GRANULOCYTES NFR BLD AUTO: 0.2 %
INR PPP: 0.96 RATIO
INR PPP: 1.04 RATIO
LYMPHOCYTES # BLD AUTO: 2.49 K/UL
LYMPHOCYTES # BLD AUTO: 2.5 K/UL — SIGNIFICANT CHANGE UP (ref 1–3.3)
LYMPHOCYTES # BLD AUTO: 37.3 % — SIGNIFICANT CHANGE UP (ref 13–44)
LYMPHOCYTES NFR BLD AUTO: 39.5 %
MAN DIFF?: NORMAL
MCHC RBC-ENTMCNC: 24.8 PG
MCHC RBC-ENTMCNC: 24.9 PG — LOW (ref 27–34)
MCHC RBC-ENTMCNC: 30.3 G/DL — LOW (ref 32–36)
MCHC RBC-ENTMCNC: 31.7 GM/DL
MCV RBC AUTO: 78.1 FL
MCV RBC AUTO: 82.3 FL — SIGNIFICANT CHANGE UP (ref 80–100)
MONOCYTES # BLD AUTO: 0.6 K/UL — SIGNIFICANT CHANGE UP (ref 0–0.9)
MONOCYTES # BLD AUTO: 0.67 K/UL
MONOCYTES NFR BLD AUTO: 10.6 %
MONOCYTES NFR BLD AUTO: 8.5 % — SIGNIFICANT CHANGE UP (ref 2–14)
NEUTROPHILS # BLD AUTO: 2.66 K/UL
NEUTROPHILS # BLD AUTO: 3.2 K/UL — SIGNIFICANT CHANGE UP (ref 1.8–7.4)
NEUTROPHILS NFR BLD AUTO: 42.2 %
NEUTROPHILS NFR BLD AUTO: 48.5 % — SIGNIFICANT CHANGE UP (ref 43–77)
NON-GYNECOLOGICAL CYTOLOGY STUDY: SIGNIFICANT CHANGE UP
PLATELET # BLD AUTO: 214 K/UL
PLATELET # BLD AUTO: 231 K/UL — SIGNIFICANT CHANGE UP (ref 150–400)
POTASSIUM SERPL-SCNC: 4.2 MMOL/L
POTASSIUM SERPL-SCNC: 4.4 MMOL/L
PROT SERPL-MCNC: 7.4 G/DL
PT BLD: 11.4 SEC
PT BLD: 12.2 SEC
RBC # BLD: 5.17 M/UL
RBC # BLD: 5.51 M/UL — SIGNIFICANT CHANGE UP (ref 4.2–5.8)
RBC # FLD: 13.8 % — SIGNIFICANT CHANGE UP (ref 10.3–14.5)
RBC # FLD: 17.4 %
SODIUM SERPL-SCNC: 140 MMOL/L
SODIUM SERPL-SCNC: 141 MMOL/L
WBC # BLD: 6.6 K/UL — SIGNIFICANT CHANGE UP (ref 3.8–10.5)
WBC # FLD AUTO: 6.3 K/UL
WBC # FLD AUTO: 6.6 K/UL — SIGNIFICANT CHANGE UP (ref 3.8–10.5)

## 2021-01-01 PROCEDURE — 88305 TISSUE EXAM BY PATHOLOGIST: CPT

## 2021-01-01 PROCEDURE — 71045 X-RAY EXAM CHEST 1 VIEW: CPT

## 2021-01-01 PROCEDURE — 99214 OFFICE O/P EST MOD 30 MIN: CPT

## 2021-01-01 PROCEDURE — 71250 CT THORAX DX C-: CPT

## 2021-01-01 PROCEDURE — 88341 IMHCHEM/IMCYTCHM EA ADD ANTB: CPT | Mod: 26

## 2021-01-01 PROCEDURE — 99205 OFFICE O/P NEW HI 60 MIN: CPT

## 2021-01-01 PROCEDURE — 78815 PET IMAGE W/CT SKULL-THIGH: CPT | Mod: 26,PI

## 2021-01-01 PROCEDURE — 88112 CYTOPATH CELL ENHANCE TECH: CPT | Mod: 26,59

## 2021-01-01 PROCEDURE — 88342 IMHCHEM/IMCYTCHM 1ST ANTB: CPT

## 2021-01-01 PROCEDURE — 31625 BRONCHOSCOPY W/BIOPSY(S): CPT

## 2021-01-01 PROCEDURE — 71260 CT THORAX DX C+: CPT | Mod: 26

## 2021-01-01 PROCEDURE — 88342 IMHCHEM/IMCYTCHM 1ST ANTB: CPT | Mod: 26

## 2021-01-01 PROCEDURE — 71250 CT THORAX DX C-: CPT | Mod: 26

## 2021-01-01 PROCEDURE — 31624 DX BRONCHOSCOPE/LAVAGE: CPT

## 2021-01-01 PROCEDURE — 74177 CT ABD & PELVIS W/CONTRAST: CPT | Mod: 26

## 2021-01-01 PROCEDURE — 88360 TUMOR IMMUNOHISTOCHEM/MANUAL: CPT

## 2021-01-01 PROCEDURE — 88341 IMHCHEM/IMCYTCHM EA ADD ANTB: CPT

## 2021-01-01 PROCEDURE — 88305 TISSUE EXAM BY PATHOLOGIST: CPT | Mod: 26,59

## 2021-01-01 PROCEDURE — 88173 CYTOPATH EVAL FNA REPORT: CPT | Mod: 26

## 2021-01-01 PROCEDURE — 88305 TISSUE EXAM BY PATHOLOGIST: CPT | Mod: 26

## 2021-01-01 PROCEDURE — 88112 CYTOPATH CELL ENHANCE TECH: CPT

## 2021-01-01 PROCEDURE — 71045 X-RAY EXAM CHEST 1 VIEW: CPT | Mod: 26

## 2021-01-01 PROCEDURE — 88173 CYTOPATH EVAL FNA REPORT: CPT

## 2021-01-01 RX ORDER — BENZONATATE 100 MG/1
100 CAPSULE ORAL 3 TIMES DAILY
Qty: 90 | Refills: 0 | Status: DISCONTINUED | COMMUNITY
Start: 2021-01-13 | End: 2021-01-01

## 2021-01-13 ENCOUNTER — APPOINTMENT (OUTPATIENT)
Dept: PULMONOLOGY | Facility: CLINIC | Age: 56
End: 2021-01-13
Payer: MEDICAID

## 2021-01-13 VITALS
SYSTOLIC BLOOD PRESSURE: 110 MMHG | OXYGEN SATURATION: 99 % | HEART RATE: 76 BPM | BODY MASS INDEX: 20.68 KG/M2 | WEIGHT: 136 LBS | DIASTOLIC BLOOD PRESSURE: 62 MMHG

## 2021-01-13 VITALS — RESPIRATION RATE: 16 BRPM

## 2021-01-13 PROCEDURE — 99214 OFFICE O/P EST MOD 30 MIN: CPT | Mod: 25

## 2021-01-13 PROCEDURE — 99406 BEHAV CHNG SMOKING 3-10 MIN: CPT

## 2021-01-13 PROCEDURE — 99072 ADDL SUPL MATRL&STAF TM PHE: CPT

## 2021-01-13 NOTE — COUNSELING
Impression: Open angle with borderline findings, low risk, bilateral: H40.013. Plan: Borderline glaucoma, intraocular pressure stable without medication. Continue without medication and observe. [Risk of tobacco use and health benefits of smoking cessation discussed] : Risk of tobacco use and health benefits of smoking cessation discussed [Cessation strategies including cessation program discussed] : Cessation strategies including cessation program discussed [Encouraged to pick a quit date and identify support needed to quit] : Encouraged to pick a quit date and identify support needed to quit [Use of nicotine replacement therapies and other medications discussed] : Use of nicotine replacement therapies and other medications discussed [Willing to Quit Smoking] : Not willing to quit smoking [Tobacco Use Cessation Intermediate Greater Than 3 Minutes Up to 10 Minutes] : Tobacco Use Cessation Intermediate Greater Than 3 Minutes Up to 10 Minutes [FreeTextEntry1] : 5

## 2021-01-13 NOTE — HISTORY OF PRESENT ILLNESS
[TextBox_4] : The patient continues to smoke 2-1/2 packs of cigarettes per day. He has his chronic cough. His CT scan followup was denied by insurance and we are appealing. Denies any increasing shortness of breath.

## 2021-01-13 NOTE — ASSESSMENT
[FreeTextEntry1] : The patient has a right upper lobe lesion that it is medically necessary to be followed. He is a high-risk patient. We will appeal the denial. I will speak to the patient after the CT scan is done.\par \par 5 minutes were spent with the patient discussing smoking cessation.  The patient is not receptive to quitting.  I suggested starting with nicotine replacement therapy, including gum, patches, lozenges, or the electronic cigarette.\par \par Benzonatate was given for cough.

## 2021-01-22 ENCOUNTER — APPOINTMENT (OUTPATIENT)
Dept: CT IMAGING | Facility: CLINIC | Age: 56
End: 2021-01-22
Payer: MEDICAID

## 2021-01-22 ENCOUNTER — OUTPATIENT (OUTPATIENT)
Dept: OUTPATIENT SERVICES | Facility: HOSPITAL | Age: 56
LOS: 1 days | End: 2021-01-22
Payer: MEDICAID

## 2021-01-22 DIAGNOSIS — R91.8 OTHER NONSPECIFIC ABNORMAL FINDING OF LUNG FIELD: ICD-10-CM

## 2021-01-22 DIAGNOSIS — Z87.81 PERSONAL HISTORY OF (HEALED) TRAUMATIC FRACTURE: Chronic | ICD-10-CM

## 2021-01-22 PROCEDURE — 71250 CT THORAX DX C-: CPT | Mod: 26

## 2021-01-22 PROCEDURE — 71250 CT THORAX DX C-: CPT

## 2021-02-25 ENCOUNTER — APPOINTMENT (OUTPATIENT)
Dept: PULMONOLOGY | Facility: CLINIC | Age: 56
End: 2021-02-25
Payer: MEDICAID

## 2021-02-25 VITALS
BODY MASS INDEX: 21.52 KG/M2 | HEIGHT: 68 IN | DIASTOLIC BLOOD PRESSURE: 70 MMHG | OXYGEN SATURATION: 99 % | WEIGHT: 142 LBS | HEART RATE: 74 BPM | SYSTOLIC BLOOD PRESSURE: 130 MMHG

## 2021-02-25 DIAGNOSIS — R05 COUGH: ICD-10-CM

## 2021-02-25 PROCEDURE — 99406 BEHAV CHNG SMOKING 3-10 MIN: CPT

## 2021-02-25 PROCEDURE — 99072 ADDL SUPL MATRL&STAF TM PHE: CPT

## 2021-02-25 PROCEDURE — 99214 OFFICE O/P EST MOD 30 MIN: CPT | Mod: 25

## 2021-02-25 NOTE — ASSESSMENT
[FreeTextEntry1] : Active smoker with chronic cough. Right upper lobe lesion stable over a three-month period. Patient needs continued followup on this lesion. The next CT scan will be done in 6 months. I will see him back after that. Benzonatate will be continued.\par \par 5 minutes were spent with the patient discussing smoking cessation.  The patient is receptive to quitting.  I suggested starting with nicotine replacement therapy, including gum, patches, lozenges, or the electronic cigarette.

## 2021-02-25 NOTE — HISTORY OF PRESENT ILLNESS
[TextBox_4] : The patient reports that his cough is better with benzonatate. He had his repeat CT scan done last month.

## 2021-03-25 ENCOUNTER — EMERGENCY (EMERGENCY)
Facility: HOSPITAL | Age: 56
LOS: 1 days | Discharge: HOME | End: 2021-03-25
Attending: EMERGENCY MEDICINE
Payer: MEDICAID

## 2021-03-25 VITALS — DIASTOLIC BLOOD PRESSURE: 90 MMHG | SYSTOLIC BLOOD PRESSURE: 150 MMHG

## 2021-03-25 VITALS
HEIGHT: 72 IN | HEART RATE: 112 BPM | SYSTOLIC BLOOD PRESSURE: 135 MMHG | TEMPERATURE: 98 F | OXYGEN SATURATION: 94 % | RESPIRATION RATE: 16 BRPM | DIASTOLIC BLOOD PRESSURE: 69 MMHG

## 2021-03-25 DIAGNOSIS — Z87.81 PERSONAL HISTORY OF (HEALED) TRAUMATIC FRACTURE: Chronic | ICD-10-CM

## 2021-03-25 LAB
ALBUMIN SERPL ELPH-MCNC: 4.3 G/DL — SIGNIFICANT CHANGE UP (ref 3.3–5.2)
ALP SERPL-CCNC: 55 U/L — SIGNIFICANT CHANGE UP (ref 40–120)
ALT FLD-CCNC: 18 U/L — SIGNIFICANT CHANGE UP
ANION GAP SERPL CALC-SCNC: 17 MMOL/L — SIGNIFICANT CHANGE UP (ref 5–17)
APTT BLD: 29.3 SEC — SIGNIFICANT CHANGE UP (ref 27.5–35.5)
AST SERPL-CCNC: 25 U/L — SIGNIFICANT CHANGE UP
BASOPHILS # BLD AUTO: 0.09 K/UL — SIGNIFICANT CHANGE UP (ref 0–0.2)
BASOPHILS NFR BLD AUTO: 1.1 % — SIGNIFICANT CHANGE UP (ref 0–2)
BILIRUB SERPL-MCNC: <0.2 MG/DL — LOW (ref 0.4–2)
BUN SERPL-MCNC: 12 MG/DL — SIGNIFICANT CHANGE UP (ref 8–20)
CALCIUM SERPL-MCNC: 8.5 MG/DL — LOW (ref 8.6–10.2)
CHLORIDE SERPL-SCNC: 100 MMOL/L — SIGNIFICANT CHANGE UP (ref 98–107)
CO2 SERPL-SCNC: 18 MMOL/L — LOW (ref 22–29)
CREAT SERPL-MCNC: 1.67 MG/DL — HIGH (ref 0.5–1.3)
EOSINOPHIL # BLD AUTO: 0.19 K/UL — SIGNIFICANT CHANGE UP (ref 0–0.5)
EOSINOPHIL NFR BLD AUTO: 2.3 % — SIGNIFICANT CHANGE UP (ref 0–6)
ETHANOL SERPL-MCNC: 96 MG/DL — HIGH (ref 0–9)
GLUCOSE SERPL-MCNC: 189 MG/DL — HIGH (ref 70–99)
HCT VFR BLD CALC: 41.8 % — SIGNIFICANT CHANGE UP (ref 39–50)
HGB BLD-MCNC: 13.3 G/DL — SIGNIFICANT CHANGE UP (ref 13–17)
IMM GRANULOCYTES NFR BLD AUTO: 1.1 % — SIGNIFICANT CHANGE UP (ref 0–1.5)
INR BLD: 1.02 RATIO — SIGNIFICANT CHANGE UP (ref 0.88–1.16)
LYMPHOCYTES # BLD AUTO: 2.98 K/UL — SIGNIFICANT CHANGE UP (ref 1–3.3)
LYMPHOCYTES # BLD AUTO: 36.1 % — SIGNIFICANT CHANGE UP (ref 13–44)
MCHC RBC-ENTMCNC: 25 PG — LOW (ref 27–34)
MCHC RBC-ENTMCNC: 31.8 GM/DL — LOW (ref 32–36)
MCV RBC AUTO: 78.7 FL — LOW (ref 80–100)
MONOCYTES # BLD AUTO: 0.45 K/UL — SIGNIFICANT CHANGE UP (ref 0–0.9)
MONOCYTES NFR BLD AUTO: 5.5 % — SIGNIFICANT CHANGE UP (ref 2–14)
NEUTROPHILS # BLD AUTO: 4.45 K/UL — SIGNIFICANT CHANGE UP (ref 1.8–7.4)
NEUTROPHILS NFR BLD AUTO: 53.9 % — SIGNIFICANT CHANGE UP (ref 43–77)
PLATELET # BLD AUTO: 214 K/UL — SIGNIFICANT CHANGE UP (ref 150–400)
POTASSIUM SERPL-MCNC: 4.2 MMOL/L — SIGNIFICANT CHANGE UP (ref 3.5–5.3)
POTASSIUM SERPL-SCNC: 4.2 MMOL/L — SIGNIFICANT CHANGE UP (ref 3.5–5.3)
PROT SERPL-MCNC: 7.1 G/DL — SIGNIFICANT CHANGE UP (ref 6.6–8.7)
PROTHROM AB SERPL-ACNC: 11.8 SEC — SIGNIFICANT CHANGE UP (ref 10.6–13.6)
RBC # BLD: 5.31 M/UL — SIGNIFICANT CHANGE UP (ref 4.2–5.8)
RBC # FLD: 15.9 % — HIGH (ref 10.3–14.5)
SODIUM SERPL-SCNC: 135 MMOL/L — SIGNIFICANT CHANGE UP (ref 135–145)
TROPONIN T SERPL-MCNC: <0.01 NG/ML — SIGNIFICANT CHANGE UP (ref 0–0.06)
WBC # BLD: 8.25 K/UL — SIGNIFICANT CHANGE UP (ref 3.8–10.5)
WBC # FLD AUTO: 8.25 K/UL — SIGNIFICANT CHANGE UP (ref 3.8–10.5)

## 2021-03-25 PROCEDURE — 99285 EMERGENCY DEPT VISIT HI MDM: CPT | Mod: 25

## 2021-03-25 PROCEDURE — 80053 COMPREHEN METABOLIC PANEL: CPT

## 2021-03-25 PROCEDURE — 72125 CT NECK SPINE W/O DYE: CPT

## 2021-03-25 PROCEDURE — 99285 EMERGENCY DEPT VISIT HI MDM: CPT

## 2021-03-25 PROCEDURE — 85025 COMPLETE CBC W/AUTO DIFF WBC: CPT

## 2021-03-25 PROCEDURE — 70450 CT HEAD/BRAIN W/O DYE: CPT

## 2021-03-25 PROCEDURE — 85730 THROMBOPLASTIN TIME PARTIAL: CPT

## 2021-03-25 PROCEDURE — 93005 ELECTROCARDIOGRAM TRACING: CPT

## 2021-03-25 PROCEDURE — 80307 DRUG TEST PRSMV CHEM ANLYZR: CPT

## 2021-03-25 PROCEDURE — 84484 ASSAY OF TROPONIN QUANT: CPT

## 2021-03-25 PROCEDURE — 36415 COLL VENOUS BLD VENIPUNCTURE: CPT

## 2021-03-25 PROCEDURE — 93010 ELECTROCARDIOGRAM REPORT: CPT

## 2021-03-25 PROCEDURE — 71045 X-RAY EXAM CHEST 1 VIEW: CPT | Mod: 26

## 2021-03-25 PROCEDURE — 96374 THER/PROPH/DIAG INJ IV PUSH: CPT

## 2021-03-25 PROCEDURE — 85610 PROTHROMBIN TIME: CPT

## 2021-03-25 PROCEDURE — 70450 CT HEAD/BRAIN W/O DYE: CPT | Mod: 26

## 2021-03-25 PROCEDURE — 72125 CT NECK SPINE W/O DYE: CPT | Mod: 26

## 2021-03-25 PROCEDURE — 71045 X-RAY EXAM CHEST 1 VIEW: CPT

## 2021-03-25 PROCEDURE — 96375 TX/PRO/DX INJ NEW DRUG ADDON: CPT

## 2021-03-25 RX ORDER — ONDANSETRON 8 MG/1
4 TABLET, FILM COATED ORAL ONCE
Refills: 0 | Status: COMPLETED | OUTPATIENT
Start: 2021-03-25 | End: 2021-03-25

## 2021-03-25 RX ORDER — SODIUM CHLORIDE 9 MG/ML
1000 INJECTION INTRAMUSCULAR; INTRAVENOUS; SUBCUTANEOUS ONCE
Refills: 0 | Status: COMPLETED | OUTPATIENT
Start: 2021-03-25 | End: 2021-03-25

## 2021-03-25 RX ORDER — METOCLOPRAMIDE HCL 10 MG
10 TABLET ORAL ONCE
Refills: 0 | Status: COMPLETED | OUTPATIENT
Start: 2021-03-25 | End: 2021-03-25

## 2021-03-25 RX ADMIN — SODIUM CHLORIDE 1000 MILLILITER(S): 9 INJECTION INTRAMUSCULAR; INTRAVENOUS; SUBCUTANEOUS at 18:50

## 2021-03-25 RX ADMIN — ONDANSETRON 4 MILLIGRAM(S): 8 TABLET, FILM COATED ORAL at 20:35

## 2021-03-25 RX ADMIN — Medication 104 MILLIGRAM(S): at 21:02

## 2021-03-25 NOTE — ED ADULT TRIAGE NOTE - CHIEF COMPLAINT QUOTE
pt BIBA after being found down in grass for unknown period of time, pt found unresponsive with pinpoint pupils, diaphoretic and O2 sats in the 50's. Received 2mg narcan IV with good results. Pt presents in triage rigid and posturing, minimally responsive. Dr Villarreal called for eval, priority CT called

## 2021-03-25 NOTE — ED PROVIDER NOTE - CLINICAL SUMMARY MEDICAL DECISION MAKING FREE TEXT BOX
Pt. with drug overdose that responded to narcan. Pt. still drowsy with slight slurring of speech. No signs of head trauma. Will check CT scan and labs and observe.

## 2021-03-25 NOTE — ED PROVIDER NOTE - OBJECTIVE STATEMENT
Pt. present to ED after he was found on the ground unresponsive and diaphoretic with pinpoint pupils. No one witnessed pt. falling. EMS gave patient 2mg of narcan IV and pt. responded. Pt. brought in the ed with no signs of head trauma. Pt. however had some slurring of his speech, unknown if that is his baseline speech. Pt. also had decebrate posture( with arms extended and stiff with flexion of both wrists). Pt. denies any alcohol or drug abuse. pt's speech slowly was improving prior to him going to cat scan.

## 2021-03-25 NOTE — ED PROVIDER NOTE - NSFOLLOWUPINSTRUCTIONS_ED_ALL_ED_FT
Follow up with your doctor. Return to ER if difficulty breathing.     The patient wishes to leave against medical advice.  I have discussed the risks, benefits and alternatives (including the possibility of worsening of disease, pain, permanent disability, and/or death) with the patient and his/her family (if available).  The patient voices understanding of these risks, benefits, and alternatives and still wishes to sign out against medical advice.  The patient is awake, alert, oriented  x 3 and has demonstrated capacity to refuse/direct care.  I have advised the patient that they can and should return immediately should they develop any worse/different/additional symptoms, or if they change their mind and want to continue their care.

## 2021-03-25 NOTE — ED ADULT NURSE REASSESSMENT NOTE - NS ED NURSE REASSESS COMMENT FT1
Pt with another episode of vomiting. Pt was gagging. Pt was suctioned by this nurse and placed on 3 L O2 d/t O2 sat 85%. Pt endorsed drinking alcohol today. MD made aware.

## 2021-03-25 NOTE — ED PROVIDER NOTE - PROGRESS NOTE DETAILS
Pt. re-evaluated. Pt. more awake and more alert. Pt. moving all extremities. Pt. is AOX3. Pt. denies any pain. Pt. does not recall what happened. Pt. cannot tell me what drugs he took. Pt. then suddenly started vomiting. Zofran ordered. Will continue to observe the patient. Pt. had another episode of vomiting. Pt. was gagging. Pt. was suctioned by the nurse. Pt. then noted to be hypoxic. 88% on RA. Pt. placed on NC 3L O2. CXR ordered. Pt. re-evaluated. Pt. with O2 sat 88-91% on RA with -120s. I stood patient up and walked him around the  ED. Pt. had O2 sat between 86-88% while walking. Pt. denied any shortness of breath or chest pain. Pt. now is fully awake and alert. Pt. AOX 3. Pt. appears to understand his current situation.  PT. states that he does not want to stay in the hospital. He understands that his oxygen is low. Pt. states that he will sign himself out AMA.   The patient wishes to leave against medical advice.  I have discussed the risks, benefits and alternatives (including the possibility of worsening of disease, pain, permanent disability, and/or death) with the patient and his/her family (if available).  The patient voices understanding of these risks, benefits, and alternatives and still wishes to sign out against medical advice.  The patient is awake, alert, oriented  x 3 and has demonstrated capacity to refuse/direct care.  I have advised the patient that they can and should return immediately should they develop any worse/different/additional symptoms, or if they change their mind and want to continue their care.

## 2021-03-25 NOTE — ED PROVIDER NOTE - PHYSICAL EXAMINATION
Pt. awake. +Drowsy. Pt. knows his name and understands that he is a hospital  Pt. not able to tell me the year.   Pt. able to move upper and lower extremities.  Head-NC/AT

## 2021-03-25 NOTE — ED PROVIDER NOTE - PATIENT PORTAL LINK FT
You can access the FollowMyHealth Patient Portal offered by [facility] by registering at the following website: [email or web address]. By joining [facility]’s FollowMyHealth portal, you will also be able to view your health information using other applications (apps) compatible with our system.

## 2021-05-18 NOTE — ED PROVIDER NOTE - SKIN NEGATIVE STATEMENT, MLM
[FreeTextEntry8] : patient with dry cough\par bronchitis\par had bronchitis last month\par  no abrasions, no jaundice, no lesions, no pruritis, and no rashes.

## 2021-07-01 NOTE — PATIENT PROFILE ADULT - NSPROEDALEARNPREF_GEN_A_NUR
individual instruction/verbal instruction Complex Repair And A-T Advancement Flap Text: The defect edges were debeveled with a #15 scalpel blade.  The primary defect was closed partially with a complex linear closure.  Given the location of the remaining defect, shape of the defect and the proximity to free margins an A-T advancement flap was deemed most appropriate for complete closure of the defect.  Using a sterile surgical marker, an appropriate advancement flap was drawn incorporating the defect and placing the expected incisions within the relaxed skin tension lines where possible.    The area thus outlined was incised deep to adipose tissue with a #15 scalpel blade.  The skin margins were undermined to an appropriate distance in all directions utilizing iris scissors.

## 2021-09-22 PROBLEM — R91.8 ABNORMAL X-RAY OF LUNG: Status: ACTIVE | Noted: 2020-09-24

## 2021-09-22 NOTE — HISTORY OF PRESENT ILLNESS
[TextBox_4] : The patient has been losing weight, essentially 14 pounds in the past 6 months. He denies cough wheeze or shortness of breath. He had a followup CT scan done in July. Some workup is being done by his primary physician. There's been no fevers chills or night sweats.

## 2021-09-22 NOTE — ASSESSMENT
[FreeTextEntry1] : Right upper lobe lesion has improved however he now has significant right middle lobe infiltrates with areas of bronchiectasis. This could represent atypical mycobacteria and is possibly the reason for his weight loss.\par \par A followup CT scan has been ordered and he will be seeing my partner Dr. Spicer to arrange bronchoscopy with biopsy and lavage.

## 2021-09-22 NOTE — CONSULT LETTER
[Dear  ___] : Dear  [unfilled], [Courtesy Letter:] : I had the pleasure of seeing your patient, [unfilled], in my office today. [Please see my note below.] : Please see my note below. [Consult Closing:] : Thank you very much for allowing me to participate in the care of this patient.  If you have any questions, please do not hesitate to contact me. [Sincerely,] : Sincerely, [FreeTextEntry3] : April Lam MD FCCP\par D-ABSM\par ABIM board certified in  Pulmonary diseases, Sleep medicine\par Internal medicine\par

## 2021-09-22 NOTE — REASON FOR VISIT
[Follow-Up] : a follow-up visit [Abnormal CXR/ Chest CT] : an abnormal CXR/ chest CT Skin Substitute Text: The defect edges were debeveled with a #15 scalpel blade.  Given the location of the defect, shape of the defect and the proximity to free margins a skin substitute graft was deemed most appropriate.  The graft material was trimmed to fit the size of the defect. The graft was then placed in the primary defect and oriented appropriately.

## 2021-10-05 NOTE — ED ADULT NURSE NOTE - NURSING MUSC EXTREMITY LIMITED ROM
Last fills of tramadol and methylphenidate 8/31/21   Refills for clonazepam and vyvanse denied. Last fill 9/15/21, too soon to refill.   Refill sertraline denied. Pt has current refills on file at the pharmacy.   right lower extremity

## 2021-11-23 PROBLEM — Z01.818 PRE-OP TESTING: Status: ACTIVE | Noted: 2021-01-01

## 2021-11-23 NOTE — HISTORY OF PRESENT ILLNESS
[Current] : current [TextBox_4] : 56M PMH HTN who presents for f/u abnormal CT, need for possible bronchoscopy. He has had a dry hacking cough for years. Has findings of chronic RML and new RUL centrilobular nodules and tree-in-bud opacities, bronchiectasis, no significant lymphadenopathy. 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 No fevers, no chills, no night sweats. Lost about 12 pounds over the past 6 months. No sick contacts. No recent travel. No N/V/D.  [TextBox_11] : 2.5 [TextBox_13] : 37

## 2021-11-23 NOTE — PROCEDURE
[FreeTextEntry1] : [facility]\par EXAM: CT CHEST\par \par \par PROCEDURE DATE: 10/22/2021\par \par \par \par INTERPRETATION: CLINICAL INFORMATION: Follow-up right middle lobe infiltrates.\par \par COMPARISON: CT chest 7/29/2021, 1/22/2021 and 10/9/2020.\par \par CONTRAST/COMPLICATIONS:\par IV Contrast: None.\par Oral Contrast: None.\par Complications: None.\par \par PROCEDURE:\par CT of the Chest was performed.\par Sagittal and coronal reformats were performed.\par \par FINDINGS:\par \par LUNGS AND AIRWAYS: No endobronchial lesions. Emphysema. A 7 mm right apical nodule (3-39) is unchanged. A nodular opacity in the right apex (3-51) has increased, now measuring 1.3 cm, previously measuring 0.9 cm. A new 1.5 cm nodular opacity is noted in the anterior segment of the right upper lobe. Extensive centrilobular nodules and tree-in-bud opacities are similar in appearance and distribution, predominantly affecting the right middle and lower lobes.\par PLEURA: No pleural effusion.\par MEDIASTINUM AND RUSS: No lymphadenopathy.\par VESSELS: Calcifications of the coronary vessels.\par HEART: Heart size is normal. No pericardial effusion.\par CHEST WALL AND LOWER NECK: Within normal limits.\par VISUALIZED UPPER ABDOMEN: There is a 2.0 cm left renal cyst.\par BONES: Within normal limits.\par \par IMPRESSION:\par 1. Unchanged 7 mm right apical nodule.\par 2. Increased right apical nodular opacity.\par 3. New 1.5 cm nodular opacity is noted in the anterior segment of the right upper lobe. Follow-up CT scan is recommended in 3 months to ensure resolution.\par \par --- End of Report ---\par \par \par \par \par \par TAVIA OLIVIER MD; Resident Radiology\par This document has been electronically signed.\par TERRA HOLLINGSWORTH MD; Attending Radiologist\par This document has been electronically signed. Oct 23 2021 11:37AM\par \par ~~~~~~~~~~~~~~~~~~~~~~~~~~~~~~~~~~~~~~~~~~~~~~~~~~~~~~~~~~~~~~~~~~~~~~~~\par \par [facility]\par EXAM: CT CHEST\par \par \par PROCEDURE DATE: 07/29/2021\par \par \par \par INTERPRETATION: .\par ACC: [id number]\par INDICATION: Follow-up lung nodule\par TECHNIQUE: Unenhanced CT of the chest. Coronal and sagittal images were reconstructed. Maximum intensity projection images were generated.\par COMPARISON: CT chest 1/22/2021, 10/9/2020\par \par FINDINGS:\par \par AIRWAYS, LUNGS and PLEURA: Patent central airways. Emphysema. 0.7 cm right apical nodule (series 3 image 29) is decreased; previously 1.3 cm. New small tubular opacity within the right apex (series 3 image 42) likely mucoid impaction. Extensive centrilobular nodules and tree-in-bud opacities predominantly within the right middle lobe and right lower lobe are increased/new from prior. No pleural effusion.\par \par MEDIASTINUM AND RUSS: No lymphadenopathy.\par \par HEART AND VESSELS: Heart size is normal. No pericardial effusion. Thoracic aorta and pulmonary artery normal in diameter. Multivessel coronary calcification.\par \par VISUALIZED UPPER ABDOMEN: Small left renal cyst.\par \par CHEST WALL AND BONES: No aggressive osseous lesion.\par \par LOWER NECK: Within normal limits.\par \par IMPRESSION:\par \par In comparison with 1/22/2021, bilateral centrilobular nodules and tree-in-bud opacities predominantly within the right middle lobe and right lower lobe are increased/new from prior likely representing bronchiolitis.\par \par 0.7 cm right apical nodule (series 3 image 29) is decreased previously 1.3 cm.\par \par --- End of Report ---\par \par \par \par \par \par \par JEFFREY MOORE MD; Attending Radiologist\par This document has been electronically signed. Jul 29 2021 12:47PM\par \par \par ~~~~~~~~~~~~~~~~~~~~~~~~~~~~~~~~~~~~~~~~~~~~~~~~~~~~~~~~~~~~~~~~~~~~~~~~\par \par [facility] [place] \par EXAM: CT CHEST\par \par \par PROCEDURE DATE: 01/22/2021\par \par \par \par INTERPRETATION: CT CHEST WITHOUT CONTRAST\par \par INDICATION: Right upper lobe lesion, follow-up. Cough.\par \par TECHNIQUE: Unenhanced helical images were obtained of the chest. Coronal and sagittal images were reconstructed. Maximum intensity projection images were generated.\par \par COMPARISON: Radiograph 4/8/2020. CT chest 10/9/2020.\par \par FINDINGS:\par \par Tubes/Lines: None.\par \par Lungs And Airways: Since 10/9/2020, the 1.3 x 0.5 cm apical segment right upper lobe opacity is unchanged.\par \par The lungs are otherwise clear. The airways are normal.\par \par Pleura: No pneumothorax. No pleural effusion.\par \par Mediastinum: The chest lymph nodes measure less than 10 mm in the short axis. The visualized thyroid gland and esophagus are unremarkable.\par \par Heart and Vasculature: The heart is normal in size. There is no pericardial effusion. Coronary artery calcifications. Left-sided aortic arch and left-sided descending thoracic aorta. Aortic calcifications.\par \par Upper Abdomen: The upper abdomen is unremarkable.\par \par Bones And Soft Tissues: The bones are unremarkable. The soft tissues are unremarkable.\par \par \par IMPRESSION:\par \par 1. Since 10/9/2020, the right upper lobe opacity is unchanged.\par \par \par \par \par \par \par IGNACIO HOLLINGSWORTH MD; Attending Radiologist\par This document has been electronically signed. Jan 25 2021 10:21AM\par \par ~~~~~~~~~~~~~~~~~~~~~~~~~~~~~~~~~~~~~~~~~~~~~~~~~~~~~~~~~~~~~~~~~~~~~~~~\par \par

## 2021-12-16 NOTE — HISTORY OF PRESENT ILLNESS
[Current] : current [TextBox_4] : 56M PMH HTN, heavy smoker who presents for f/u abnormal CT, s/p bronchoscopy. He had bronchoscopy 12/14/2021. Found with RUL endobronchial mass that was biopsied and positive for NSCLC. Denies chest pain. Denies SOB. Denies hemoptysis. Still smoking 2.5ppd, still having chronic cough. [TextBox_11] : 2.5 [TextBox_13] : 37

## 2021-12-16 NOTE — PROCEDURE
[FreeTextEntry1] : [facility]\par EXAM: CT CHEST\par \par \par PROCEDURE DATE: 10/22/2021\par \par \par \par INTERPRETATION: CLINICAL INFORMATION: Follow-up right middle lobe infiltrates.\par \par COMPARISON: CT chest 7/29/2021, 1/22/2021 and 10/9/2020.\par \par CONTRAST/COMPLICATIONS:\par IV Contrast: None.\par Oral Contrast: None.\par Complications: None.\par \par PROCEDURE:\par CT of the Chest was performed.\par Sagittal and coronal reformats were performed.\par \par FINDINGS:\par \par LUNGS AND AIRWAYS: No endobronchial lesions. Emphysema. A 7 mm right apical nodule (3-39) is unchanged. A nodular opacity in the right apex (3-51) has increased, now measuring 1.3 cm, previously measuring 0.9 cm. A new 1.5 cm nodular opacity is noted in the anterior segment of the right upper lobe. Extensive centrilobular nodules and tree-in-bud opacities are similar in appearance and distribution, predominantly affecting the right middle and lower lobes.\par PLEURA: No pleural effusion.\par MEDIASTINUM AND RUSS: No lymphadenopathy.\par VESSELS: Calcifications of the coronary vessels.\par HEART: Heart size is normal. No pericardial effusion.\par CHEST WALL AND LOWER NECK: Within normal limits.\par VISUALIZED UPPER ABDOMEN: There is a 2.0 cm left renal cyst.\par BONES: Within normal limits.\par \par IMPRESSION:\par 1. Unchanged 7 mm right apical nodule.\par 2. Increased right apical nodular opacity.\par 3. New 1.5 cm nodular opacity is noted in the anterior segment of the right upper lobe. Follow-up CT scan is recommended in 3 months to ensure resolution.\par \par --- End of Report ---\par \par \par \par \par \par TAVIA OLIVIER MD; Resident Radiology\par This document has been electronically signed.\par TERRA HOLLINGSWORTH MD; Attending Radiologist\par This document has been electronically signed. Oct 23 2021 11:37AM\par \par ~~~~~~~~~~~~~~~~~~~~~~~~~~~~~~~~~~~~~~~~~~~~~~~~~~~~~~~~~~~~~~~~~~~~~~~~\par \par [facility]\par EXAM: CT CHEST\par \par \par PROCEDURE DATE: 07/29/2021\par \par \par \par INTERPRETATION: .\par ACC: [id number]\par INDICATION: Follow-up lung nodule\par TECHNIQUE: Unenhanced CT of the chest. Coronal and sagittal images were reconstructed. Maximum intensity projection images were generated.\par COMPARISON: CT chest 1/22/2021, 10/9/2020\par \par FINDINGS:\par \par AIRWAYS, LUNGS and PLEURA: Patent central airways. Emphysema. 0.7 cm right apical nodule (series 3 image 29) is decreased; previously 1.3 cm. New small tubular opacity within the right apex (series 3 image 42) likely mucoid impaction. Extensive centrilobular nodules and tree-in-bud opacities predominantly within the right middle lobe and right lower lobe are increased/new from prior. No pleural effusion.\par \par MEDIASTINUM AND RUSS: No lymphadenopathy.\par \par HEART AND VESSELS: Heart size is normal. No pericardial effusion. Thoracic aorta and pulmonary artery normal in diameter. Multivessel coronary calcification.\par \par VISUALIZED UPPER ABDOMEN: Small left renal cyst.\par \par CHEST WALL AND BONES: No aggressive osseous lesion.\par \par LOWER NECK: Within normal limits.\par \par IMPRESSION:\par \par In comparison with 1/22/2021, bilateral centrilobular nodules and tree-in-bud opacities predominantly within the right middle lobe and right lower lobe are increased/new from prior likely representing bronchiolitis.\par \par 0.7 cm right apical nodule (series 3 image 29) is decreased previously 1.3 cm.\par \par --- End of Report ---\par \par \par \par \par \par \par JEFFREY MOORE MD; Attending Radiologist\par This document has been electronically signed. Jul 29 2021 12:47PM\par \par \par ~~~~~~~~~~~~~~~~~~~~~~~~~~~~~~~~~~~~~~~~~~~~~~~~~~~~~~~~~~~~~~~~~~~~~~~~\par \par [facility]\par \par EXAM: CT CHEST\par \par \par PROCEDURE DATE: 01/22/2021\par \par \par \par INTERPRETATION: CT CHEST WITHOUT CONTRAST\par \par INDICATION: Right upper lobe lesion, follow-up. Cough.\par \par TECHNIQUE: Unenhanced helical images were obtained of the chest. Coronal and sagittal images were reconstructed. Maximum intensity projection images were generated.\par \par COMPARISON: Radiograph 4/8/2020. CT chest 10/9/2020.\par \par FINDINGS:\par \par Tubes/Lines: None.\par \par Lungs And Airways: Since 10/9/2020, the 1.3 x 0.5 cm apical segment right upper lobe opacity is unchanged.\par \par The lungs are otherwise clear. The airways are normal.\par \par Pleura: No pneumothorax. No pleural effusion.\par \par Mediastinum: The chest lymph nodes measure less than 10 mm in the short axis. The visualized thyroid gland and esophagus are unremarkable.\par \par Heart and Vasculature: The heart is normal in size. There is no pericardial effusion. Coronary artery calcifications. Left-sided aortic arch and left-sided descending thoracic aorta. Aortic calcifications.\par \par Upper Abdomen: The upper abdomen is unremarkable.\par \par Bones And Soft Tissues: The bones are unremarkable. The soft tissues are unremarkable.\par \par \par IMPRESSION:\par \par 1. Since 10/9/2020, the right upper lobe opacity is unchanged.\par \par \par \par \par \par \par IGNACIO HOLLINGSWORTH MD; Attending Radiologist\par This document has been electronically signed. Jan 25 2021 10:21AM\par \par ~~~~~~~~~~~~~~~~~~~~~~~~~~~~~~~~~~~~~~~~~~~~~~~~~~~~~~~~~~~~~~~~~~~~~~~~\par

## 2021-12-30 NOTE — HISTORY OF PRESENT ILLNESS
[de-identified] : 55-year-old male w/ no significant PMH with smoking history of  2 1/2 pack per day smoker for 37 years, current smoker presents to office for squamous cell carcinoma lung \par \par Patient following Dr. Lam (Pulmonary) for routine CT scans of chest closely monitoring RUL lesion since October 2020.\par  CT on 10/22/21 revealing a nodular opacity in the right apex (3-51) has increased, now measuring 1.3 cm, previously measuring 0.9 cm. A new 1.5 cm nodular opacity is noted in the anterior segment of the right upper lobe.  \par \par EBUS perfromed on 12/14/21 by Dr. Spicer. a 0.5 cm nodular lesion was noted in the RUL and endobronchial biopsy was done. \par A RUL and RML BAL was performed and specimen sent \par \par 12/14/21 \par Specimen(s) Submitted\par LUNG, RIGHT MIDDLE LOBE, BRONCHOALVEOLAR LAVAGE:  POSITIVE FOR MALIGNANT CELLS.\par Non-Small Cell Carcinoma, favor Squamous Cell Carcinoma.\par Scanty cellularity precludes iMmunostains \par \par Core Biopsy:\par 1. Immunohistochemical stain for P40 is positive in tumor cells. Staining for TTF-1 is negative. The immunophenotype together with the\par cytomorphology supports the diagnosis of squamous cell carcinoma.\par \par PD-L1 Immunohistochemistry Antibody: [place] PDL1 ()\par Tissue type:  core biopsy of lung squamous cell carcinoma  Result: Tumor Proportion Score (TPS*)   75%\par \par *TPS: The percentage of viable tumor cells showing partial or complete membrane staining at any intensity [de-identified] : Patient presents today for initial consultation. \par Reports "smoker's cough". \par Reports intermittent pain in chest. Notes more prominent at night.\par Reports arthritic knees. \par Denies headaches, dizziness\par Denies difficulty breathing  \par Smoking history: Age [age] y/o, 1 pack a day. For the last 10 years progressed to 2 1/2 packs a day. Has not attempted on cutting back. \par Has not completed COVID19 vaccine series. Discussed and advised to receive vaccine series.\par Reports staying active by walking 3-5 miles per day. \par Alcohol use once a month, vodka. \par Allergies: Penicillin and shellfish \par Denies family history of cancer\par Lives at a [place] house in [place] w/ 5 other people. [facility] \par Patient does not drive. Uses bus transportation to get to appointments. \par \par PCP- Mayela Otero \par

## 2021-12-30 NOTE — ASSESSMENT
[FreeTextEntry1] : 55-year-old male w/ no significant PMH with smoking history of  2 1/2 pack per day smoker for 37 years, smoker?? \par \par Patient following Dr. Lam (Pulmonary) for routine CT scans of chest closely monitoring RUL lesion since October 2020.\par  CT on 10/22/21 revealing a nodular opacity in the right apex (3-51) has increased, now measuring 1.3 cm, previously measuring 0.9 cm. A new 1.5 cm nodular opacity is noted in the anterior segment of the right upper lobe.  \par \par EBUS perfromed on 12/14/21 by Dr. Spicer. a 0.5 cm nodular lesion was noted in the RUL and endobronchial biopsy was done. which revealed Sq cell car lung  PDL1 75% \par A RUL and RML BAL was performed which was +ve for malignant cells \par \par - discussed with patient pathology\par - Last scan was in Oct 2021, No mediastinal involvement at that scan \par - WIll have to evaluate Rt Middle lobe lesion given +ve BAL and no definitive lesion on imaging of Rt middle lobe\par - T4 ( s?? +VE BAL Of RT Middle lobe and +ve Rt Upper lobe) ? Nx Mx\par - discussed treatment of localized treatment of chemo/ RT followed by IO vs single agent IO given PDL1 of 75% if metastatic \par He has a CT cap scheduled on 12/30/21,bone scan on 1/17/21 and a MRI brain on 12/31 \par - Will obtain a PET scan and MRI brain \par - F/u with me in 10-14 days to discuss scans \par - Refer to Rad onc for RT to chest

## 2021-12-30 NOTE — RESULTS/DATA
[FreeTextEntry1] : Collected Date/Time:                   12/14/2021 19:14 EST\par Received Date/Time:                    12/14/2021 19:14 EST\par \par Fine Needle Aspiration Report - Auth (Verified)\par \par Specimen(s) Submitted\par LUNG, BRONCHUS, RIGHT MIDDLE, CORE BIOPSY\par \par Final Diagnosis\par LUNG, BRONCHUS, RIGHT MIDDLE, CORE BIOPSY\par POSITIVE FOR MALIGNANT CELLS.\par Clinical Information\par Upper bronchus biopsy.\par \par \par Specimen(s) Submitted\par LUNG, RIGHT MIDDLE LOBE, BRONCHOALVEOLAR LAVAGE\par \par Final Diagnosis\par LUNG, RIGHT MIDDLE LOBE, BRONCHOALVEOLAR LAVAGE\par POSITIVE FOR MALIGNANT CELLS.\par Non-Small Cell Carcinoma, favor Squamous Cell Carcinoma.\par \par Gross Description\par Core Biopsy:\par Tissue collected: Specimen container has been inspected to confirm\par patient 's name and date of birth, contains 1  white/tan cores measuring\par 0.3 cm in length. Entire specimen submitted    in 1 cassette labeled 1B.\par \par Fine Needle Aspiration Addendum Report - Auth (Verified)\par \par Addendum\par 1. Immunohistochemical stain for P40 is positive in tumor cells.\par Staining for TTF-1 is negative. The immunophenotype together with the\par cytomorphology supports the diagnosis of squamous cell carcinoma.\par \par PD-L1 Immunohistochemistry\par Antibody: [place] PDL1 ()\par Tissue type:  core biopsy of lung squamous cell carcinoma\par Block: 1 B\par \par Result: Tumor Proportion Score (TPS*)   75%\par \par *TPS: The percentage of viable tumor cells showing partial or complete\par membrane staining at any intensity\par \par Slide(s) with built in immunohistochemical study control(s) and negative\par control associated with this case has/have been verified by the sign out\par pathologist.\par \par For slide(s) without built in controls positive control slides has/have\par been reviewed and approved by immunohistochemistry lab\par \par These immunohistochemical/ in-situ hybridization tests have been developed\par and their performance characteristics determined by [facility], Department of Pathology, Division of Immunopathology,\par [id number] [place] [address].  It has not been cleared\par or approved by the U.S. Food and Drug Administration.  The FDA has\par determined that such clearance or approval is not necessary.  This test\par is used for clinical purposes.  The laboratory is certified under the\par CLIA-88 as qualified to perform high complexity clinical testing.\par \par \par \par EXAM: CT CHEST\par PROCEDURE DATE: 10/22/2021\par LUNGS AND AIRWAYS: No endobronchial lesions. Emphysema. A 7 mm right apical nodule (3-39) is unchanged. A nodular opacity in the right apex (3-51) has increased, now measuring 1.3 cm, previously measuring 0.9 cm. A new 1.5 cm nodular opacity is noted in the anterior segment of the right upper lobe. Extensive centrilobular nodules and tree-in-bud opacities are similar in appearance and distribution, predominantly affecting the right middle and lower lobes.\par IMPRESSION:\par 1. Unchanged 7 mm right apical nodule.\par 2. Increased right apical nodular opacity.\par 3. New 1.5 cm nodular opacity is noted in the anterior segment of the right upper lobe. Follow-up CT scan is recommended in 3 months to ensure resolution.\par \par \par \par \par CT Chest No Cont: EXAM:  CT CHEST\par PROCEDURE DATE:  07/29/2021\par AIRWAYS, LUNGS and PLEURA: Patent central airways. Emphysema. 0.7 cm right apical nodule\par (series 3 image 29) is decreased; previously 1.3 cm. New small tubular opacity within the\par right apex (series 3 image 42) likely mucoid impaction. Extensive centrilobular nodules and\par tree-in-bud opacities predominantly within the right middle lobe and right lower lobe are\par increased/new from prior. No pleural effusion.\par IMPRESSION:\par \par In comparison with 1/22/2021, bilateral centrilobular nodules and tree-in-bud opacities predominantly\par within the right middle lobe and right lower lobe are increased/new from\par prior likely representing bronchiolitis.\par \par 0.7 cm right apical nodule (series 3 image 29) is decreased previously 1.3 cm.\par \par

## 2021-12-30 NOTE — ADDENDUM
[FreeTextEntry1] : Documented by Jaydon Wang acting as scribe for Dr. Sharpe on 12/28/2021. \par \par All Medical record entries made by the Scribe were at my, Dr. Sharpe's, direction and personally dictated by me on 12/28/2021. I have reviewed the chart and agree that the record accurately reflects my personal performance of the history, physical exam, assessment and plan. I have also personally directed, reviewed, and agreed with the discharge instructions.

## 2022-01-01 ENCOUNTER — NON-APPOINTMENT (OUTPATIENT)
Age: 57
End: 2022-01-01

## 2022-01-01 ENCOUNTER — APPOINTMENT (OUTPATIENT)
Dept: RADIATION ONCOLOGY | Facility: CLINIC | Age: 57
End: 2022-01-01
Payer: MEDICAID

## 2022-01-01 ENCOUNTER — INPATIENT (INPATIENT)
Facility: HOSPITAL | Age: 57
LOS: 9 days | Discharge: HOME | DRG: 871 | End: 2022-06-02
Attending: HOSPITALIST | Admitting: HOSPITALIST
Payer: MEDICAID

## 2022-01-01 ENCOUNTER — APPOINTMENT (OUTPATIENT)
Age: 57
End: 2022-01-01

## 2022-01-01 ENCOUNTER — APPOINTMENT (OUTPATIENT)
Dept: CT IMAGING | Facility: CLINIC | Age: 57
End: 2022-01-01
Payer: MEDICAID

## 2022-01-01 ENCOUNTER — RESULT REVIEW (OUTPATIENT)
Age: 57
End: 2022-01-01

## 2022-01-01 ENCOUNTER — OUTPATIENT (OUTPATIENT)
Dept: OUTPATIENT SERVICES | Facility: HOSPITAL | Age: 57
LOS: 1 days | Discharge: HOME | End: 2022-01-01

## 2022-01-01 ENCOUNTER — APPOINTMENT (OUTPATIENT)
Dept: HEMATOLOGY ONCOLOGY | Facility: CLINIC | Age: 57
End: 2022-01-01

## 2022-01-01 ENCOUNTER — APPOINTMENT (OUTPATIENT)
Dept: HEMATOLOGY ONCOLOGY | Facility: CLINIC | Age: 57
End: 2022-01-01
Payer: MEDICAID

## 2022-01-01 ENCOUNTER — OUTPATIENT (OUTPATIENT)
Dept: OUTPATIENT SERVICES | Facility: HOSPITAL | Age: 57
LOS: 1 days | End: 2022-01-01

## 2022-01-01 ENCOUNTER — APPOINTMENT (OUTPATIENT)
Dept: THORACIC SURGERY | Facility: CLINIC | Age: 57
End: 2022-01-01
Payer: MEDICAID

## 2022-01-01 ENCOUNTER — APPOINTMENT (OUTPATIENT)
Dept: NEUROLOGY | Facility: CLINIC | Age: 57
End: 2022-01-01
Payer: MEDICAID

## 2022-01-01 ENCOUNTER — TRANSCRIPTION ENCOUNTER (OUTPATIENT)
Age: 57
End: 2022-01-01

## 2022-01-01 ENCOUNTER — OUTPATIENT (OUTPATIENT)
Dept: OUTPATIENT SERVICES | Facility: HOSPITAL | Age: 57
LOS: 1 days | End: 2022-01-01
Payer: MEDICAID

## 2022-01-01 ENCOUNTER — APPOINTMENT (OUTPATIENT)
Dept: NUCLEAR MEDICINE | Facility: CLINIC | Age: 57
End: 2022-01-01
Payer: MEDICAID

## 2022-01-01 ENCOUNTER — APPOINTMENT (OUTPATIENT)
Dept: PULMONOLOGY | Facility: CLINIC | Age: 57
End: 2022-01-01
Payer: MEDICAID

## 2022-01-01 ENCOUNTER — LABORATORY RESULT (OUTPATIENT)
Age: 57
End: 2022-01-01

## 2022-01-01 ENCOUNTER — OUTPATIENT (OUTPATIENT)
Dept: OUTPATIENT SERVICES | Facility: HOSPITAL | Age: 57
LOS: 1 days | Discharge: HOME | End: 2022-01-01
Payer: MEDICAID

## 2022-01-01 ENCOUNTER — APPOINTMENT (OUTPATIENT)
Dept: RADIATION ONCOLOGY | Facility: CLINIC | Age: 57
End: 2022-01-01

## 2022-01-01 ENCOUNTER — EMERGENCY (EMERGENCY)
Facility: HOSPITAL | Age: 57
LOS: 1 days | Discharge: HOME | End: 2022-01-01
Attending: EMERGENCY MEDICINE
Payer: MEDICAID

## 2022-01-01 ENCOUNTER — APPOINTMENT (OUTPATIENT)
Dept: ULTRASOUND IMAGING | Facility: CLINIC | Age: 57
End: 2022-01-01
Payer: MEDICAID

## 2022-01-01 ENCOUNTER — APPOINTMENT (OUTPATIENT)
Dept: THORACIC SURGERY | Facility: HOSPITAL | Age: 57
End: 2022-01-01

## 2022-01-01 ENCOUNTER — RX CHANGE (OUTPATIENT)
Age: 57
End: 2022-01-01

## 2022-01-01 ENCOUNTER — EMERGENCY (EMERGENCY)
Facility: HOSPITAL | Age: 57
LOS: 1 days | Discharge: LEFT BEFORE DISCHARGE | End: 2022-01-01
Attending: EMERGENCY MEDICINE
Payer: MEDICAID

## 2022-01-01 ENCOUNTER — APPOINTMENT (OUTPATIENT)
Dept: NEUROLOGY | Facility: CLINIC | Age: 57
End: 2022-01-01

## 2022-01-01 ENCOUNTER — APPOINTMENT (OUTPATIENT)
Dept: MRI IMAGING | Facility: CLINIC | Age: 57
End: 2022-01-01
Payer: MEDICAID

## 2022-01-01 VITALS
HEIGHT: 72 IN | HEART RATE: 143 BPM | OXYGEN SATURATION: 81 % | SYSTOLIC BLOOD PRESSURE: 112 MMHG | WEIGHT: 110.89 LBS | DIASTOLIC BLOOD PRESSURE: 78 MMHG | RESPIRATION RATE: 20 BRPM | TEMPERATURE: 99 F

## 2022-01-01 VITALS
HEART RATE: 89 BPM | BODY MASS INDEX: 19.46 KG/M2 | RESPIRATION RATE: 16 BRPM | OXYGEN SATURATION: 100 % | WEIGHT: 128 LBS | SYSTOLIC BLOOD PRESSURE: 120 MMHG | DIASTOLIC BLOOD PRESSURE: 75 MMHG

## 2022-01-01 VITALS
SYSTOLIC BLOOD PRESSURE: 126 MMHG | OXYGEN SATURATION: 99 % | BODY MASS INDEX: 19.77 KG/M2 | HEART RATE: 74 BPM | WEIGHT: 130 LBS | RESPIRATION RATE: 16 BRPM | DIASTOLIC BLOOD PRESSURE: 78 MMHG

## 2022-01-01 VITALS
HEART RATE: 116 BPM | RESPIRATION RATE: 18 BRPM | TEMPERATURE: 98 F | OXYGEN SATURATION: 91 % | SYSTOLIC BLOOD PRESSURE: 119 MMHG | DIASTOLIC BLOOD PRESSURE: 75 MMHG

## 2022-01-01 VITALS
BODY MASS INDEX: 17.63 KG/M2 | HEIGHT: 69 IN | WEIGHT: 119 LBS | SYSTOLIC BLOOD PRESSURE: 106 MMHG | DIASTOLIC BLOOD PRESSURE: 70 MMHG

## 2022-01-01 VITALS
DIASTOLIC BLOOD PRESSURE: 79 MMHG | RESPIRATION RATE: 17 BRPM | OXYGEN SATURATION: 99 % | HEART RATE: 99 BPM | SYSTOLIC BLOOD PRESSURE: 142 MMHG

## 2022-01-01 VITALS
SYSTOLIC BLOOD PRESSURE: 117 MMHG | RESPIRATION RATE: 20 BRPM | TEMPERATURE: 99 F | DIASTOLIC BLOOD PRESSURE: 74 MMHG | OXYGEN SATURATION: 97 % | HEART RATE: 96 BPM

## 2022-01-01 VITALS
OXYGEN SATURATION: 98 % | HEIGHT: 69 IN | DIASTOLIC BLOOD PRESSURE: 68 MMHG | SYSTOLIC BLOOD PRESSURE: 110 MMHG | BODY MASS INDEX: 18.22 KG/M2 | HEART RATE: 109 BPM | RESPIRATION RATE: 16 BRPM | WEIGHT: 123 LBS

## 2022-01-01 VITALS
HEART RATE: 107 BPM | DIASTOLIC BLOOD PRESSURE: 76 MMHG | HEIGHT: 69 IN | SYSTOLIC BLOOD PRESSURE: 116 MMHG | WEIGHT: 117 LBS | BODY MASS INDEX: 17.33 KG/M2 | OXYGEN SATURATION: 95 %

## 2022-01-01 VITALS
RESPIRATION RATE: 18 BRPM | DIASTOLIC BLOOD PRESSURE: 86 MMHG | TEMPERATURE: 98 F | SYSTOLIC BLOOD PRESSURE: 128 MMHG | HEART RATE: 80 BPM | OXYGEN SATURATION: 100 %

## 2022-01-01 VITALS
HEART RATE: 86 BPM | DIASTOLIC BLOOD PRESSURE: 70 MMHG | WEIGHT: 130 LBS | SYSTOLIC BLOOD PRESSURE: 130 MMHG | OXYGEN SATURATION: 98 % | BODY MASS INDEX: 19.77 KG/M2

## 2022-01-01 VITALS
TEMPERATURE: 98 F | SYSTOLIC BLOOD PRESSURE: 123 MMHG | WEIGHT: 130.07 LBS | HEART RATE: 75 BPM | RESPIRATION RATE: 18 BRPM | DIASTOLIC BLOOD PRESSURE: 85 MMHG | OXYGEN SATURATION: 100 % | HEIGHT: 72 IN

## 2022-01-01 VITALS
TEMPERATURE: 98 F | SYSTOLIC BLOOD PRESSURE: 126 MMHG | WEIGHT: 130.07 LBS | RESPIRATION RATE: 20 BRPM | HEART RATE: 80 BPM | OXYGEN SATURATION: 98 % | DIASTOLIC BLOOD PRESSURE: 80 MMHG | HEIGHT: 72 IN

## 2022-01-01 VITALS
HEIGHT: 68 IN | SYSTOLIC BLOOD PRESSURE: 120 MMHG | WEIGHT: 126 LBS | DIASTOLIC BLOOD PRESSURE: 80 MMHG | BODY MASS INDEX: 19.1 KG/M2

## 2022-01-01 VITALS
RESPIRATION RATE: 16 BRPM | WEIGHT: 212.8 LBS | OXYGEN SATURATION: 99 % | SYSTOLIC BLOOD PRESSURE: 118 MMHG | HEART RATE: 88 BPM | DIASTOLIC BLOOD PRESSURE: 81 MMHG | BODY MASS INDEX: 32.36 KG/M2

## 2022-01-01 VITALS
SYSTOLIC BLOOD PRESSURE: 118 MMHG | HEIGHT: 68 IN | DIASTOLIC BLOOD PRESSURE: 79 MMHG | BODY MASS INDEX: 19.25 KG/M2 | OXYGEN SATURATION: 100 % | WEIGHT: 127 LBS | HEART RATE: 66 BPM

## 2022-01-01 VITALS
BODY MASS INDEX: 18.04 KG/M2 | HEART RATE: 112 BPM | DIASTOLIC BLOOD PRESSURE: 72 MMHG | HEIGHT: 68 IN | WEIGHT: 119 LBS | OXYGEN SATURATION: 96 % | SYSTOLIC BLOOD PRESSURE: 114 MMHG

## 2022-01-01 VITALS
HEART RATE: 99 BPM | WEIGHT: 119 LBS | BODY MASS INDEX: 17.63 KG/M2 | DIASTOLIC BLOOD PRESSURE: 73 MMHG | OXYGEN SATURATION: 94 % | HEIGHT: 69 IN | TEMPERATURE: 97.8 F | SYSTOLIC BLOOD PRESSURE: 107 MMHG

## 2022-01-01 VITALS
SYSTOLIC BLOOD PRESSURE: 138 MMHG | HEART RATE: 66 BPM | WEIGHT: 131 LBS | BODY MASS INDEX: 19.85 KG/M2 | OXYGEN SATURATION: 100 % | HEIGHT: 68 IN | DIASTOLIC BLOOD PRESSURE: 90 MMHG | RESPIRATION RATE: 17 BRPM

## 2022-01-01 VITALS
DIASTOLIC BLOOD PRESSURE: 79 MMHG | HEART RATE: 59 BPM | HEIGHT: 68 IN | BODY MASS INDEX: 19.7 KG/M2 | WEIGHT: 130 LBS | OXYGEN SATURATION: 99 % | SYSTOLIC BLOOD PRESSURE: 128 MMHG

## 2022-01-01 VITALS
DIASTOLIC BLOOD PRESSURE: 77 MMHG | HEART RATE: 74 BPM | SYSTOLIC BLOOD PRESSURE: 119 MMHG | WEIGHT: 127.2 LBS | OXYGEN SATURATION: 99 % | RESPIRATION RATE: 16 BRPM | BODY MASS INDEX: 19.34 KG/M2

## 2022-01-01 VITALS
WEIGHT: 124.02 LBS | SYSTOLIC BLOOD PRESSURE: 106 MMHG | HEART RATE: 81 BPM | HEIGHT: 68 IN | OXYGEN SATURATION: 98 % | BODY MASS INDEX: 18.79 KG/M2 | DIASTOLIC BLOOD PRESSURE: 70 MMHG

## 2022-01-01 VITALS
WEIGHT: 123 LBS | BODY MASS INDEX: 18.16 KG/M2 | RESPIRATION RATE: 16 BRPM | SYSTOLIC BLOOD PRESSURE: 102 MMHG | OXYGEN SATURATION: 96 % | DIASTOLIC BLOOD PRESSURE: 65 MMHG | HEART RATE: 111 BPM

## 2022-01-01 VITALS
HEART RATE: 65 BPM | RESPIRATION RATE: 16 BRPM | DIASTOLIC BLOOD PRESSURE: 80 MMHG | SYSTOLIC BLOOD PRESSURE: 129 MMHG | WEIGHT: 132 LBS | OXYGEN SATURATION: 99 % | BODY MASS INDEX: 20.07 KG/M2

## 2022-01-01 VITALS
SYSTOLIC BLOOD PRESSURE: 95 MMHG | WEIGHT: 128 LBS | BODY MASS INDEX: 19.46 KG/M2 | HEART RATE: 85 BPM | OXYGEN SATURATION: 85 % | RESPIRATION RATE: 16 BRPM | DIASTOLIC BLOOD PRESSURE: 74 MMHG

## 2022-01-01 DIAGNOSIS — Z87.81 PERSONAL HISTORY OF (HEALED) TRAUMATIC FRACTURE: Chronic | ICD-10-CM

## 2022-01-01 DIAGNOSIS — C34.90 MALIGNANT NEOPLASM OF UNSPECIFIED PART OF UNSPECIFIED BRONCHUS OR LUNG: ICD-10-CM

## 2022-01-01 DIAGNOSIS — I65.29 OCCLUSION AND STENOSIS OF UNSPECIFIED CAROTID ARTERY: ICD-10-CM

## 2022-01-01 DIAGNOSIS — R11.2 NAUSEA WITH VOMITING, UNSPECIFIED: ICD-10-CM

## 2022-01-01 DIAGNOSIS — Z51.11 ENCOUNTER FOR ANTINEOPLASTIC CHEMOTHERAPY: ICD-10-CM

## 2022-01-01 DIAGNOSIS — J18.9 PNEUMONIA, UNSPECIFIED ORGANISM: ICD-10-CM

## 2022-01-01 DIAGNOSIS — I63.9 CEREBRAL INFARCTION, UNSPECIFIED: ICD-10-CM

## 2022-01-01 DIAGNOSIS — R06.02 SHORTNESS OF BREATH: ICD-10-CM

## 2022-01-01 DIAGNOSIS — J43.8 OTHER EMPHYSEMA: ICD-10-CM

## 2022-01-01 DIAGNOSIS — F17.210 NICOTINE DEPENDENCE, CIGARETTES, UNCOMPLICATED: ICD-10-CM

## 2022-01-01 DIAGNOSIS — J96.01 ACUTE RESPIRATORY FAILURE WITH HYPOXIA: ICD-10-CM

## 2022-01-01 DIAGNOSIS — J70.0 ACUTE PULMONARY MANIFESTATIONS DUE TO RADIATION: ICD-10-CM

## 2022-01-01 DIAGNOSIS — Z13.9 ENCOUNTER FOR SCREENING, UNSPECIFIED: ICD-10-CM

## 2022-01-01 DIAGNOSIS — Z78.9 OTHER SPECIFIED HEALTH STATUS: ICD-10-CM

## 2022-01-01 DIAGNOSIS — F17.200 NICOTINE DEPENDENCE, UNSPECIFIED, UNCOMPLICATED: ICD-10-CM

## 2022-01-01 DIAGNOSIS — Z00.8 ENCOUNTER FOR OTHER GENERAL EXAMINATION: ICD-10-CM

## 2022-01-01 DIAGNOSIS — R91.8 OTHER NONSPECIFIC ABNORMAL FINDING OF LUNG FIELD: ICD-10-CM

## 2022-01-01 DIAGNOSIS — Z29.8 ENCOUNTER FOR OTHER SPECIFIED PROPHYLACTIC MEASURES: ICD-10-CM

## 2022-01-01 DIAGNOSIS — Z86.79 PERSONAL HISTORY OF OTHER DISEASES OF THE CIRCULATORY SYSTEM: ICD-10-CM

## 2022-01-01 LAB
ACANTHOCYTES BLD QL SMEAR: SLIGHT — SIGNIFICANT CHANGE UP
ALBUMIN SERPL ELPH-MCNC: 3.1 G/DL — LOW (ref 3.3–5.2)
ALBUMIN SERPL ELPH-MCNC: 3.2 G/DL — LOW (ref 3.3–5.2)
ALBUMIN SERPL ELPH-MCNC: 3.7 G/DL
ALBUMIN SERPL ELPH-MCNC: 3.8 G/DL — SIGNIFICANT CHANGE UP (ref 3.3–5.2)
ALBUMIN SERPL ELPH-MCNC: 4.2 G/DL
ALBUMIN SERPL ELPH-MCNC: 4.2 G/DL — SIGNIFICANT CHANGE UP (ref 3.3–5)
ALBUMIN SERPL ELPH-MCNC: 4.2 G/DL — SIGNIFICANT CHANGE UP (ref 3.3–5)
ALBUMIN SERPL ELPH-MCNC: 4.3 G/DL — SIGNIFICANT CHANGE UP (ref 3.3–5)
ALBUMIN SERPL ELPH-MCNC: 4.3 G/DL — SIGNIFICANT CHANGE UP (ref 3.3–5)
ALBUMIN SERPL ELPH-MCNC: 4.4 G/DL
ALBUMIN SERPL ELPH-MCNC: 4.6 G/DL — SIGNIFICANT CHANGE UP (ref 3.3–5)
ALBUMIN SERPL ELPH-MCNC: 4.7 G/DL — SIGNIFICANT CHANGE UP (ref 3.3–5)
ALP BLD-CCNC: 50 U/L
ALP BLD-CCNC: 70 U/L
ALP BLD-CCNC: 77 U/L
ALP SERPL-CCNC: 115 U/L — SIGNIFICANT CHANGE UP (ref 40–120)
ALP SERPL-CCNC: 121 U/L — HIGH (ref 40–120)
ALP SERPL-CCNC: 130 U/L — HIGH (ref 40–120)
ALP SERPL-CCNC: 41 U/L — SIGNIFICANT CHANGE UP (ref 40–120)
ALP SERPL-CCNC: 43 U/L — SIGNIFICANT CHANGE UP (ref 40–120)
ALP SERPL-CCNC: 46 U/L — SIGNIFICANT CHANGE UP (ref 40–120)
ALP SERPL-CCNC: 48 U/L — SIGNIFICANT CHANGE UP (ref 40–120)
ALP SERPL-CCNC: 49 U/L — SIGNIFICANT CHANGE UP (ref 40–120)
ALP SERPL-CCNC: 53 U/L — SIGNIFICANT CHANGE UP (ref 40–120)
ALT FLD-CCNC: 100 U/L — HIGH
ALT FLD-CCNC: 12 U/L — SIGNIFICANT CHANGE UP (ref 10–45)
ALT FLD-CCNC: 15 U/L — SIGNIFICANT CHANGE UP (ref 10–45)
ALT FLD-CCNC: 16 U/L — SIGNIFICANT CHANGE UP (ref 10–45)
ALT FLD-CCNC: 16 U/L — SIGNIFICANT CHANGE UP (ref 10–45)
ALT FLD-CCNC: 17 U/L — SIGNIFICANT CHANGE UP (ref 10–45)
ALT FLD-CCNC: 19 U/L — SIGNIFICANT CHANGE UP (ref 10–45)
ALT FLD-CCNC: 26 U/L — SIGNIFICANT CHANGE UP
ALT FLD-CCNC: 71 U/L — HIGH
ALT SERPL-CCNC: 14 U/L
ALT SERPL-CCNC: 16 U/L
ALT SERPL-CCNC: 40 U/L
ANION GAP SERPL CALC-SCNC: 10 MMOL/L — SIGNIFICANT CHANGE UP (ref 5–17)
ANION GAP SERPL CALC-SCNC: 10 MMOL/L — SIGNIFICANT CHANGE UP (ref 5–17)
ANION GAP SERPL CALC-SCNC: 11 MMOL/L — SIGNIFICANT CHANGE UP (ref 5–17)
ANION GAP SERPL CALC-SCNC: 11 MMOL/L — SIGNIFICANT CHANGE UP (ref 5–17)
ANION GAP SERPL CALC-SCNC: 12 MMOL/L — SIGNIFICANT CHANGE UP (ref 5–17)
ANION GAP SERPL CALC-SCNC: 13 MMOL/L — SIGNIFICANT CHANGE UP (ref 5–17)
ANION GAP SERPL CALC-SCNC: 14 MMOL/L — SIGNIFICANT CHANGE UP (ref 5–17)
ANION GAP SERPL CALC-SCNC: 14 MMOL/L — SIGNIFICANT CHANGE UP (ref 5–17)
ANION GAP SERPL CALC-SCNC: 15 MMOL/L
ANION GAP SERPL CALC-SCNC: 15 MMOL/L — SIGNIFICANT CHANGE UP (ref 5–17)
ANION GAP SERPL CALC-SCNC: 16 MMOL/L
ANION GAP SERPL CALC-SCNC: 17 MMOL/L
ANION GAP SERPL CALC-SCNC: 19 MMOL/L — HIGH (ref 5–17)
ANION GAP SERPL CALC-SCNC: 9 MMOL/L — SIGNIFICANT CHANGE UP (ref 5–17)
ANISOCYTOSIS BLD QL: SLIGHT — SIGNIFICANT CHANGE UP
APPEARANCE UR: CLEAR — SIGNIFICANT CHANGE UP
APTT BLD: 30.6 SEC — SIGNIFICANT CHANGE UP (ref 27.5–35.5)
AST SERPL-CCNC: 19 U/L
AST SERPL-CCNC: 20 U/L — SIGNIFICANT CHANGE UP
AST SERPL-CCNC: 20 U/L — SIGNIFICANT CHANGE UP (ref 10–40)
AST SERPL-CCNC: 21 U/L — SIGNIFICANT CHANGE UP (ref 10–40)
AST SERPL-CCNC: 23 U/L
AST SERPL-CCNC: 24 U/L — SIGNIFICANT CHANGE UP (ref 10–40)
AST SERPL-CCNC: 25 U/L — SIGNIFICANT CHANGE UP (ref 10–40)
AST SERPL-CCNC: 30 U/L — SIGNIFICANT CHANGE UP (ref 10–40)
AST SERPL-CCNC: 34 U/L — SIGNIFICANT CHANGE UP (ref 10–40)
AST SERPL-CCNC: 35 U/L — SIGNIFICANT CHANGE UP
AST SERPL-CCNC: 39 U/L — SIGNIFICANT CHANGE UP
AST SERPL-CCNC: 51 U/L
BACTERIA # UR AUTO: ABNORMAL
BASE EXCESS BLDA CALC-SCNC: 2.5 MMOL/L — SIGNIFICANT CHANGE UP (ref -2–3)
BASE EXCESS BLDV CALC-SCNC: 0.1 MMOL/L — SIGNIFICANT CHANGE UP (ref -2–3)
BASOPHILS # BLD AUTO: 0 K/UL — SIGNIFICANT CHANGE UP (ref 0–0.2)
BASOPHILS # BLD AUTO: 0.1 K/UL — SIGNIFICANT CHANGE UP (ref 0–0.2)
BASOPHILS # BLD AUTO: 0.2 K/UL — SIGNIFICANT CHANGE UP (ref 0–0.2)
BASOPHILS # BLD AUTO: 0.3 K/UL — HIGH (ref 0–0.2)
BASOPHILS NFR BLD AUTO: 0 % — SIGNIFICANT CHANGE UP (ref 0–2)
BASOPHILS NFR BLD AUTO: 0 % — SIGNIFICANT CHANGE UP (ref 0–2)
BASOPHILS NFR BLD AUTO: 1.3 % — SIGNIFICANT CHANGE UP (ref 0–2)
BASOPHILS NFR BLD AUTO: 1.6 % — SIGNIFICANT CHANGE UP (ref 0–2)
BASOPHILS NFR BLD AUTO: 1.7 % — SIGNIFICANT CHANGE UP (ref 0–2)
BASOPHILS NFR BLD AUTO: 1.7 % — SIGNIFICANT CHANGE UP (ref 0–2)
BASOPHILS NFR BLD AUTO: 2 % — SIGNIFICANT CHANGE UP (ref 0–2)
BASOPHILS NFR BLD AUTO: 2 % — SIGNIFICANT CHANGE UP (ref 0–2)
BASOPHILS NFR BLD AUTO: 2.2 % — HIGH (ref 0–2)
BASOPHILS NFR BLD AUTO: 2.4 % — HIGH (ref 0–2)
BASOPHILS NFR BLD AUTO: 2.6 % — HIGH (ref 0–2)
BASOPHILS NFR BLD AUTO: 2.6 % — HIGH (ref 0–2)
BILIRUB SERPL-MCNC: 0.2 MG/DL
BILIRUB SERPL-MCNC: 0.2 MG/DL — LOW (ref 0.4–2)
BILIRUB SERPL-MCNC: 0.2 MG/DL — SIGNIFICANT CHANGE UP (ref 0.2–1.2)
BILIRUB SERPL-MCNC: 0.3 MG/DL — SIGNIFICANT CHANGE UP (ref 0.2–1.2)
BILIRUB SERPL-MCNC: 0.4 MG/DL
BILIRUB SERPL-MCNC: 0.4 MG/DL — SIGNIFICANT CHANGE UP (ref 0.2–1.2)
BILIRUB SERPL-MCNC: 0.4 MG/DL — SIGNIFICANT CHANGE UP (ref 0.4–2)
BILIRUB SERPL-MCNC: 0.5 MG/DL
BILIRUB SERPL-MCNC: 0.6 MG/DL — SIGNIFICANT CHANGE UP (ref 0.4–2)
BILIRUB SERPL-MCNC: <0.2 MG/DL — SIGNIFICANT CHANGE UP (ref 0.2–1.2)
BILIRUB UR-MCNC: NEGATIVE — SIGNIFICANT CHANGE UP
BITE CELLS BLD QL SMEAR: PRESENT — SIGNIFICANT CHANGE UP
BLD GP AB SCN SERPL QL: SIGNIFICANT CHANGE UP
BLOOD GAS COMMENTS ARTERIAL: SIGNIFICANT CHANGE UP
BUN SERPL-MCNC: 10 MG/DL
BUN SERPL-MCNC: 11 MG/DL
BUN SERPL-MCNC: 11 MG/DL — SIGNIFICANT CHANGE UP (ref 7–23)
BUN SERPL-MCNC: 11 MG/DL — SIGNIFICANT CHANGE UP (ref 7–23)
BUN SERPL-MCNC: 11 MG/DL — SIGNIFICANT CHANGE UP (ref 8–20)
BUN SERPL-MCNC: 11.7 MG/DL — SIGNIFICANT CHANGE UP (ref 8–20)
BUN SERPL-MCNC: 11.9 MG/DL — SIGNIFICANT CHANGE UP (ref 8–20)
BUN SERPL-MCNC: 12 MG/DL — SIGNIFICANT CHANGE UP (ref 7–23)
BUN SERPL-MCNC: 12.5 MG/DL — SIGNIFICANT CHANGE UP (ref 8–20)
BUN SERPL-MCNC: 12.7 MG/DL — SIGNIFICANT CHANGE UP (ref 8–20)
BUN SERPL-MCNC: 13 MG/DL — SIGNIFICANT CHANGE UP (ref 8–20)
BUN SERPL-MCNC: 13.7 MG/DL — SIGNIFICANT CHANGE UP (ref 8–20)
BUN SERPL-MCNC: 14 MG/DL
BUN SERPL-MCNC: 14.3 MG/DL — SIGNIFICANT CHANGE UP (ref 8–20)
BUN SERPL-MCNC: 14.4 MG/DL — SIGNIFICANT CHANGE UP (ref 8–20)
BUN SERPL-MCNC: 16 MG/DL — SIGNIFICANT CHANGE UP (ref 7–23)
BUN SERPL-MCNC: 20.7 MG/DL — HIGH (ref 8–20)
BUN SERPL-MCNC: 8 MG/DL — SIGNIFICANT CHANGE UP (ref 7–23)
BUN SERPL-MCNC: 9 MG/DL — SIGNIFICANT CHANGE UP (ref 7–23)
BURR CELLS BLD QL SMEAR: PRESENT — SIGNIFICANT CHANGE UP
CA-I SERPL-SCNC: 1.09 MMOL/L — LOW (ref 1.15–1.33)
CALCIUM SERPL-MCNC: 8.8 MG/DL
CALCIUM SERPL-MCNC: 9 MG/DL — SIGNIFICANT CHANGE UP (ref 8.6–10.2)
CALCIUM SERPL-MCNC: 9.1 MG/DL — SIGNIFICANT CHANGE UP (ref 8.6–10.2)
CALCIUM SERPL-MCNC: 9.1 MG/DL — SIGNIFICANT CHANGE UP (ref 8.6–10.2)
CALCIUM SERPL-MCNC: 9.2 MG/DL — SIGNIFICANT CHANGE UP (ref 8.6–10.2)
CALCIUM SERPL-MCNC: 9.3 MG/DL
CALCIUM SERPL-MCNC: 9.3 MG/DL — SIGNIFICANT CHANGE UP (ref 8.6–10.2)
CALCIUM SERPL-MCNC: 9.4 MG/DL
CALCIUM SERPL-MCNC: 9.4 MG/DL — SIGNIFICANT CHANGE UP (ref 8.4–10.5)
CALCIUM SERPL-MCNC: 9.4 MG/DL — SIGNIFICANT CHANGE UP (ref 8.4–10.5)
CALCIUM SERPL-MCNC: 9.4 MG/DL — SIGNIFICANT CHANGE UP (ref 8.6–10.2)
CALCIUM SERPL-MCNC: 9.4 MG/DL — SIGNIFICANT CHANGE UP (ref 8.6–10.2)
CALCIUM SERPL-MCNC: 9.5 MG/DL — SIGNIFICANT CHANGE UP (ref 8.4–10.5)
CALCIUM SERPL-MCNC: 9.5 MG/DL — SIGNIFICANT CHANGE UP (ref 8.4–10.5)
CALCIUM SERPL-MCNC: 9.6 MG/DL — SIGNIFICANT CHANGE UP (ref 8.4–10.5)
CALCIUM SERPL-MCNC: 9.6 MG/DL — SIGNIFICANT CHANGE UP (ref 8.4–10.5)
CEA SERPL-MCNC: 10.5 NG/ML — HIGH (ref 0–3.8)
CEA SERPL-MCNC: 10.9 NG/ML — HIGH (ref 0–3.8)
CEA SERPL-MCNC: 8.2 NG/ML
CEA SERPL-MCNC: 9.2 NG/ML — HIGH (ref 0–3.8)
CEA SERPL-MCNC: 9.3 NG/ML
CEA SERPL-MCNC: 9.5 NG/ML — HIGH (ref 0–3.8)
CEA SERPL-MCNC: 9.5 NG/ML — HIGH (ref 0–3.8)
CEA SERPL-MCNC: 9.9 NG/ML — HIGH (ref 0–3.8)
CHLORIDE BLDV-SCNC: 103 MMOL/L — SIGNIFICANT CHANGE UP (ref 98–107)
CHLORIDE SERPL-SCNC: 100 MMOL/L — SIGNIFICANT CHANGE UP (ref 98–107)
CHLORIDE SERPL-SCNC: 100 MMOL/L — SIGNIFICANT CHANGE UP (ref 98–107)
CHLORIDE SERPL-SCNC: 101 MMOL/L
CHLORIDE SERPL-SCNC: 101 MMOL/L — SIGNIFICANT CHANGE UP (ref 98–107)
CHLORIDE SERPL-SCNC: 102 MMOL/L
CHLORIDE SERPL-SCNC: 104 MMOL/L — SIGNIFICANT CHANGE UP (ref 96–108)
CHLORIDE SERPL-SCNC: 105 MMOL/L — SIGNIFICANT CHANGE UP (ref 96–108)
CHLORIDE SERPL-SCNC: 105 MMOL/L — SIGNIFICANT CHANGE UP (ref 96–108)
CHLORIDE SERPL-SCNC: 106 MMOL/L
CHLORIDE SERPL-SCNC: 106 MMOL/L — SIGNIFICANT CHANGE UP (ref 96–108)
CHLORIDE SERPL-SCNC: 108 MMOL/L — SIGNIFICANT CHANGE UP (ref 96–108)
CHLORIDE SERPL-SCNC: 109 MMOL/L — HIGH (ref 96–108)
CHLORIDE SERPL-SCNC: 96 MMOL/L — LOW (ref 98–107)
CHLORIDE SERPL-SCNC: 98 MMOL/L — SIGNIFICANT CHANGE UP (ref 98–107)
CHLORIDE SERPL-SCNC: 99 MMOL/L — SIGNIFICANT CHANGE UP (ref 98–107)
CO2 SERPL-SCNC: 20 MMOL/L — LOW (ref 22–31)
CO2 SERPL-SCNC: 21 MMOL/L
CO2 SERPL-SCNC: 21 MMOL/L — LOW (ref 22–31)
CO2 SERPL-SCNC: 22 MMOL/L — SIGNIFICANT CHANGE UP (ref 22–29)
CO2 SERPL-SCNC: 22 MMOL/L — SIGNIFICANT CHANGE UP (ref 22–31)
CO2 SERPL-SCNC: 23 MMOL/L — SIGNIFICANT CHANGE UP (ref 22–29)
CO2 SERPL-SCNC: 23 MMOL/L — SIGNIFICANT CHANGE UP (ref 22–31)
CO2 SERPL-SCNC: 24 MMOL/L — SIGNIFICANT CHANGE UP (ref 22–29)
CO2 SERPL-SCNC: 24 MMOL/L — SIGNIFICANT CHANGE UP (ref 22–29)
CO2 SERPL-SCNC: 24 MMOL/L — SIGNIFICANT CHANGE UP (ref 22–31)
CO2 SERPL-SCNC: 25 MMOL/L — SIGNIFICANT CHANGE UP (ref 22–29)
CO2 SERPL-SCNC: 26 MMOL/L — SIGNIFICANT CHANGE UP (ref 22–29)
CO2 SERPL-SCNC: 26 MMOL/L — SIGNIFICANT CHANGE UP (ref 22–31)
CO2 SERPL-SCNC: 29 MMOL/L — SIGNIFICANT CHANGE UP (ref 22–29)
COLOR SPEC: YELLOW — SIGNIFICANT CHANGE UP
CREAT SERPL-MCNC: 0.49 MG/DL — LOW (ref 0.5–1.3)
CREAT SERPL-MCNC: 0.52 MG/DL — SIGNIFICANT CHANGE UP (ref 0.5–1.3)
CREAT SERPL-MCNC: 0.52 MG/DL — SIGNIFICANT CHANGE UP (ref 0.5–1.3)
CREAT SERPL-MCNC: 0.53 MG/DL — SIGNIFICANT CHANGE UP (ref 0.5–1.3)
CREAT SERPL-MCNC: 0.54 MG/DL — SIGNIFICANT CHANGE UP (ref 0.5–1.3)
CREAT SERPL-MCNC: 0.55 MG/DL — SIGNIFICANT CHANGE UP (ref 0.5–1.3)
CREAT SERPL-MCNC: 0.55 MG/DL — SIGNIFICANT CHANGE UP (ref 0.5–1.3)
CREAT SERPL-MCNC: 0.58 MG/DL — SIGNIFICANT CHANGE UP (ref 0.5–1.3)
CREAT SERPL-MCNC: 0.71 MG/DL — SIGNIFICANT CHANGE UP (ref 0.5–1.3)
CREAT SERPL-MCNC: 0.75 MG/DL
CREAT SERPL-MCNC: 0.75 MG/DL — SIGNIFICANT CHANGE UP (ref 0.5–1.3)
CREAT SERPL-MCNC: 0.77 MG/DL — SIGNIFICANT CHANGE UP (ref 0.5–1.3)
CREAT SERPL-MCNC: 0.79 MG/DL — SIGNIFICANT CHANGE UP (ref 0.5–1.3)
CREAT SERPL-MCNC: 0.8 MG/DL — SIGNIFICANT CHANGE UP (ref 0.5–1.3)
CREAT SERPL-MCNC: 0.82 MG/DL
CREAT SERPL-MCNC: 0.87 MG/DL — SIGNIFICANT CHANGE UP (ref 0.5–1.3)
CREAT SERPL-MCNC: 0.9 MG/DL — SIGNIFICANT CHANGE UP (ref 0.5–1.3)
CREAT SERPL-MCNC: 1.02 MG/DL
CREAT SERPL-MCNC: 1.02 MG/DL — SIGNIFICANT CHANGE UP (ref 0.5–1.3)
CULTURE RESULTS: NO GROWTH — SIGNIFICANT CHANGE UP
CULTURE RESULTS: NO GROWTH — SIGNIFICANT CHANGE UP
CULTURE RESULTS: SIGNIFICANT CHANGE UP
DACRYOCYTES BLD QL SMEAR: SLIGHT — SIGNIFICANT CHANGE UP
DIFF PNL FLD: ABNORMAL
EGFR: 101 ML/MIN/1.73M2 — SIGNIFICANT CHANGE UP
EGFR: 102 ML/MIN/1.73M2 — SIGNIFICANT CHANGE UP
EGFR: 104 ML/MIN/1.73M2
EGFR: 105 ML/MIN/1.73M2 — SIGNIFICANT CHANGE UP
EGFR: 106 ML/MIN/1.73M2 — SIGNIFICANT CHANGE UP
EGFR: 107 ML/MIN/1.73M2
EGFR: 107 ML/MIN/1.73M2 — SIGNIFICANT CHANGE UP
EGFR: 108 ML/MIN/1.73M2 — SIGNIFICANT CHANGE UP
EGFR: 115 ML/MIN/1.73M2 — SIGNIFICANT CHANGE UP
EGFR: 117 ML/MIN/1.73M2 — SIGNIFICANT CHANGE UP
EGFR: 117 ML/MIN/1.73M2 — SIGNIFICANT CHANGE UP
EGFR: 118 ML/MIN/1.73M2 — SIGNIFICANT CHANGE UP
EGFR: 118 ML/MIN/1.73M2 — SIGNIFICANT CHANGE UP
EGFR: 119 ML/MIN/1.73M2 — SIGNIFICANT CHANGE UP
EGFR: 119 ML/MIN/1.73M2 — SIGNIFICANT CHANGE UP
EGFR: 121 ML/MIN/1.73M2 — SIGNIFICANT CHANGE UP
EGFR: 87 ML/MIN/1.73M2
EGFR: 87 ML/MIN/1.73M2 — SIGNIFICANT CHANGE UP
ELLIPTOCYTES BLD QL SMEAR: SLIGHT — SIGNIFICANT CHANGE UP
ELLIPTOCYTES BLD QL SMEAR: SLIGHT — SIGNIFICANT CHANGE UP
EOSINOPHIL # BLD AUTO: 0 K/UL — SIGNIFICANT CHANGE UP (ref 0–0.5)
EOSINOPHIL # BLD AUTO: 0.1 K/UL — SIGNIFICANT CHANGE UP (ref 0–0.5)
EOSINOPHIL # BLD AUTO: 0.16 K/UL — SIGNIFICANT CHANGE UP (ref 0–0.5)
EOSINOPHIL # BLD AUTO: 0.2 K/UL — SIGNIFICANT CHANGE UP (ref 0–0.5)
EOSINOPHIL # BLD AUTO: 0.3 K/UL — SIGNIFICANT CHANGE UP (ref 0–0.5)
EOSINOPHIL # BLD AUTO: 0.3 K/UL — SIGNIFICANT CHANGE UP (ref 0–0.5)
EOSINOPHIL NFR BLD AUTO: 0 % — SIGNIFICANT CHANGE UP (ref 0–6)
EOSINOPHIL NFR BLD AUTO: 0.2 % — SIGNIFICANT CHANGE UP (ref 0–6)
EOSINOPHIL NFR BLD AUTO: 0.5 % — SIGNIFICANT CHANGE UP (ref 0–6)
EOSINOPHIL NFR BLD AUTO: 0.9 % — SIGNIFICANT CHANGE UP (ref 0–6)
EOSINOPHIL NFR BLD AUTO: 1 % — SIGNIFICANT CHANGE UP (ref 0–6)
EOSINOPHIL NFR BLD AUTO: 1.6 % — SIGNIFICANT CHANGE UP (ref 0–6)
EOSINOPHIL NFR BLD AUTO: 2.5 % — SIGNIFICANT CHANGE UP (ref 0–6)
EOSINOPHIL NFR BLD AUTO: 2.9 % — SIGNIFICANT CHANGE UP (ref 0–6)
EOSINOPHIL NFR BLD AUTO: 3.2 % — SIGNIFICANT CHANGE UP (ref 0–6)
EOSINOPHIL NFR BLD AUTO: 3.4 % — SIGNIFICANT CHANGE UP (ref 0–6)
EOSINOPHIL NFR BLD AUTO: 5.3 % — SIGNIFICANT CHANGE UP (ref 0–6)
EOSINOPHIL NFR BLD AUTO: 5.4 % — SIGNIFICANT CHANGE UP (ref 0–6)
EPI CELLS # UR: SIGNIFICANT CHANGE UP
GAS PNL BLDA: SIGNIFICANT CHANGE UP
GAS PNL BLDA: SIGNIFICANT CHANGE UP
GAS PNL BLDV: 134 MMOL/L — LOW (ref 136–145)
GAS PNL BLDV: SIGNIFICANT CHANGE UP
GIANT PLATELETS BLD QL SMEAR: PRESENT — SIGNIFICANT CHANGE UP
GLUCOSE BLDC GLUCOMTR-MCNC: 120 MG/DL — HIGH (ref 70–99)
GLUCOSE BLDV-MCNC: 145 MG/DL — HIGH (ref 70–99)
GLUCOSE SERPL-MCNC: 106 MG/DL — HIGH (ref 70–99)
GLUCOSE SERPL-MCNC: 123 MG/DL — HIGH (ref 70–99)
GLUCOSE SERPL-MCNC: 127 MG/DL
GLUCOSE SERPL-MCNC: 127 MG/DL — HIGH (ref 70–99)
GLUCOSE SERPL-MCNC: 131 MG/DL — HIGH (ref 70–99)
GLUCOSE SERPL-MCNC: 136 MG/DL — HIGH (ref 70–99)
GLUCOSE SERPL-MCNC: 137 MG/DL — HIGH (ref 70–99)
GLUCOSE SERPL-MCNC: 155 MG/DL — HIGH (ref 70–99)
GLUCOSE SERPL-MCNC: 166 MG/DL — HIGH (ref 70–99)
GLUCOSE SERPL-MCNC: 65 MG/DL — LOW (ref 70–99)
GLUCOSE SERPL-MCNC: 75 MG/DL — SIGNIFICANT CHANGE UP (ref 70–99)
GLUCOSE SERPL-MCNC: 81 MG/DL — SIGNIFICANT CHANGE UP (ref 70–99)
GLUCOSE SERPL-MCNC: 86 MG/DL
GLUCOSE SERPL-MCNC: 88 MG/DL — SIGNIFICANT CHANGE UP (ref 70–99)
GLUCOSE SERPL-MCNC: 90 MG/DL — SIGNIFICANT CHANGE UP (ref 70–99)
GLUCOSE SERPL-MCNC: 93 MG/DL — SIGNIFICANT CHANGE UP (ref 70–99)
GLUCOSE SERPL-MCNC: 94 MG/DL — SIGNIFICANT CHANGE UP (ref 70–99)
GLUCOSE SERPL-MCNC: 96 MG/DL
GLUCOSE SERPL-MCNC: 99 MG/DL — SIGNIFICANT CHANGE UP (ref 70–99)
GLUCOSE UR QL: NEGATIVE MG/DL — SIGNIFICANT CHANGE UP
GRAM STN FLD: SIGNIFICANT CHANGE UP
GRAN CASTS # UR COMP ASSIST: ABNORMAL /LPF
HCO3 BLDA-SCNC: 26 MMOL/L — SIGNIFICANT CHANGE UP (ref 21–28)
HCO3 BLDV-SCNC: 25 MMOL/L — SIGNIFICANT CHANGE UP (ref 22–29)
HCT VFR BLD CALC: 30.9 % — LOW (ref 39–50)
HCT VFR BLD CALC: 31.7 % — LOW (ref 39–50)
HCT VFR BLD CALC: 33.1 % — LOW (ref 39–50)
HCT VFR BLD CALC: 33.3 % — LOW (ref 39–50)
HCT VFR BLD CALC: 33.7 % — LOW (ref 39–50)
HCT VFR BLD CALC: 34 % — LOW (ref 39–50)
HCT VFR BLD CALC: 35.4 % — LOW (ref 39–50)
HCT VFR BLD CALC: 36.3 % — LOW (ref 39–50)
HCT VFR BLD CALC: 37.7 % — LOW (ref 39–50)
HCT VFR BLD CALC: 38 % — LOW (ref 39–50)
HCT VFR BLD CALC: 38.6 % — LOW (ref 39–50)
HCT VFR BLD CALC: 40 % — SIGNIFICANT CHANGE UP (ref 39–50)
HCT VFR BLD CALC: 40.1 % — SIGNIFICANT CHANGE UP (ref 39–50)
HCT VFR BLD CALC: 40.6 % — SIGNIFICANT CHANGE UP (ref 39–50)
HCT VFR BLD CALC: 43.8 % — SIGNIFICANT CHANGE UP (ref 39–50)
HCT VFR BLD CALC: 44.7 % — SIGNIFICANT CHANGE UP (ref 39–50)
HCT VFR BLD CALC: 44.7 % — SIGNIFICANT CHANGE UP (ref 39–50)
HCT VFR BLDA CALC: 38 % — SIGNIFICANT CHANGE UP
HGB BLD CALC-MCNC: 12.6 G/DL — SIGNIFICANT CHANGE UP (ref 12.6–17.4)
HGB BLD-MCNC: 10.1 G/DL — LOW (ref 13–17)
HGB BLD-MCNC: 10.2 G/DL — LOW (ref 13–17)
HGB BLD-MCNC: 10.5 G/DL — LOW (ref 13–17)
HGB BLD-MCNC: 10.7 G/DL — LOW (ref 13–17)
HGB BLD-MCNC: 10.7 G/DL — LOW (ref 13–17)
HGB BLD-MCNC: 10.8 G/DL — LOW (ref 13–17)
HGB BLD-MCNC: 10.9 G/DL — LOW (ref 13–17)
HGB BLD-MCNC: 11.4 G/DL — LOW (ref 13–17)
HGB BLD-MCNC: 11.4 G/DL — LOW (ref 13–17)
HGB BLD-MCNC: 12 G/DL — LOW (ref 13–17)
HGB BLD-MCNC: 12.1 G/DL — LOW (ref 13–17)
HGB BLD-MCNC: 12.2 G/DL — LOW (ref 13–17)
HGB BLD-MCNC: 12.2 G/DL — LOW (ref 13–17)
HGB BLD-MCNC: 12.4 G/DL — LOW (ref 13–17)
HGB BLD-MCNC: 13.3 G/DL — SIGNIFICANT CHANGE UP (ref 13–17)
HGB BLD-MCNC: 13.6 G/DL — SIGNIFICANT CHANGE UP (ref 13–17)
HGB BLD-MCNC: 13.8 G/DL — SIGNIFICANT CHANGE UP (ref 13–17)
HOROWITZ INDEX BLDA+IHG-RTO: 50 — SIGNIFICANT CHANGE UP
HYALINE CASTS # UR AUTO: ABNORMAL /LPF
INR BLD: 1.46 RATIO — HIGH (ref 0.88–1.16)
KETONES UR-MCNC: ABNORMAL
LACTATE BLDV-MCNC: 2.3 MMOL/L — HIGH (ref 0.5–2)
LEGIONELLA AG UR QL: NEGATIVE — SIGNIFICANT CHANGE UP
LEUKOCYTE ESTERASE UR-ACNC: NEGATIVE — SIGNIFICANT CHANGE UP
LG PLATELETS BLD QL AUTO: SLIGHT — SIGNIFICANT CHANGE UP
LYMPHOCYTES # BLD AUTO: 0.3 K/UL — LOW (ref 1–3.3)
LYMPHOCYTES # BLD AUTO: 0.3 K/UL — LOW (ref 1–3.3)
LYMPHOCYTES # BLD AUTO: 0.4 K/UL — LOW (ref 1–3.3)
LYMPHOCYTES # BLD AUTO: 0.4 K/UL — LOW (ref 1–3.3)
LYMPHOCYTES # BLD AUTO: 0.5 K/UL — LOW (ref 1–3.3)
LYMPHOCYTES # BLD AUTO: 0.53 K/UL — LOW (ref 1–3.3)
LYMPHOCYTES # BLD AUTO: 0.64 K/UL — LOW (ref 1–3.3)
LYMPHOCYTES # BLD AUTO: 0.7 K/UL — LOW (ref 1–3.3)
LYMPHOCYTES # BLD AUTO: 1 K/UL — SIGNIFICANT CHANGE UP (ref 1–3.3)
LYMPHOCYTES # BLD AUTO: 1.2 K/UL — SIGNIFICANT CHANGE UP (ref 1–3.3)
LYMPHOCYTES # BLD AUTO: 11.3 % — LOW (ref 13–44)
LYMPHOCYTES # BLD AUTO: 15.2 % — SIGNIFICANT CHANGE UP (ref 13–44)
LYMPHOCYTES # BLD AUTO: 15.5 % — SIGNIFICANT CHANGE UP (ref 13–44)
LYMPHOCYTES # BLD AUTO: 19.6 % — SIGNIFICANT CHANGE UP (ref 13–44)
LYMPHOCYTES # BLD AUTO: 2 K/UL — SIGNIFICANT CHANGE UP (ref 1–3.3)
LYMPHOCYTES # BLD AUTO: 2 K/UL — SIGNIFICANT CHANGE UP (ref 1–3.3)
LYMPHOCYTES # BLD AUTO: 2.6 % — LOW (ref 13–44)
LYMPHOCYTES # BLD AUTO: 22.4 % — SIGNIFICANT CHANGE UP (ref 13–44)
LYMPHOCYTES # BLD AUTO: 3.5 % — LOW (ref 13–44)
LYMPHOCYTES # BLD AUTO: 37.3 % — SIGNIFICANT CHANGE UP (ref 13–44)
LYMPHOCYTES # BLD AUTO: 38.4 % — SIGNIFICANT CHANGE UP (ref 13–44)
LYMPHOCYTES # BLD AUTO: 4.4 % — LOW (ref 13–44)
LYMPHOCYTES # BLD AUTO: 8 % — LOW (ref 13–44)
LYMPHOCYTES # BLD AUTO: 9.3 % — LOW (ref 13–44)
MACROCYTES BLD QL: SLIGHT — SIGNIFICANT CHANGE UP
MACROCYTES BLD QL: SLIGHT — SIGNIFICANT CHANGE UP
MAGNESIUM SERPL-MCNC: 1.5 MG/DL — LOW (ref 1.6–2.6)
MAGNESIUM SERPL-MCNC: 1.6 MG/DL — LOW (ref 1.8–2.6)
MAGNESIUM SERPL-MCNC: 1.6 MG/DL — LOW (ref 1.8–2.6)
MAGNESIUM SERPL-MCNC: 1.7 MG/DL — LOW (ref 1.8–2.6)
MAGNESIUM SERPL-MCNC: 1.8 MG/DL — SIGNIFICANT CHANGE UP (ref 1.6–2.6)
MAGNESIUM SERPL-MCNC: 1.8 MG/DL — SIGNIFICANT CHANGE UP (ref 1.8–2.6)
MANUAL SMEAR VERIFICATION: SIGNIFICANT CHANGE UP
MANUAL SMEAR VERIFICATION: SIGNIFICANT CHANGE UP
MCHC RBC-ENTMCNC: 24.3 PG — LOW (ref 27–34)
MCHC RBC-ENTMCNC: 24.5 PG — LOW (ref 27–34)
MCHC RBC-ENTMCNC: 24.6 PG — LOW (ref 27–34)
MCHC RBC-ENTMCNC: 24.8 PG — LOW (ref 27–34)
MCHC RBC-ENTMCNC: 25 PG — LOW (ref 27–34)
MCHC RBC-ENTMCNC: 25 PG — LOW (ref 27–34)
MCHC RBC-ENTMCNC: 25.1 PG — LOW (ref 27–34)
MCHC RBC-ENTMCNC: 25.4 PG — LOW (ref 27–34)
MCHC RBC-ENTMCNC: 25.4 PG — LOW (ref 27–34)
MCHC RBC-ENTMCNC: 25.6 PG — LOW (ref 27–34)
MCHC RBC-ENTMCNC: 30 G/DL — LOW (ref 32–36)
MCHC RBC-ENTMCNC: 30.2 G/DL — LOW (ref 32–36)
MCHC RBC-ENTMCNC: 30.3 G/DL — LOW (ref 32–36)
MCHC RBC-ENTMCNC: 30.4 G/DL — LOW (ref 32–36)
MCHC RBC-ENTMCNC: 30.5 G/DL — LOW (ref 32–36)
MCHC RBC-ENTMCNC: 30.8 G/DL — LOW (ref 32–36)
MCHC RBC-ENTMCNC: 30.8 G/DL — LOW (ref 32–36)
MCHC RBC-ENTMCNC: 31 G/DL — LOW (ref 32–36)
MCHC RBC-ENTMCNC: 31.4 G/DL — LOW (ref 32–36)
MCHC RBC-ENTMCNC: 31.4 GM/DL — LOW (ref 32–36)
MCHC RBC-ENTMCNC: 31.5 G/DL — LOW (ref 32–36)
MCHC RBC-ENTMCNC: 31.7 GM/DL — LOW (ref 32–36)
MCHC RBC-ENTMCNC: 31.8 GM/DL — LOW (ref 32–36)
MCHC RBC-ENTMCNC: 31.8 GM/DL — LOW (ref 32–36)
MCHC RBC-ENTMCNC: 32.1 GM/DL — SIGNIFICANT CHANGE UP (ref 32–36)
MCHC RBC-ENTMCNC: 32.2 GM/DL — SIGNIFICANT CHANGE UP (ref 32–36)
MCHC RBC-ENTMCNC: 32.7 GM/DL — SIGNIFICANT CHANGE UP (ref 32–36)
MCV RBC AUTO: 75.7 FL — LOW (ref 80–100)
MCV RBC AUTO: 75.9 FL — LOW (ref 80–100)
MCV RBC AUTO: 76.4 FL — LOW (ref 80–100)
MCV RBC AUTO: 76.6 FL — LOW (ref 80–100)
MCV RBC AUTO: 78.4 FL — LOW (ref 80–100)
MCV RBC AUTO: 80.4 FL — SIGNIFICANT CHANGE UP (ref 80–100)
MCV RBC AUTO: 80.5 FL — SIGNIFICANT CHANGE UP (ref 80–100)
MCV RBC AUTO: 81.4 FL — SIGNIFICANT CHANGE UP (ref 80–100)
MCV RBC AUTO: 81.5 FL — SIGNIFICANT CHANGE UP (ref 80–100)
MCV RBC AUTO: 81.6 FL — SIGNIFICANT CHANGE UP (ref 80–100)
MCV RBC AUTO: 82 FL — SIGNIFICANT CHANGE UP (ref 80–100)
MCV RBC AUTO: 82.8 FL — SIGNIFICANT CHANGE UP (ref 80–100)
MCV RBC AUTO: 82.9 FL — SIGNIFICANT CHANGE UP (ref 80–100)
METAMYELOCYTES # FLD: 0.9 % — HIGH (ref 0–0)
METAMYELOCYTES # FLD: 0.9 % — HIGH (ref 0–0)
MICROCYTES BLD QL: SLIGHT — SIGNIFICANT CHANGE UP
MONOCYTES # BLD AUTO: 0.2 K/UL — SIGNIFICANT CHANGE UP (ref 0–0.9)
MONOCYTES # BLD AUTO: 0.3 K/UL — SIGNIFICANT CHANGE UP (ref 0–0.9)
MONOCYTES # BLD AUTO: 0.3 K/UL — SIGNIFICANT CHANGE UP (ref 0–0.9)
MONOCYTES # BLD AUTO: 0.33 K/UL — SIGNIFICANT CHANGE UP (ref 0–0.9)
MONOCYTES # BLD AUTO: 0.4 K/UL — SIGNIFICANT CHANGE UP (ref 0–0.9)
MONOCYTES # BLD AUTO: 0.5 K/UL — SIGNIFICANT CHANGE UP (ref 0–0.9)
MONOCYTES # BLD AUTO: 0.6 K/UL — SIGNIFICANT CHANGE UP (ref 0–0.9)
MONOCYTES # BLD AUTO: 0.9 K/UL — SIGNIFICANT CHANGE UP (ref 0–0.9)
MONOCYTES # BLD AUTO: 1.9 K/UL — HIGH (ref 0–0.9)
MONOCYTES # BLD AUTO: 2.31 K/UL — HIGH (ref 0–0.9)
MONOCYTES NFR BLD AUTO: 1.8 % — LOW (ref 2–14)
MONOCYTES NFR BLD AUTO: 10 % — SIGNIFICANT CHANGE UP (ref 2–14)
MONOCYTES NFR BLD AUTO: 10.6 % — SIGNIFICANT CHANGE UP (ref 2–14)
MONOCYTES NFR BLD AUTO: 11.3 % — SIGNIFICANT CHANGE UP (ref 2–14)
MONOCYTES NFR BLD AUTO: 11.8 % — SIGNIFICANT CHANGE UP (ref 2–14)
MONOCYTES NFR BLD AUTO: 12.7 % — SIGNIFICANT CHANGE UP (ref 2–14)
MONOCYTES NFR BLD AUTO: 13.7 % — SIGNIFICANT CHANGE UP (ref 2–14)
MONOCYTES NFR BLD AUTO: 15 % — HIGH (ref 2–14)
MONOCYTES NFR BLD AUTO: 15.2 % — HIGH (ref 2–14)
MONOCYTES NFR BLD AUTO: 4.7 % — SIGNIFICANT CHANGE UP (ref 2–14)
MONOCYTES NFR BLD AUTO: 8.3 % — SIGNIFICANT CHANGE UP (ref 2–14)
MONOCYTES NFR BLD AUTO: 9 % — SIGNIFICANT CHANGE UP (ref 2–14)
MRSA PCR RESULT.: SIGNIFICANT CHANGE UP
MYELOCYTES NFR BLD: 0.9 % — HIGH (ref 0–0)
MYELOCYTES NFR BLD: 0.9 % — HIGH (ref 0–0)
MYELOCYTES NFR BLD: 2 % — HIGH (ref 0–0)
NEUTROPHILS # BLD AUTO: 1.9 K/UL — SIGNIFICANT CHANGE UP (ref 1.8–7.4)
NEUTROPHILS # BLD AUTO: 16.73 K/UL — HIGH (ref 1.8–7.4)
NEUTROPHILS # BLD AUTO: 17.08 K/UL — HIGH (ref 1.8–7.4)
NEUTROPHILS # BLD AUTO: 2.1 K/UL — SIGNIFICANT CHANGE UP (ref 1.8–7.4)
NEUTROPHILS # BLD AUTO: 2.1 K/UL — SIGNIFICANT CHANGE UP (ref 1.8–7.4)
NEUTROPHILS # BLD AUTO: 2.3 K/UL — SIGNIFICANT CHANGE UP (ref 1.8–7.4)
NEUTROPHILS # BLD AUTO: 2.4 K/UL — SIGNIFICANT CHANGE UP (ref 1.8–7.4)
NEUTROPHILS # BLD AUTO: 2.4 K/UL — SIGNIFICANT CHANGE UP (ref 1.8–7.4)
NEUTROPHILS # BLD AUTO: 3.2 K/UL — SIGNIFICANT CHANGE UP (ref 1.8–7.4)
NEUTROPHILS # BLD AUTO: 3.7 K/UL — SIGNIFICANT CHANGE UP (ref 1.8–7.4)
NEUTROPHILS # BLD AUTO: 5.5 K/UL — SIGNIFICANT CHANGE UP (ref 1.8–7.4)
NEUTROPHILS # BLD AUTO: 9 K/UL — HIGH (ref 1.8–7.4)
NEUTROPHILS NFR BLD AUTO: 44.3 % — SIGNIFICANT CHANGE UP (ref 43–77)
NEUTROPHILS NFR BLD AUTO: 45.2 % — SIGNIFICANT CHANGE UP (ref 43–77)
NEUTROPHILS NFR BLD AUTO: 64.3 % — SIGNIFICANT CHANGE UP (ref 43–77)
NEUTROPHILS NFR BLD AUTO: 66.7 % — SIGNIFICANT CHANGE UP (ref 43–77)
NEUTROPHILS NFR BLD AUTO: 69.4 % — SIGNIFICANT CHANGE UP (ref 43–77)
NEUTROPHILS NFR BLD AUTO: 70.9 % — SIGNIFICANT CHANGE UP (ref 43–77)
NEUTROPHILS NFR BLD AUTO: 72.1 % — SIGNIFICANT CHANGE UP (ref 43–77)
NEUTROPHILS NFR BLD AUTO: 74.6 % — SIGNIFICANT CHANGE UP (ref 43–77)
NEUTROPHILS NFR BLD AUTO: 75 % — SIGNIFICANT CHANGE UP (ref 43–77)
NEUTROPHILS NFR BLD AUTO: 79.1 % — HIGH (ref 43–77)
NEUTROPHILS NFR BLD AUTO: 83.5 % — HIGH (ref 43–77)
NEUTROPHILS NFR BLD AUTO: 92 % — HIGH (ref 43–77)
NIGHT BLUE STAIN TISS: SIGNIFICANT CHANGE UP
NIGHT BLUE STAIN TISS: SIGNIFICANT CHANGE UP
NITRITE UR-MCNC: NEGATIVE — SIGNIFICANT CHANGE UP
NON-GYNECOLOGICAL CYTOLOGY STUDY: SIGNIFICANT CHANGE UP
OVALOCYTES BLD QL SMEAR: SLIGHT — SIGNIFICANT CHANGE UP
PCO2 BLDA: 33 MMHG — LOW (ref 35–48)
PCO2 BLDV: 39 MMHG — LOW (ref 42–55)
PH BLDA: 7.5 — HIGH (ref 7.35–7.45)
PH BLDV: 7.41 — SIGNIFICANT CHANGE UP (ref 7.32–7.43)
PH UR: 5 — SIGNIFICANT CHANGE UP (ref 5–8)
PLAT MORPH BLD: NORMAL — SIGNIFICANT CHANGE UP
PLATELET # BLD AUTO: 168 K/UL — SIGNIFICANT CHANGE UP (ref 150–400)
PLATELET # BLD AUTO: 177 K/UL — SIGNIFICANT CHANGE UP (ref 150–400)
PLATELET # BLD AUTO: 200 K/UL — SIGNIFICANT CHANGE UP (ref 150–400)
PLATELET # BLD AUTO: 204 K/UL — SIGNIFICANT CHANGE UP (ref 150–400)
PLATELET # BLD AUTO: 206 K/UL — SIGNIFICANT CHANGE UP (ref 150–400)
PLATELET # BLD AUTO: 225 K/UL — SIGNIFICANT CHANGE UP (ref 150–400)
PLATELET # BLD AUTO: 228 K/UL — SIGNIFICANT CHANGE UP (ref 150–400)
PLATELET # BLD AUTO: 238 K/UL — SIGNIFICANT CHANGE UP (ref 150–400)
PLATELET # BLD AUTO: 248 K/UL — SIGNIFICANT CHANGE UP (ref 150–400)
PLATELET # BLD AUTO: 321 K/UL — SIGNIFICANT CHANGE UP (ref 150–400)
PLATELET # BLD AUTO: 327 K/UL — SIGNIFICANT CHANGE UP (ref 150–400)
PLATELET # BLD AUTO: 349 K/UL — SIGNIFICANT CHANGE UP (ref 150–400)
PLATELET # BLD AUTO: 352 K/UL — SIGNIFICANT CHANGE UP (ref 150–400)
PLATELET # BLD AUTO: 353 K/UL — SIGNIFICANT CHANGE UP (ref 150–400)
PLATELET # BLD AUTO: 357 K/UL — SIGNIFICANT CHANGE UP (ref 150–400)
PLATELET # BLD AUTO: 370 K/UL — SIGNIFICANT CHANGE UP (ref 150–400)
PLATELET # BLD AUTO: 459 K/UL — HIGH (ref 150–400)
PO2 BLDA: 92 MMHG — SIGNIFICANT CHANGE UP (ref 83–108)
PO2 BLDV: 57 MMHG — HIGH (ref 25–45)
POIKILOCYTOSIS BLD QL AUTO: SIGNIFICANT CHANGE UP
POIKILOCYTOSIS BLD QL AUTO: SIGNIFICANT CHANGE UP
POIKILOCYTOSIS BLD QL AUTO: SLIGHT — SIGNIFICANT CHANGE UP
POLYCHROMASIA BLD QL SMEAR: SLIGHT — SIGNIFICANT CHANGE UP
POTASSIUM BLDV-SCNC: 3.9 MMOL/L — SIGNIFICANT CHANGE UP (ref 3.5–5.1)
POTASSIUM SERPL-MCNC: 3.8 MMOL/L — SIGNIFICANT CHANGE UP (ref 3.5–5.3)
POTASSIUM SERPL-MCNC: 3.9 MMOL/L — SIGNIFICANT CHANGE UP (ref 3.5–5.3)
POTASSIUM SERPL-MCNC: 4.1 MMOL/L — SIGNIFICANT CHANGE UP (ref 3.5–5.3)
POTASSIUM SERPL-MCNC: 4.2 MMOL/L — SIGNIFICANT CHANGE UP (ref 3.5–5.3)
POTASSIUM SERPL-MCNC: 4.2 MMOL/L — SIGNIFICANT CHANGE UP (ref 3.5–5.3)
POTASSIUM SERPL-MCNC: 4.3 MMOL/L — SIGNIFICANT CHANGE UP (ref 3.5–5.3)
POTASSIUM SERPL-MCNC: 4.3 MMOL/L — SIGNIFICANT CHANGE UP (ref 3.5–5.3)
POTASSIUM SERPL-MCNC: 4.4 MMOL/L — SIGNIFICANT CHANGE UP (ref 3.5–5.3)
POTASSIUM SERPL-MCNC: 4.4 MMOL/L — SIGNIFICANT CHANGE UP (ref 3.5–5.3)
POTASSIUM SERPL-MCNC: 4.5 MMOL/L — SIGNIFICANT CHANGE UP (ref 3.5–5.3)
POTASSIUM SERPL-MCNC: 4.5 MMOL/L — SIGNIFICANT CHANGE UP (ref 3.5–5.3)
POTASSIUM SERPL-SCNC: 3.6 MMOL/L
POTASSIUM SERPL-SCNC: 3.8 MMOL/L — SIGNIFICANT CHANGE UP (ref 3.5–5.3)
POTASSIUM SERPL-SCNC: 3.9 MMOL/L — SIGNIFICANT CHANGE UP (ref 3.5–5.3)
POTASSIUM SERPL-SCNC: 4.1 MMOL/L — SIGNIFICANT CHANGE UP (ref 3.5–5.3)
POTASSIUM SERPL-SCNC: 4.2 MMOL/L
POTASSIUM SERPL-SCNC: 4.2 MMOL/L — SIGNIFICANT CHANGE UP (ref 3.5–5.3)
POTASSIUM SERPL-SCNC: 4.2 MMOL/L — SIGNIFICANT CHANGE UP (ref 3.5–5.3)
POTASSIUM SERPL-SCNC: 4.3 MMOL/L — SIGNIFICANT CHANGE UP (ref 3.5–5.3)
POTASSIUM SERPL-SCNC: 4.3 MMOL/L — SIGNIFICANT CHANGE UP (ref 3.5–5.3)
POTASSIUM SERPL-SCNC: 4.4 MMOL/L — SIGNIFICANT CHANGE UP (ref 3.5–5.3)
POTASSIUM SERPL-SCNC: 4.4 MMOL/L — SIGNIFICANT CHANGE UP (ref 3.5–5.3)
POTASSIUM SERPL-SCNC: 4.5 MMOL/L
POTASSIUM SERPL-SCNC: 4.5 MMOL/L — SIGNIFICANT CHANGE UP (ref 3.5–5.3)
POTASSIUM SERPL-SCNC: 4.5 MMOL/L — SIGNIFICANT CHANGE UP (ref 3.5–5.3)
PROCALCITONIN SERPL-MCNC: 0.76 NG/ML — HIGH (ref 0.02–0.1)
PROT SERPL-MCNC: 6.2 G/DL
PROT SERPL-MCNC: 6.2 G/DL — LOW (ref 6.6–8.7)
PROT SERPL-MCNC: 6.6 G/DL — SIGNIFICANT CHANGE UP (ref 6.6–8.7)
PROT SERPL-MCNC: 6.7 G/DL
PROT SERPL-MCNC: 6.7 G/DL — SIGNIFICANT CHANGE UP (ref 6–8.3)
PROT SERPL-MCNC: 6.8 G/DL — SIGNIFICANT CHANGE UP (ref 6–8.3)
PROT SERPL-MCNC: 7 G/DL
PROT SERPL-MCNC: 7.1 G/DL — SIGNIFICANT CHANGE UP (ref 6–8.3)
PROT SERPL-MCNC: 7.3 G/DL — SIGNIFICANT CHANGE UP (ref 6–8.3)
PROT SERPL-MCNC: 7.4 G/DL — SIGNIFICANT CHANGE UP (ref 6–8.3)
PROT SERPL-MCNC: 7.5 G/DL — SIGNIFICANT CHANGE UP (ref 6–8.3)
PROT SERPL-MCNC: 7.8 G/DL — SIGNIFICANT CHANGE UP (ref 6.6–8.7)
PROT UR-MCNC: 100
PROTHROM AB SERPL-ACNC: 17 SEC — HIGH (ref 10.5–13.4)
RAPID RVP RESULT: SIGNIFICANT CHANGE UP
RBC # BLD: 4.07 M/UL — LOW (ref 4.2–5.8)
RBC # BLD: 4.19 M/UL — LOW (ref 4.2–5.8)
RBC # BLD: 4.32 M/UL — SIGNIFICANT CHANGE UP (ref 4.2–5.8)
RBC # BLD: 4.33 M/UL — SIGNIFICANT CHANGE UP (ref 4.2–5.8)
RBC # BLD: 4.36 M/UL — SIGNIFICANT CHANGE UP (ref 4.2–5.8)
RBC # BLD: 4.41 M/UL — SIGNIFICANT CHANGE UP (ref 4.2–5.8)
RBC # BLD: 4.45 M/UL — SIGNIFICANT CHANGE UP (ref 4.2–5.8)
RBC # BLD: 4.54 M/UL — SIGNIFICANT CHANGE UP (ref 4.2–5.8)
RBC # BLD: 4.63 M/UL — SIGNIFICANT CHANGE UP (ref 4.2–5.8)
RBC # BLD: 4.72 M/UL — SIGNIFICANT CHANGE UP (ref 4.2–5.8)
RBC # BLD: 4.74 M/UL — SIGNIFICANT CHANGE UP (ref 4.2–5.8)
RBC # BLD: 4.88 M/UL — SIGNIFICANT CHANGE UP (ref 4.2–5.8)
RBC # BLD: 4.89 M/UL — SIGNIFICANT CHANGE UP (ref 4.2–5.8)
RBC # BLD: 4.9 M/UL — SIGNIFICANT CHANGE UP (ref 4.2–5.8)
RBC # BLD: 5.35 M/UL — SIGNIFICANT CHANGE UP (ref 4.2–5.8)
RBC # BLD: 5.48 M/UL — SIGNIFICANT CHANGE UP (ref 4.2–5.8)
RBC # BLD: 5.56 M/UL — SIGNIFICANT CHANGE UP (ref 4.2–5.8)
RBC # FLD: 13.9 % — SIGNIFICANT CHANGE UP (ref 10.3–14.5)
RBC # FLD: 14.5 % — SIGNIFICANT CHANGE UP (ref 10.3–14.5)
RBC # FLD: 14.6 % — HIGH (ref 10.3–14.5)
RBC # FLD: 14.6 % — HIGH (ref 10.3–14.5)
RBC # FLD: 14.7 % — HIGH (ref 10.3–14.5)
RBC # FLD: 15.4 % — HIGH (ref 10.3–14.5)
RBC # FLD: 15.5 % — HIGH (ref 10.3–14.5)
RBC # FLD: 15.8 % — HIGH (ref 10.3–14.5)
RBC # FLD: 16.5 % — HIGH (ref 10.3–14.5)
RBC # FLD: 16.6 % — HIGH (ref 10.3–14.5)
RBC # FLD: 16.6 % — HIGH (ref 10.3–14.5)
RBC # FLD: 16.7 % — HIGH (ref 10.3–14.5)
RBC # FLD: 16.7 % — HIGH (ref 10.3–14.5)
RBC # FLD: 17.1 % — HIGH (ref 10.3–14.5)
RBC # FLD: 17.2 % — HIGH (ref 10.3–14.5)
RBC BLD AUTO: ABNORMAL
RBC CASTS # UR COMP ASSIST: ABNORMAL /HPF (ref 0–4)
S AUREUS DNA NOSE QL NAA+PROBE: SIGNIFICANT CHANGE UP
SAO2 % BLDA: 98.8 % — HIGH (ref 94–98)
SAO2 % BLDV: 86.8 % — SIGNIFICANT CHANGE UP
SARS-COV-2 RNA SPEC QL NAA+PROBE: SIGNIFICANT CHANGE UP
SARS-COV-2 RNA SPEC QL NAA+PROBE: SIGNIFICANT CHANGE UP
SCHISTOCYTES BLD QL AUTO: SLIGHT — SIGNIFICANT CHANGE UP
SCHISTOCYTES BLD QL AUTO: SLIGHT — SIGNIFICANT CHANGE UP
SMUDGE CELLS # BLD: PRESENT — SIGNIFICANT CHANGE UP
SODIUM SERPL-SCNC: 134 MMOL/L — LOW (ref 135–145)
SODIUM SERPL-SCNC: 135 MMOL/L — SIGNIFICANT CHANGE UP (ref 135–145)
SODIUM SERPL-SCNC: 135 MMOL/L — SIGNIFICANT CHANGE UP (ref 135–145)
SODIUM SERPL-SCNC: 136 MMOL/L — SIGNIFICANT CHANGE UP (ref 135–145)
SODIUM SERPL-SCNC: 136 MMOL/L — SIGNIFICANT CHANGE UP (ref 135–145)
SODIUM SERPL-SCNC: 137 MMOL/L — SIGNIFICANT CHANGE UP (ref 135–145)
SODIUM SERPL-SCNC: 138 MMOL/L
SODIUM SERPL-SCNC: 138 MMOL/L — SIGNIFICANT CHANGE UP (ref 135–145)
SODIUM SERPL-SCNC: 139 MMOL/L
SODIUM SERPL-SCNC: 139 MMOL/L — SIGNIFICANT CHANGE UP (ref 135–145)
SODIUM SERPL-SCNC: 140 MMOL/L — SIGNIFICANT CHANGE UP (ref 135–145)
SODIUM SERPL-SCNC: 141 MMOL/L
SODIUM SERPL-SCNC: 142 MMOL/L — SIGNIFICANT CHANGE UP (ref 135–145)
SODIUM SERPL-SCNC: 142 MMOL/L — SIGNIFICANT CHANGE UP (ref 135–145)
SP GR SPEC: 1.02 — SIGNIFICANT CHANGE UP (ref 1.01–1.02)
SPECIMEN SOURCE: SIGNIFICANT CHANGE UP
SURGICAL PATHOLOGY STUDY: SIGNIFICANT CHANGE UP
TARGETS BLD QL SMEAR: SLIGHT — SIGNIFICANT CHANGE UP
UROBILINOGEN FLD QL: 1 MG/DL
VANCOMYCIN TROUGH SERPL-MCNC: 5.9 UG/ML — LOW (ref 10–20)
VARIANT LYMPHS # BLD: 0.8 % — SIGNIFICANT CHANGE UP (ref 0–6)
WBC # BLD: 12.4 K/UL — HIGH (ref 3.8–10.5)
WBC # BLD: 15.2 K/UL — HIGH (ref 3.8–10.5)
WBC # BLD: 15.38 K/UL — HIGH (ref 3.8–10.5)
WBC # BLD: 16.63 K/UL — HIGH (ref 3.8–10.5)
WBC # BLD: 18.19 K/UL — HIGH (ref 3.8–10.5)
WBC # BLD: 18.54 K/UL — HIGH (ref 3.8–10.5)
WBC # BLD: 2.6 K/UL — LOW (ref 3.8–10.5)
WBC # BLD: 20.46 K/UL — HIGH (ref 3.8–10.5)
WBC # BLD: 23.38 K/UL — HIGH (ref 3.8–10.5)
WBC # BLD: 3.1 K/UL — LOW (ref 3.8–10.5)
WBC # BLD: 3.3 K/UL — LOW (ref 3.8–10.5)
WBC # BLD: 3.4 K/UL — LOW (ref 3.8–10.5)
WBC # BLD: 4.3 K/UL — SIGNIFICANT CHANGE UP (ref 3.8–10.5)
WBC # BLD: 5.2 K/UL — SIGNIFICANT CHANGE UP (ref 3.8–10.5)
WBC # BLD: 5.3 K/UL — SIGNIFICANT CHANGE UP (ref 3.8–10.5)
WBC # BLD: 5.3 K/UL — SIGNIFICANT CHANGE UP (ref 3.8–10.5)
WBC # BLD: 7 K/UL — SIGNIFICANT CHANGE UP (ref 3.8–10.5)
WBC # FLD AUTO: 12.4 K/UL — HIGH (ref 3.8–10.5)
WBC # FLD AUTO: 15.2 K/UL — HIGH (ref 3.8–10.5)
WBC # FLD AUTO: 15.38 K/UL — HIGH (ref 3.8–10.5)
WBC # FLD AUTO: 16.63 K/UL — HIGH (ref 3.8–10.5)
WBC # FLD AUTO: 18.19 K/UL — HIGH (ref 3.8–10.5)
WBC # FLD AUTO: 18.54 K/UL — HIGH (ref 3.8–10.5)
WBC # FLD AUTO: 2.6 K/UL — LOW (ref 3.8–10.5)
WBC # FLD AUTO: 20.46 K/UL — HIGH (ref 3.8–10.5)
WBC # FLD AUTO: 23.38 K/UL — HIGH (ref 3.8–10.5)
WBC # FLD AUTO: 3.1 K/UL — LOW (ref 3.8–10.5)
WBC # FLD AUTO: 3.3 K/UL — LOW (ref 3.8–10.5)
WBC # FLD AUTO: 3.4 K/UL — LOW (ref 3.8–10.5)
WBC # FLD AUTO: 4.3 K/UL — SIGNIFICANT CHANGE UP (ref 3.8–10.5)
WBC # FLD AUTO: 5.2 K/UL — SIGNIFICANT CHANGE UP (ref 3.8–10.5)
WBC # FLD AUTO: 5.3 K/UL — SIGNIFICANT CHANGE UP (ref 3.8–10.5)
WBC # FLD AUTO: 5.3 K/UL — SIGNIFICANT CHANGE UP (ref 3.8–10.5)
WBC # FLD AUTO: 7 K/UL — SIGNIFICANT CHANGE UP (ref 3.8–10.5)
WBC UR QL: SIGNIFICANT CHANGE UP /HPF (ref 0–5)

## 2022-01-01 PROCEDURE — 78803 RP LOCLZJ TUM SPECT 1 AREA: CPT | Mod: 26

## 2022-01-01 PROCEDURE — 70496 CT ANGIOGRAPHY HEAD: CPT | Mod: 26

## 2022-01-01 PROCEDURE — 99406 BEHAV CHNG SMOKING 3-10 MIN: CPT

## 2022-01-01 PROCEDURE — 99233 SBSQ HOSP IP/OBS HIGH 50: CPT

## 2022-01-01 PROCEDURE — 85027 COMPLETE CBC AUTOMATED: CPT

## 2022-01-01 PROCEDURE — 96375 TX/PRO/DX INJ NEW DRUG ADDON: CPT

## 2022-01-01 PROCEDURE — 70450 CT HEAD/BRAIN W/O DYE: CPT | Mod: MG

## 2022-01-01 PROCEDURE — 77427 RADIATION TX MANAGEMENT X5: CPT

## 2022-01-01 PROCEDURE — 70496 CT ANGIOGRAPHY HEAD: CPT

## 2022-01-01 PROCEDURE — 31625 BRONCHOSCOPY W/BIOPSY(S): CPT

## 2022-01-01 PROCEDURE — 99214 OFFICE O/P EST MOD 30 MIN: CPT | Mod: 25

## 2022-01-01 PROCEDURE — 94760 N-INVAS EAR/PLS OXIMETRY 1: CPT

## 2022-01-01 PROCEDURE — G6002: CPT | Mod: 26

## 2022-01-01 PROCEDURE — 71260 CT THORAX DX C+: CPT | Mod: 26

## 2022-01-01 PROCEDURE — 82947 ASSAY GLUCOSE BLOOD QUANT: CPT

## 2022-01-01 PROCEDURE — 99284 EMERGENCY DEPT VISIT MOD MDM: CPT | Mod: 25

## 2022-01-01 PROCEDURE — 71045 X-RAY EXAM CHEST 1 VIEW: CPT | Mod: 26

## 2022-01-01 PROCEDURE — 83735 ASSAY OF MAGNESIUM: CPT

## 2022-01-01 PROCEDURE — 85730 THROMBOPLASTIN TIME PARTIAL: CPT

## 2022-01-01 PROCEDURE — 99231 SBSQ HOSP IP/OBS SF/LOW 25: CPT

## 2022-01-01 PROCEDURE — 74176 CT ABD & PELVIS W/O CONTRAST: CPT

## 2022-01-01 PROCEDURE — 99291 CRITICAL CARE FIRST HOUR: CPT

## 2022-01-01 PROCEDURE — 99283 EMERGENCY DEPT VISIT LOW MDM: CPT

## 2022-01-01 PROCEDURE — 87075 CULTR BACTERIA EXCEPT BLOOD: CPT

## 2022-01-01 PROCEDURE — G1004: CPT

## 2022-01-01 PROCEDURE — 80202 ASSAY OF VANCOMYCIN: CPT

## 2022-01-01 PROCEDURE — 78306 BONE IMAGING WHOLE BODY: CPT | Mod: 26

## 2022-01-01 PROCEDURE — 93010 ELECTROCARDIOGRAM REPORT: CPT | Mod: 77

## 2022-01-01 PROCEDURE — 84132 ASSAY OF SERUM POTASSIUM: CPT

## 2022-01-01 PROCEDURE — 93010 ELECTROCARDIOGRAM REPORT: CPT

## 2022-01-01 PROCEDURE — 82803 BLOOD GASES ANY COMBINATION: CPT

## 2022-01-01 PROCEDURE — 83605 ASSAY OF LACTIC ACID: CPT

## 2022-01-01 PROCEDURE — 77014: CPT | Mod: 26

## 2022-01-01 PROCEDURE — 80053 COMPREHEN METABOLIC PANEL: CPT

## 2022-01-01 PROCEDURE — 70498 CT ANGIOGRAPHY NECK: CPT | Mod: 26

## 2022-01-01 PROCEDURE — 88305 TISSUE EXAM BY PATHOLOGIST: CPT | Mod: 26

## 2022-01-01 PROCEDURE — 99214 OFFICE O/P EST MOD 30 MIN: CPT

## 2022-01-01 PROCEDURE — 86850 RBC ANTIBODY SCREEN: CPT

## 2022-01-01 PROCEDURE — 99232 SBSQ HOSP IP/OBS MODERATE 35: CPT

## 2022-01-01 PROCEDURE — 92610 EVALUATE SWALLOWING FUNCTION: CPT

## 2022-01-01 PROCEDURE — 82435 ASSAY OF BLOOD CHLORIDE: CPT

## 2022-01-01 PROCEDURE — 99215 OFFICE O/P EST HI 40 MIN: CPT

## 2022-01-01 PROCEDURE — 77301 RADIOTHERAPY DOSE PLAN IMRT: CPT | Mod: 26

## 2022-01-01 PROCEDURE — 84145 PROCALCITONIN (PCT): CPT

## 2022-01-01 PROCEDURE — 82962 GLUCOSE BLOOD TEST: CPT

## 2022-01-01 PROCEDURE — 99212 OFFICE O/P EST SF 10 MIN: CPT

## 2022-01-01 PROCEDURE — 88112 CYTOPATH CELL ENHANCE TECH: CPT

## 2022-01-01 PROCEDURE — 70553 MRI BRAIN STEM W/O & W/DYE: CPT | Mod: 26

## 2022-01-01 PROCEDURE — 81001 URINALYSIS AUTO W/SCOPE: CPT

## 2022-01-01 PROCEDURE — 87077 CULTURE AEROBIC IDENTIFY: CPT

## 2022-01-01 PROCEDURE — 80048 BASIC METABOLIC PNL TOTAL CA: CPT

## 2022-01-01 PROCEDURE — U0005: CPT

## 2022-01-01 PROCEDURE — 87070 CULTURE OTHR SPECIMN AEROBIC: CPT

## 2022-01-01 PROCEDURE — 0225U NFCT DS DNA&RNA 21 SARSCOV2: CPT

## 2022-01-01 PROCEDURE — 86900 BLOOD TYPING SEROLOGIC ABO: CPT

## 2022-01-01 PROCEDURE — 85018 HEMOGLOBIN: CPT

## 2022-01-01 PROCEDURE — 84295 ASSAY OF SERUM SODIUM: CPT

## 2022-01-01 PROCEDURE — 71250 CT THORAX DX C-: CPT | Mod: 26

## 2022-01-01 PROCEDURE — 87040 BLOOD CULTURE FOR BACTERIA: CPT

## 2022-01-01 PROCEDURE — 82330 ASSAY OF CALCIUM: CPT

## 2022-01-01 PROCEDURE — 94640 AIRWAY INHALATION TREATMENT: CPT

## 2022-01-01 PROCEDURE — 36415 COLL VENOUS BLD VENIPUNCTURE: CPT

## 2022-01-01 PROCEDURE — 85014 HEMATOCRIT: CPT

## 2022-01-01 PROCEDURE — 77300 RADIATION THERAPY DOSE PLAN: CPT | Mod: 26

## 2022-01-01 PROCEDURE — 99222 1ST HOSP IP/OBS MODERATE 55: CPT

## 2022-01-01 PROCEDURE — 71260 CT THORAX DX C+: CPT

## 2022-01-01 PROCEDURE — 77470 SPECIAL RADIATION TREATMENT: CPT | Mod: 26

## 2022-01-01 PROCEDURE — 93880 EXTRACRANIAL BILAT STUDY: CPT | Mod: 26

## 2022-01-01 PROCEDURE — 87102 FUNGUS ISOLATION CULTURE: CPT

## 2022-01-01 PROCEDURE — 70450 CT HEAD/BRAIN W/O DYE: CPT | Mod: 26,MG

## 2022-01-01 PROCEDURE — 99284 EMERGENCY DEPT VISIT MOD MDM: CPT

## 2022-01-01 PROCEDURE — 71045 X-RAY EXAM CHEST 1 VIEW: CPT

## 2022-01-01 PROCEDURE — 93005 ELECTROCARDIOGRAM TRACING: CPT

## 2022-01-01 PROCEDURE — 87015 SPECIMEN INFECT AGNT CONCNTJ: CPT

## 2022-01-01 PROCEDURE — 74177 CT ABD & PELVIS W/CONTRAST: CPT | Mod: 26

## 2022-01-01 PROCEDURE — 87206 SMEAR FLUORESCENT/ACID STAI: CPT

## 2022-01-01 PROCEDURE — 87640 STAPH A DNA AMP PROBE: CPT

## 2022-01-01 PROCEDURE — 96374 THER/PROPH/DIAG INJ IV PUSH: CPT

## 2022-01-01 PROCEDURE — 77338 DESIGN MLC DEVICE FOR IMRT: CPT | Mod: 26

## 2022-01-01 PROCEDURE — 77263 THER RADIOLOGY TX PLNG CPLX: CPT

## 2022-01-01 PROCEDURE — 85610 PROTHROMBIN TIME: CPT

## 2022-01-01 PROCEDURE — 88112 CYTOPATH CELL ENHANCE TECH: CPT | Mod: 26

## 2022-01-01 PROCEDURE — 31624 DX BRONCHOSCOPE/LAVAGE: CPT

## 2022-01-01 PROCEDURE — 74176 CT ABD & PELVIS W/O CONTRAST: CPT | Mod: 26

## 2022-01-01 PROCEDURE — 99239 HOSP IP/OBS DSCHRG MGMT >30: CPT

## 2022-01-01 PROCEDURE — 87449 NOS EACH ORGANISM AG IA: CPT

## 2022-01-01 PROCEDURE — 70498 CT ANGIOGRAPHY NECK: CPT

## 2022-01-01 PROCEDURE — 87641 MR-STAPH DNA AMP PROBE: CPT

## 2022-01-01 PROCEDURE — 99443: CPT

## 2022-01-01 PROCEDURE — 94660 CPAP INITIATION&MGMT: CPT

## 2022-01-01 PROCEDURE — 99205 OFFICE O/P NEW HI 60 MIN: CPT

## 2022-01-01 PROCEDURE — 85025 COMPLETE CBC W/AUTO DIFF WBC: CPT

## 2022-01-01 PROCEDURE — 71250 CT THORAX DX C-: CPT

## 2022-01-01 PROCEDURE — 99285 EMERGENCY DEPT VISIT HI MDM: CPT

## 2022-01-01 PROCEDURE — 87086 URINE CULTURE/COLONY COUNT: CPT

## 2022-01-01 PROCEDURE — 99204 OFFICE O/P NEW MOD 45 MIN: CPT | Mod: 25

## 2022-01-01 PROCEDURE — U0003: CPT

## 2022-01-01 PROCEDURE — 87116 MYCOBACTERIA CULTURE: CPT

## 2022-01-01 PROCEDURE — 88305 TISSUE EXAM BY PATHOLOGIST: CPT

## 2022-01-01 PROCEDURE — 86901 BLOOD TYPING SEROLOGIC RH(D): CPT

## 2022-01-01 PROCEDURE — 99292 CRITICAL CARE ADDL 30 MIN: CPT

## 2022-01-01 RX ORDER — BENZONATATE 100 MG/1
100 CAPSULE ORAL EVERY 8 HOURS
Qty: 90 | Refills: 2 | Status: ACTIVE | COMMUNITY
Start: 2021-01-01 | End: 1900-01-01

## 2022-01-01 RX ORDER — MAGNESIUM SULFATE 500 MG/ML
2 VIAL (ML) INJECTION ONCE
Refills: 0 | Status: COMPLETED | OUTPATIENT
Start: 2022-01-01 | End: 2022-01-01

## 2022-01-01 RX ORDER — RIVAROXABAN 15 MG-20MG
20 KIT ORAL
Refills: 0 | Status: DISCONTINUED | OUTPATIENT
Start: 2022-01-01 | End: 2022-01-01

## 2022-01-01 RX ORDER — AZITHROMYCIN 500 MG/1
500 TABLET, FILM COATED ORAL EVERY 24 HOURS
Refills: 0 | Status: DISCONTINUED | OUTPATIENT
Start: 2022-01-01 | End: 2022-01-01

## 2022-01-01 RX ORDER — IPRATROPIUM/ALBUTEROL SULFATE 18-103MCG
3 AEROSOL WITH ADAPTER (GRAM) INHALATION ONCE
Refills: 0 | Status: COMPLETED | OUTPATIENT
Start: 2022-01-01 | End: 2022-01-01

## 2022-01-01 RX ORDER — NICOTINE POLACRILEX 2 MG
1 GUM BUCCAL DAILY
Refills: 0 | Status: DISCONTINUED | OUTPATIENT
Start: 2022-01-01 | End: 2022-01-01

## 2022-01-01 RX ORDER — AMLODIPINE BESYLATE 2.5 MG/1
1 TABLET ORAL
Qty: 0 | Refills: 0 | DISCHARGE

## 2022-01-01 RX ORDER — UMECLIDINIUM 62.5 UG/1
1 AEROSOL, POWDER ORAL
Qty: 0 | Refills: 0 | DISCHARGE

## 2022-01-01 RX ORDER — ATORVASTATIN CALCIUM 80 MG/1
1 TABLET, FILM COATED ORAL
Qty: 0 | Refills: 0 | DISCHARGE

## 2022-01-01 RX ORDER — VANCOMYCIN HCL 1 G
1000 VIAL (EA) INTRAVENOUS EVERY 12 HOURS
Refills: 0 | Status: DISCONTINUED | OUTPATIENT
Start: 2022-01-01 | End: 2022-01-01

## 2022-01-01 RX ORDER — PANTOPRAZOLE SODIUM 20 MG/1
1 TABLET, DELAYED RELEASE ORAL
Qty: 30 | Refills: 0
Start: 2022-01-01 | End: 2022-07-01

## 2022-01-01 RX ORDER — PROCHLORPERAZINE MALEATE 10 MG/1
10 TABLET ORAL EVERY 6 HOURS
Qty: 60 | Refills: 2 | Status: ACTIVE | COMMUNITY
Start: 2022-01-01 | End: 1900-01-01

## 2022-01-01 RX ORDER — FOLIC ACID 0.8 MG
1 TABLET ORAL
Qty: 0 | Refills: 0 | DISCHARGE
Start: 2022-01-01

## 2022-01-01 RX ORDER — SODIUM CHLORIDE 9 MG/ML
1000 INJECTION, SOLUTION INTRAVENOUS
Refills: 0 | Status: DISCONTINUED | OUTPATIENT
Start: 2022-01-01 | End: 2022-01-01

## 2022-01-01 RX ORDER — AMLODIPINE BESYLATE 2.5 MG/1
10 TABLET ORAL DAILY
Refills: 0 | Status: DISCONTINUED | OUTPATIENT
Start: 2022-01-01 | End: 2022-01-01

## 2022-01-01 RX ORDER — CEFTRIAXONE 500 MG/1
1000 INJECTION, POWDER, FOR SOLUTION INTRAMUSCULAR; INTRAVENOUS EVERY 24 HOURS
Refills: 0 | Status: COMPLETED | OUTPATIENT
Start: 2022-01-01 | End: 2022-01-01

## 2022-01-01 RX ORDER — ATORVASTATIN CALCIUM 80 MG/1
40 TABLET, FILM COATED ORAL AT BEDTIME
Refills: 0 | Status: DISCONTINUED | OUTPATIENT
Start: 2022-01-01 | End: 2022-01-01

## 2022-01-01 RX ORDER — CEFEPIME 1 G/1
1000 INJECTION, POWDER, FOR SOLUTION INTRAMUSCULAR; INTRAVENOUS ONCE
Refills: 0 | Status: DISCONTINUED | OUTPATIENT
Start: 2022-01-01 | End: 2022-01-01

## 2022-01-01 RX ORDER — ATORVASTATIN CALCIUM 40 MG/1
40 TABLET, FILM COATED ORAL
Qty: 1 | Refills: 1 | Status: ACTIVE | COMMUNITY
Start: 2022-01-01 | End: 1900-01-01

## 2022-01-01 RX ORDER — VANCOMYCIN HCL 1 G
1000 VIAL (EA) INTRAVENOUS ONCE
Refills: 0 | Status: COMPLETED | OUTPATIENT
Start: 2022-01-01 | End: 2022-01-01

## 2022-01-01 RX ORDER — RIVAROXABAN 20 MG/1
20 TABLET, FILM COATED ORAL
Qty: 90 | Refills: 0 | Status: ACTIVE | COMMUNITY
Start: 2022-01-01 | End: 1900-01-01

## 2022-01-01 RX ORDER — CELECOXIB 200 MG/1
1 CAPSULE ORAL
Qty: 0 | Refills: 0 | DISCHARGE

## 2022-01-01 RX ORDER — CEFEPIME 1 G/1
2000 INJECTION, POWDER, FOR SOLUTION INTRAMUSCULAR; INTRAVENOUS ONCE
Refills: 0 | Status: COMPLETED | OUTPATIENT
Start: 2022-01-01 | End: 2022-01-01

## 2022-01-01 RX ORDER — ACETAMINOPHEN 500 MG
650 TABLET ORAL EVERY 6 HOURS
Refills: 0 | Status: DISCONTINUED | OUTPATIENT
Start: 2022-01-01 | End: 2022-01-01

## 2022-01-01 RX ORDER — RIVAROXABAN 15 MG-20MG
1 KIT ORAL
Qty: 30 | Refills: 0
Start: 2022-01-01 | End: 2022-07-01

## 2022-01-01 RX ORDER — GABAPENTIN 400 MG/1
2 CAPSULE ORAL
Qty: 0 | Refills: 0 | DISCHARGE
Start: 2022-01-01

## 2022-01-01 RX ORDER — ALBUTEROL 90 UG/1
2 AEROSOL, METERED ORAL
Qty: 0 | Refills: 0 | DISCHARGE

## 2022-01-01 RX ORDER — ALBUTEROL 90 UG/1
2 AEROSOL, METERED ORAL EVERY 6 HOURS
Refills: 0 | Status: DISCONTINUED | OUTPATIENT
Start: 2022-01-01 | End: 2022-01-01

## 2022-01-01 RX ORDER — PROCHLORPERAZINE MALEATE 5 MG
1 TABLET ORAL
Qty: 0 | Refills: 0 | DISCHARGE

## 2022-01-01 RX ORDER — CEFTRIAXONE 500 MG/1
1000 INJECTION, POWDER, FOR SOLUTION INTRAMUSCULAR; INTRAVENOUS ONCE
Refills: 0 | Status: DISCONTINUED | OUTPATIENT
Start: 2022-01-01 | End: 2022-01-01

## 2022-01-01 RX ORDER — GABAPENTIN 100 MG/1
100 CAPSULE ORAL
Qty: 60 | Refills: 0 | Status: ACTIVE | COMMUNITY
Start: 2022-01-01 | End: 1900-01-01

## 2022-01-01 RX ORDER — GABAPENTIN 400 MG/1
200 CAPSULE ORAL
Qty: 0 | Refills: 0 | DISCHARGE

## 2022-01-01 RX ORDER — MAGNESIUM OXIDE 400 MG ORAL TABLET 241.3 MG
400 TABLET ORAL
Refills: 0 | Status: DISCONTINUED | OUTPATIENT
Start: 2022-01-01 | End: 2022-01-01

## 2022-01-01 RX ORDER — UMECLIDINIUM 62.5 UG/1
62.5 AEROSOL, POWDER ORAL DAILY
Qty: 3 | Refills: 1 | Status: ACTIVE | COMMUNITY
Start: 2022-01-01 | End: 1900-01-01

## 2022-01-01 RX ORDER — ALBUTEROL SULFATE 90 UG/1
108 (90 BASE) INHALANT RESPIRATORY (INHALATION)
Qty: 3 | Refills: 5 | Status: ACTIVE | COMMUNITY
Start: 2022-01-01 | End: 1900-01-01

## 2022-01-01 RX ORDER — CEFPODOXIME PROXETIL 100 MG
200 TABLET ORAL EVERY 12 HOURS
Refills: 0 | Status: DISCONTINUED | OUTPATIENT
Start: 2022-01-01 | End: 2022-01-01

## 2022-01-01 RX ORDER — MAGNESIUM OXIDE 400 MG ORAL TABLET 241.3 MG
400 TABLET ORAL
Refills: 0 | Status: COMPLETED | OUTPATIENT
Start: 2022-01-01 | End: 2022-01-01

## 2022-01-01 RX ORDER — TIOTROPIUM BROMIDE INHALATION SPRAY 3.12 UG/1
2.5 SPRAY, METERED RESPIRATORY (INHALATION)
Qty: 3 | Refills: 3 | Status: DISCONTINUED | COMMUNITY
Start: 2022-01-01 | End: 2022-01-01

## 2022-01-01 RX ORDER — CEFEPIME 1 G/1
2000 INJECTION, POWDER, FOR SOLUTION INTRAMUSCULAR; INTRAVENOUS EVERY 12 HOURS
Refills: 0 | Status: DISCONTINUED | OUTPATIENT
Start: 2022-01-01 | End: 2022-01-01

## 2022-01-01 RX ORDER — RIVAROXABAN 15 MG-20MG
1 KIT ORAL
Qty: 0 | Refills: 0 | DISCHARGE
Start: 2022-01-01

## 2022-01-01 RX ORDER — TIOTROPIUM BROMIDE 18 UG/1
1 CAPSULE ORAL; RESPIRATORY (INHALATION) DAILY
Refills: 0 | Status: DISCONTINUED | OUTPATIENT
Start: 2022-01-01 | End: 2022-01-01

## 2022-01-01 RX ORDER — NICOTINE POLACRILEX 2 MG
1 GUM BUCCAL
Qty: 14 | Refills: 0
Start: 2022-01-01 | End: 2022-01-01

## 2022-01-01 RX ORDER — RIVAROXABAN 15 MG-20MG
20 KIT ORAL DAILY
Refills: 0 | Status: DISCONTINUED | OUTPATIENT
Start: 2022-01-01 | End: 2022-01-01

## 2022-01-01 RX ORDER — SODIUM CHLORIDE 9 MG/ML
1550 INJECTION INTRAMUSCULAR; INTRAVENOUS; SUBCUTANEOUS ONCE
Refills: 0 | Status: COMPLETED | OUTPATIENT
Start: 2022-01-01 | End: 2022-01-01

## 2022-01-01 RX ORDER — ONDANSETRON 8 MG/1
4 TABLET, FILM COATED ORAL EVERY 8 HOURS
Refills: 0 | Status: DISCONTINUED | OUTPATIENT
Start: 2022-01-01 | End: 2022-01-01

## 2022-01-01 RX ORDER — RIVAROXABAN 15 MG-20MG
1 KIT ORAL
Qty: 0 | Refills: 0 | DISCHARGE

## 2022-01-01 RX ORDER — IPRATROPIUM/ALBUTEROL SULFATE 18-103MCG
3 AEROSOL WITH ADAPTER (GRAM) INHALATION EVERY 6 HOURS
Refills: 0 | Status: DISCONTINUED | OUTPATIENT
Start: 2022-01-01 | End: 2022-01-01

## 2022-01-01 RX ORDER — POLYETHYLENE GLYCOL 3350 17 G/17G
17 POWDER, FOR SOLUTION ORAL DAILY
Qty: 340 | Refills: 0 | Status: ACTIVE | COMMUNITY
Start: 2022-01-01 | End: 1900-01-01

## 2022-01-01 RX ORDER — THIAMINE MONONITRATE (VIT B1) 100 MG
100 TABLET ORAL DAILY
Refills: 0 | Status: COMPLETED | OUTPATIENT
Start: 2022-01-01 | End: 2022-01-01

## 2022-01-01 RX ORDER — VANCOMYCIN HCL 1 G
750 VIAL (EA) INTRAVENOUS EVERY 12 HOURS
Refills: 0 | Status: DISCONTINUED | OUTPATIENT
Start: 2022-01-01 | End: 2022-01-01

## 2022-01-01 RX ORDER — PANTOPRAZOLE SODIUM 20 MG/1
40 TABLET, DELAYED RELEASE ORAL DAILY
Refills: 0 | Status: DISCONTINUED | OUTPATIENT
Start: 2022-01-01 | End: 2022-01-01

## 2022-01-01 RX ORDER — ONDANSETRON 8 MG/1
8 TABLET ORAL EVERY 8 HOURS
Qty: 90 | Refills: 1 | Status: ACTIVE | COMMUNITY
Start: 2022-01-01 | End: 1900-01-01

## 2022-01-01 RX ORDER — LANOLIN ALCOHOL/MO/W.PET/CERES
3 CREAM (GRAM) TOPICAL AT BEDTIME
Refills: 0 | Status: DISCONTINUED | OUTPATIENT
Start: 2022-01-01 | End: 2022-01-01

## 2022-01-01 RX ORDER — GABAPENTIN 400 MG/1
200 CAPSULE ORAL
Refills: 0 | Status: DISCONTINUED | OUTPATIENT
Start: 2022-01-01 | End: 2022-01-01

## 2022-01-01 RX ORDER — CELECOXIB 200 MG/1
200 CAPSULE ORAL TWICE DAILY
Qty: 30 | Refills: 1 | Status: ACTIVE | COMMUNITY
Start: 2022-01-01 | End: 1900-01-01

## 2022-01-01 RX ORDER — MORPHINE SULFATE 50 MG/1
2 CAPSULE, EXTENDED RELEASE ORAL ONCE
Refills: 0 | Status: DISCONTINUED | OUTPATIENT
Start: 2022-01-01 | End: 2022-01-01

## 2022-01-01 RX ORDER — AZTREONAM 2 G
1 VIAL (EA) INJECTION
Qty: 13 | Refills: 0
Start: 2022-01-01 | End: 2022-07-01

## 2022-01-01 RX ORDER — CEFEPIME 1 G/1
1000 INJECTION, POWDER, FOR SOLUTION INTRAMUSCULAR; INTRAVENOUS EVERY 12 HOURS
Refills: 0 | Status: DISCONTINUED | OUTPATIENT
Start: 2022-01-01 | End: 2022-01-01

## 2022-01-01 RX ORDER — ACETAMINOPHEN 500 MG
650 TABLET ORAL ONCE
Refills: 0 | Status: COMPLETED | OUTPATIENT
Start: 2022-01-01 | End: 2022-01-01

## 2022-01-01 RX ORDER — AZITHROMYCIN 500 MG/1
500 TABLET, FILM COATED ORAL ONCE
Refills: 0 | Status: DISCONTINUED | OUTPATIENT
Start: 2022-01-01 | End: 2022-01-01

## 2022-01-01 RX ORDER — FOLIC ACID 0.8 MG
1 TABLET ORAL DAILY
Refills: 0 | Status: DISCONTINUED | OUTPATIENT
Start: 2022-01-01 | End: 2022-01-01

## 2022-01-01 RX ADMIN — AMLODIPINE BESYLATE 10 MILLIGRAM(S): 2.5 TABLET ORAL at 06:22

## 2022-01-01 RX ADMIN — PANTOPRAZOLE SODIUM 40 MILLIGRAM(S): 20 TABLET, DELAYED RELEASE ORAL at 12:14

## 2022-01-01 RX ADMIN — RIVAROXABAN 20 MILLIGRAM(S): KIT at 17:47

## 2022-01-01 RX ADMIN — AMLODIPINE BESYLATE 10 MILLIGRAM(S): 2.5 TABLET ORAL at 05:30

## 2022-01-01 RX ADMIN — Medication 3 MILLILITER(S): at 16:13

## 2022-01-01 RX ADMIN — Medication 40 MILLIGRAM(S): at 21:26

## 2022-01-01 RX ADMIN — AZITHROMYCIN 255 MILLIGRAM(S): 500 TABLET, FILM COATED ORAL at 17:38

## 2022-01-01 RX ADMIN — Medication 1 TABLET(S): at 12:12

## 2022-01-01 RX ADMIN — AMLODIPINE BESYLATE 10 MILLIGRAM(S): 2.5 TABLET ORAL at 06:12

## 2022-01-01 RX ADMIN — Medication 3 MILLILITER(S): at 13:49

## 2022-01-01 RX ADMIN — SODIUM CHLORIDE 75 MILLILITER(S): 9 INJECTION, SOLUTION INTRAVENOUS at 05:07

## 2022-01-01 RX ADMIN — GABAPENTIN 200 MILLIGRAM(S): 400 CAPSULE ORAL at 18:13

## 2022-01-01 RX ADMIN — AMLODIPINE BESYLATE 10 MILLIGRAM(S): 2.5 TABLET ORAL at 05:03

## 2022-01-01 RX ADMIN — Medication 1 MILLIGRAM(S): at 11:11

## 2022-01-01 RX ADMIN — Medication 1 TABLET(S): at 18:13

## 2022-01-01 RX ADMIN — Medication 3 MILLILITER(S): at 21:12

## 2022-01-01 RX ADMIN — Medication 150 GRAM(S): at 12:45

## 2022-01-01 RX ADMIN — Medication 3 MILLILITER(S): at 12:30

## 2022-01-01 RX ADMIN — Medication 40 MILLIGRAM(S): at 05:18

## 2022-01-01 RX ADMIN — Medication 650 MILLIGRAM(S): at 11:00

## 2022-01-01 RX ADMIN — Medication 40 MILLIGRAM(S): at 06:14

## 2022-01-01 RX ADMIN — CEFEPIME 100 MILLIGRAM(S): 1 INJECTION, POWDER, FOR SOLUTION INTRAMUSCULAR; INTRAVENOUS at 22:22

## 2022-01-01 RX ADMIN — MORPHINE SULFATE 2 MILLIGRAM(S): 50 CAPSULE, EXTENDED RELEASE ORAL at 12:50

## 2022-01-01 RX ADMIN — MAGNESIUM OXIDE 400 MG ORAL TABLET 400 MILLIGRAM(S): 241.3 TABLET ORAL at 08:32

## 2022-01-01 RX ADMIN — GABAPENTIN 200 MILLIGRAM(S): 400 CAPSULE ORAL at 05:03

## 2022-01-01 RX ADMIN — Medication 40 MILLIGRAM(S): at 05:30

## 2022-01-01 RX ADMIN — Medication 40 MILLIGRAM(S): at 12:29

## 2022-01-01 RX ADMIN — Medication 3 MILLILITER(S): at 20:48

## 2022-01-01 RX ADMIN — GABAPENTIN 200 MILLIGRAM(S): 400 CAPSULE ORAL at 05:08

## 2022-01-01 RX ADMIN — Medication 1 PATCH: at 17:47

## 2022-01-01 RX ADMIN — Medication 30 MILLIGRAM(S): at 05:08

## 2022-01-01 RX ADMIN — Medication 40 MILLIGRAM(S): at 06:22

## 2022-01-01 RX ADMIN — Medication 1 TABLET(S): at 12:30

## 2022-01-01 RX ADMIN — Medication 100 MILLIGRAM(S): at 12:29

## 2022-01-01 RX ADMIN — CEFEPIME 100 MILLIGRAM(S): 1 INJECTION, POWDER, FOR SOLUTION INTRAMUSCULAR; INTRAVENOUS at 11:00

## 2022-01-01 RX ADMIN — Medication 3 MILLILITER(S): at 08:30

## 2022-01-01 RX ADMIN — PANTOPRAZOLE SODIUM 40 MILLIGRAM(S): 20 TABLET, DELAYED RELEASE ORAL at 11:08

## 2022-01-01 RX ADMIN — GABAPENTIN 200 MILLIGRAM(S): 400 CAPSULE ORAL at 05:18

## 2022-01-01 RX ADMIN — PANTOPRAZOLE SODIUM 40 MILLIGRAM(S): 20 TABLET, DELAYED RELEASE ORAL at 12:30

## 2022-01-01 RX ADMIN — Medication 1 PATCH: at 17:44

## 2022-01-01 RX ADMIN — Medication 3 MILLILITER(S): at 15:30

## 2022-01-01 RX ADMIN — Medication 3 MILLIGRAM(S): at 21:03

## 2022-01-01 RX ADMIN — Medication 3 MILLILITER(S): at 20:58

## 2022-01-01 RX ADMIN — CEFTRIAXONE 100 MILLIGRAM(S): 500 INJECTION, POWDER, FOR SOLUTION INTRAMUSCULAR; INTRAVENOUS at 05:30

## 2022-01-01 RX ADMIN — Medication 40 MILLIGRAM(S): at 16:51

## 2022-01-01 RX ADMIN — Medication 3 MILLILITER(S): at 14:58

## 2022-01-01 RX ADMIN — AZITHROMYCIN 255 MILLIGRAM(S): 500 TABLET, FILM COATED ORAL at 18:33

## 2022-01-01 RX ADMIN — ATORVASTATIN CALCIUM 40 MILLIGRAM(S): 80 TABLET, FILM COATED ORAL at 22:11

## 2022-01-01 RX ADMIN — Medication 1 TABLET(S): at 11:07

## 2022-01-01 RX ADMIN — Medication 250 MILLIGRAM(S): at 11:51

## 2022-01-01 RX ADMIN — AMLODIPINE BESYLATE 10 MILLIGRAM(S): 2.5 TABLET ORAL at 05:08

## 2022-01-01 RX ADMIN — Medication 3 MILLILITER(S): at 14:37

## 2022-01-01 RX ADMIN — ATORVASTATIN CALCIUM 40 MILLIGRAM(S): 80 TABLET, FILM COATED ORAL at 21:03

## 2022-01-01 RX ADMIN — Medication 1 TABLET(S): at 18:06

## 2022-01-01 RX ADMIN — PANTOPRAZOLE SODIUM 40 MILLIGRAM(S): 20 TABLET, DELAYED RELEASE ORAL at 11:58

## 2022-01-01 RX ADMIN — AZITHROMYCIN 255 MILLIGRAM(S): 500 TABLET, FILM COATED ORAL at 18:06

## 2022-01-01 RX ADMIN — Medication 250 MILLIGRAM(S): at 00:13

## 2022-01-01 RX ADMIN — ATORVASTATIN CALCIUM 40 MILLIGRAM(S): 80 TABLET, FILM COATED ORAL at 22:23

## 2022-01-01 RX ADMIN — Medication 40 MILLIGRAM(S): at 05:03

## 2022-01-01 RX ADMIN — Medication 1 MILLIGRAM(S): at 11:58

## 2022-01-01 RX ADMIN — GABAPENTIN 200 MILLIGRAM(S): 400 CAPSULE ORAL at 06:11

## 2022-01-01 RX ADMIN — Medication 3 MILLILITER(S): at 08:19

## 2022-01-01 RX ADMIN — Medication 3 MILLILITER(S): at 09:18

## 2022-01-01 RX ADMIN — Medication 125 MILLIGRAM(S): at 12:46

## 2022-01-01 RX ADMIN — GABAPENTIN 200 MILLIGRAM(S): 400 CAPSULE ORAL at 17:52

## 2022-01-01 RX ADMIN — Medication 3 MILLILITER(S): at 20:50

## 2022-01-01 RX ADMIN — Medication 1 TABLET(S): at 11:58

## 2022-01-01 RX ADMIN — GABAPENTIN 200 MILLIGRAM(S): 400 CAPSULE ORAL at 05:30

## 2022-01-01 RX ADMIN — Medication 1 PATCH: at 10:21

## 2022-01-01 RX ADMIN — Medication 40 MILLIGRAM(S): at 06:27

## 2022-01-01 RX ADMIN — PANTOPRAZOLE SODIUM 40 MILLIGRAM(S): 20 TABLET, DELAYED RELEASE ORAL at 18:13

## 2022-01-01 RX ADMIN — Medication 1 MILLIGRAM(S): at 12:14

## 2022-01-01 RX ADMIN — Medication 25 GRAM(S): at 11:07

## 2022-01-01 RX ADMIN — AZITHROMYCIN 255 MILLIGRAM(S): 500 TABLET, FILM COATED ORAL at 17:46

## 2022-01-01 RX ADMIN — Medication 1 TABLET(S): at 11:51

## 2022-01-01 RX ADMIN — Medication 1 MILLIGRAM(S): at 12:30

## 2022-01-01 RX ADMIN — Medication 250 MILLIGRAM(S): at 12:46

## 2022-01-01 RX ADMIN — CEFEPIME 100 MILLIGRAM(S): 1 INJECTION, POWDER, FOR SOLUTION INTRAMUSCULAR; INTRAVENOUS at 22:16

## 2022-01-01 RX ADMIN — Medication 3 MILLIGRAM(S): at 21:17

## 2022-01-01 RX ADMIN — Medication 40 MILLIGRAM(S): at 05:58

## 2022-01-01 RX ADMIN — Medication 40 MILLIGRAM(S): at 00:13

## 2022-01-01 RX ADMIN — Medication 3 MILLILITER(S): at 03:49

## 2022-01-01 RX ADMIN — Medication 40 MILLIGRAM(S): at 06:11

## 2022-01-01 RX ADMIN — Medication 1 PATCH: at 17:06

## 2022-01-01 RX ADMIN — Medication 25 GRAM(S): at 17:59

## 2022-01-01 RX ADMIN — Medication 100 MILLIGRAM(S): at 11:07

## 2022-01-01 RX ADMIN — Medication 1 PATCH: at 19:44

## 2022-01-01 RX ADMIN — Medication 100 MILLIGRAM(S): at 03:22

## 2022-01-01 RX ADMIN — RIVAROXABAN 20 MILLIGRAM(S): KIT at 11:07

## 2022-01-01 RX ADMIN — Medication 3 MILLILITER(S): at 21:01

## 2022-01-01 RX ADMIN — Medication 40 MILLIGRAM(S): at 05:05

## 2022-01-01 RX ADMIN — ATORVASTATIN CALCIUM 40 MILLIGRAM(S): 80 TABLET, FILM COATED ORAL at 22:13

## 2022-01-01 RX ADMIN — Medication 250 MILLIGRAM(S): at 00:56

## 2022-01-01 RX ADMIN — Medication 3 MILLILITER(S): at 04:43

## 2022-01-01 RX ADMIN — AMLODIPINE BESYLATE 10 MILLIGRAM(S): 2.5 TABLET ORAL at 05:18

## 2022-01-01 RX ADMIN — GABAPENTIN 200 MILLIGRAM(S): 400 CAPSULE ORAL at 17:06

## 2022-01-01 RX ADMIN — Medication 3 MILLILITER(S): at 10:28

## 2022-01-01 RX ADMIN — ATORVASTATIN CALCIUM 40 MILLIGRAM(S): 80 TABLET, FILM COATED ORAL at 21:18

## 2022-01-01 RX ADMIN — Medication 40 MILLIGRAM(S): at 11:07

## 2022-01-01 RX ADMIN — SODIUM CHLORIDE 1550 MILLILITER(S): 9 INJECTION INTRAMUSCULAR; INTRAVENOUS; SUBCUTANEOUS at 11:00

## 2022-01-01 RX ADMIN — CEFEPIME 100 MILLIGRAM(S): 1 INJECTION, POWDER, FOR SOLUTION INTRAMUSCULAR; INTRAVENOUS at 12:29

## 2022-01-01 RX ADMIN — Medication 40 MILLIGRAM(S): at 23:18

## 2022-01-01 RX ADMIN — Medication 1 MILLIGRAM(S): at 11:07

## 2022-01-01 RX ADMIN — GABAPENTIN 200 MILLIGRAM(S): 400 CAPSULE ORAL at 06:23

## 2022-01-01 RX ADMIN — Medication 1 MILLIGRAM(S): at 18:13

## 2022-01-01 RX ADMIN — GABAPENTIN 200 MILLIGRAM(S): 400 CAPSULE ORAL at 17:42

## 2022-01-01 RX ADMIN — Medication 3 MILLIGRAM(S): at 21:26

## 2022-01-01 RX ADMIN — CEFEPIME 100 MILLIGRAM(S): 1 INJECTION, POWDER, FOR SOLUTION INTRAMUSCULAR; INTRAVENOUS at 22:19

## 2022-01-01 RX ADMIN — Medication 40 MILLIGRAM(S): at 11:51

## 2022-01-01 RX ADMIN — Medication 40 MILLIGRAM(S): at 18:33

## 2022-01-01 RX ADMIN — Medication 3 MILLILITER(S): at 15:04

## 2022-01-01 RX ADMIN — Medication 100 MILLIGRAM(S): at 11:51

## 2022-01-01 RX ADMIN — Medication 3 MILLILITER(S): at 05:05

## 2022-01-01 RX ADMIN — Medication 3 MILLILITER(S): at 08:40

## 2022-01-01 RX ADMIN — GABAPENTIN 200 MILLIGRAM(S): 400 CAPSULE ORAL at 06:27

## 2022-01-01 RX ADMIN — Medication 3 MILLILITER(S): at 15:42

## 2022-01-01 RX ADMIN — CEFEPIME 100 MILLIGRAM(S): 1 INJECTION, POWDER, FOR SOLUTION INTRAMUSCULAR; INTRAVENOUS at 11:50

## 2022-01-01 RX ADMIN — Medication 40 MILLIGRAM(S): at 17:37

## 2022-01-01 RX ADMIN — CEFEPIME 100 MILLIGRAM(S): 1 INJECTION, POWDER, FOR SOLUTION INTRAMUSCULAR; INTRAVENOUS at 11:07

## 2022-01-01 RX ADMIN — Medication 3 MILLILITER(S): at 20:39

## 2022-01-01 RX ADMIN — Medication 40 MILLIGRAM(S): at 18:05

## 2022-01-01 RX ADMIN — Medication 1 PATCH: at 10:38

## 2022-01-01 RX ADMIN — RIVAROXABAN 20 MILLIGRAM(S): KIT at 17:07

## 2022-01-01 RX ADMIN — Medication 1 MILLIGRAM(S): at 11:51

## 2022-01-01 RX ADMIN — ATORVASTATIN CALCIUM 40 MILLIGRAM(S): 80 TABLET, FILM COATED ORAL at 21:27

## 2022-01-01 RX ADMIN — RIVAROXABAN 20 MILLIGRAM(S): KIT at 17:48

## 2022-01-01 RX ADMIN — Medication 3 MILLILITER(S): at 08:51

## 2022-01-01 RX ADMIN — Medication 1 TABLET(S): at 11:11

## 2022-01-01 RX ADMIN — PANTOPRAZOLE SODIUM 40 MILLIGRAM(S): 20 TABLET, DELAYED RELEASE ORAL at 11:10

## 2022-01-01 RX ADMIN — GABAPENTIN 200 MILLIGRAM(S): 400 CAPSULE ORAL at 18:05

## 2022-01-01 RX ADMIN — SODIUM CHLORIDE 75 MILLILITER(S): 9 INJECTION, SOLUTION INTRAVENOUS at 12:03

## 2022-01-01 RX ADMIN — RIVAROXABAN 20 MILLIGRAM(S): KIT at 11:51

## 2022-01-01 RX ADMIN — Medication 25 GRAM(S): at 08:30

## 2022-01-01 RX ADMIN — Medication 40 MILLIGRAM(S): at 00:56

## 2022-01-01 RX ADMIN — GABAPENTIN 200 MILLIGRAM(S): 400 CAPSULE ORAL at 17:47

## 2022-01-01 RX ADMIN — CEFEPIME 100 MILLIGRAM(S): 1 INJECTION, POWDER, FOR SOLUTION INTRAMUSCULAR; INTRAVENOUS at 22:12

## 2022-01-01 RX ADMIN — AMLODIPINE BESYLATE 10 MILLIGRAM(S): 2.5 TABLET ORAL at 06:11

## 2022-01-01 RX ADMIN — Medication 40 MILLIGRAM(S): at 17:46

## 2022-01-01 RX ADMIN — Medication 3 MILLILITER(S): at 02:57

## 2022-01-01 RX ADMIN — Medication 3 MILLIGRAM(S): at 21:06

## 2022-01-01 RX ADMIN — Medication 100 MILLIGRAM(S): at 06:30

## 2022-01-01 RX ADMIN — ATORVASTATIN CALCIUM 40 MILLIGRAM(S): 80 TABLET, FILM COATED ORAL at 22:16

## 2022-01-01 RX ADMIN — Medication 40 MILLIGRAM(S): at 23:52

## 2022-01-01 RX ADMIN — ATORVASTATIN CALCIUM 40 MILLIGRAM(S): 80 TABLET, FILM COATED ORAL at 21:06

## 2022-01-01 RX ADMIN — Medication 3 MILLILITER(S): at 09:00

## 2022-01-01 RX ADMIN — Medication 1 PATCH: at 17:54

## 2022-01-01 RX ADMIN — Medication 3 MILLILITER(S): at 03:44

## 2022-01-01 RX ADMIN — Medication 3 MILLILITER(S): at 16:14

## 2022-01-01 RX ADMIN — Medication 3 MILLILITER(S): at 03:15

## 2022-01-01 RX ADMIN — Medication 100 MILLIGRAM(S): at 00:15

## 2022-01-01 RX ADMIN — Medication 3 MILLILITER(S): at 04:32

## 2022-01-01 RX ADMIN — Medication 200 MILLIGRAM(S): at 06:22

## 2022-01-01 RX ADMIN — Medication 3 MILLILITER(S): at 21:20

## 2022-01-01 RX ADMIN — Medication 3 MILLILITER(S): at 08:22

## 2022-01-01 RX ADMIN — ATORVASTATIN CALCIUM 40 MILLIGRAM(S): 80 TABLET, FILM COATED ORAL at 21:26

## 2022-01-01 RX ADMIN — Medication 3 MILLILITER(S): at 08:17

## 2022-01-01 RX ADMIN — Medication 3 MILLILITER(S): at 21:30

## 2022-01-01 RX ADMIN — Medication 1 PATCH: at 22:09

## 2022-01-01 RX ADMIN — Medication 250 MILLIGRAM(S): at 11:06

## 2022-01-01 RX ADMIN — GABAPENTIN 200 MILLIGRAM(S): 400 CAPSULE ORAL at 18:33

## 2022-01-01 RX ADMIN — CEFTRIAXONE 100 MILLIGRAM(S): 500 INJECTION, POWDER, FOR SOLUTION INTRAMUSCULAR; INTRAVENOUS at 05:05

## 2022-01-01 RX ADMIN — Medication 3 MILLILITER(S): at 02:10

## 2022-01-01 RX ADMIN — Medication 1 MILLIGRAM(S): at 18:05

## 2022-01-01 RX ADMIN — Medication 650 MILLIGRAM(S): at 14:20

## 2022-01-01 RX ADMIN — Medication 3 MILLILITER(S): at 20:27

## 2022-01-01 RX ADMIN — GABAPENTIN 200 MILLIGRAM(S): 400 CAPSULE ORAL at 17:48

## 2022-01-01 RX ADMIN — GABAPENTIN 200 MILLIGRAM(S): 400 CAPSULE ORAL at 17:37

## 2022-01-01 RX ADMIN — Medication 200 MILLIGRAM(S): at 17:52

## 2022-01-01 RX ADMIN — Medication 3 MILLILITER(S): at 21:07

## 2022-01-01 RX ADMIN — Medication 1 PATCH: at 17:49

## 2022-01-01 RX ADMIN — Medication 40 MILLIGRAM(S): at 17:52

## 2022-01-01 RX ADMIN — Medication 200 MILLIGRAM(S): at 18:13

## 2022-01-01 RX ADMIN — Medication 3 MILLILITER(S): at 14:53

## 2022-01-01 RX ADMIN — PANTOPRAZOLE SODIUM 40 MILLIGRAM(S): 20 TABLET, DELAYED RELEASE ORAL at 18:05

## 2022-01-01 RX ADMIN — Medication 25 GRAM(S): at 09:23

## 2022-01-01 RX ADMIN — CEFTRIAXONE 100 MILLIGRAM(S): 500 INJECTION, POWDER, FOR SOLUTION INTRAMUSCULAR; INTRAVENOUS at 05:04

## 2022-01-01 RX ADMIN — ALBUTEROL 2 PUFF(S): 90 AEROSOL, METERED ORAL at 03:01

## 2022-01-06 PROBLEM — Z78.9 LIVES INDEPENDENTLY: Status: ACTIVE | Noted: 2019-04-08

## 2022-01-06 PROBLEM — Z78.9 REGULAR DRINKER OF ALCOHOL: Status: ACTIVE | Noted: 2019-04-08

## 2022-01-06 PROBLEM — F17.200 CURRENT EVERY DAY SMOKER: Status: ACTIVE | Noted: 2019-04-08

## 2022-01-06 NOTE — REASON FOR VISIT
[Consideration of Curative Therapy] : consideration of curative therapy for [Lung Cancer] : lung cancer [Other: _____] : [unfilled]

## 2022-01-06 NOTE — REVIEW OF SYSTEMS
[Wheezing] : wheezing [Cough] : cough [Cough: Grade 2 - Moderate symptoms, medical intervention indicated; limiting instrumental ADL] : Cough: Grade 2 - Moderate symptoms, medical intervention indicated; limiting instrumental ADL [Dyspnea: Grade 0] : Dyspnea: Grade 0 [Hiccups: Grade 0] : Hiccups: Grade 0 [Hoarseness: Grade 0] : Hoarseness: Grade 0 [Pharyngeal Mucositis: Grade 0] : Pharyngeal Mucositis: Grade 0 [Voice Alteration: Grade 0] : Voice Alteration: Grade 0 [Shortness Of Breath] : no shortness of breath [SOB on Exertion] : no shortness of breath during exertion [Negative] : Musculoskeletal [FreeTextEntry5] : hypertension [FreeTextEntry6] : continues smoking 2 1/2 PPD [FreeTextEntry9] : Hx femur fracture [FreeTextEntry1] : takes benzonatate

## 2022-01-06 NOTE — DISEASE MANAGEMENT
[Pathological] : TNM Stage: p [III] : III [FreeTextEntry4] : non small cell carcinoma [TTNM] : 4 [NTNM] : x [MTNM] : x

## 2022-01-06 NOTE — LETTER CLOSING
[Consult Closing:] : Thank you for allowing me to participate in the care of this patient.  If you have any questions, please do not hesitate to contact me. [Sincerely yours,] : Sincerely yours, [FreeTextEntry3] : Frederic Weber MD\par Physician in Chief\par Department of Radiation Medicine\par [facility] [place]\par [facility] [place]\par \par  of Radiation Medicine\par [facility]\par at  [facility]\par \par Radiation \par [facility]/\par [facility] [place]\par [address]\par [place], [place] [address]\par \par Tel: [phone]\par Fax: [phone]\par

## 2022-01-06 NOTE — LETTER GREETING
[Dear Doctor] : Dear Doctor, [Consult Letter:] : Your patient, [unfilled] was seen in my office today for consultation. [Please see my note below.] : Please see my note below. [FreeTextEntry2] : Khushi Urbina

## 2022-01-06 NOTE — PHYSICAL EXAM
[] : no respiratory distress [Respiration, Rhythm And Depth] : normal respiratory rhythm and effort [Exaggerated Use Of Accessory Muscles For Inspiration] : no accessory muscle use [Normal] : oriented to person, place and time, the affect was normal, the mood was normal and not anxious [de-identified] : breath sounds with inspiratory and expiratory wheeze throughout [de-identified] : right knee tenderness without swelling, anterior chest wall tenderness

## 2022-01-06 NOTE — VITALS
[80: Normal activity with effort; some signs or symptoms of disease.] : 80: Normal activity with effort; some signs or symptoms of disease.  [Maximal Pain Intensity: 4/10] : 4/10 [Least Pain Intensity: 1/10] : 1/10 [Pain Location: ___] : Pain Location: [unfilled] [NSAID/Non-Opioid] : NSAID/Non-Opioid [ECOG Performance Status: 1 - Restricted in physically strenuous activity but ambulatory and able to carry out work of a light or sedentary nature] : Performance Status: 1 - Restricted in physically strenuous activity but ambulatory and able to carry out work of a light or sedentary nature, e.g., light house work, office work

## 2022-01-06 NOTE — HISTORY OF PRESENT ILLNESS
[FreeTextEntry1] : This 56 year-old male presents for radiation medicine consultation.  Mr. Krause has a smoking history of 2 1/2 packs per day for 37 years,  He was recently diagnosed with  squamous cell carcinoma lung.\par \par Patient has been following Dr. Lam (Pulmonary) for routine CT scans of chest, closely monitoring RUL lesion since October 2020.  CT scan on 10/22/21 revealed the nodular opacity in the right apex increased, now measuring 1.3 cm, previously measuring 0.9 cm.  A new 1.5 cm nodular opacity is noted in the anterior segment of the right upper lobe. \par \par EBUS 12/14/21: A RUL and RML BAL was performed by Dr. Spicer.\par LUNG, RIGHT MIDDLE LOBE, BRONCHOALVEOLAR LAVAGE: POSITIVE FOR MALIGNANT CELLS.\par Non-Small Cell Carcinoma, favor Squamous Cell Carcinoma.\par Scanty cellularity precludes iMmunostains \par \par Core Biopsy:\par 1. Immunohistochemical stain for P40 is positive in tumor cells. Staining for TTF-1 is negative. The immunophenotype together with the cytomorphology supports the diagnosis of squamous cell carcinoma.\par \par

## 2022-01-14 PROBLEM — F17.210 CIGARETTE SMOKER: Status: ACTIVE | Noted: 2020-04-02

## 2022-02-02 NOTE — VITALS
[Maximal Pain Intensity: 3/10] : 3/10 [Least Pain Intensity: 1/10] : 1/10 [Pain Description/Quality: ___] : Pain description/quality: [unfilled] [Pain Duration: ___] : Pain duration: [unfilled] [Pain Location: ___] : Pain Location: [unfilled] [Pain Interferes with ADLs] : Pain interferes with activities of daily living. [NSAID/Non-Opioid] : NSAID/Non-Opioid [Adjuvant (neuroleptics)] : adjuvant (neuroleptics) [80: Normal activity with effort; some signs or symptoms of disease.] : 80: Normal activity with effort; some signs or symptoms of disease.  [ECOG Performance Status: 1 - Restricted in physically strenuous activity but ambulatory and able to carry out work of a light or sedentary nature] : Performance Status: 1 - Restricted in physically strenuous activity but ambulatory and able to carry out work of a light or sedentary nature, e.g., light house work, office work

## 2022-02-03 NOTE — REVIEW OF SYSTEMS
[Wheezing] : wheezing [Cough] : cough [Cough: Grade 2 - Moderate symptoms, medical intervention indicated; limiting instrumental ADL] : Cough: Grade 2 - Moderate symptoms, medical intervention indicated; limiting instrumental ADL [Dyspnea: Grade 0] : Dyspnea: Grade 0 [Hiccups: Grade 0] : Hiccups: Grade 0 [Hoarseness: Grade 0] : Hoarseness: Grade 0 [Pharyngeal Mucositis: Grade 0] : Pharyngeal Mucositis: Grade 0 [Voice Alteration: Grade 0] : Voice Alteration: Grade 0 [Shortness Of Breath] : no shortness of breath [SOB on Exertion] : no shortness of breath during exertion [Negative] : Cardiovascular [FreeTextEntry5] : hypertension [FreeTextEntry6] : continues smoking 2 1/2 PPD [FreeTextEntry9] : Hx femur fracture [FreeTextEntry1] : takes benzonatate

## 2022-02-03 NOTE — HISTORY OF PRESENT ILLNESS
[FreeTextEntry1] : This 56 year-old male presents for follow-up and to begin radiation treatment planning.  Mr. Krause has a smoking history of 2 1/2 packs per day for 37 years,  He was recently diagnosed with  squamous cell carcinoma lung.\par \par Moderate delay in the planning process due to insurance issues.\par

## 2022-02-03 NOTE — LETTER CLOSING
[Consult Closing:] : Thank you for allowing me to participate in the care of this patient.  If you have any questions, please do not hesitate to contact me. [Sincerely yours,] : Sincerely yours, [FreeTextEntry3] : Frederic Weber MD\par Physician in Chief\par Department of Radiation Medicine\par [facility] [place]\par [facility] [place]\par \par  of Radiation Medicine\par [facility]\par at  [facility]\par \par Radiation \par [facility]/\par [facility] [place]\par [address]\par [place], [place] [address]\par \par Tel: [phone]\par Fax: [phone]\par

## 2022-02-03 NOTE — DISEASE MANAGEMENT
[Pathological] : TNM Stage: p [III] : III [FreeTextEntry4] : non small cell carcinoma [TTNM] : 4 [NTNM] : x [MTNM] : x [de-identified] : 6000cGy [de-identified] : right lung/mediastinum

## 2022-02-03 NOTE — PHYSICAL EXAM
[] : no respiratory distress [Respiration, Rhythm And Depth] : normal respiratory rhythm and effort [Exaggerated Use Of Accessory Muscles For Inspiration] : no accessory muscle use [Oriented To Time, Place, And Person] : oriented to person, place, and time [Affect] : the affect was normal [Mood] : the mood was normal [Not Anxious] : not anxious [Normal] : oriented to person, place and time, the affect was normal, the mood was normal and not anxious [de-identified] : breath sounds with inspiratory and expiratory wheeze throughout [de-identified] : anterior chest wall tenderness

## 2022-02-04 PROBLEM — Z29.8 ENCOUNTER FOR IMMUNOTHERAPY: Status: ACTIVE | Noted: 2022-01-01

## 2022-02-04 NOTE — RESULTS/DATA
[FreeTextEntry1] : Collected Date/Time:                   12/14/2021 19:14 EST\par Received Date/Time:                    12/14/2021 19:14 EST\par \par Fine Needle Aspiration Report - Auth (Verified)\par \par Specimen(s) Submitted\par LUNG, BRONCHUS, RIGHT MIDDLE, CORE BIOPSY\par \par Final Diagnosis\par LUNG, BRONCHUS, RIGHT MIDDLE, CORE BIOPSY\par POSITIVE FOR MALIGNANT CELLS.\par Clinical Information\par Upper bronchus biopsy.\par \par \par Specimen(s) Submitted\par LUNG, RIGHT MIDDLE LOBE, BRONCHOALVEOLAR LAVAGE\par \par Final Diagnosis\par LUNG, RIGHT MIDDLE LOBE, BRONCHOALVEOLAR LAVAGE\par POSITIVE FOR MALIGNANT CELLS.\par Non-Small Cell Carcinoma, favor Squamous Cell Carcinoma.\par \par Gross Description\par Core Biopsy:\par Tissue collected: Specimen container has been inspected to confirm\par patient 's name and date of birth, contains 1  white/tan cores measuring\par 0.3 cm in length. Entire specimen submitted    in 1 cassette labeled 1B.\par \par Fine Needle Aspiration Addendum Report - Auth (Verified)\par \par Addendum\par 1. Immunohistochemical stain for P40 is positive in tumor cells.\par Staining for TTF-1 is negative. The immunophenotype together with the\par cytomorphology supports the diagnosis of squamous cell carcinoma.\par \par PD-L1 Immunohistochemistry\par Antibody: [employer] PDL1 ()\par Tissue type:  core biopsy of lung squamous cell carcinoma\par Block: 1 B\par \par Result: Tumor Proportion Score (TPS*)   75%\par \par *TPS: The percentage of viable tumor cells showing partial or complete\par membrane staining at any intensity\par \par Slide(s) with built in immunohistochemical study control(s) and negative\par control associated with this case has/have been verified by the sign out\par pathologist.\par \par For slide(s) without built in controls positive control slides has/have\par been reviewed and approved by immunohistochemistry lab\par \par These immunohistochemical/ in-situ hybridization tests have been developed\par and their performance characteristics determined by [facility], Department of Pathology, Division of Immunopathology,\par [phone] [place] [address].  It has not been cleared\par or approved by the U.S. Food and Drug Administration.  The FDA has\par determined that such clearance or approval is not necessary.  This test\par is used for clinical purposes.  The laboratory is certified under the\par CLIA-88 as qualified to perform high complexity clinical testing.\par \par \par \par EXAM: CT CHEST\par PROCEDURE DATE: 10/22/2021\par LUNGS AND AIRWAYS: No endobronchial lesions. Emphysema. A 7 mm right apical nodule (3-39) is unchanged. A nodular opacity in the right apex (3-51) has increased, now measuring 1.3 cm, previously measuring 0.9 cm. A new 1.5 cm nodular opacity is noted in the anterior segment of the right upper lobe. Extensive centrilobular nodules and tree-in-bud opacities are similar in appearance and distribution, predominantly affecting the right middle and lower lobes.\par IMPRESSION:\par 1. Unchanged 7 mm right apical nodule.\par 2. Increased right apical nodular opacity.\par 3. New 1.5 cm nodular opacity is noted in the anterior segment of the right upper lobe. Follow-up CT scan is recommended in 3 months to ensure resolution.\par \par \par \par \par CT Chest No Cont: EXAM:  CT CHEST\par PROCEDURE DATE:  07/29/2021\par AIRWAYS, LUNGS and PLEURA: Patent central airways. Emphysema. 0.7 cm right apical nodule\par (series 3 image 29) is decreased; previously 1.3 cm. New small tubular opacity within the\par right apex (series 3 image 42) likely mucoid impaction. Extensive centrilobular nodules and\par tree-in-bud opacities predominantly within the right middle lobe and right lower lobe are\par increased/new from prior. No pleural effusion.\par IMPRESSION:\par \par In comparison with 1/22/2021, bilateral centrilobular nodules and tree-in-bud opacities predominantly\par within the right middle lobe and right lower lobe are increased/new from\par prior likely representing bronchiolitis.\par \par 0.7 cm right apical nodule (series 3 image 29) is decreased previously 1.3 cm.\par \par

## 2022-02-04 NOTE — ADDENDUM
[FreeTextEntry1] : Documented by Willy Wong acting as scribe for Dr. Sharpe on 02/04/2022 \par \par All Medical record entries made by the Scribe were at my, Dr. Sharpe's, direction and personally dictated by me on 02/04/2022 . I have reviewed the chart and agree that the record accurately reflects my personal performance of the history, physical exam, assessment and plan. I have also personally directed, reviewed, and agreed with the discharge instructions.\par \par

## 2022-02-04 NOTE — ASSESSMENT
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 Currently smoking 2 packs a day.\par \par Patient following Dr. Lam (Pulmonary) for routine CT scans of chest closely monitoring RUL lesion since October 2020.\par CT on 10/22/21 revealing a nodular opacity in the right apex (3-51) has increased, now measuring 1.3 cm, previously measuring 0.9 cm. A new 1.5 cm nodular opacity is noted in the anterior segment of the right upper lobe.  \par \par EBUS perfromed on 12/14/21 by Dr. Spicer. a 0.5 cm nodular lesion was noted in the RUL and endobronchial biopsy was done. which revealed Sq cell car lung  PDL1 75% \par A RUL and RML BAL was performed which was +ve for malignant cells \par \par CT Abdomen Pelvis 12/30/21\par IMPRESSION:\par 1.  Unchanged 7 mm right apical nodule and 1.3 x 0.7 cm and subcarinal node measuring 2.5 x 0.9 cm, unchanged when compared with 10/22/2021. Previously noted right upper lobe anterior segment opacity has resolved.\par 2.  Decreased centrilobular and tree-in-bud opacities.\par 3.  Infiltrative subcarinal and right hilar lymphadenopathy unchanged.\par \par PET/CT 12/31/2021\par 1.  Two FDG avid right upper lung nodules (0.7 cm right apical nodule. The 1.3 cm right upper lung nodular opacity medially), compatible with malignancy.\par 2. FDG avid subcarinal, bilateral paratracheal, and right hilar lymph nodes suspicious for metastases. Right perihilar node which measured 1.3 x 0.7 cm on diagnostic CT (SUV 8.9). A 0.7 cm right paratracheal lymph node (SUV 6.0). A 0.6 cm right high paratracheal node (SUV 4.3). A 3 mm left upper paratracheal node (SUV 3.2).\par 3. Mildly FDG avid tree-in-bud opacity in the right middle lobe, likely inflammatory.\par \par Bone Scan 1/17/22: No scan evidence of osseous metastasis. Degenerative disease in the spine and major joints.\par \par Plan: \par - Discussed with patient pathology of Stage 3A Squamous cell ca lung ( Pending MRI brain) \par - Discussed  at [facility] tumor board on 1/11/22 with patient being Right, Middle, Upper BAL being positive. However, as per discussion it appears it was only the RUL lesion with positivity on BAL\par - Pending MRI Brain 2/10/22. \par - Will now however proceed with chemoRT with Carbo AUC 2 and taxol 50 mg/ m2 weekly with RT followed by Immunotherapy witH Durvalumab for 1 year \par - Discussed side effects of carboplatin including but not limited to Nausea/ vomiting/ myelosuppression/ fatigue. \par - Paclitaxel  has side effects including N/v/ myelosuppression/flushing / alopecia/ Neuropathy / diarrhea/ stomatitis/ \par -Durvalumab has immune mediated side effects including but not limited to thyroiditis/ hypopit /myocarditis/   arrythmias/ joint pains/ rash. \par - Will monitor counts/ and for immune mediated side effects\par - Also gave patient instructions for febrile neutropenia and to call the office if they were to develop a fever. \par - Consent to Chemotherapy signed\par - Patient has already been simulated RT with Dr. Weber\par - He is still smoking 2ppd, will order repeat CT Chest, Abdomen, Pelvis\par - Will schedule for Chemo/ RT \par - F/u in 3-4 weeks with Teri

## 2022-02-04 NOTE — HISTORY OF PRESENT ILLNESS
[de-identified] : 55-year-old male w/ no significant PMH with smoking history of  2 1/2 pack per day smoker for 37 years, current smoker presents to office for squamous cell carcinoma lung \par \par Patient following Dr. Lam (Pulmonary) for routine CT scans of chest closely monitoring RUL lesion since October 2020.\par  CT on 10/22/21 revealing a nodular opacity in the right apex (3-51) has increased, now measuring 1.3 cm, previously measuring 0.9 cm. A new 1.5 cm nodular opacity is noted in the anterior segment of the right upper lobe.  \par \par EBUS perfromed on 12/14/21 by Dr. Spicer. a 0.5 cm nodular lesion was noted in the RUL and endobronchial biopsy was done. \par A RUL and RML BAL was performed and specimen sent \par \par 12/14/21 \par Specimen(s) Submitted\par LUNG, RIGHT MIDDLE LOBE, BRONCHOALVEOLAR LAVAGE:  POSITIVE FOR MALIGNANT CELLS.\par Non-Small Cell Carcinoma, favor Squamous Cell Carcinoma.\par Scanty cellularity precludes iMmunostains \par \par Core Biopsy:\par 1. Immunohistochemical stain for P40 is positive in tumor cells. Staining for TTF-1 is negative. The immunophenotype together with the\par cytomorphology supports the diagnosis of squamous cell carcinoma.\par \par PD-L1 Immunohistochemistry Antibody: [employer] PDL1 ()\par Tissue type:  core biopsy of lung squamous cell carcinoma  Result: Tumor Proportion Score (TPS*)   75%\par \par *TPS: The percentage of viable tumor cells showing partial or complete membrane staining at any intensity\par \par Lives at a 's house in [place] w/ 5 other people. [facility] . \par Patient does not drive. Uses bus transportation to get to appointments. \par PCP- Mayela Otero  [de-identified] : Patient presents today for a FU visit. Currently smoking 2 packs a day \par  \par Reports "smoker's cough", intermittent burning pain in chest , arthritic knees.\par Smoking history: Age [age] y/o, 1 pack a day. For the last 10 years progressed to 2 1/2 packs a day. Has not attempted on cutting back. \par Has not completed COVID19 vaccine series. Discussed and advised to receive vaccine series.\par \par Patient was supposed to see us 01/14/22 however appointment was cancelled due to insurance lapse. Has not had MRI brain yet, scheduled for 2/10/22. \par He was presented at [facility] tumor board on 1/11/22 with Right, Middle, Upper BAL being positive. however, per discussion it appears it was only the RUL lesion with positivity on BAL\par \par Bone Scan 1/17/22\par IMPRESSION: No scan evidence of osseous metastasis. Degenerative disease in the spine and major joints.\par \par CT Abdomen Pelvis 12/30/21\par IMPRESSION:\par 1.  Unchanged 7 mm right apical nodule and 1.3 x 0.7 cm and subcarinal node measuring 2.5 x 0.9 cm, unchanged when compared with 10/22/2021. Previously noted right upper lobe anterior segment opacity has resolved.\par 2.  Decreased centrilobular and tree-in-bud opacities.\par 3.  Infiltrative subcarinal and right hilar lymphadenopathy unchanged.\par \par PET/CT 12/31/2021\par 1.  Two FDG avid right upper lung nodules (0.7 cm right apical nodule. The 1.3 cm right upper lung nodular opacity medially), compatible with malignancy.\par 2. FDG avid subcarinal, bilateral paratracheal, and right hilar lymph nodes suspicious for metastases. Right perihilar node which measured 1.3 x 0.7 cm on diagnostic CT (SUV 8.9). A 0.7 cm right paratracheal lymph node (SUV 6.0). A 0.6 cm right high paratracheal node (SUV 4.3). A 3 mm left upper paratracheal node (SUV 3.2).\par 3. Mildly FDG avid tree-in-bud opacity in the right middle lobe, likely inflammatory.

## 2022-02-16 NOTE — ED STATDOCS - OBJECTIVE STATEMENT
57 y/o male with PMHx of HTN presents to the ED c/o medical evaluation. Patient was diagnosed with small cell lung cancer recently. Patient had an MRI done last Thursday and his doctor referred him to the ED on Monday when the results were back but the patient couldn't come until today. The MRI was indicative of stroke.

## 2022-02-16 NOTE — ED PROVIDER NOTE - OBJECTIVE STATEMENT
57 y/o male with PMHx of Small Cell Lung CA dx 10/21 starting chemo and radiation next week, and HTN on Amlodipine, and Celebrex presents to the ER for medical evaluation. Pt states he had an outpatient MRI of the head 6 days ago, received the results 2 days ago, was told he possibly had a stroke and was advised to come to the ED immediately.     Denies weakness numbness tingling, slurred speech fever, SOB chest pain, fever 57 y/o male with PMHx of Small Cell Lung CA dx 10/21 starting chemo and radiation next week, and HTN on Amlodipine, and Celebrex presents to the ER for medical evaluation. Pt states he had an outpatient MRI of the head 6 days ago, received the results 2 days ago, was told possibly had a stroke and was advised to come to the ED immediately.     Denies weakness numbness tingling, slurred speech, fever, SOB chest pain, fever

## 2022-02-16 NOTE — ED PROVIDER NOTE - NSFOLLOWUPINSTRUCTIONS_ED_ALL_ED_FT
- Return to the ED as soon as possible.   - Call Neurology for an appointment, see information above.   - Return to the ED for any new or worsening symptoms.

## 2022-02-16 NOTE — ED ADULT TRIAGE NOTE - DIRECT TO ROOM CARE INITIATED:
[FreeTextEntry1] : 89-year-old woman seen for followup. \par \par She has known valvulopathy with postprocedural mitral stenosis and mitral regurgitation post mitral clip. Prior mechanical aVR. Pulmonary hypertension.\par \par She is aware of weight loss feeling fatigue and generally poorly. She was with family a few days ago and felt quite weak and felt like she might almost passed out but did not do so. After resting at home she felt somewhat better. She is aware of a small degree of leg edema. Diuretic dose noted 80 mg alternating with 120 mg. daily\par \par \par \par \par \par \par \par \par \par \par \par \par \par \par 
16-Feb-2022 16:52

## 2022-02-16 NOTE — ED PROVIDER NOTE - PATIENT PORTAL LINK FT
You can access the FollowMyHealth Patient Portal offered by [facility] by registering at the following website: [email or web address]. By joining [employer]’s FollowMyHealth portal, you will also be able to view your health information using other applications (apps) compatible with our system.

## 2022-02-16 NOTE — ED ADULT TRIAGE NOTE - CHIEF COMPLAINT QUOTE
Pt states he had MRI of the brain done last week and on Monday  he was told to come to ED due to " evidence of stroke"

## 2022-02-16 NOTE — ED STATDOCS - PROGRESS NOTE DETAILS
57 y/o male with PMHx of HTN presents to the ED c/o medical evaluation. Patient was diagnosed with small cell lung cancer recently. Patient had an MRI done last Thursday and his doctor referred him to the ED on Monday when the results were back but the patient couldn't come until today. The MRI was indicative of stroke. 57 y/o male with PMHx of HTN presents to the ED c/o medical evaluation. Patient was diagnosed with small cell lung cancer recently. Patient had an MRI done last Thursday and his doctor referred him to the ED on Monday when the results were back but the patient couldn't come until today. The MRI ( [facility]foremd with [facility]) was indicative of stroke in he right occiptal area. Patient denies any focal neurologic symptoms.

## 2022-02-16 NOTE — ED PROVIDER NOTE - PROGRESS NOTE DETAILS
Carl SCHRADER: Patient does not want to stay for workup, wants to come back tomorrow. Patient is alert and oriented times three. No acute distress. Discussed patient's current condition and need for further diagnostic evaluation. Patient wants to leave and understands leaving against medical advise. Risks, benefits and alternatives explained. Advised to follow up with physician tomorrow or return immediately for any change in symptoms. Patient understand that they can return to the ER at any time to continue evaluation. Patient verbalizes understanding to the above instructions.

## 2022-02-16 NOTE — ED PROVIDER NOTE - CLINICAL SUMMARY MEDICAL DECISION MAKING FREE TEXT BOX
Patient offered to stay for CT, blood work and admission but patient does not want to stay, states he will come back tomorrow. See progress note for AMA information.

## 2022-02-16 NOTE — ED PROVIDER NOTE - PHYSICAL EXAMINATION
Gen: Well appearing in NAD  Head: NC/AT  Neck: trachea midline  Cardic: RRR  Resp:  No distress; CTAB  Abd: Soft, NT/ND  Ext: no deformities  Neuro:  CN 2-12 intact, No tremors. No fasciculations. No nystagmus. Normal finger to nose and heel to shin b/l. No dysmetria.  Normal gait. Normal sensation. Normal strength.   Skin:  Warm and dry as visualized  Psych:  Normal affect and mood

## 2022-02-17 NOTE — HISTORY OF PRESENT ILLNESS
[Current] : current [TextBox_4] : 56M PMH NSCLC (SCC) stage 3A pending chemoRT, active heavy smoker, HTN who presents for f/u. Had recent CT C/A/P showing no significant change in 2 right upper lobe nodules,, no significant change in nodularity along the distal airway, no significant change in subcarinal lymph node, slight interval increase in right hilar lymph node, and no evidence of metastatic disease in the abdomen and pelvis. Had MRI 2/10/22 showing acute R occipital infarct. I advised to go to the ED, which he did on 2/16, was ordered for CT perfusion work-up but left AMA. No cough. No fevers, no chills. He cut down down 1.5ppd. No N/V/D. No headaches. No extremity weakness. No sick contacts, no recent travel. Is starting radiation on 2/24 and chemotherapy on 2/25.  [TextBox_11] : 2.5 [TextBox_13] : 37

## 2022-02-17 NOTE — PROCEDURE
[FreeTextEntry1] : HAILE\par EXAM: [id number] - CT ABDOMEN AND PELVIS OC IC - ORDERED BY: SMOOTH MONTESINOS\par \par EXAM: [id number] - CT CHEST IC - ORDERED BY: SMOOTH MONTESINOS\par \par \par PROCEDURE DATE: 02/10/2022\par \par \par \par INTERPRETATION: CLINICAL INFORMATION: Non-small cell lung cancer. Restaging.\par \par COMPARISON: 12/30/2021.\par \par CONTRAST/COMPLICATIONS:\par IV Contrast: Omnipaque 350 90 cc administered 10 cc discarded\par Oral Contrast: Omnipaque 300\par Complications: None reported at time of study completion\par \par PROCEDURE:\par CT of the Chest, Abdomen and Pelvis was performed.\par Sagittal and coronal reformats were performed.\par \par FINDINGS:\par CHEST:\par LUNGS AND LARGE AIRWAYS: Patent central airways. Soft tissue thickening around the distal trachea and ani with extension along the right and left main stem bronchus is unchanged. Soft tissue nodularity within the left main bronchus is also unchanged. A partially solid and groundglass nodule of 1.2 cm in the medial right upper lobe (2, 23) is unchanged. Another partial solid and groundglass nodule more superiorly in the right upper lobe is also unchanged at 10 mm (2, 15). Tree-in-bud opacities in the right middle and right lower lobes is without significant change.\par PLEURA: No pleural effusion.\par VESSELS: Within normal limits.\par HEART: Heart size is normal. No pericardial effusion.\par MEDIASTINUM AND RUSS: Subcarinal lymph node of 3.2 x 1.8 cm is unchanged right hilar lymph node of 2.0 x 1.8 cm (2, 35) is slightly increased when remeasured by this examiner 1.8 x 1.6 cm. A slightly prominent right upper paratracheal lymph node (2, 27) measuring 6 x 9 mm versus 5 x 7 mm.\par CHEST WALL AND LOWER NECK: Within normal limits.\par \par ABDOMEN AND PELVIS:\par LIVER: Within normal limits.\par BILE DUCTS: Normal caliber.\par GALLBLADDER: Within normal limits.\par SPLEEN: Within normal limits.\par PANCREAS: Within normal limits.\par ADRENALS: Within normal limits.\par KIDNEYS/URETERS: Left renal cyst.\par \par BLADDER: Decompressed.\par REPRODUCTIVE ORGANS: Prostate within normal limits. Sliver of free fluid in the pelvis.\par \par BOWEL: No bowel obstruction. Appendix is normal.\par PERITONEUM: No ascites.\par VESSELS: Atherosclerotic changes.\par RETROPERITONEUM/LYMPH NODES: No lymphadenopathy.\par ABDOMINAL WALL: Within normal limits.\par BONES: Status post right hip ORIF. Mild degenerative changes.\par \par IMPRESSION:\par No significant change in 2 right upper lobe nodules. No significant change in nodularity along the distal airway. No significant change in subcarinal lymph node. Slight interval increase in right hilar lymph node.\par No evidence of metastatic disease in the abdomen and pelvis.\par \par \par --- End of Report ---\par \par \par \par \par \par \par FELIBERTO LOPEZ MD; Attending Radiologist\par This document has been electronically signed. Feb 14 2022 10:05AM\par \par ~~~~~~~~~~~~~~~~~~~~~~~~~~~~~~~~~~~~~~~~~~~~~~~~~~~~~~~~~~~~~~~~~~~~~~~~\par \par \par [facility]\par EXAM: CT CHEST\par \par \par PROCEDURE DATE: 10/22/2021\par \par \par \par INTERPRETATION: CLINICAL INFORMATION: Follow-up right middle lobe infiltrates.\par \par COMPARISON: CT chest 7/29/2021, 1/22/2021 and 10/9/2020.\par \par CONTRAST/COMPLICATIONS:\par IV Contrast: None.\par Oral Contrast: None.\par Complications: None.\par \par PROCEDURE:\par CT of the Chest was performed.\par Sagittal and coronal reformats were performed.\par \par FINDINGS:\par \par LUNGS AND AIRWAYS: No endobronchial lesions. Emphysema. A 7 mm right apical nodule (3-39) is unchanged. A nodular opacity in the right apex (3-51) has increased, now measuring 1.3 cm, previously measuring 0.9 cm. A new 1.5 cm nodular opacity is noted in the anterior segment of the right upper lobe. Extensive centrilobular nodules and tree-in-bud opacities are similar in appearance and distribution, predominantly affecting the right middle and lower lobes.\par PLEURA: No pleural effusion.\par MEDIASTINUM AND RUSS: No lymphadenopathy.\par VESSELS: Calcifications of the coronary vessels.\par HEART: Heart size is normal. No pericardial effusion.\par CHEST WALL AND LOWER NECK: Within normal limits.\par VISUALIZED UPPER ABDOMEN: There is a 2.0 cm left renal cyst.\par BONES: Within normal limits.\par \par IMPRESSION:\par 1. Unchanged 7 mm right apical nodule.\par 2. Increased right apical nodular opacity.\par 3. New 1.5 cm nodular opacity is noted in the anterior segment of the right upper lobe. Follow-up CT scan is recommended in 3 months to ensure resolution.\par \par --- End of Report ---\par \par \par \par \par \par TAVIA OLIVIER MD; Resident Radiology\par This document has been electronically signed.\par TERRA HOLLINGSWORTH MD; Attending Radiologist\par This document has been electronically signed. Oct 23 2021 11:37AM\par \par ~~~~~~~~~~~~~~~~~~~~~~~~~~~~~~~~~~~~~~~~~~~~~~~~~~~~~~~~~~~~~~~~~~~~~~~~\par \par [facility]\par EXAM: CT CHEST\par \par \par PROCEDURE DATE: 07/29/2021\par \par \par \par INTERPRETATION: .\par ACC: [id number]\par INDICATION: Follow-up lung nodule\par TECHNIQUE: Unenhanced CT of the chest. Coronal and sagittal images were reconstructed. Maximum intensity projection images were generated.\par COMPARISON: CT chest 1/22/2021, 10/9/2020\par \par FINDINGS:\par \par AIRWAYS, LUNGS and PLEURA: Patent central airways. Emphysema. 0.7 cm right apical nodule (series 3 image 29) is decreased; previously 1.3 cm. New small tubular opacity within the right apex (series 3 image 42) likely mucoid impaction. Extensive centrilobular nodules and tree-in-bud opacities predominantly within the right middle lobe and right lower lobe are increased/new from prior. No pleural effusion.\par \par MEDIASTINUM AND RUSS: No lymphadenopathy.\par \par HEART AND VESSELS: Heart size is normal. No pericardial effusion. Thoracic aorta and pulmonary artery normal in diameter. Multivessel coronary calcification.\par \par VISUALIZED UPPER ABDOMEN: Small left renal cyst.\par \par CHEST WALL AND BONES: No aggressive osseous lesion.\par \par LOWER NECK: Within normal limits.\par \par IMPRESSION:\par \par In comparison with 1/22/2021, bilateral centrilobular nodules and tree-in-bud opacities predominantly within the right middle lobe and right lower lobe are increased/new from prior likely representing bronchiolitis.\par \par 0.7 cm right apical nodule (series 3 image 29) is decreased previously 1.3 cm.\par \par --- End of Report ---\par \par \par \par \par \par \par JEFFREY MOORE MD; Attending Radiologist\par This document has been electronically signed. Jul 29 2021 12:47PM\par \par \par ~~~~~~~~~~~~~~~~~~~~~~~~~~~~~~~~~~~~~~~~~~~~~~~~~~~~~~~~~~~~~~~~~~~~~~~~\par \par [facility]\par \par EXAM: CT CHEST\par \par \par PROCEDURE DATE: 01/22/2021\par \par \par \par INTERPRETATION: CT CHEST WITHOUT CONTRAST\par \par INDICATION: Right upper lobe lesion, follow-up. Cough.\par \par TECHNIQUE: Unenhanced helical images were obtained of the chest. Coronal and sagittal images were reconstructed. Maximum intensity projection images were generated.\par \par COMPARISON: Radiograph 4/8/2020. CT chest 10/9/2020.\par \par FINDINGS:\par \par Tubes/Lines: None.\par \par Lungs And Airways: Since 10/9/2020, the 1.3 x 0.5 cm apical segment right upper lobe opacity is unchanged.\par \par The lungs are otherwise clear. The airways are normal.\par \par Pleura: No pneumothorax. No pleural effusion.\par \par Mediastinum: The chest lymph nodes measure less than 10 mm in the short axis. The visualized thyroid gland and esophagus are unremarkable.\par \par Heart and Vasculature: The heart is normal in size. There is no pericardial effusion. Coronary artery calcifications. Left-sided aortic arch and left-sided descending thoracic aorta. Aortic calcifications.\par \par Upper Abdomen: The upper abdomen is unremarkable.\par \par Bones And Soft Tissues: The bones are unremarkable. The soft tissues are unremarkable.\par \par \par IMPRESSION:\par \par 1. Since 10/9/2020, the right upper lobe opacity is unchanged.\par \par \par \par \par \par \par IGNACIO HOLLINGSWORTH MD; Attending Radiologist\par This document has been electronically signed. Jan 25 2021 10:21AM\par \par ~~~~~~~~~~~~~~~~~~~~~~~~~~~~~~~~~~~~~~~~~~~~~~~~~~~~~~~~~~~~~~~~~~~~~~~~\par

## 2022-02-17 NOTE — COUNSELING
[Cessation strategies including cessation program discussed] : Cessation strategies including cessation program discussed [Encouraged to pick a quit date and identify support needed to quit] : Encouraged to pick a quit date and identify support needed to quit [Yes] : Willing to quit smoking [FreeTextEntry1] : 5

## 2022-02-26 NOTE — ED PROVIDER NOTE - PHYSICAL EXAMINATION
General: Well appearing male in no acute distress  HEENT: Normocephalic, atraumatic. Moist mucous membranes. Oropharynx clear. No lymphadenopathy.  Eyes: No scleral icterus. EOMI. MARLEEN.  Neck:. Soft and supple. Full ROM without pain. No midline tenderness  Cardiac: Regular rate and regular rhythm. No murmurs, rubs, gallops. Peripheral pulses 2+ and symmetric. No LE edema.  Resp: Lungs CTAB. Speaking in full sentences. No wheezes, rales or rhonchi.  Abd: Soft, non-tender, non-distended. No guarding or rebound. No scars, masses, or lesions.  Back: Spine midline and non-tender. No CVA tenderness.    Skin: No rashes, abrasions, or lacerations.  Neuro: AO x 3. Moves all extremities symmetrically. Motor strength and sensation grossly intact. finger to nose testing intact. gait normal, non-ataxic.

## 2022-02-26 NOTE — ED PROVIDER NOTE - ATTENDING CONTRIBUTION TO CARE
Patient seen with resident.  RACHEL.  Had recent MRI with incidental finding of acute CVA.  Told to get repeat CT but couldn't follow up until now.  Here today with need for repeat CT in setting of recent acute CVA on MRI.  Current Lung CA and just started chemo.  Non toxic.  Well appearing. NIHSS of zero. No aggravating or relieving factors. No other pertinent PMH.  No other pertinent PSH.  No other pertinent FHx.  Patient denies EtOH/tobacco/illicit substance use. No fever/chills, No photophobia/eye pain/changes in vision, No ear pain/sore throat/dysphagia, No chest pain/palpitations, no SOB/cough/wheeze/stridor, No abdominal pain, No N/V/D, no dysuria/frequency/discharge, No neck/back pain, no rash, no changes in neurological status/function.     Gen: Alert, NAD  Head: NC, AT, PERRL, EOMI, normal lids/conjunctiva  ENT: normal hearing, patent oropharynx without erythema/exudate, uvula midline  Neck: +supple, no tenderness/meningismus/JVD, +Trachea midline  Pulm: Bilateral BS, normal resp effort, no wheeze/stridor/retractions  CV: RRR, no R/G, +dist pulses  Abd: soft, NT/ND, +BS, no hepatosplenomegaly  Mskel: no edema/erythema/cyanosis  Skin: no rash  Neuro: AAOx3, no gross sensory/motor deficits,

## 2022-02-26 NOTE — ED PROVIDER NOTE - OBJECTIVE STATEMENT
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 states that he had MRI brain on 2/10/21 to see if there was metastasis to the brain. states that he had no symptoms at the time and was told he had a stroke. was evaluated at [facility] ER but he AMAed prior to getting CT scan done and states he was unable to come back to the ER until today. denies any acute symptoms toady. denies dysarthria. Denies dysphagia. Denies vision changes. Denies headache. Denies weakness. Denies numbness or tingling in the extremities. Denies chest pain. Denies shortness of breath.

## 2022-02-26 NOTE — ED ADULT TRIAGE NOTE - CHIEF COMPLAINT QUOTE
pt states he was here 2/16 seen was seen for possible stroke x 2, had an MRI, pt states he was told to come back for a repeat CT  A&Ox3, resp wnl, denies any complaints, pt is a Cancer pt undergoing Chemo & Radiation

## 2022-02-26 NOTE — ED PROVIDER NOTE - PATIENT PORTAL LINK FT
You can access the FollowMyHealth Patient Portal offered by [facility] by registering at the following website: [email or web address]. By joining [facility]’s FollowMyHealth portal, you will also be able to view your health information using other applications (apps) compatible with our system.

## 2022-02-26 NOTE — ED PROVIDER NOTE - CARE PROVIDER_API CALL
Reagan Leiva; PhD)  Neurology; Vascular Neurology  [address]  [place] [address]  Phone: [phone]  Fax: [phone]  Follow Up Time: 4-6 Days

## 2022-02-26 NOTE — ED ADULT NURSE REASSESSMENT NOTE - NS ED NURSE REASSESS COMMENT FT1
Pt discharged instructions and medication instructions provided by ACP.  Pt ambulated off unit with all belongings. Pt made aware hospital not aware for anything lost, damaged or stolen.

## 2022-02-26 NOTE — ED PROVIDER NOTE - CLINICAL SUMMARY MEDICAL DECISION MAKING FREE TEXT BOX
57 y/o M hx of small cell lung cancer on chemo (followed by [facility]) presents for CT scan. states that he had MRI brain on 2/10/21 to see if there was metastasis to the brain. benign neuro exam. no acute complaints today. MRI brain showed he had an occipital stroke on 2/10, patient had no neuro deficits at the time, AMAed from [facility] ER prior to getting a CT scan done.   will repeat CT scan today, neuro f/u if no acute findings. does not appear to have any residual deficits s/p occipital stroke.

## 2022-02-26 NOTE — ED PROVIDER NOTE - NSFOLLOWUPINSTRUCTIONS_ED_ALL_ED_FT
Followup with neurology in the next 1-7 days.     Return to the emergency department if vision changes, slurred speech, numbness/tingling, fever/chills, chest pain, shortness of breath, abdominal pain, headache.

## 2022-02-28 NOTE — DISEASE MANAGEMENT
[Pathological] : TNM Stage: p [III] : III [FreeTextEntry4] : squamous cell carcinoma [TTNM] : 4 [NTNM] : x [MTNM] : x [de-identified] : 400 cGy [de-identified] : 6000 cGy [de-identified] : right lung/mediastinum

## 2022-03-07 NOTE — DISEASE MANAGEMENT
[Pathological] : TNM Stage: p [FreeTextEntry4] : squamous cell carcinoma [TTNM] : 4 [NTNM] : x [III] : III [MTNM] : x [de-identified] : 1400 cGy [de-identified] : 6000 cGy [de-identified] : right lung/mediastinum

## 2022-03-09 NOTE — CONSULT LETTER
[Dear  ___] : Dear  [unfilled], [Consult Letter:] : I had the pleasure of evaluating your patient, [unfilled]. [Please see my note below.] : Please see my note below. [Consult Closing:] : Thank you very much for allowing me to participate in the care of this patient.  If you have any questions, please do not hesitate to contact me. [Sincerely,] : Sincerely, [FreeTextEntry3] : Christopher Greenberg MD, PhD, DPN-N\par [facility]\par Neurology at [place]\par Chief, Division of Neurology\par [facility]\par

## 2022-03-09 NOTE — HISTORY OF PRESENT ILLNESS
[FreeTextEntry1] : This 56-year-old man was referred here for evaluation of stroke.  He was recently diagnosed with lung cancer, he says this was in October.  As part of screening work-up he had a brain MRI 2/10/2022 which reported an acute right occipital stroke.  Imaging studies discussed further below.  He was referred to the emergency department but refused to stay.  He denies any strokelike symptoms.  In particular denies any visual disturbance.  Denies any focal weakness or numbness.  Denies any speech or comprehension problems.\par \par Medical history significant for hypertension and lung cancer.  He continues to smoke.

## 2022-03-09 NOTE — DATA REVIEWED
[de-identified] : Brain MRI dated 2/10/2022 reports acute right occipital stroke, multiple old lacunar wounds\par CT scan of the head dated 2/26/2022 reports expected evolution of right occipital and medial temporal stroke [de-identified] : Reviewed extensive emergency department notes from 2/10/2022 and 2/26/2022\par Reviewed oncology notes\par Spoke with oncology PA

## 2022-03-09 NOTE — ASSESSMENT
[FreeTextEntry1] : Recent right occipital stroke as discussed above\par He has a left superior quadrantanopsia which he was not aware of\par Also has multiple old lacunar strokes\par Risk factors include hypertension and smoking\par Have discussed with him the importance of controlling risk factors\par He will get his cholesterol checked through his primary care doctor with goal LDL less than 70\par He will start aspirin 81 mg a day\par He will get a carotid duplex\par Suggesting cardiac evaluation\par \par Follow-up here in 6 weeks

## 2022-03-09 NOTE — PHYSICAL EXAM
[General Appearance - Alert] : alert [General Appearance - In No Acute Distress] : in no acute distress [General Appearance - Well-Appearing] : healthy appearing [Oriented To Time, Place, And Person] : oriented to person, place, and time [Impaired Insight] : insight and judgment were intact [Affect] : the affect was normal [Memory Recent] : recent memory was not impaired [Person] : oriented to person [Place] : oriented to place [Time] : oriented to time [Concentration Intact] : normal concentrating ability [Fluency] : fluency intact [Comprehension] : comprehension intact [Cranial Nerves Oculomotor (III)] : extraocular motion intact [Cranial Nerves Trigeminal (V)] : facial sensation intact symmetrically [Cranial Nerves Facial (VII)] : face symmetrical [Cranial Nerves Vestibulocochlear (VIII)] : hearing was intact bilaterally [Cranial Nerves Glossopharyngeal (IX)] : tongue and palate midline [Cranial Nerves Accessory (XI - Cranial And Spinal)] : head turning and shoulder shrug symmetric [Cranial Nerves Hypoglossal (XII)] : there was no tongue deviation with protrusion [Motor Tone] : muscle tone was normal in all four extremities [Motor Strength] : muscle strength was normal in all four extremities [No Muscle Atrophy] : normal bulk in all four extremities [Paresis Pronator Drift Right-Sided] : no pronator drift on the right [Paresis Pronator Drift Left-Sided] : no pronator drift on the left [Sensation Tactile Decrease] : light touch was intact [Romberg's Sign] : Romberg's sign was negtive [Sensation Pain / Temperature Decrease] : pain and temperature was intact [Abnormal Walk] : normal gait [Balance] : balance was intact [Past-pointing] : there was no past-pointing [Tremor] : no tremor present [Coordination - Dysmetria Impaired Finger-to-Nose Bilateral] : not present [Coordination - Dysmetria Impaired Heel-to-Shin Bilateral] : not present [2+] : Ankle jerk left 2+ [Plantar Reflex Right Only] : normal on the right [Plantar Reflex Left Only] : normal on the left [FreeTextEntry5] : 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  Pupils equal and reactive. 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

## 2022-03-10 NOTE — HISTORY OF PRESENT ILLNESS
[de-identified] : 55-year-old male w/ no significant PMH with smoking history of  2 1/2 pack per day smoker for 37 years, current smoker presents to office for squamous cell carcinoma lung \par \par Patient following Dr. Lam (Pulmonary) for routine CT scans of chest closely monitoring RUL lesion since October 2020.\par  CT on 10/22/21 revealing a nodular opacity in the right apex (3-51) has increased, now measuring 1.3 cm, previously measuring 0.9 cm. A new 1.5 cm nodular opacity is noted in the anterior segment of the right upper lobe.  \par \par EBUS perfromed on 12/14/21 by Dr. Spicer. a 0.5 cm nodular lesion was noted in the RUL and endobronchial biopsy was done. \par A RUL and RML BAL was performed and specimen sent \par \par 12/14/21 \par Specimen(s) Submitted\par LUNG, RIGHT MIDDLE LOBE, BRONCHOALVEOLAR LAVAGE:  POSITIVE FOR MALIGNANT CELLS.\par Non-Small Cell Carcinoma, favor Squamous Cell Carcinoma.\par Scanty cellularity precludes iMmunostains \par \par Core Biopsy:\par 1. Immunohistochemical stain for P40 is positive in tumor cells. Staining for TTF-1 is negative. The immunophenotype together with the\par cytomorphology supports the diagnosis of squamous cell carcinoma.\par \par PD-L1 Immunohistochemistry Antibody: [place] PDL1 ()\par Tissue type:  core biopsy of lung squamous cell carcinoma  Result: Tumor Proportion Score (TPS*)   75%\par \par *TPS: The percentage of viable tumor cells showing partial or complete membrane staining at any intensity\par \par Lives at a 's house in [place] w/ 5 other people. [facility] . \par Patient does not drive. Uses bus transportation to get to appointments. \par PCP- Mayela Otero  [de-identified] : Patient presents today for a FU visit.\par He was presented at [facility] tumor board on 1/11/22 with Right, Middle, Upper BAL being positive. however, per discussion it appears it was only the RUL lesion with positivity on BAL\par \par Patient started weekly Carbo/Taxol with RT on 2/25/22, Patient to received C2 today\par Patient tolerating tx very well. \par Patient admits h/o decreased appetite and only eats one meal a day. lost ~6 lbs since mid Feb\par Patient cut down smoking to 1 pack a day. Smoking history: Age [age] y/o, 1 pack a day. For the last 10 years progressed to 2 1/2 packs a day.\par Reports "smoker's cough", intermittent burning pain in chest , arthritic knees. \par \par Patient had MR Head performed on 2/10/22 revealing Acute RIGHT occipital lobe cortical infarction. Dr. Spicer sent patient to ED. Patient was asymptomatic with no neural deficit. Repeat CT Head performed on 2/26/2. Patient still needs to f/u with Neurology. \par \par \par CT Head 2/26/22\par - There has been interval evolution of the infarction along right PCA distribution involving the medial right temporal lobe and right occipital lobe to chronic. Hyperdensity in the right occipital infarct may represent laminar necrosis versus petechial hemorrhage transformation. Multiple chronic lacunar infarctions as described. No acute intracranial hemorrhage or midline shift.  \par \par MR Head 2/10/22\par IMPRESSION: Acute RIGHT occipital lobe cortical infarction. Moderate periventricular and deep white\par matter ischemia is noted with multiple old lacunar infarctions in the BILATERAL basal\par ganglia, corona radiata and anika.\par \par \par Has not completed COVID19 vaccine series. Discussed and advised to receive vaccine series.

## 2022-03-10 NOTE — ASSESSMENT
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 Currently smoking 2 packs a day.\par \par Patient following Dr. Lam (Pulmonary) for routine CT scans of chest closely monitoring RUL lesion since October 2020.\par CT on 10/22/21 revealing a nodular opacity in the right apex (3-51) has increased, now measuring 1.3 cm, previously measuring 0.9 cm. A new 1.5 cm nodular opacity is noted in the anterior segment of the right upper lobe.  \par \par EBUS perfromed on 12/14/21 by Dr. Spicer. a 0.5 cm nodular lesion was noted in the RUL and endobronchial biopsy was done. which revealed Sq cell car lung  PDL1 75% \par A RUL and RML BAL was performed which was +ve for malignant cells \par \par CT Abdomen Pelvis 12/30/21\par IMPRESSION:\par 1.  Unchanged 7 mm right apical nodule and 1.3 x 0.7 cm and subcarinal node measuring 2.5 x 0.9 cm, unchanged when compared with 10/22/2021. Previously noted right upper lobe anterior segment opacity has resolved.\par 2.  Decreased centrilobular and tree-in-bud opacities.\par 3.  Infiltrative subcarinal and right hilar lymphadenopathy unchanged.\par \par PET/CT 12/31/2021\par 1.  Two FDG avid right upper lung nodules (0.7 cm right apical nodule. The 1.3 cm right upper lung nodular opacity medially), compatible with malignancy.\par 2. FDG avid subcarinal, bilateral paratracheal, and right hilar lymph nodes suspicious for metastases. Right perihilar node which measured 1.3 x 0.7 cm on diagnostic CT (SUV 8.9). A 0.7 cm right paratracheal lymph node (SUV 6.0). A 0.6 cm right high paratracheal node (SUV 4.3). A 3 mm left upper paratracheal node (SUV 3.2).\par 3. Mildly FDG avid tree-in-bud opacity in the right middle lobe, likely inflammatory.\par \par Bone Scan 1/17/22: No scan evidence of osseous metastasis. Degenerative disease in the spine and major joints.\par \par Plan: \par - Discussed with patient pathology of Stage 3A Squamous cell ca lung ( Pending MRI brain) \par - Discussed  at [facility] tumor board on 1/11/22 with patient being Right, Middle, Upper BAL being positive. However, as per discussion it appears it was only the RUL lesion with positivity on BAL \par - Will now however proceed with chemoRT with Carbo AUC 2 and taxol 50 mg/ m2 weekly with RT followed by Immunotherapy witH Durvalumab for 1 year \par - Discussed side effects of carboplatin including but not limited to Nausea/ vomiting/ myelosuppression/ fatigue. \par - Paclitaxel  has side effects including N/v/ myelosuppression/flushing / alopecia/ Neuropathy / diarrhea/ stomatitis/ \par -Durvalumab has immune mediated side effects including but not limited to thyroiditis/ hypopit /myocarditis/   arrythmias/ joint pains/ rash. \par - Will monitor counts/ and for immune mediated side effects\par - Also gave patient instructions for febrile neutropenia and to call the office if they were to develop a fever. \par - Consent to Chemotherapy signed\par - MRI Brain 2/10/22 performed revealing Acute RIGHT occipital lobe cortical infarction. Dr. Spicer sent patient to ED. Patient was asymptomatic with no neural deficit. Repeat CT Head performed on 2/26/22.\par - Discussed case with Dr. Christopher Greenberg, who reviewed scans as well. He will see patient on 3/9/22\par - Patient started weekly Carbo/Taxol with RT on 2/25/22, Patient to received C2 today\par - He is currently smoking 1 ppd\par - F/u in 3-4 weeks

## 2022-03-10 NOTE — RESULTS/DATA
[FreeTextEntry1] : Bone Scan 1/17/22\par IMPRESSION: No scan evidence of osseous metastasis. Degenerative disease in the spine and major joints.\par \par CT Abdomen Pelvis 12/30/21\par IMPRESSION:\par 1.  Unchanged 7 mm right apical nodule and 1.3 x 0.7 cm and subcarinal node measuring 2.5 x 0.9 cm, unchanged when compared with 10/22/2021. Previously noted right upper lobe anterior segment opacity has resolved.\par 2.  Decreased centrilobular and tree-in-bud opacities.\par 3.  Infiltrative subcarinal and right hilar lymphadenopathy unchanged.\par \par PET/CT 12/31/2021\par 1.  Two FDG avid right upper lung nodules (0.7 cm right apical nodule. The 1.3 cm right upper lung nodular opacity medially), compatible with malignancy.\par 2. FDG avid subcarinal, bilateral paratracheal, and right hilar lymph nodes suspicious for metastases. Right perihilar node which measured 1.3 x 0.7 cm on diagnostic CT (SUV 8.9). A 0.7 cm right paratracheal lymph node (SUV 6.0). A 0.6 cm right high paratracheal node (SUV 4.3). A 3 mm left upper paratracheal node (SUV 3.2).\par 3. Mildly FDG avid tree-in-bud opacity in the right middle lobe, likely inflammatory.\par \par 12/14/2021 Fine Needle Aspiration\par LUNG, BRONCHUS, RIGHT MIDDLE, CORE BIOPSY\par \par Final Diagnosis\par LUNG, BRONCHUS, RIGHT MIDDLE, CORE BIOPSY\par POSITIVE FOR MALIGNANT CELLS.\par Clinical Information\par Upper bronchus biopsy.\par \par \par Specimen(s) Submitted\par LUNG, RIGHT MIDDLE LOBE, BRONCHOALVEOLAR LAVAGE\par \par Final Diagnosis\par LUNG, RIGHT MIDDLE LOBE, BRONCHOALVEOLAR LAVAGE\par POSITIVE FOR MALIGNANT CELLS.\par Non-Small Cell Carcinoma, favor Squamous Cell Carcinoma.\par \par Gross Description\par Core Biopsy:\par Tissue collected: Specimen container has been inspected to confirm\par patient 's name and date of birth, contains 1  white/tan cores measuring\par 0.3 cm in length. Entire specimen submitted    in 1 cassette labeled 1B.\par \par Fine Needle Aspiration Addendum Report - Auth (Verified)\par \par Addendum\par 1. Immunohistochemical stain for P40 is positive in tumor cells.\par Staining for TTF-1 is negative. The immunophenotype together with the\par cytomorphology supports the diagnosis of squamous cell carcinoma.\par \par PD-L1 Immunohistochemistry\par Antibody: [place] PDL1 ()\par Tissue type:  core biopsy of lung squamous cell carcinoma\par Block: 1 B\par \par Result: Tumor Proportion Score (TPS*)   75%\par \par *TPS: The percentage of viable tumor cells showing partial or complete\par membrane staining at any intensity\par \par \par \par EXAM: CT CHEST\par PROCEDURE DATE: 10/22/2021\par LUNGS AND AIRWAYS: No endobronchial lesions. Emphysema. A 7 mm right apical nodule (3-39) is unchanged. A nodular opacity in the right apex (3-51) has increased, now measuring 1.3 cm, previously measuring 0.9 cm. A new 1.5 cm nodular opacity is noted in the anterior segment of the right upper lobe. Extensive centrilobular nodules and tree-in-bud opacities are similar in appearance and distribution, predominantly affecting the right middle and lower lobes.\par IMPRESSION:\par 1. Unchanged 7 mm right apical nodule.\par 2. Increased right apical nodular opacity.\par 3. New 1.5 cm nodular opacity is noted in the anterior segment of the right upper lobe. Follow-up CT scan is recommended in 3 months to ensure resolution.\par \par \par \par \par CT Chest No Cont: EXAM:  CT CHEST\par PROCEDURE DATE:  07/29/2021\par AIRWAYS, LUNGS and PLEURA: Patent central airways. Emphysema. 0.7 cm right apical nodule\par (series 3 image 29) is decreased; previously 1.3 cm. New small tubular opacity within the\par right apex (series 3 image 42) likely mucoid impaction. Extensive centrilobular nodules and\par tree-in-bud opacities predominantly within the right middle lobe and right lower lobe are\par increased/new from prior. No pleural effusion.\par IMPRESSION:\par \par In comparison with 1/22/2021, bilateral centrilobular nodules and tree-in-bud opacities predominantly\par within the right middle lobe and right lower lobe are increased/new from\par prior likely representing bronchiolitis.\par \par 0.7 cm right apical nodule (series 3 image 29) is decreased previously 1.3 cm.\par \par

## 2022-03-14 PROBLEM — I63.9 STROKE/CEREBROVASCULAR ACCIDENT: Status: ACTIVE | Noted: 2022-01-01

## 2022-03-14 NOTE — DISEASE MANAGEMENT
[Pathological] : TNM Stage: p [FreeTextEntry4] : squamous cell carcinoma [TTNM] : 4 [NTNM] : x [MTNM] : x [III] : III [de-identified] : 2400 cGy [de-identified] : 6000 cGy [de-identified] : right lung/mediastinum

## 2022-03-21 NOTE — DISEASE MANAGEMENT
[Pathological] : TNM Stage: p [FreeTextEntry4] : squamous cell carcinoma [TTNM] : 4 [NTNM] : x [MTNM] : x [III] : III [de-identified] : 2400 cGy [de-identified] : 6000 cGy [de-identified] : right lung/mediastinum

## 2022-03-23 NOTE — ASSESSMENT
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 Currently smoking 2 packs a day.\par \par Patient following Dr. Lam (Pulmonary) for routine CT scans of chest closely monitoring RUL lesion since October 2020.\par CT on 10/22/21 revealing a nodular opacity in the right apex (3-51) has increased, now measuring 1.3 cm, previously measuring 0.9 cm. A new 1.5 cm nodular opacity is noted in the anterior segment of the right upper lobe.  \par \par EBUS perfromed on 12/14/21 by Dr. Spicer. a 0.5 cm nodular lesion was noted in the RUL and endobronchial biopsy was done. which revealed Sq cell car lung  PDL1 75% \par A RUL and RML BAL was performed which was +ve for malignant cells \par \par CT Abdomen Pelvis 12/30/21\par IMPRESSION:\par 1.  Unchanged 7 mm right apical nodule and 1.3 x 0.7 cm and subcarinal node measuring 2.5 x 0.9 cm, unchanged when compared with 10/22/2021. Previously noted right upper lobe anterior segment opacity has resolved.\par 2.  Decreased centrilobular and tree-in-bud opacities.\par 3.  Infiltrative subcarinal and right hilar lymphadenopathy unchanged.\par \par PET/CT 12/31/2021\par 1.  Two FDG avid right upper lung nodules (0.7 cm right apical nodule. The 1.3 cm right upper lung nodular opacity medially), compatible with malignancy.\par 2. FDG avid subcarinal, bilateral paratracheal, and right hilar lymph nodes suspicious for metastases. Right perihilar node which measured 1.3 x 0.7 cm on diagnostic CT (SUV 8.9). A 0.7 cm right paratracheal lymph node (SUV 6.0). A 0.6 cm right high paratracheal node (SUV 4.3). A 3 mm left upper paratracheal node (SUV 3.2).\par 3. Mildly FDG avid tree-in-bud opacity in the right middle lobe, likely inflammatory.\par \par Bone Scan 1/17/22: No scan evidence of osseous metastasis. Degenerative disease in the spine and major joints.\par \par Plan: \par - Stage 3A Squamous cell ca lung \par - Discussed  at [facility] tumor board on 1/11/22 with patient being Right, Middle, Upper BAL being positive. However, as per discussion it appears it was only the RUL lesion with positivity on BAL \par - chemoRT with Carbo AUC 2 and taxol 50 mg/ m2 weekly with RT followed by Immunotherapy witH Durvalumab for 1 year \par - MRI Brain 2/10/22 performed revealing Acute RIGHT occipital lobe cortical infarction. Dr. Spicer sent patient to ED. Patient was asymptomatic with no neural deficit. Repeat CT Head performed on 2/26/22.\par -W/u with neurology PENDING CTA of Hed and neck given high grade stenosis \par - Patient started weekly Carbo/Taxol with RT on 2/25/22,  CRT C 5 due on 3/25/22 \par Tentative end date for RT is 4/7/22 \par - Counseled on quitting smoking ( Smoking 1.5 PPD) \par \par - F/u with adolfo in 2-3 weeks \par - CT CHEST 4 -5 weeks post RT \par - F/u with me in May 2022 ( after Imaging) Will plan to start Durvalumab if stable scans \par

## 2022-03-23 NOTE — RESULTS/DATA
[FreeTextEntry1] : CT Head 2/26/22\par - There has been interval evolution of the infarction along right PCA distribution involving the medial right temporal lobe and right occipital lobe to chronic. Hyperdensity in the right occipital infarct may represent laminar necrosis versus petechial hemorrhage transformation. Multiple chronic lacunar infarctions as described. No acute intracranial hemorrhage or midline shift.  \par \par MR Head 2/10/22\par IMPRESSION: Acute RIGHT occipital lobe cortical infarction. Moderate periventricular and deep white\par matter ischemia is noted with multiple old lacunar infarctions in the BILATERAL basal\par ganglia, corona radiata and anika.\par \par Bone Scan 1/17/22\par IMPRESSION: No scan evidence of osseous metastasis. Degenerative disease in the spine and major joints.\par \par CT Abdomen Pelvis 12/30/21\par IMPRESSION:\par 1.  Unchanged 7 mm right apical nodule and 1.3 x 0.7 cm and subcarinal node measuring 2.5 x 0.9 cm, unchanged when compared with 10/22/2021. Previously noted right upper lobe anterior segment opacity has resolved.\par 2.  Decreased centrilobular and tree-in-bud opacities.\par 3.  Infiltrative subcarinal and right hilar lymphadenopathy unchanged.\par \par PET/CT 12/31/2021\par 1.  Two FDG avid right upper lung nodules (0.7 cm right apical nodule. The 1.3 cm right upper lung nodular opacity medially), compatible with malignancy.\par 2. FDG avid subcarinal, bilateral paratracheal, and right hilar lymph nodes suspicious for metastases. Right perihilar node which measured 1.3 x 0.7 cm on diagnostic CT (SUV 8.9). A 0.7 cm right paratracheal lymph node (SUV 6.0). A 0.6 cm right high paratracheal node (SUV 4.3). A 3 mm left upper paratracheal node (SUV 3.2).\par 3. Mildly FDG avid tree-in-bud opacity in the right middle lobe, likely inflammatory.\par \par 12/14/2021 Fine Needle Aspiration\par LUNG, BRONCHUS, RIGHT MIDDLE, CORE BIOPSY\par \par Final Diagnosis\par LUNG, BRONCHUS, RIGHT MIDDLE, CORE BIOPSY\par POSITIVE FOR MALIGNANT CELLS.\par Clinical Information\par Upper bronchus biopsy.\par \par \par Specimen(s) Submitted\par LUNG, RIGHT MIDDLE LOBE, BRONCHOALVEOLAR LAVAGE\par \par Final Diagnosis\par LUNG, RIGHT MIDDLE LOBE, BRONCHOALVEOLAR LAVAGE\par POSITIVE FOR MALIGNANT CELLS.\par Non-Small Cell Carcinoma, favor Squamous Cell Carcinoma.\par \par Gross Description\par Core Biopsy:\par Tissue collected: Specimen container has been inspected to confirm\par patient 's name and date of birth, contains 1  white/tan cores measuring\par 0.3 cm in length. Entire specimen submitted    in 1 cassette labeled 1B.\par \par Fine Needle Aspiration Addendum Report - Auth (Verified)\par \par Addendum\par 1. Immunohistochemical stain for P40 is positive in tumor cells.\par Staining for TTF-1 is negative. The immunophenotype together with the\par cytomorphology supports the diagnosis of squamous cell carcinoma.\par \par PD-L1 Immunohistochemistry\par Antibody: [place] PDL1 ()\par Tissue type:  core biopsy of lung squamous cell carcinoma\par Block: 1 B\par \par Result: Tumor Proportion Score (TPS*)   75%\par \par *TPS: The percentage of viable tumor cells showing partial or complete\par membrane staining at any intensity\par \par \par \par EXAM: CT CHEST\par PROCEDURE DATE: 10/22/2021\par LUNGS AND AIRWAYS: No endobronchial lesions. Emphysema. A 7 mm right apical nodule (3-39) is unchanged. A nodular opacity in the right apex (3-51) has increased, now measuring 1.3 cm, previously measuring 0.9 cm. A new 1.5 cm nodular opacity is noted in the anterior segment of the right upper lobe. Extensive centrilobular nodules and tree-in-bud opacities are similar in appearance and distribution, predominantly affecting the right middle and lower lobes.\par IMPRESSION:\par 1. Unchanged 7 mm right apical nodule.\par 2. Increased right apical nodular opacity.\par 3. New 1.5 cm nodular opacity is noted in the anterior segment of the right upper lobe. Follow-up CT scan is recommended in 3 months to ensure resolution.\par \par \par \par \par CT Chest No Cont: EXAM:  CT CHEST\par PROCEDURE DATE:  07/29/2021\par AIRWAYS, LUNGS and PLEURA: Patent central airways. Emphysema. 0.7 cm right apical nodule\par (series 3 image 29) is decreased; previously 1.3 cm. New small tubular opacity within the\par right apex (series 3 image 42) likely mucoid impaction. Extensive centrilobular nodules and\par tree-in-bud opacities predominantly within the right middle lobe and right lower lobe are\par increased/new from prior. No pleural effusion.\par IMPRESSION:\par \par In comparison with 1/22/2021, bilateral centrilobular nodules and tree-in-bud opacities predominantly\par within the right middle lobe and right lower lobe are increased/new from\par prior likely representing bronchiolitis.\par \par 0.7 cm right apical nodule (series 3 image 29) is decreased previously 1.3 cm.\par \par

## 2022-03-23 NOTE — HISTORY OF PRESENT ILLNESS
[de-identified] : 55-year-old male w/ no significant PMH with smoking history of  2 1/2 pack per day smoker for 37 years, current smoker presents to office for squamous cell carcinoma lung \par \par Patient following Dr. Lam (Pulmonary) for routine CT scans of chest closely monitoring RUL lesion since October 2020.\par  CT on 10/22/21 revealing a nodular opacity in the right apex (3-51) has increased, now measuring 1.3 cm, previously measuring 0.9 cm. A new 1.5 cm nodular opacity is noted in the anterior segment of the right upper lobe.  \par \par EBUS perfromed on 12/14/21 by Dr. Spicer. a 0.5 cm nodular lesion was noted in the RUL and endobronchial biopsy was done. \par A RUL and RML BAL was performed and specimen sent \par \par 12/14/21 \par Specimen(s) Submitted\par LUNG, RIGHT MIDDLE LOBE, BRONCHOALVEOLAR LAVAGE:  POSITIVE FOR MALIGNANT CELLS.\par Non-Small Cell Carcinoma, favor Squamous Cell Carcinoma.\par Scanty cellularity precludes iMmunostains \par \par Core Biopsy:\par 1. Immunohistochemical stain for P40 is positive in tumor cells. Staining for TTF-1 is negative. The immunophenotype together with the\par cytomorphology supports the diagnosis of squamous cell carcinoma.\par \par PD-L1 Immunohistochemistry Antibody: [place] PDL1 ()\par Tissue type:  core biopsy of lung squamous cell carcinoma  Result: Tumor Proportion Score (TPS*)   75%\par \par *TPS: The percentage of viable tumor cells showing partial or complete membrane staining at any intensity\par \par Lives at a 's house in [place] w/ 5 other people. [facility] . \par Patient does not drive. Uses bus transportation to get to appointments. \par PCP- Mayela Otero  [de-identified] : Patient presents today for a FU visit.\par \par On weekly CRT C 5 due on 3/25/22 \par Tentative end date for RT is 4/7/22 \par \par Patient tolerating tx very well. Still smoking reports that he has cut back to 1.5 PPD ( Down from 2.5 PPD) \par \par Has not completed COVID19 vaccine series. Discussed and advised to receive vaccine series.

## 2022-03-28 NOTE — DISEASE MANAGEMENT
[Pathological] : TNM Stage: p [FreeTextEntry4] : squamous cell carcinoma [TTNM] : 4 [NTNM] : x [MTNM] : x [III] : III [de-identified] : 4400 cGy [de-identified] : 6000 cGy [de-identified] : right lung/mediastinum

## 2022-04-04 NOTE — DISEASE MANAGEMENT
[Pathological] : TNM Stage: p [FreeTextEntry4] : squamous cell carcinoma [TTNM] : 4 [NTNM] : x [MTNM] : x [III] : III [de-identified] : 5400 cGy [de-identified] : 6000 cGy [de-identified] : right lung/mediastinum

## 2022-04-25 PROBLEM — F17.210: Status: ACTIVE | Noted: 2019-04-08

## 2022-04-25 PROBLEM — Z86.79 HISTORY OF HYPERTENSION: Status: RESOLVED | Noted: 2022-01-01 | Resolved: 2022-01-01

## 2022-04-25 PROBLEM — I65.29 CAROTID ARTERY STENOSIS: Status: ACTIVE | Noted: 2022-01-01

## 2022-04-25 NOTE — RESULTS/DATA
[FreeTextEntry1] : CT Head 2/26/22\par - There has been interval evolution of the infarction along right PCA distribution involving the medial right temporal lobe and right occipital lobe to chronic. Hyperdensity in the right occipital infarct may represent laminar necrosis versus petechial hemorrhage transformation. Multiple chronic lacunar infarctions as described. No acute intracranial hemorrhage or midline shift.  \par \par MR Head 2/10/22\par IMPRESSION: Acute RIGHT occipital lobe cortical infarction. Moderate periventricular and deep white\par matter ischemia is noted with multiple old lacunar infarctions in the BILATERAL basal\par ganglia, corona radiata and anika.\par \par Bone Scan 1/17/22\par IMPRESSION: No scan evidence of osseous metastasis. Degenerative disease in the spine and major joints.\par \par CT Abdomen Pelvis 12/30/21\par IMPRESSION:\par 1.  Unchanged 7 mm right apical nodule and 1.3 x 0.7 cm and subcarinal node measuring 2.5 x 0.9 cm, unchanged when compared with 10/22/2021. Previously noted right upper lobe anterior segment opacity has resolved.\par 2.  Decreased centrilobular and tree-in-bud opacities.\par 3.  Infiltrative subcarinal and right hilar lymphadenopathy unchanged.\par \par PET/CT 12/31/2021\par 1.  Two FDG avid right upper lung nodules (0.7 cm right apical nodule. The 1.3 cm right upper lung nodular opacity medially), compatible with malignancy.\par 2. FDG avid subcarinal, bilateral paratracheal, and right hilar lymph nodes suspicious for metastases. Right perihilar node which measured 1.3 x 0.7 cm on diagnostic CT (SUV 8.9). A 0.7 cm right paratracheal lymph node (SUV 6.0). A 0.6 cm right high paratracheal node (SUV 4.3). A 3 mm left upper paratracheal node (SUV 3.2).\par 3. Mildly FDG avid tree-in-bud opacity in the right middle lobe, likely inflammatory.\par \par 12/14/2021 Fine Needle Aspiration\par LUNG, BRONCHUS, RIGHT MIDDLE, CORE BIOPSY\par \par Final Diagnosis\par LUNG, BRONCHUS, RIGHT MIDDLE, CORE BIOPSY\par POSITIVE FOR MALIGNANT CELLS.\par Clinical Information\par Upper bronchus biopsy.\par \par \par Specimen(s) Submitted\par LUNG, RIGHT MIDDLE LOBE, BRONCHOALVEOLAR LAVAGE\par \par Final Diagnosis\par LUNG, RIGHT MIDDLE LOBE, BRONCHOALVEOLAR LAVAGE\par POSITIVE FOR MALIGNANT CELLS.\par Non-Small Cell Carcinoma, favor Squamous Cell Carcinoma.\par \par Gross Description\par Core Biopsy:\par Tissue collected: Specimen container has been inspected to confirm\par patient 's name and date of birth, contains 1  white/tan cores measuring\par 0.3 cm in length. Entire specimen submitted    in 1 cassette labeled 1B.\par \par Fine Needle Aspiration Addendum Report - Auth (Verified)\par \par Addendum\par 1. Immunohistochemical stain for P40 is positive in tumor cells.\par Staining for TTF-1 is negative. The immunophenotype together with the\par cytomorphology supports the diagnosis of squamous cell carcinoma.\par \par PD-L1 Immunohistochemistry\par Antibody: [place] PDL1 ()\par Tissue type:  core biopsy of lung squamous cell carcinoma\par Block: 1 B\par \par Result: Tumor Proportion Score (TPS*)   75%\par \par *TPS: The percentage of viable tumor cells showing partial or complete\par membrane staining at any intensity\par \par \par \par EXAM: CT CHEST\par PROCEDURE DATE: 10/22/2021\par LUNGS AND AIRWAYS: No endobronchial lesions. Emphysema. A 7 mm right apical nodule (3-39) is unchanged. A nodular opacity in the right apex (3-51) has increased, now measuring 1.3 cm, previously measuring 0.9 cm. A new 1.5 cm nodular opacity is noted in the anterior segment of the right upper lobe. Extensive centrilobular nodules and tree-in-bud opacities are similar in appearance and distribution, predominantly affecting the right middle and lower lobes.\par IMPRESSION:\par 1. Unchanged 7 mm right apical nodule.\par 2. Increased right apical nodular opacity.\par 3. New 1.5 cm nodular opacity is noted in the anterior segment of the right upper lobe. Follow-up CT scan is recommended in 3 months to ensure resolution.\par \par \par \par \par CT Chest No Cont: EXAM:  CT CHEST\par PROCEDURE DATE:  07/29/2021\par AIRWAYS, LUNGS and PLEURA: Patent central airways. Emphysema. 0.7 cm right apical nodule\par (series 3 image 29) is decreased; previously 1.3 cm. New small tubular opacity within the\par right apex (series 3 image 42) likely mucoid impaction. Extensive centrilobular nodules and\par tree-in-bud opacities predominantly within the right middle lobe and right lower lobe are\par increased/new from prior. No pleural effusion.\par IMPRESSION:\par \par In comparison with 1/22/2021, bilateral centrilobular nodules and tree-in-bud opacities predominantly\par within the right middle lobe and right lower lobe are increased/new from\par prior likely representing bronchiolitis.\par \par 0.7 cm right apical nodule (series 3 image 29) is decreased previously 1.3 cm.\par \par

## 2022-04-25 NOTE — DISCUSSION/SUMMARY
[FreeTextEntry1] : Mr. Krause is a 56 year-old man with PMH HTN, right occipital infarct and lung cancer who was referred to me by Dr. Greenberg for evaluation of severe right ICA stenosis, right MCA aneurysm and recent right occipital stroke. His stroke occurred within 2 weeks of the MRI, though he is unsure when his symptoms started, and occurred in the setting of Lung Cancer. Though not adenocarcinoma, he probably would benefit more from anticoagulation than just aspirin. we will discuss this with his oncologist. No indication for aneurysm treatment or carotid treatment at this time because he has active cancer and both pathologies are currently asymptomatic. 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 Follow-up with me in six weeks. If his cancer goes into remission, then he may benefit from carotid revascularization with CEA or BENJA. CEA would be preferred so that he would not need triple therapy, though the stenosis is distal and BENJA may be technically easier. All of his questions and concerns were addressed. We will follow-up in 6 weeks.

## 2022-04-25 NOTE — HISTORY OF PRESENT ILLNESS
[FreeTextEntry1] : Mr. Krause is a 56 year-old man with PMH HTN, right occipital infarct and lung cancer who was referred to me by Dr. Greenberg for evaluation of asymptomatic carotid stenosis and recent right occipital stroke. Recent CTA demonstrates a severe focal 80% right internal carotid artery stenosis (not vascular distribution of patient's stroke) just distal to the bulb. the plaque is paertially calcified. The report also mentions a 5 mm right MCA bifurcation aneurysm which I do not appreciate. I personally reviewed his imaging. He denies interval TIA or stroke-like symptoms. \par

## 2022-04-25 NOTE — ASSESSMENT
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 Currently smoking 2 packs a day.\par \par Patient following Dr. Lam (Pulmonary) for routine CT scans of chest closely monitoring RUL lesion since October 2020.\par CT on 10/22/21 revealing a nodular opacity in the right apex (3-51) has increased, now measuring 1.3 cm, previously measuring 0.9 cm. A new 1.5 cm nodular opacity is noted in the anterior segment of the right upper lobe.  \par \par EBUS performed on 12/14/21 by Dr. Spicer. a 0.5 cm nodular lesion was noted in the RUL and endobronchial biopsy was done. which revealed Sq cell car lung  PDL1 75% \par A RUL and RML BAL was performed which was +ve for malignant cells \par \par CT Abdomen Pelvis 12/30/21\par IMPRESSION:\par 1.  Unchanged 7 mm right apical nodule and 1.3 x 0.7 cm and subcarinal node measuring 2.5 x 0.9 cm, unchanged when compared with 10/22/2021. Previously noted right upper lobe anterior segment opacity has resolved.\par 2.  Decreased centrilobular and tree-in-bud opacities.\par 3.  Infiltrative subcarinal and right hilar lymphadenopathy unchanged.\par \par PET/CT 12/31/2021\par 1.  Two FDG avid right upper lung nodules (0.7 cm right apical nodule. The 1.3 cm right upper lung nodular opacity medially), compatible with malignancy.\par 2. FDG avid subcarinal, bilateral paratracheal, and right hilar lymph nodes suspicious for metastases. Right perihilar node which measured 1.3 x 0.7 cm on diagnostic CT (SUV 8.9). A 0.7 cm right paratracheal lymph node (SUV 6.0). A 0.6 cm right high paratracheal node (SUV 4.3). A 3 mm left upper paratracheal node (SUV 3.2).\par 3. Mildly FDG avid tree-in-bud opacity in the right middle lobe, likely inflammatory.\par \par Bone Scan 1/17/22: No scan evidence of osseous metastasis. Degenerative disease in the spine and major joints.\par \par Plan: \par - Stage 3A Squamous cell ca lung \par - Discussed  at [facility] tumor board on 1/11/22 with patient being Right, Middle, Upper BAL being positive. However, as per discussion it appears it was only the RUL lesion with positivity on BAL \par - chemoRT with Carbo AUC 2 and taxol 50 mg/ m2 weekly with RT followed by Immunotherapy witH Durvalumab for 1 year \par - MRI Brain 2/10/22 performed revealing Acute RIGHT occipital lobe cortical infarction. Dr. Spicer sent patient to ED. Patient was asymptomatic with no neural deficit. Repeat CT Head performed on 2/26/22.\par -W/u with neurology.  CTA of Head and neck  shows 80% right internal carotid artery stenosis (not vascular distribution of patient's stroke) also shows a small 5 mm aneurysm at the right middle cerebral artery bifurcation\par - Dr. Greenberg referred to neuro vascular specialist Dr. Rai for further recommendations. \par - Patient started weekly Carbo/Taxol with RT on 2/25/22,  received  C 6  on 4/1/22, completed  RT on 4/7/22 \par - Counseled on quitting smoking ( Smoking 1.5 PPD) \par \par - CT CHEST 4 -5 weeks post RT \par - F/u in May 2022 ( after Imaging) Will plan to start Durvalumab if stable scans \par

## 2022-04-25 NOTE — REVIEW OF SYSTEMS
[Abnormal Sensation] : an abnormal sensation [Cough] : cough [Fever] : no fever [Chills] : no chills [Anxiety] : no anxiety [Depression] : no depression [Confused or Disoriented] : no confusion [Memory Lapses or Loss] : no memory loss [Decr. Concentrating Ability] : no decrease in concentrating ability [Difficulty with Language] : no ~M difficulty with language [Facial Weakness] : no facial weakness [Arm Weakness] : no arm weakness [Hand Weakness] : no hand weakness [Leg Weakness] : no leg weakness [Poor Coordination] : good coordination [Difficulty Writing] : no difficulty writing [Difficulties in Speech] : no speech difficulties [Numbness] : no numbness [Tingling] : no tingling [Seizures] : no convulsions [Lightheadedness] : no lightheadedness [Cluster Headache] : no cluster headache [Migraine Headache] : no migraine headache [Tension Headache] : no tension-type headache [Difficulty Walking] : no difficulty walking [Frequent Falls] : not falling [Eye Pain] : no eye pain [Eyesight Problems] : no eyesight problems [Earache] : no earache [Loss Of Hearing] : no hearing loss [Chest Pain] : no chest pain [Palpitations] : no palpitations [Shortness Of Breath] : no shortness of breath [Constipation] : no constipation [Diarrhea] : no diarrhea [Dysuria] : no dysuria [Hesitancy] : no urinary hesitancy [de-identified] : Bilateral hand pain/burning

## 2022-04-25 NOTE — CONSULT LETTER
[Dear  ___] : Dear  [unfilled], [Consult Letter:] : I had the pleasure of evaluating your patient, [unfilled]. [Please see my note below.] : Please see my note below. [Consult Closing:] : Thank you very much for allowing me to participate in the care of this patient.  If you have any questions, please do not hesitate to contact me. [Sincerely,] : Sincerely, [FreeTextEntry3] : Andrez Rai MD\par Chief, Vascular Neurology and Neurology Service , NeuroEndovascular Surgery\par  of Neurology and Radiology\par [facility]\par Director, Comprehensive Stroke Center and Stroke Unit\par [facility]\par Director, NeuroEndovascular Surgery\par [facility]

## 2022-04-25 NOTE — HISTORY OF PRESENT ILLNESS
[de-identified] : 55-year-old male w/ no significant PMH with smoking history of  2 1/2 pack per day smoker for 37 years, current smoker presents to office for squamous cell carcinoma lung \par \par Patient following Dr. Lam (Pulmonary) for routine CT scans of chest closely monitoring RUL lesion since October 2020.\par  CT on 10/22/21 revealing a nodular opacity in the right apex (3-51) has increased, now measuring 1.3 cm, previously measuring 0.9 cm. A new 1.5 cm nodular opacity is noted in the anterior segment of the right upper lobe.  \par \par EBUS perfromed on 12/14/21 by Dr. Spicer. a 0.5 cm nodular lesion was noted in the RUL and endobronchial biopsy was done. \par A RUL and RML BAL was performed and specimen sent \par \par 12/14/21 \par Specimen(s) Submitted\par LUNG, RIGHT MIDDLE LOBE, BRONCHOALVEOLAR LAVAGE:  POSITIVE FOR MALIGNANT CELLS.\par Non-Small Cell Carcinoma, favor Squamous Cell Carcinoma.\par Scanty cellularity precludes iMmunostains \par \par Core Biopsy:\par 1. Immunohistochemical stain for P40 is positive in tumor cells. Staining for TTF-1 is negative. The immunophenotype together with the\par cytomorphology supports the diagnosis of squamous cell carcinoma.\par \par PD-L1 Immunohistochemistry Antibody: [place] PDL1 ()\par Tissue type:  core biopsy of lung squamous cell carcinoma  Result: Tumor Proportion Score (TPS*)   75%\par \par *TPS: The percentage of viable tumor cells showing partial or complete membrane staining at any intensity\par \par Lives at a 's house in [place] w/ 5 other people. [facility] . \par Patient does not drive. Uses bus transportation to get to appointments. \par PCP- Mayela Otero  [de-identified] : Patient presents today for a FU visit.\par \par On weekly CRT received  C 6  on 4/1/22, completed  RT on 4/7/22 \par \par Patient tolerated tx very well. \par Admits burning in middle chest. States symptoms only occurs when he smokes\par Still smoking reports that he has cut back to 1.5 PPD ( Down from 2.5 PPD) \par Admits decreased appetite \par Denies fever/chills, fatigue,rash, mouth sores, nausea, vomiting, diarrhea,constipation,  abdominal pain, chest pain, cough.\par \par \par Has not completed COVID19 vaccine series. Discussed and advised to receive vaccine series.

## 2022-04-25 NOTE — PHYSICAL EXAM
[FreeTextEntry1] : GENERAL PHYSICAL EXAM:\par GEN: no distress, normal affect\par HEENT: NCAT, OP clear\par EYES: sclera white, conjunctiva clear, no nystagmus\par NECK: supple\par CV: normal rhythm\par PULM: no respiratory distress, normal rhythm and effort\par EXT: no edema, no cyanosis\par MSK: muscle tone and strength normal\par SKIN: warm, dry, no rash or lesion on exposed skin \par \par NEUROLOGICAL EXAM:\par Mental Status\par Orientation: alert and oriented to person, place, time, and situation \par Language: clear and fluent, intact comprehension and repetition, intact naming and reading\par \par Cranial Nerves\par II: LHH\par III, IV, VI: left pupil fixed\par V, VII: facial sensation and movement intact and symmetric \par VIII: hearing intact \par IX, X: uvula midline, soft palate elevates normally \par XI: BL shoulder shrug intact \par XII: tongue midline\par \par Motor\par Shoulder abd: 5 (R), 5 (L)\par EF/EE: 5 (R), 5 (L)\par hand : 5 (R), 5 (L)\par HF/HE: 5 (R), 5 (L)\par KF/KE: 5 (R), 5 (L)\par DF/PF: 5 (R), 5 (L) \par Tone and bulk are normal in upper and lower limbs\par No pronator drift\par \par Sensation\par Intact to light touch in all 4 EXTs\par \par Reflex\par 2+ in BL biceps, brachioradialis, patella\par \par Coordination\par Normal FTN bilaterally\par Dysdiadochokinesia not present. \par Able to perform rapid, alternating movements\par \par Gait\par Normal stance, stride, and pivot turn\par Tandem walk intact\par Negative Romberg

## 2022-04-27 PROBLEM — I63.9 OCCIPITAL STROKE: Status: ACTIVE | Noted: 2022-01-01

## 2022-04-27 NOTE — DATA REVIEWED
[de-identified] : Brain MRI dated 2/10/2022 reports acute right occipital stroke, multiple old lacunar wounds\par CT scan of the head dated 2/26/2022 reports expected evolution of right occipital and medial temporal stroke [de-identified] : Reviewed extensive emergency department notes from 2/10/2022 and 2/26/2022\par Reviewed oncology notes\par Spoke with oncology PA

## 2022-04-27 NOTE — PHYSICAL EXAM
[General Appearance - Alert] : alert [General Appearance - In No Acute Distress] : in no acute distress [General Appearance - Well-Appearing] : healthy appearing [Oriented To Time, Place, And Person] : oriented to person, place, and time [Impaired Insight] : insight and judgment were intact [Affect] : the affect was normal [Memory Recent] : recent memory was not impaired [Person] : oriented to person [Place] : oriented to place [Time] : oriented to time [Concentration Intact] : normal concentrating ability [Fluency] : fluency intact [Comprehension] : comprehension intact [Cranial Nerves Oculomotor (III)] : extraocular motion intact [Cranial Nerves Trigeminal (V)] : facial sensation intact symmetrically [Cranial Nerves Facial (VII)] : face symmetrical [Cranial Nerves Vestibulocochlear (VIII)] : hearing was intact bilaterally [Cranial Nerves Glossopharyngeal (IX)] : tongue and palate midline [Cranial Nerves Accessory (XI - Cranial And Spinal)] : head turning and shoulder shrug symmetric [Cranial Nerves Hypoglossal (XII)] : there was no tongue deviation with protrusion [Motor Tone] : muscle tone was normal in all four extremities [Motor Strength] : muscle strength was normal in all four extremities [No Muscle Atrophy] : normal bulk in all four extremities [Paresis Pronator Drift Right-Sided] : no pronator drift on the right [Paresis Pronator Drift Left-Sided] : no pronator drift on the left [Sensation Tactile Decrease] : light touch was intact [Sensation Pain / Temperature Decrease] : pain and temperature was intact [Romberg's Sign] : Romberg's sign was negtive [Abnormal Walk] : normal gait [Balance] : balance was intact [Past-pointing] : there was no past-pointing [Tremor] : no tremor present [Coordination - Dysmetria Impaired Finger-to-Nose Bilateral] : not present [Coordination - Dysmetria Impaired Heel-to-Shin Bilateral] : not present [2+] : Ankle jerk left 2+ [Plantar Reflex Right Only] : normal on the right [Plantar Reflex Left Only] : normal on the left [FreeTextEntry5] : 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  Pupils equal and reactive. 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

## 2022-04-27 NOTE — CONSULT LETTER
[Dear  ___] : Dear  [unfilled], [Consult Letter:] : I had the pleasure of evaluating your patient, [unfilled]. [Please see my note below.] : Please see my note below. [Consult Closing:] : Thank you very much for allowing me to participate in the care of this patient.  If you have any questions, please do not hesitate to contact me. [Sincerely,] : Sincerely, [FreeTextEntry3] : Christopher Greenberg MD, PhD, DPN-N\par [facility]\par Neurology at [place]\par Chief, Division of Neurology\par [facility]\par

## 2022-04-27 NOTE — HISTORY OF PRESENT ILLNESS
[FreeTextEntry1] : I initially saw him 3/9/2022 with the following history.  This 56-year-old man was referred here for evaluation of stroke.  He was recently diagnosed with lung cancer, he says this was in October.  As part of screening work-up he had a brain MRI 2/10/2022 which reported an acute right occipital stroke.  Imaging studies discussed further below.  He was referred to the emergency department but refused to stay.  He denies any strokelike symptoms.  In particular denies any visual disturbance.  Denies any focal weakness or numbness.  Denies any speech or comprehension problems.\par \par Medical history significant for hypertension and lung cancer.  He continues to smoke.\par \par I ordered a carotid duplex which showed high-grade stenosis right internal carotid artery which is not in the vascular territory of his right occipital stroke.  Subsequently he had a CTA which showed 80% stenosis of the right internal carotid artery and a 5 mm right MCA bifurcation aneurysm.  I referred him to neuro vascular specialist Dr. Rai who felt that no intervention was currently indicated.  He did put him on Xarelto and Lipitor.  Patient says his visual deficit has remained stable.  There are no new neurological deficits to report.  He does have some pain in the left arm and gabapentin has been suggested.  He has completed his chemotherapy and radiation therapy and follow-up imaging studies for his cancer are planned.
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
## 2022-05-11 PROBLEM — Z13.9 SCREENING PROCEDURE: Status: ACTIVE | Noted: 2022-01-01

## 2022-05-11 PROBLEM — R06.02 SOBOE (SHORTNESS OF BREATH ON EXERTION): Status: ACTIVE | Noted: 2022-01-01

## 2022-05-11 PROBLEM — J43.8 OTHER EMPHYSEMA: Status: ACTIVE | Noted: 2022-01-01

## 2022-05-11 NOTE — PROCEDURE
[FreeTextEntry1] : [facility]\par EXAM: [id number] - CT CHEST IC - ORDERED BY: YAMEL LOBATO\par \par \par PROCEDURE DATE: 05/02/2022\par \par \par \par INTERPRETATION: INDICATION: Squamous cell carcinoma of the lung, completed radiation on 4/7/2022\par \par TECHNIQUE: Volumetric images of the chest after the administration of 40 mL of Omnipaque 350. Maximum intensity projection images were generated.\par \par COMPARISON: CT chest 2/10/2022.\par \par FINDINGS:\par \par LUNGS/AIRWAYS/PLEURA: 6 mm nodule in the right apex (4-41), prior 1.1 cm. Multiple new areas of groundglass and consolidation in all lobes, preferential to the right lung and with minimal involvement of the left upper and lower lobes. Mild emphysema.\par \par Stable diffuse wall thickening of the trachea and proximal main bronchi. Unchanged mural irregularity and slight narrowing the proximal left main bronchus.\par \par Unremarkable pleura.\par \par LYMPH NODES/MEDIASTINUM: Decreased subcarinal lymph nodes which are now subcentimeter.\par \par HEART/VASCULATURE: Normal heart size. Unremarkable pericardium. Normal caliber aorta and pulmonary artery. Coronary artery calcification mild calcified and noncalcified plaque.\par \par UPPER ABDOMEN: Stable minimal left renal cyst.\par \par BONES/SOFT TISSUES: Unchanged small subcutaneous nodule in the right upper anterior abdominal wall. No significant bone abnormality.\par \par \par IMPRESSION:\par \par Decreased treated nodule in the right upper lobe. Second right upper lobe nodule is no longer seen.\par \par New opacities in both lungs, preferential to the right, likely related to radiation treatment. Infection is also in the differential.\par \par Decreased subcarinal lymphadenopathy.\par \par Unchanged diffuse tracheal wall thickening and mural irregularity of the left main bronchus.\par \par --- End of Report ---\par \par \par \par \par \par \par MESERET CHRISTIANSON M.D., ATTENDING RADIOGIST\par This document has been electronically signed. May 4 2022 2:27PM\par \par ~~~~~~~~~~~~~~~~~~~~~~~~~~~~~~~~~~~~~~~~~~~~~~~~~~~~~~~~~~~~~~~~~~~~~~~~\par \par \par [facility]\par EXAM: [id number] - CT ABDOMEN AND PELVIS OC IC - ORDERED BY: SMOOTH MONTESINOS\par \par EXAM: [id number] - CT CHEST IC - ORDERED BY: SMOOTH MONTESINOS\par \par \par PROCEDURE DATE: 02/10/2022\par \par \par \par INTERPRETATION: CLINICAL INFORMATION: Non-small cell lung cancer. Restaging.\par \par COMPARISON: 12/30/2021.\par \par CONTRAST/COMPLICATIONS:\par IV Contrast: Omnipaque 350 90 cc administered 10 cc discarded\par Oral Contrast: Omnipaque 300\par Complications: None reported at time of study completion\par \par PROCEDURE:\par CT of the Chest, Abdomen and Pelvis was performed.\par Sagittal and coronal reformats were performed.\par \par FINDINGS:\par CHEST:\par LUNGS AND LARGE AIRWAYS: Patent central airways. Soft tissue thickening around the distal trachea and ani with extension along the right and left main stem bronchus is unchanged. Soft tissue nodularity within the left main bronchus is also unchanged. A partially solid and groundglass nodule of 1.2 cm in the medial right upper lobe (2, 23) is unchanged. Another partial solid and groundglass nodule more superiorly in the right upper lobe is also unchanged at 10 mm (2, 15). Tree-in-bud opacities in the right middle and right lower lobes is without significant change.\par PLEURA: No pleural effusion.\par VESSELS: Within normal limits.\par HEART: Heart size is normal. No pericardial effusion.\par MEDIASTINUM AND RUSS: Subcarinal lymph node of 3.2 x 1.8 cm is unchanged right hilar lymph node of 2.0 x 1.8 cm (2, 35) is slightly increased when remeasured by this examiner 1.8 x 1.6 cm. A slightly prominent right upper paratracheal lymph node (2, 27) measuring 6 x 9 mm versus 5 x 7 mm.\par CHEST WALL AND LOWER NECK: Within normal limits.\par \par ABDOMEN AND PELVIS:\par LIVER: Within normal limits.\par BILE DUCTS: Normal caliber.\par GALLBLADDER: Within normal limits.\par SPLEEN: Within normal limits.\par PANCREAS: Within normal limits.\par ADRENALS: Within normal limits.\par KIDNEYS/URETERS: Left renal cyst.\par \par BLADDER: Decompressed.\par REPRODUCTIVE ORGANS: Prostate within normal limits. Sliver of free fluid in the pelvis.\par \par BOWEL: No bowel obstruction. Appendix is normal.\par PERITONEUM: No ascites.\par VESSELS: Atherosclerotic changes.\par RETROPERITONEUM/LYMPH NODES: No lymphadenopathy.\par ABDOMINAL WALL: Within normal limits.\par BONES: Status post right hip ORIF. Mild degenerative changes.\par \par IMPRESSION:\par No significant change in 2 right upper lobe nodules. No significant change in nodularity along the distal airway. No significant change in subcarinal lymph node. Slight interval increase in right hilar lymph node.\par No evidence of metastatic disease in the abdomen and pelvis.\par \par \par --- End of Report ---\par \par \par \par \par \par \par FELIBERTO LOPEZ MD; Attending Radiologist\par This document has been electronically signed. Feb 14 2022 10:05AM\par \par ~~~~~~~~~~~~~~~~~~~~~~~~~~~~~~~~~~~~~~~~~~~~~~~~~~~~~~~~~~~~~~~~~~~~~~~~\par \par \par [facility]\par EXAM: CT CHEST\par \par \par PROCEDURE DATE: 10/22/2021\par \par \par \par INTERPRETATION: CLINICAL INFORMATION: Follow-up right middle lobe infiltrates.\par \par COMPARISON: CT chest 7/29/2021, 1/22/2021 and 10/9/2020.\par \par CONTRAST/COMPLICATIONS:\par IV Contrast: None.\par Oral Contrast: None.\par Complications: None.\par \par PROCEDURE:\par CT of the Chest was performed.\par Sagittal and coronal reformats were performed.\par \par FINDINGS:\par \par LUNGS AND AIRWAYS: No endobronchial lesions. Emphysema. A 7 mm right apical nodule (3-39) is unchanged. A nodular opacity in the right apex (3-51) has increased, now measuring 1.3 cm, previously measuring 0.9 cm. A new 1.5 cm nodular opacity is noted in the anterior segment of the right upper lobe. Extensive centrilobular nodules and tree-in-bud opacities are similar in appearance and distribution, predominantly affecting the right middle and lower lobes.\par PLEURA: No pleural effusion.\par MEDIASTINUM AND RUSS: No lymphadenopathy.\par VESSELS: Calcifications of the coronary vessels.\par HEART: Heart size is normal. No pericardial effusion.\par CHEST WALL AND LOWER NECK: Within normal limits.\par VISUALIZED UPPER ABDOMEN: There is a 2.0 cm left renal cyst.\par BONES: Within normal limits.\par \par IMPRESSION:\par 1. Unchanged 7 mm right apical nodule.\par 2. Increased right apical nodular opacity.\par 3. New 1.5 cm nodular opacity is noted in the anterior segment of the right upper lobe. Follow-up CT scan is recommended in 3 months to ensure resolution.\par \par --- End of Report ---\par \par \par \par \par \par TAVIA OLIVIER MD; Resident Radiology\par This document has been electronically signed.\par TERRA HOLLINGSWORTH MD; Attending Radiologist\par This document has been electronically signed. Oct 23 2021 11:37AM\par \par ~~~~~~~~~~~~~~~~~~~~~~~~~~~~~~~~~~~~~~~~~~~~~~~~~~~~~~~~~~~~~~~~~~~~~~~~\par \par [facility]\par EXAM: CT CHEST\par \par \par PROCEDURE DATE: 07/29/2021\par \par \par \par INTERPRETATION: .\par ACC: [id number]\par INDICATION: Follow-up lung nodule\par TECHNIQUE: Unenhanced CT of the chest. Coronal and sagittal images were reconstructed. Maximum intensity projection images were generated.\par COMPARISON: CT chest 1/22/2021, 10/9/2020\par \par FINDINGS:\par \par AIRWAYS, LUNGS and PLEURA: Patent central airways. Emphysema. 0.7 cm right apical nodule (series 3 image 29) is decreased; previously 1.3 cm. New small tubular opacity within the right apex (series 3 image 42) likely mucoid impaction. Extensive centrilobular nodules and tree-in-bud opacities predominantly within the right middle lobe and right lower lobe are increased/new from prior. No pleural effusion.\par \par MEDIASTINUM AND RUSS: No lymphadenopathy.\par \par HEART AND VESSELS: Heart size is normal. No pericardial effusion. Thoracic aorta and pulmonary artery normal in diameter. Multivessel coronary calcification.\par \par VISUALIZED UPPER ABDOMEN: Small left renal cyst.\par \par CHEST WALL AND BONES: No aggressive osseous lesion.\par \par LOWER NECK: Within normal limits.\par \par IMPRESSION:\par \par In comparison with 1/22/2021, bilateral centrilobular nodules and tree-in-bud opacities predominantly within the right middle lobe and right lower lobe are increased/new from prior likely representing bronchiolitis.\par \par 0.7 cm right apical nodule (series 3 image 29) is decreased previously 1.3 cm.\par \par --- End of Report ---\par \par \par \par \par \par \par JEFFREY MOORE MD; Attending Radiologist\par This document has been electronically signed. Jul 29 2021 12:47PM\par \par \par ~~~~~~~~~~~~~~~~~~~~~~~~~~~~~~~~~~~~~~~~~~~~~~~~~~~~~~~~~~~~~~~~~~~~~~~~\par \par [facility]\par \par EXAM: CT CHEST\par \par \par PROCEDURE DATE: 01/22/2021\par \par \par \par INTERPRETATION: CT CHEST WITHOUT CONTRAST\par \par INDICATION: Right upper lobe lesion, follow-up. Cough.\par \par TECHNIQUE: Unenhanced helical images were obtained of the chest. Coronal and sagittal images were reconstructed. Maximum intensity projection images were generated.\par \par COMPARISON: Radiograph 4/8/2020. CT chest 10/9/2020.\par \par FINDINGS:\par \par Tubes/Lines: None.\par \par Lungs And Airways: Since 10/9/2020, the 1.3 x 0.5 cm apical segment right upper lobe opacity is unchanged.\par \par The lungs are otherwise clear. The airways are normal.\par \par Pleura: No pneumothorax. No pleural effusion.\par \par Mediastinum: The chest lymph nodes measure less than 10 mm in the short axis. The visualized thyroid gland and esophagus are unremarkable.\par \par Heart and Vasculature: The heart is normal in size. There is no pericardial effusion. Coronary artery calcifications. Left-sided aortic arch and left-sided descending thoracic aorta. Aortic calcifications.\par \par Upper Abdomen: The upper abdomen is unremarkable.\par \par Bones And Soft Tissues: The bones are unremarkable. The soft tissues are unremarkable.\par \par \par IMPRESSION:\par \par 1. Since 10/9/2020, the right upper lobe opacity is unchanged.\par \par \par \par \par \par \par IGNACIO HOLLINGSWORTH MD; Attending Radiologist\par This document has been electronically signed. Jan 25 2021 10:21AM\par \par ~~~~~~~~~~~~~~~~~~~~~~~~~~~~~~~~~~~~~~~~~~~~~~~~~~~~~~~~~~~~~~~~~~~~~~~~\par

## 2022-05-11 NOTE — HISTORY OF PRESENT ILLNESS
[Home] : at home, [unfilled] , at the time of the visit. [Medical Office: ([facility])___] : at the medical office located in  [FreeTextEntry1] : This 56 year-old male presents for post-treatment evaluation.\par The patient has a history of heavy smoking and relatively few co-morbidities. He has newly diagnosed non-cell carcinoma of the lung with cytology positive BAL.   Mr Krause completed radiation therapy to the right lung/mediastinum on 4/7/2022.\par \par At last on-treatment visit:\par Denied pain.\par Denied dyspnea. \par Breath sounds CTA throughout.\par Reports he cut down on cigarette smoking from 2 1/2 PPD to 1 1/2 PPD.  Refuses referral to smoking cessation.\par \par Today Mr. Krause reports he cut down on cigarettes further, now smoke 1 PPD.\par Denies SOB.\par Denies KING.\par Breath sounds CTA b/l. \par

## 2022-05-11 NOTE — HISTORY OF PRESENT ILLNESS
[Current] : current [TextBox_4] : 56M PMH R occipital CVA on Xarelto, R carotid artery stenosis, NSCLC (SCC) stage 3A s/p Carbo/Taxol s/p RT, active heavy smoker, HTN who presents for f/u. Following with Dr. Garcia. Pt smoked up to 2.5ppd now down to 1ppd. He is having frequent cough every day. No fevers, no chills, no N/V/D. He reports being able to walk up to 1 mile without any difficulty.  [TextBox_11] : 2.5 [TextBox_13] : 37

## 2022-05-11 NOTE — DISEASE MANAGEMENT
[Pathological] : TNM Stage: p [III] : III [FreeTextEntry4] : squamous cell carcinoma [TTNM] : 4 [NTNM] : x [MTNM] : x [de-identified] : 6000 cGy [de-identified] : right lung/mediastinum

## 2022-05-11 NOTE — ASSESSMENT
[No evidence of disease] : No evidence of disease [FreeTextEntry1] : Mostly resolved treatment related sequelae

## 2022-05-17 PROBLEM — C34.90 SQUAMOUS CELL CARCINOMA LUNG: Status: ACTIVE | Noted: 2021-01-01

## 2022-05-17 PROBLEM — C34.90 NON-SMALL CELL LUNG CANCER: Status: ACTIVE | Noted: 2021-01-01

## 2022-05-17 NOTE — RESULTS/DATA
[FreeTextEntry1] : CT Chest on 5/02/22: Decreased treated nodule in the right upper lobe (6 mm nodule in the right apex, prior 1.1). Second right upper lobe nodule is no longer seen.\par New opacities in both lungs, preferential to the right, likely related to radiation treatment. Infection is also in the differential.\par Decreased subcarinal lymphadenopathy.\par Unchanged diffuse tracheal wall thickening and mural irregularity of the left main bronchus.\par \par CT Head 2/26/22\par - There has been interval evolution of the infarction along right PCA distribution involving the medial right temporal lobe and right occipital lobe to chronic. Hyperdensity in the right occipital infarct may represent laminar necrosis versus petechial hemorrhage transformation. Multiple chronic lacunar infarctions as described. No acute intracranial hemorrhage or midline shift.  \par \par MR Head 2/10/22\par IMPRESSION: Acute RIGHT occipital lobe cortical infarction. Moderate periventricular and deep white\par matter ischemia is noted with multiple old lacunar infarctions in the BILATERAL basal\par ganglia, corona radiata and anika.\par \par Bone Scan 1/17/22\par IMPRESSION: No scan evidence of osseous metastasis. Degenerative disease in the spine and major joints.\par \par CT Abdomen Pelvis 12/30/21\par IMPRESSION:\par 1.  Unchanged 7 mm right apical nodule and 1.3 x 0.7 cm and subcarinal node measuring 2.5 x 0.9 cm, unchanged when compared with 10/22/2021. Previously noted right upper lobe anterior segment opacity has resolved.\par 2.  Decreased centrilobular and tree-in-bud opacities.\par 3.  Infiltrative subcarinal and right hilar lymphadenopathy unchanged.\par \par PET/CT 12/31/2021\par 1.  Two FDG avid right upper lung nodules (0.7 cm right apical nodule. The 1.3 cm right upper lung nodular opacity medially), compatible with malignancy.\par 2. FDG avid subcarinal, bilateral paratracheal, and right hilar lymph nodes suspicious for metastases. Right perihilar node which measured 1.3 x 0.7 cm on diagnostic CT (SUV 8.9). A 0.7 cm right paratracheal lymph node (SUV 6.0). A 0.6 cm right high paratracheal node (SUV 4.3). A 3 mm left upper paratracheal node (SUV 3.2).\par 3. Mildly FDG avid tree-in-bud opacity in the right middle lobe, likely inflammatory.\par \par 12/14/2021 Fine Needle Aspiration\par LUNG, BRONCHUS, RIGHT MIDDLE, CORE BIOPSY\par \par Final Diagnosis\par LUNG, BRONCHUS, RIGHT MIDDLE, CORE BIOPSY\par POSITIVE FOR MALIGNANT CELLS.\par Clinical Information\par Upper bronchus biopsy.\par \par \par Specimen(s) Submitted\par LUNG, RIGHT MIDDLE LOBE, BRONCHOALVEOLAR LAVAGE\par \par Final Diagnosis\par LUNG, RIGHT MIDDLE LOBE, BRONCHOALVEOLAR LAVAGE\par POSITIVE FOR MALIGNANT CELLS.\par Non-Small Cell Carcinoma, favor Squamous Cell Carcinoma.\par \par Gross Description\par Core Biopsy:\par Tissue collected: Specimen container has been inspected to confirm\par patient 's name and date of birth, contains 1  white/tan cores measuring\par 0.3 cm in length. Entire specimen submitted    in 1 cassette labeled 1B.\par \par Fine Needle Aspiration Addendum Report - Auth (Verified)\par \par Addendum\par 1. Immunohistochemical stain for P40 is positive in tumor cells.\par Staining for TTF-1 is negative. The immunophenotype together with the\par cytomorphology supports the diagnosis of squamous cell carcinoma.\par \par PD-L1 Immunohistochemistry\par Antibody: [place] PDL1 ()\par Tissue type:  core biopsy of lung squamous cell carcinoma\par Block: 1 B\par \par Result: Tumor Proportion Score (TPS*)   75%\par \par *TPS: The percentage of viable tumor cells showing partial or complete\par membrane staining at any intensity\par \par \par \par EXAM: CT CHEST\par PROCEDURE DATE: 10/22/2021\par LUNGS AND AIRWAYS: No endobronchial lesions. Emphysema. A 7 mm right apical nodule (3-39) is unchanged. A nodular opacity in the right apex (3-51) has increased, now measuring 1.3 cm, previously measuring 0.9 cm. A new 1.5 cm nodular opacity is noted in the anterior segment of the right upper lobe. Extensive centrilobular nodules and tree-in-bud opacities are similar in appearance and distribution, predominantly affecting the right middle and lower lobes.\par IMPRESSION:\par 1. Unchanged 7 mm right apical nodule.\par 2. Increased right apical nodular opacity.\par 3. New 1.5 cm nodular opacity is noted in the anterior segment of the right upper lobe. Follow-up CT scan is recommended in 3 months to ensure resolution.\par \par \par \par \par CT Chest No Cont: EXAM:  CT CHEST\par PROCEDURE DATE:  07/29/2021\par AIRWAYS, LUNGS and PLEURA: Patent central airways. Emphysema. 0.7 cm right apical nodule\par (series 3 image 29) is decreased; previously 1.3 cm. New small tubular opacity within the\par right apex (series 3 image 42) likely mucoid impaction. Extensive centrilobular nodules and\par tree-in-bud opacities predominantly within the right middle lobe and right lower lobe are\par increased/new from prior. No pleural effusion.\par IMPRESSION:\par \par In comparison with 1/22/2021, bilateral centrilobular nodules and tree-in-bud opacities predominantly\par within the right middle lobe and right lower lobe are increased/new from\par prior likely representing bronchiolitis.\par \par 0.7 cm right apical nodule (series 3 image 29) is decreased previously 1.3 cm.\par \par

## 2022-05-17 NOTE — ADDENDUM
[FreeTextEntry1] : Documented by Jaydon Wang acting as scribe for Dr. Sharpe on 05/17/2022. \par \par All Medical record entries made by the Scribe were at my, Dr. Sharpe's, direction and personally dictated by me on 05/17/2022. I have reviewed the chart and agree that the record accurately reflects my personal performance of the history, physical exam, assessment and plan. I have also personally directed, reviewed, and agreed with the discharge instructions.

## 2022-05-17 NOTE — HISTORY OF PRESENT ILLNESS
[de-identified] : 55-year-old male w/ no significant PMH with smoking history of  2 1/2 pack per day smoker for 37 years, current smoker presents to office for squamous cell carcinoma lung \par \par Patient following Dr. Lam (Pulmonary) for routine CT scans of chest closely monitoring RUL lesion since October 2020.\par  CT on 10/22/21 revealing a nodular opacity in the right apex (3-51) has increased, now measuring 1.3 cm, previously measuring 0.9 cm. A new 1.5 cm nodular opacity is noted in the anterior segment of the right upper lobe.  \par \par EBUS perfromed on 12/14/21 by Dr. Spicer. a 0.5 cm nodular lesion was noted in the RUL and endobronchial biopsy was done. \par A RUL and RML BAL was performed and specimen sent \par \par 12/14/21 \par Specimen(s) Submitted\par LUNG, RIGHT MIDDLE LOBE, BRONCHOALVEOLAR LAVAGE:  POSITIVE FOR MALIGNANT CELLS.\par Non-Small Cell Carcinoma, favor Squamous Cell Carcinoma.\par Scanty cellularity precludes iMmunostains \par \par Core Biopsy:\par 1. Immunohistochemical stain for P40 is positive in tumor cells. Staining for TTF-1 is negative. The immunophenotype together with the\par cytomorphology supports the diagnosis of squamous cell carcinoma.\par \par PD-L1 Immunohistochemistry Antibody: [place] PDL1 ()\par Tissue type:  core biopsy of lung squamous cell carcinoma  Result: Tumor Proportion Score (TPS*)   75%\par \par *TPS: The percentage of viable tumor cells showing partial or complete membrane staining at any intensity\par \par Lives at a 's house in [place] w/ 5 other people. [facility] . \par Patient does not drive. Uses bus transportation to get to appointments. \par PCP- Mayela Otero  [de-identified] : Patient presents today for a FU visit.\par \par On weekly CRT received  C 6 on 4/1/22, completed  RT on 4/7/22 \par \par Notes burning sensation in chest when coughing \par Weight loss. Stable appetite \par Still smoking reports that he smokes 20 cigarettes per day. \par \par Has not completed COVID19 vaccine series. Discussed and advised to receive vaccine series.\par \par CT Chest on 5/02/22: Decreased treated nodule in the right upper lobe (6 mm nodule in the right apex, prior 1.1). Second right upper lobe nodule is no longer seen.\par New opacities in both lungs, preferential to the right, likely related to radiation treatment. Infection is also in the differential.\par Decreased subcarinal lymphadenopathy.\par Unchanged diffuse tracheal wall thickening and mural irregularity of the left main bronchus.

## 2022-05-17 NOTE — ASSESSMENT
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 Currently smoking 2 packs a day.\par \par Patient following Dr. Lam (Pulmonary) for routine CT scans of chest closely monitoring RUL lesion since October 2020.\par CT on 10/22/21 revealing a nodular opacity in the right apex (3-51) has increased, now measuring 1.3 cm, previously measuring 0.9 cm. A new 1.5 cm nodular opacity is noted in the anterior segment of the right upper lobe.  \par \par EBUS performed on 12/14/21 by Dr. Spicer. a 0.5 cm nodular lesion was noted in the RUL and endobronchial biopsy was done. which revealed Sq cell car lung  PDL1 75% \par A RUL and RML BAL was performed which was +ve for malignant cells \par \par CT Abdomen Pelvis 12/30/21\par IMPRESSION:\par 1.  Unchanged 7 mm right apical nodule and 1.3 x 0.7 cm and subcarinal node measuring 2.5 x 0.9 cm, unchanged when compared with 10/22/2021. Previously noted right upper lobe anterior segment opacity has resolved.\par 2.  Decreased centrilobular and tree-in-bud opacities.\par 3.  Infiltrative subcarinal and right hilar lymphadenopathy unchanged.\par \par PET/CT 12/31/2021\par 1.  Two FDG avid right upper lung nodules (0.7 cm right apical nodule. The 1.3 cm right upper lung nodular opacity medially), compatible with malignancy.\par 2. FDG avid subcarinal, bilateral paratracheal, and right hilar lymph nodes suspicious for metastases. Right perihilar node which measured 1.3 x 0.7 cm on diagnostic CT (SUV 8.9). A 0.7 cm right paratracheal lymph node (SUV 6.0). A 0.6 cm right high paratracheal node (SUV 4.3). A 3 mm left upper paratracheal node (SUV 3.2).\par 3. Mildly FDG avid tree-in-bud opacity in the right middle lobe, likely inflammatory.\par \par Bone Scan 1/17/22: No scan evidence of osseous metastasis. Degenerative disease in the spine and major joints.\par \par Plan: \par - Stage 3A Squamous cell ca lung \par - Discussed  at [facility] tumor board on 1/11/22 with patient being Right, Middle, Upper BAL being positive. However, as per discussion it appears it was only the RUL lesion with positivity on BAL \par - chemoRT with Carbo AUC 2 and taxol 50 mg/ m2 weekly with RT followed by Immunotherapy with Durvalumab for 1 year \par - MRI Brain 2/10/22 performed revealing Acute RIGHT occipital lobe cortical infarction. Dr. Spicer sent patient to ED. Patient was asymptomatic with no neural deficit. Repeat CT Head performed on 2/26/22.\par -W/u with neurology.  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 Greenberg: Subsequently he had a CTA which showed 80% stenosis of the right internal carotid artery and a 5 mm right MCA bifurcation aneurysm. \par - Saw neuro vascular specialist Dr. Rai with no intervention indicated. Started Xarelto and Lipitor.\par - Patient started weekly Carbo/Taxol with RT on 2/25/22,  received  C 6  on 4/1/22, completed  RT on 4/7/22 \par Restaging scan on 5/2/22 with improvement in lesions, \par will  Start Durvalumab q 2 weeks \par - F/u in 4 weeks\par \par - Counseled on quitting smoking (Smoking 1 PPD, down from, 2.5 PPD, congratulated and  advised to cut back further to 1/2 ppd, \par At next visit will  further cut back to 5-7 cigarettes/ day \par \par

## 2022-05-23 NOTE — H&P ADULT - HISTORY OF PRESENT ILLNESS
56 year old male with pmh of alcohol abuse, htn, hld, cva on xarelto, lung cancer on chemo coming to the hospital with progressive shortness of breath since friday. patient states has had recent chemo as well. states currently having worsening shortness of breath. not on home 02. states has not taken covid vaccine. +fever +chills. sob worsens with exertion.

## 2022-05-23 NOTE — ED PROVIDER NOTE - NS ED ATTENDING STATEMENT MOD
I have personally provided the amount of critical care time documented below excluding time spent on separate procedures. [Follow-Up] : a follow-up visit [Sleep Apnea] : sleep apnea [Shortness of Breath] : shortness of breath [Wheezing] : wheezing [Obesity] : obesity

## 2022-05-23 NOTE — CONSULT NOTE ADULT - SUBJECTIVE AND OBJECTIVE BOX
PULMONARY CONSULT NOTE      BETH REYES-[id number]    Patient is a 56y old  Male who presents with a chief complaint of   Patient is a 56y old  Male hx of small cell lung cancer (follows w/ dr. basilio at [facility] last chemo treatment was in march per patient), COPD, smoker, who presents with a chief complaint of shortness of breath for the past 3 days. endorsing worsening cough compared to baseline. febrile, tachycardic on arrival, sepsis protocol initiated by ER team. empiric coverage w/ vancomycin and cefepime given with 30cc/kg bolus. nebs/steroids given. patient started on BIPAP for increased work of breath, tachypnea and has clinically improved.         HISTORY OF PRESENT ILLNESS:  worsening dyspnea, fevers, cough, last few days  no cp  still smoking  placed on bipap, failed cannula  hx NSCLA, stage 3 A  post Carbo/taxol,RT, has not started Durvalumab yet  last PFts 2020 were without air flow obstruction  recent CVA, had 80% ICA stenosis, on Xarelto  MEDICATIONS  (STANDING):  albuterol/ipratropium for Nebulization. 3 milliLiter(s) Nebulizer once  albuterol/ipratropium for Nebulization. 3 milliLiter(s) Nebulizer once  albuterol/ipratropium for Nebulization. 3 milliLiter(s) Nebulizer once  albuterol/ipratropium for Nebulization. 3 milliLiter(s) Nebulizer once  albuterol/ipratropium for Nebulization. 3 milliLiter(s) Nebulizer once      MEDICATIONS  (PRN):      Allergies    penicillins (Unknown)  shellfish (Unknown)    Intolerances        PAST MEDICAL & SURGICAL HISTORY:  Hypertension, unspecified type      Alcohol abuse      Poor historian      H/O fracture of right hip          FAMILY HISTORY:  No pertinent family history in first degree relatives        SOCIAL HISTORY  Smoking History:     REVIEW OF SYSTEMS:    CONSTITUTIONAL:  No fevers, chills, sweats    HEENT:  Eyes:  No diplopia or blurred vision. ENT:  No earache, sore throat or runny nose.    CARDIOVASCULAR:  No pressure, squeezing, tightness, or heaviness about the chest; no palpitations.    RESPIRATORY:  see HPI    GASTROINTESTINAL:  No abdominal pain, nausea, vomiting or diarrhea.    GENITOURINARY:  No dysuria, frequency or urgency.    NEUROLOGIC:  No paresthesias, fasciculations, seizures or weakness.    PSYCHIATRIC:  No disorder of thought or mood.    Vital Signs Last 24 Hrs  T(C): 38.6 (23 May 2022 10:40), Max: 38.6 (23 May 2022 10:40)  T(F): 101.5 (23 May 2022 10:40), Max: 101.5 (23 May 2022 10:40)  HR: 120 (23 May 2022 11:45) (89 - 143)  BP: 127/91 (23 May 2022 11:45) (112/78 - 127/91)  BP(mean): --  RR: 26 (23 May 2022 11:45) (20 - 32)  SpO2: 100% (23 May 2022 11:45) (81% - 100%)    PHYSICAL EXAMINATION:    GENERAL: The patient is thin , ill looking tachypneic , no paradox, able to speak sentences     HEENT: Head is normocephalic and atraumatic. Extraocular muscles are intact. Mucous membranes are moist.     NECK: Supple.     LUNGS: moderate air entry with exp rhonchi. Respirations unlabored    HEART: Regular rate and rhythm without murmur.    ABDOMEN: Soft, nontender, and nondistended.  No hepatosplenomegaly is noted.    EXTREMITIES: Without any cyanosis, clubbing, rash, lesions or edema.    NEUROLOGIC: Grossly intact.      LABS:                        11.4   20.46 )-----------( 459      ( 23 May 2022 11:23 )             36.3     05-23    139  |  98  |  14.3  ----------------------------<  137<H>  3.9   |  22.0  |  1.02    Ca    9.3      23 May 2022 11:23    TPro  7.8  /  Alb  3.8  /  TBili  0.6  /  DBili  x   /  AST  39  /  ALT  26  /  AlkPhos  130<H>  05-23    PT/INR - ( 23 May 2022 11:23 )   PT: 17.0 sec;   INR: 1.46 ratio         PTT - ( 23 May 2022 11:23 )  PTT:30.6 sec    ABG - ( 23 May 2022 11:23 )  pH, Arterial: 7.500 pH, Blood: x     /  pCO2: 33    /  pO2: 92    / HCO3: 26    / Base Excess: 2.5   /  SaO2: 98.8                            MICROBIOLOGY:    RADIOLOGY & ADDITIONAL STUDIES:  CXrs , CT scans,PET scan, office notes reviewed

## 2022-05-23 NOTE — ED PROVIDER NOTE - CRITICAL CARE ATTENDING CONTRIBUTION TO CARE
Upon my evaluation, this patient had a high probability of imminent or life-threatening deterioration due to sepsis and respiratory failure , which required my direct attention, intervention, and personal management.    I have personally provided  40 minutes of critical care time exculsive of time spent on separately billable procedures.  Time includes review of laboratory data, radiology results, discussion with consultants, and monitoring for potential decompensation.  Interventions were performed as documented.

## 2022-05-23 NOTE — H&P ADULT - NSHPPHYSICALEXAM_GEN_ALL_CORE
Vital Signs Last 24 Hrs  T(F): 101.5 (23 May 2022 10:40), Max: 101.5 (23 May 2022 10:40)  HR: 120 (23 May 2022 11:45) (89 - 143)  BP: 127/91 (23 May 2022 11:45) (112/78 - 127/91)  RR: 28 (23 May 2022 15:52) (20 - 32)  SpO2: 95% (23 May 2022 15:52) (81% - 100%)    Physical Exam:  Constitutional: NAD, awake and alert, well-developed  Neck: Soft and supple, No LAD, No JVD  Respiratory: decreased breath sounds b/l bases  Cardiovascular: +s1/s2 no edema b/l le  Gastrointestinal: soft nt nd bs+  Vascular: 2+ peripheral pulses  Neurological: A/O x 3, no focal deficits  Musculoskeletal: 4/5 strength b/l upper and lower extremities  : Contraindicated  Breasts: Contraindicated  Rectal: Contraindicated

## 2022-05-23 NOTE — CONSULT NOTE ADULT - CRITICAL CARE ATTENDING COMMENT
greater than 50% of time spent reviewing labs, notes, orders and radiographs, coordinating care  discussed with med/ICU  pt at bedside

## 2022-05-23 NOTE — CONSULT NOTE ADULT - ASSESSMENT
Acute hypoxemic resp failure  Stage 3A, NSCLA, Sq cell by BAL RUL, post RT ( finished 4/22, and chemo ? Feb -March), has not started Durvalumab  last PFT 2020, no air flow obstruction, continued nicotine addiction  On full dose AC for carotid stenosis, recent CVA   Progressive lung infiltrates R, fever, covid negative  Rx as pneumonia, differential RT pneumonitis, doubt progressive malignancy CT scan 5/2 improved PET positive infiltrates   Empiric abx, add corticosteroids, nebs  Failed nasal cannula, placed back on Bipap- remains tachypneic oxygenating adequately on 12/5, 12, 40%  Trial of HFNC, ICU to re evaluate- spoke with Dr Carroll   Prognosis guarded, critical Acute hypoxemic resp failure  Stage 3A, NSCLA, Sq cell by BAL RUL, post RT ( finished 4/22, and chemo ? Feb -March), has not started Durvalumab  last PFT 2020, no air flow obstruction, continued nicotine addiction  On full dose AC for carotid stenosis, recent CVA   Progressive lung infiltrates R, fever, covid negative  Rx as pneumonia, differential RT pneumonitis, doubt progressive malignancy CT scan 5/2 improved PET positive infiltrates   Empiric abx, add corticosteroids, nebs  Failed nasal cannula, placed back on Bipap- remains tachypneic oxygenating adequately on 12/5, 12, 40%  Trial of HFNC, ICU to re evaluate- spoke with Dr Carroll , 40L flow, 100%- sp02 90%  Prognosis guarded, critical Acute hypoxemic resp failure  Stage 3A, NSCLA, Sq cell by BAL RUL, post RT ( finished 4/22, and chemo ? Feb -March), has not started Durvalumab  last PFT 2020, no air flow obstruction, continued nicotine addiction  On full dose AC for carotid stenosis, recent CVA   Progressive lung infiltrates R, fever, covid negative  Rx as pneumonia, differential RT pneumonitis, doubt progressive malignancy CT scan 5/2 improved PET positive infiltrates   Empiric abx, add corticosteroids, nebs  Failed nasal cannula, placed back on Bipap- remains tachypneic oxygenating adequately on 12/5, 12, 40%  Trial of HFNC, ICU to re evaluate- spoke with Dr Carroll , 40L flow, 100%- sp02 90%  CT chest when able to safely travel  critical, prognosis guarded

## 2022-05-23 NOTE — ED PROVIDER NOTE - NS ED ROS FT
Review of Systems  •	CONSTITUTIONAL - (+) fever, no diaphoresis, no weight change  •	SKIN - no rash  •	HEMATOLOGIC - no bleeding, no bruising  •	EYES - no eye pain, no blurred vision  •	ENT - no change in hearing, no pain  •	RESPIRATORY - (+) shortness of breath, no cough  •	CARDIAC - no chest pain, no palpitations  •	GI - no abd pain, no nausea, no vomiting, no diarrhea, no constipation, no bleeding  •	GENITO-URINARY - no discharge, no dysuria; no hematuria,   •	ENDO - no polydipsia, no polyuria, no heat/no cold intolerance  •	MUSCULOSKELETAL - no joint pain, no swelling, no redness  •	NEUROLOGIC - no weakness, no headache, no anesthesia, no paresthesias  •	PSYCH - no anxiety, non suicidal, non homicidal, no hallucination, no depression

## 2022-05-23 NOTE — CHART NOTE - NSCHARTNOTEFT_GEN_A_CORE
Received call from Dr. Brock Carroll (medicine) and Dr. Peter (pulmonology) for re-evaluation of patient for shortness of breath. Patient transitioned to high flow nasal cannula 40L/min @ 100% for past 20 mins, still tachypneic into the 30s but appears comfortable, speaking in full sentences and saturating high 90s-100%. Repeat ABG on high flow nasal cannula shows improvement of respiratory alkalosis in ABG 7.420/39/90/25 from 7.50/33/92/26 @ 50%. Recommend admission to stepdown, if escalation fo care is warranted then please -reconsult at any time. Received call from Dr. Brock Carroll (medicine) and Dr. Peter (pulmonology) for re-evaluation of patient for shortness of breath. Patient transitioned to high flow nasal cannula 40L/min @ 100% for past 20 mins, still tachypneic into the 30s but appears comfortable, speaking in full sentences and saturating high 90s-100%. Repeat ABG on high flow nasal cannula shows improvement of respiratory alkalosis in ABG 7.420/39/90/25 from 7.50/33/92/26 @ 50% on BIPAP. Recommend admission to stepdown, does not currently require ICU level of care. If escalation fo care is warranted then please -reconsult at any time.

## 2022-05-23 NOTE — CONSULT NOTE ADULT - ATTENDING COMMENTS
56-year-old -American male with past medical history of essential hypertension, dyslipidemia, neuropathy, tobacco use disorder with 65-pack-year smoking history with right-sided stage III squamous cell cancer on Carbo/Taxol  with radiation since February 2022 who was planned to be started on PD-L1 inhibitor (Durvalumab)  presented with shortness of breath, fever now being admitted for acute hypoxic respiratory failure with right multilobar infiltrate with differential being community-acquired pneumonia/radiation pneumonitis.    I would recommend   sputum culture, procalcitonin,  RVP panel, urinary strep and Legionella antigen   broad-spectrum antibiotic including vancomycin, cefepime, azithromycin   1 to 2 mg/kg/day of methylprednisolone in divided doses intravenously   around-the-clock inhaled bronchodilators with as needed   patient has responded to bilevel noninvasive ventilator would recommend continuing for a few more hours with aid of anxiolytic as morphine and subsequently trial of weaning down to high flow nasal cannula   discussed the case with pulmonary and hematology   would recommend admitting to stepdown unit for now     please call medical ICU back with any questions or concerns or clinical status changes   plan of care discussed with patient and all questions answered.

## 2022-05-23 NOTE — ED PROVIDER NOTE - PHYSICAL EXAMINATION
VITAL SIGNS: I have reviewed nursing notes and confirm.  CONSTITUTIONAL:  in no acute distress.  SKIN: Skin exam is warm and dry, no acute rash.  HEAD: Normocephalic; atraumatic.  EYES: PERRL, EOM intact; conjunctiva and sclera clear.  ENT: No nasal discharge; airway clear. Throat clear.  NECK: Supple; non tender.    CARD: (+)tachycardia   RESP: (+)tachypnea (+)small rhonchi   ABD:  soft; non-distended; non-tender;   EXT: Normal ROM. No clubbing, cyanosis or edema.  NEURO: Alert, oriented. Grossly unremarkable. No focal deficits.  moves all extremities,  normal gait   PSYCH: Cooperative, appropriate.

## 2022-05-23 NOTE — H&P ADULT - CRITICAL CARE ATTENDING COMMENT
history and physical     patient rejected by MICU - reeval called     on bipap transitioned to high flow     active lung cancer on chemo

## 2022-05-23 NOTE — ED ADULT NURSE REASSESSMENT NOTE - NS ED NURSE REASSESS COMMENT FT1
pt remains on High flow at this time, tachypneic but improved from previously. pt is AOX3, IV sites patent, sitting up in stretcher. high flow set to 60 LPM @ 100% 02. pt with no change in mental status, MICU at bedside for re-eval and pt to be admitted to step down unit. pt aware of plan of car, will monitor.

## 2022-05-23 NOTE — ED ADULT NURSE REASSESSMENT NOTE - NS ED NURSE REASSESS COMMENT FT1
pt remains AOX3 on high flow, 60LPM @ 100% 02. pt IV sites patent, pt appears more comfortable with a decreased work of breathing. awaiting bed assignment. report handed off to [facility] RN at this time, RT paged and patient care assumed by holding RN.

## 2022-05-23 NOTE — ED ADULT NURSE NOTE - NSICDXPASTMEDICALHX_GEN_ALL_CORE_FT
Lives by herself   no alcohol  no smoking  no drugs  Not sexually active the past few years, pregnant twice, one live birth PAST MEDICAL HISTORY:  Alcohol abuse     Hypertension, unspecified type     Poor historian

## 2022-05-23 NOTE — ED PROVIDER NOTE - OBJECTIVE STATEMENT
57 yo M hx of copd and lung ca on chemo p/w sob x 3 days. Patient found to have fever, tachycardia, and hypoxia to 81%. no home O2 use. not vaccinated for covid.

## 2022-05-23 NOTE — ED PROVIDER NOTE - PROGRESS NOTE DETAILS
cxr showed diffuse right sided infiltrate, more comfortable on BIPAP, able to talk more, still tachypnic RR 40-50s. ICU consulted. patient seen by ICU, stable for step down.

## 2022-05-23 NOTE — ED ADULT NURSE REASSESSMENT NOTE - NS ED NURSE REASSESS COMMENT FT1
pt switched from Bipap to high flow at this time. pt remains awake and alert, remains tachypneic and sinus tach on cardiac monitor. pt with no change in mental status since arrival to ED. voids in urinal without assistance. IV sites patent, vanco infusing as ordered. pulmonary dr gonzalez at bedside for eval.

## 2022-05-23 NOTE — H&P ADULT - ASSESSMENT
56 year old male with pmh of alcohol abuse, htn, hld, cva on xarelto, lung cancer on chemo coming to the hospital with progressive shortness of breath since friday. patient states has had recent chemo as well. states currently having worsening shortness of breath. not on home 02. states has not taken covid vaccine. +fever +chills. sob worsens with exertion.     #acute hypoxic respiratory failure  - 2/2 pneumonia in immunocompromised patient (pneumonia probable gram negative) present on admission w/ sepsis present on admission   - in patient with lung cancer on chemo- follows with heme/onc Dr Sharpe  - w/ tachypnea, leukocytosis and lactic acidosis  - admit to medicine - step down  - micu reeval pending- d/w Dr Carroll - MICU- initially rejected  - s/p bipap changed to high flow however tachypneic and hypoxic  - pulmonary consult appreciated  - iv steroids  - duoneb  - supportive care  - tiotropium  - ID consult pending   - azithro, vanco cefepime    #HLD   - statin    #hx of cva  - xarelto     #HTN- Essential   - monitor blood pressure  - norvasc    #hx of alcohol abuse  - ciwa prn  - mvi, thiamine, folic acid  56 year old male with pmh of alcohol abuse, htn, hld, cva on xarelto, lung cancer on chemo coming to the hospital with progressive shortness of breath since friday. patient states has had recent chemo as well. states currently having worsening shortness of breath. not on home 02. states has not taken covid vaccine. +fever +chills. sob worsens with exertion.     #acute hypoxic respiratory failure  - 2/2 pneumonia in immunocompromised patient (pneumonia probable gram negative) present on admission w/ sepsis present on admission   - in patient with lung cancer on chemo- follows with heme/onc Dr Sharpe  - w/ tachypnea, leukocytosis and lactic acidosis  - admit to medicine - step down  - MICU declined x 2 - consult appreciated  - s/p bipap changed to high flow however tachypneic and hypoxic  - pulmonary consult appreciated  - iv steroids  - duoneb  - supportive care  - tiotropium  - ID consult pending   - azithfinesse vanco cefepime  - wean 02 as tolerated     #HLD   - statin    #hx of cva  - xarelto     #HTN- Essential   - monitor blood pressure  - norvasc    #hx of alcohol abuse  - ciwa prn  - mvi, thiamine, folic acid  56 year old male with pmh of alcohol abuse, htn, hld, cva on xarelto, lung cancer on chemo coming to the hospital with progressive shortness of breath since friday. patient states has had recent chemo as well. states currently having worsening shortness of breath. not on home 02. states has not taken covid vaccine. +fever +chills. sob worsens with exertion.     #acute hypoxic respiratory failure  - 2/2 pneumonia in immunocompromised patient (pneumonia probable gram negative) present on admission w/ sepsis present on admission   - in patient with lung cancer on chemo- follows with heme/onc Dr Sharpe  - w/ tachypnea, leukocytosis and lactic acidosis  - admit to medicine - step down  - MICU declined x 2 - consult appreciated  - s/p bipap changed to high flow however tachypneic and hypoxic  - pulmonary consult appreciated  - iv steroids  - duoneb  - supportive care  - tiotropium  - ID consult pending   - azithro vanco cefepime  - wean 02 as tolerated     #HLD   - statin    #hx of cva  - xarelto     #tobacco abuse  - current 1ppd smoker  - cessation advised - 5 minutes spent     #HTN- Essential   - monitor blood pressure  - norvasc    #hx of alcohol abuse  - ciwa prn  - mvi, thiamine, folic acid

## 2022-05-23 NOTE — ED ADULT NURSE REASSESSMENT NOTE - NS ED NURSE REASSESS COMMENT FT1
pt made more comfortable, remains on Bipap with stable vital signs, reports he feels better. remains slightly tachypneic. IV sites patent, ABX and IVF infusing as ordered through patent IV sites. MICU at bedside for consult.

## 2022-05-23 NOTE — CONSULT NOTE ADULT - SUBJECTIVE AND OBJECTIVE BOX
REASON FOR CONSULT: : shortness of breath, BiPAP    CONSULT REQUESTED BY: Albert    Patient is a 56y old  Male hx of small cell lung cancer (follows w/ dr. basilio at [facility] last chemo treatment was in march per patient), COPD, smoker, who presents with a chief complaint of shortness of breath for the past 3 days. endorsing worsening cough compared to baseline. febrile, tachycardic on arrival, sepsis protocol initiated by ER team. empiric coverage w/ vancomycin and cefepime given with 30cc/kg bolus. nebs/steroids given. patient started on BIPAP for increased work of breath, tachypnea and has clinically improved.         PAST MEDICAL & SURGICAL HISTORY:  Hypertension, unspecified type      Alcohol abuse      Poor historian      H/O fracture of right hip        Allergies    penicillins (Unknown)  shellfish (Unknown)    Intolerances      FAMILY HISTORY:  No pertinent family history in first degree relatives        Review of Systems:  CONSTITUTIONAL: No fever, chills, or fatigue  EYES: No eye pain, visual disturbances, or discharge  ENMT:  No difficulty hearing, tinnitus, vertigo; No sinus or throat pain  NECK: No pain or stiffness  RESPIRATORY: + cough, Denies wheezing, chills or hemoptysis;+ shortness of breath  CARDIOVASCULAR: No chest pain, palpitations, dizziness, or leg swelling  GASTROINTESTINAL: No abdominal or epigastric pain. No nausea, vomiting, or hematemesis; No diarrhea or constipation. No melena or hematochezia.  GENITOURINARY: No dysuria, frequency, hematuria, or incontinence  NEUROLOGICAL: No headaches, memory loss, loss of strength, numbness, or tremors  SKIN: No itching, burning, rashes, or lesions   MUSCULOSKELETAL: No joint pain or swelling; No muscle, back, or extremity pain  PSYCHIATRIC: No depression, anxiety, mood swings, or difficulty sleeping      Medications:      albuterol/ipratropium for Nebulization. 3 milliLiter(s) Nebulizer once  albuterol/ipratropium for Nebulization. 3 milliLiter(s) Nebulizer once  albuterol/ipratropium for Nebulization. 3 milliLiter(s) Nebulizer once  albuterol/ipratropium for Nebulization. 3 milliLiter(s) Nebulizer once  albuterol/ipratropium for Nebulization. 3 milliLiter(s) Nebulizer once                               Vital Signs Last 24 Hrs  T(C): 38.6 (23 May 2022 10:40), Max: 38.6 (23 May 2022 10:40)  T(F): 101.5 (23 May 2022 10:40), Max: 101.5 (23 May 2022 10:40)  HR: 120 (23 May 2022 11:45) (89 - 143)  BP: 127/91 (23 May 2022 11:45) (112/78 - 127/91)  BP(mean): --  ABP: --  ABP(mean): --  RR: 26 (23 May 2022 11:45) (20 - 32)  SpO2: 100% (23 May 2022 11:45) (81% - 100%)    Vital Signs Last 24 Hrs  T(C): 38.6 (23 May 2022 10:40), Max: 38.6 (23 May 2022 10:40)  T(F): 101.5 (23 May 2022 10:40), Max: 101.5 (23 May 2022 10:40)  HR: 120 (23 May 2022 11:45) (89 - 143)  BP: 127/91 (23 May 2022 11:45) (112/78 - 127/91)  BP(mean): --  RR: 26 (23 May 2022 11:45) (20 - 32)  SpO2: 100% (23 May 2022 11:45) (81% - 100%)    ABG - ( 23 May 2022 11:23 )  pH, Arterial: 7.500 pH, Blood: x     /  pCO2: 33    /  pO2: 92    / HCO3: 26    / Base Excess: 2.5   /  SaO2: 98.8                I&O's Detail        LABS:                        11.4   20.46 )-----------( 459      ( 23 May 2022 11:23 )             36.3     05-23    139  |  98  |  14.3  ----------------------------<  137<H>  3.9   |  22.0  |  1.02    Ca    9.3      23 May 2022 11:23    TPro  7.8  /  Alb  3.8  /  TBili  0.6  /  DBili  x   /  AST  39  /  ALT  26  /  AlkPhos  130<H>  05-23          CAPILLARY BLOOD GLUCOSE        PT/INR - ( 23 May 2022 11:23 )   PT: 17.0 sec;   INR: 1.46 ratio         PTT - ( 23 May 2022 11:23 )  PTT:30.6 sec    CULTURES:      Physical Examination:    General: No acute distress.  Alert, oriented, interactive, nonfocal    HEENT: Pupils equal, reactive to light.  Symmetric.    PULM: +rhonchi, no wheezing. mildly tachypneic.     CVS: Regular rate and rhythm, no murmurs, rubs, or gallops    ABD: Soft, nondistended, nontender, normoactive bowel sounds, no masses    EXT: No edema, nontender    SKIN: Warm and well perfused, no rashes noted.    RADIOLOGY: Volume loss right thorax with significant interval increase in right lung parenchymal opacity. Clinical correlation and follow-up suggested.    CRITICAL CARE TIME SPENT: 30 mins    Assessment and Plan:     Patient is a 56y old  Male hx of small cell lung cancer (follows w/ dr. basilio at [facility] last chemo treatment was in march per patient), COPD, smoker, who presents with a chief complaint of shortness of breath for the past 3 days.     Shortness of Breath/ Hypoxia  -patient currently on bipap 14/8, rate 12 @ 40%, saturating in high 90s, not in respiratory distress   -ABG on bipap: 7.50/33/92/26 @ 50% - respiratory alkalosis - clinically correlates w/ tachypnea, will give 2mg morphine to help w/ bipap mask tolerance / respiratory rate   -pulmonary consult recommended   -would repeat ABG and assess patient clinically if patient continues to improve on bipap if improved   -can continue nebs/steroids     Sepsis   -leukocytosis 20.46, febrile 101.5, trend WBC/temp  -likely pulmonary source of infection give new R sided opacity on lung cxr, consider CT scan chest   -empiric coverage w/ vancomycin/cefepime   -30cc/kg NS bolus given   -lactate 2.30 initially, trend s/p fluid bolus   -continue antibiotics pending blood/urine culture results  -tylenol PRN for fever     Diet  -keep NPO while on BiPAP    Patient does not clinically require ICU level of care. recommend admission to stepdown. Please re-consult at any time if escalation of care is warranted.      REASON FOR CONSULT: : shortness of breath, BiPAP    CONSULT REQUESTED BY: Albert    Patient is a 56y old  Male hx of small cell lung cancer (follows w/ dr. basilio at [facility] last chemo treatment was in march per patient), COPD, smoker, who presents with a chief complaint of shortness of breath for the past 3 days. endorsing worsening cough compared to baseline. febrile, tachycardic on arrival, sepsis protocol initiated by ER team. empiric coverage w/ vancomycin and cefepime given with 30cc/kg bolus. nebs/steroids given. patient started on BIPAP for increased work of breath, tachypnea and has clinically improved.         PAST MEDICAL & SURGICAL HISTORY:  Hypertension, unspecified type      Alcohol abuse      Poor historian      H/O fracture of right hip        Allergies    penicillins (Unknown)  shellfish (Unknown)    Intolerances      FAMILY HISTORY:  No pertinent family history in first degree relatives        Review of Systems:  CONSTITUTIONAL: No fever, chills, or fatigue  EYES: No eye pain, visual disturbances, or discharge  ENMT:  No difficulty hearing, tinnitus, vertigo; No sinus or throat pain  NECK: No pain or stiffness  RESPIRATORY: + cough, Denies wheezing, chills or hemoptysis;+ shortness of breath  CARDIOVASCULAR: No chest pain, palpitations, dizziness, or leg swelling  GASTROINTESTINAL: No abdominal or epigastric pain. No nausea, vomiting, or hematemesis; No diarrhea or constipation. No melena or hematochezia.  GENITOURINARY: No dysuria, frequency, hematuria, or incontinence  NEUROLOGICAL: No headaches, memory loss, loss of strength, numbness, or tremors  SKIN: No itching, burning, rashes, or lesions   MUSCULOSKELETAL: No joint pain or swelling; No muscle, back, or extremity pain  PSYCHIATRIC: No depression, anxiety, mood swings, or difficulty sleeping      Medications:      albuterol/ipratropium for Nebulization. 3 milliLiter(s) Nebulizer once  albuterol/ipratropium for Nebulization. 3 milliLiter(s) Nebulizer once  albuterol/ipratropium for Nebulization. 3 milliLiter(s) Nebulizer once  albuterol/ipratropium for Nebulization. 3 milliLiter(s) Nebulizer once  albuterol/ipratropium for Nebulization. 3 milliLiter(s) Nebulizer once                               Vital Signs Last 24 Hrs  T(C): 38.6 (23 May 2022 10:40), Max: 38.6 (23 May 2022 10:40)  T(F): 101.5 (23 May 2022 10:40), Max: 101.5 (23 May 2022 10:40)  HR: 120 (23 May 2022 11:45) (89 - 143)  BP: 127/91 (23 May 2022 11:45) (112/78 - 127/91)  BP(mean): --  ABP: --  ABP(mean): --  RR: 26 (23 May 2022 11:45) (20 - 32)  SpO2: 100% (23 May 2022 11:45) (81% - 100%)    Vital Signs Last 24 Hrs  T(C): 38.6 (23 May 2022 10:40), Max: 38.6 (23 May 2022 10:40)  T(F): 101.5 (23 May 2022 10:40), Max: 101.5 (23 May 2022 10:40)  HR: 120 (23 May 2022 11:45) (89 - 143)  BP: 127/91 (23 May 2022 11:45) (112/78 - 127/91)  BP(mean): --  RR: 26 (23 May 2022 11:45) (20 - 32)  SpO2: 100% (23 May 2022 11:45) (81% - 100%)    ABG - ( 23 May 2022 11:23 )  pH, Arterial: 7.500 pH, Blood: x     /  pCO2: 33    /  pO2: 92    / HCO3: 26    / Base Excess: 2.5   /  SaO2: 98.8                I&O's Detail        LABS:                        11.4   20.46 )-----------( 459      ( 23 May 2022 11:23 )             36.3     05-23    139  |  98  |  14.3  ----------------------------<  137<H>  3.9   |  22.0  |  1.02    Ca    9.3      23 May 2022 11:23    TPro  7.8  /  Alb  3.8  /  TBili  0.6  /  DBili  x   /  AST  39  /  ALT  26  /  AlkPhos  130<H>  05-23          CAPILLARY BLOOD GLUCOSE        PT/INR - ( 23 May 2022 11:23 )   PT: 17.0 sec;   INR: 1.46 ratio         PTT - ( 23 May 2022 11:23 )  PTT:30.6 sec    CULTURES:      Physical Examination:    General: No acute distress.  Alert, oriented, interactive, nonfocal    HEENT: Pupils equal, reactive to light.  Symmetric.    PULM: +rhonchi, no wheezing. mildly tachypneic.     CVS: Regular rate and rhythm, no murmurs, rubs, or gallops    ABD: Soft, nondistended, nontender, normoactive bowel sounds, no masses    EXT: No edema, nontender    SKIN: Warm and well perfused, no rashes noted.    RADIOLOGY: Volume loss right thorax with significant interval increase in right lung parenchymal opacity. Clinical correlation and follow-up suggested.    CRITICAL CARE TIME SPENT: 30 mins    Assessment and Plan:     Patient is a 56y old  Male hx of small cell lung cancer (follows w/ dr. basilio at [facility] last chemo treatment was in march per patient), COPD, smoker, who presents with a chief complaint of shortness of breath for the past 3 days.     Shortness of Breath/ Hypoxia  -patient currently on bipap 14/8, rate 12 @ 40%, saturating in high 90s, not in respiratory distress   -ABG on bipap: 7.50/33/92/26 @ 50% - respiratory alkalosis - clinically correlates w/ tachypnea, will give 2mg morphine to help w/ bipap mask tolerance / respiratory rate   -pulmonary consult recommended   -would repeat ABG and assess patient clinically if patient continues to improve on bipap if improved   -can continue nebs/steroids , rhonchi on exam  -likely due to pneumonia in setting of sepsis and R sided opacity on cxr     Sepsis   -leukocytosis 20.46, febrile 101.5, trend WBC/temp  -likely pulmonary source of infection give new R sided opacity on lung cxr, consider CT scan chest   -empiric coverage w/ vancomycin/cefepime   -30cc/kg NS bolus given   -lactate 2.30 initially, trend s/p fluid bolus   -continue antibiotics pending blood/urine culture results  -tylenol PRN for fever     Diet  -keep NPO while on BiPAP    Patient does not clinically require ICU level of care. recommend admission to stepdown. Please re-consult at any time if escalation of care is warranted.

## 2022-05-23 NOTE — ED ADULT NURSE NOTE - OBJECTIVE STATEMENT
pt comes to ED with reports of SOB worsening over the past few days. pt is awake, alert, oriented, severe resp distress noted jorge rrival. pt hypoxic and febrile. skin warm to touch, reports no nausea/vomiting. states he was dx with lung cancer last October, last chemo was March 17th and he is due to begin another round of chemo this month. pt unable to complete sentences due to resp distress.

## 2022-05-24 NOTE — PROGRESS NOTE ADULT - ASSESSMENT
Acute hypoxemic resp failure  Stage 3A, NSCLA, Sq cell by BAL RUL, post RT ( finished 4/22, and chemo ? Feb -March), has not started Durvalumab  last PFT 2020, no air flow obstruction, continued nicotine addiction  On full dose AC for carotid stenosis, recent CVA   Progressive lung infiltrates R, fever, covid negative  Rx as pneumonia, differential RT pneumonitis, doubt progressive malignancy CT scan 5/2 improved PET positive infiltrates   Currently tolerating HFNC 40/70, remains tachypneic, no paradox  continue abx, wean medrol  repeat CXR today  CT chest when able to safely travel  prognosis guarded, rapid response - recall ICU if change in status- remains critically ill

## 2022-05-24 NOTE — PROGRESS NOTE ADULT - CRITICAL CARE ATTENDING COMMENT
greater than 50% of time spent reviewing labs, notes, orders and radiographs, coordinating care  discussed with med and pt at bedside

## 2022-05-24 NOTE — CONSULT NOTE ADULT - SUBJECTIVE AND OBJECTIVE BOX
[facility]                                                INFECTIOUS DISEASES  =======================================================                               Mendez Johnson MD#  Eris Rodriguez MD*                                     Mukesh Burdick MD*    Hannah Yee MD*            Diplomates American Board of Internal Medicine & Infectious Diseases                  # [place] Office - Appt - Tel  [phone] Fax [phone]                * [place] Office - Appt - Tel [phone] Fax [phone]                                  Hospital Consult line:  [phone]  =======================================================    [id number]  CATHY REARDONNER   HPI: This 56 year old male with pmh of alcohol abuse, htn, hld, cva on xarelto, lung cancer on chemo coming to the hospital with progressive shortness of breath since friday. patient states has had recent chemo as well. states currently having worsening shortness of breath. not on home 02. states has not taken covid vaccine. +fever +chills. sob worsens with exertion.  (23 May 2022 15:58)    he had fever of 101 on admission.  patient had a XR of the chest which showed:  Volume loss right thorax with significant interval increase in right lung parenchymal opacity. Clinical correlation and follow-up suggested.  Due to his penicillin allergies, he was started on Zithromax, Vanco, and Aztreonam.        I have personally reviewed the labs and data; pertinent labs and data are listed in this note; please see below.   =======================================================  Past Medical & Surgical Hx:  =====================  PAST MEDICAL & SURGICAL HISTORY:  Hypertension, unspecified type  Alcohol abuse  Poor historian  H/O fracture of right hip      Problem List:  ==========  HEALTH ISSUES - PROBLEM Dx:  Acute hypoxemic respiratory failure  Multilobar lung infiltrate  Pneumonia  Radiation pneumonitis      Social Hx:  =======  no toxic habits currently    FAMILY HISTORY:  FH: renal failure  - on HD    no significant family history of immunosuppressive disorders in mother or father   =======================================================    REVIEW OF SYSTEMS:  CONSTITUTIONAL:  No Fever or chills  HEENT:  No diplopia or blurred vision.  No earache, sore throat or runny nose.  CARDIOVASCULAR:  No pressure, squeezing, strangling, tightness, heaviness or aching about the chest, neck, axilla or epigastrium.  RESPIRATORY:   + SOB  GASTROINTESTINAL:  No nausea, vomiting or diarrhea.  GENITOURINARY:  No dysuria, frequency or urgency. No Blood in urine  MUSCULOSKELETAL:  no joint aches, no muscle pain  SKIN:  No change in skin, hair or nails.  NEUROLOGIC:  No Headaches, seizures or weakness.  PSYCHIATRIC:  No disorder of thought or mood.  ENDOCRINE:  No heat or cold intolerance  HEMATOLOGICAL:  No easy bruising or bleeding.    =======================================================  Allergies  penicillins (Unknown)  shellfish (Unknown)    Antibiotics:  azithromycin  IVPB 500 milliGRAM(s) IV Intermittent every 24 hours  cefepime   IVPB 2000 milliGRAM(s) IV Intermittent every 12 hours  vancomycin  IVPB 750 milliGRAM(s) IV Intermittent every 12 hours    Other medications:  albuterol/ipratropium for Nebulization 3 milliLiter(s) Nebulizer every 6 hours  amLODIPine   Tablet 10 milliGRAM(s) Oral daily  atorvastatin 40 milliGRAM(s) Oral at bedtime  folic acid 1 milliGRAM(s) Oral daily  gabapentin 200 milliGRAM(s) Oral two times a day  methylPREDNISolone sodium succinate Injectable 40 milliGRAM(s) IV Push every 6 hours  multivitamin 1 Tablet(s) Oral daily  rivaroxaban 20 milliGRAM(s) Oral daily  thiamine 100 milliGRAM(s) Oral daily    azithromycin  IVPB   255 mL/Hr IV Intermittent (05-23-22 @ 18:33)    cefepime   IVPB   100 mL/Hr IV Intermittent (05-23-22 @ 11:00)   100 mL/Hr IV Intermittent (05-23-22 @ 22:19)   100 mL/Hr IV Intermittent (05-24-22 @ 11:50)    vancomycin  IVPB   250 mL/Hr IV Intermittent (05-23-22 @ 12:46)   250 mL/Hr IV Intermittent (05-24-22 @ 00:56)   250 mL/Hr IV Intermittent (05-24-22 @ 11:51)      ======================================================  Physical Exam:  ============  T(F): 98.4 (24 May 2022 12:00), Max: 99.3 (23 May 2022 16:10)  HR: 92 (24 May 2022 12:00)  BP: 108/71 (24 May 2022 12:00)  RR: 24 (24 May 2022 12:00)  SpO2: 97% (24 May 2022 12:00) (92% - 100%)  temp max in last 48H T(F): , Max: 101.5 (05-23-22 @ 10:40)    General:  No acute distress. THIN FRAIL  Eye: Pupils are equal, round and reactive to light, Extraocular movements are intact, Normal conjunctiva.  HENT: Normocephalic, Oral mucosa is dry  + HF oxygen  Neck: Supple, No lymphadenopathy.  Respiratory: Lungs with bronchial breath sounds.   Cardiovascular: Normal rate, Regular rhythm,   Gastrointestinal: Soft, Non-tender, Non-distended, Normal bowel sounds.  Genitourinary: No costovertebral angle tenderness.  Lymphatics: No lymphadenopathy neck,   Musculoskeletal: Normal range of motion, Normal strength.  Integumentary: No rash.  Neurologic: Alert, Oriented, No focal deficits, Cranial Nerves II-XII are grossly intact.  Psychiatric: Appropriate mood & affect.    =======================================================  Labs:                        10.1   18.54 )-----------( 353      ( 24 May 2022 06:30 )             30.9     05-24    135  |  99  |  11.9  ----------------------------<  136<H>  4.1   |  23.0  |  0.55    Ca    9.0      24 May 2022 06:30    TPro  7.8  /  Alb  3.8  /  TBili  0.6  /  DBili  x   /  AST  39  /  ALT  26  /  AlkPhos  130<H>  05-23      Creatinine, Serum: 0.55 mg/dL (05-24-22 @ 06:30)  Creatinine, Serum: 1.02 mg/dL (05-23-22 @ 11:23)        Procalcitonin, Serum: 0.76 ng/mL (05-24-22 @ 06:27)    SARS-CoV-2: NotDetec (05-23-22 @ 12:00)         < from: Xray Chest 1 View-PORTABLE IMMEDIATE (05.23.22 @ 11:17) >  ACC: [id number] EXAM:  XR CHEST PORTABLE IMMED 1V                        PROCEDURE DATE:  05/23/2022    INTERPRETATION:  INDICATION: Sepsis  PRIORS: 12/14/2021.    VIEWS: Portable AP radiography of the chest performed.  FINDINGS: Volume loss right thorax. Heart size appears within normal limits. Significant interval increase in right lung parenchymal opacity. No evidence for acute left-sided infiltrate. No pleural effusion. No pneumothorax. No mediastinal shift. No acute osseousfractures.    IMPRESSION: Volume loss right thorax with significant interval increase in right lung parenchymal opacity. Clinical correlation and follow-up suggested.    --- End of Report ---      AJZZMINE CHAVARRIA MD; Attending Radiologist  This document has been electronically signed. May 23 2022 11:30AM    < end of copied text >

## 2022-05-24 NOTE — PATIENT PROFILE ADULT - FALL HARM RISK - HARM RISK INTERVENTIONS
Assistance with ambulation/Assistance OOB with selected safe patient handling equipment/Communicate Risk of Fall with Harm to all staff/Discuss with provider need for PT consult/Monitor for mental status changes/Monitor gait and stability/Provide patient with walking aids - walker, cane, crutches/Reinforce activity limits and safety measures with patient and family/Tailored Fall Risk Interventions/Toileting schedule using arm’s reach rule for commode and bathroom/Use of alarms - bed, chair and/or voice tab/Visual Cue: Yellow wristband and red socks/Bed in lowest position, wheels locked, appropriate side rails in place/Call bell, personal items and telephone in reach/Instruct patient to call for assistance before getting out of bed or chair/Non-slip footwear when patient is out of bed/[place] to call system/Physically safe environment - no spills, clutter or unnecessary equipment/Purposeful Proactive Rounding/Room/bathroom lighting operational, light cord in reach

## 2022-05-24 NOTE — PROGRESS NOTE ADULT - SUBJECTIVE AND OBJECTIVE BOX
Patient is a 56y old  Male who presents with a chief complaint of hypoxia (24 May 2022 13:34)    Patient seen and examined at bedside.     ALLERGIES:  penicillins (Unknown)  shellfish (Unknown)    MEDICATIONS  (STANDING):  albuterol/ipratropium for Nebulization 3 milliLiter(s) Nebulizer every 6 hours  amLODIPine   Tablet 10 milliGRAM(s) Oral daily  atorvastatin 40 milliGRAM(s) Oral at bedtime  azithromycin  IVPB 500 milliGRAM(s) IV Intermittent every 24 hours  cefepime   IVPB 2000 milliGRAM(s) IV Intermittent every 12 hours  folic acid 1 milliGRAM(s) Oral daily  gabapentin 200 milliGRAM(s) Oral two times a day  methylPREDNISolone sodium succinate Injectable 40 milliGRAM(s) IV Push every 6 hours  multivitamin 1 Tablet(s) Oral daily  rivaroxaban 20 milliGRAM(s) Oral daily  thiamine 100 milliGRAM(s) Oral daily  vancomycin  IVPB 750 milliGRAM(s) IV Intermittent every 12 hours    MEDICATIONS  (PRN):  acetaminophen     Tablet .. 650 milliGRAM(s) Oral every 6 hours PRN Temp greater or equal to 38C (100.4F), Mild Pain (1 - 3)  ALBUTerol    90 MICROgram(s) HFA Inhaler 2 Puff(s) Inhalation every 6 hours PRN Shortness of Breath and/or Wheezing  aluminum hydroxide/magnesium hydroxide/simethicone Suspension 30 milliLiter(s) Oral every 4 hours PRN Dyspepsia  LORazepam   Injectable 2 milliGRAM(s) IV Push every 1 hour PRN Symptom-triggered: each CIWA -Ar score 8 or GREATER  melatonin 3 milliGRAM(s) Oral at bedtime PRN Insomnia  ondansetron Injectable 4 milliGRAM(s) IV Push every 8 hours PRN Nausea and/or Vomiting    Vital Signs Last 24 Hrs  T(F): 98.4 (24 May 2022 12:00), Max: 99.3 (23 May 2022 16:10)  HR: 90 (24 May 2022 14:39) (75 - 104)  BP: 108/71 (24 May 2022 12:00) (95/59 - 121/64)  RR: 24 (24 May 2022 12:00) (24 - 32)  SpO2: 97% (24 May 2022 14:39) (92% - 100%)  I&O's Summary    24 May 2022 07:01  -  24 May 2022 15:21  --------------------------------------------------------  IN: 300 mL / OUT: 350 mL / NET: -50 mL      PHYSICAL EXAM:  General: NAD, Alert  ENT: MMM, no thrush  Neck: Supple, No JVD  Lungs: decreased breath sounds   Cardio: +s1/s2  Abdomen: Soft, Nontender, Nondistended; Bowel sounds present  Extremities: No calf tenderness    LABS:                        10.1   18.54 )-----------( 353      ( 24 May 2022 06:30 )             30.9         135  |  99  |  11.9  ----------------------------<  136  4.1   |  23.0  |  0.55    Ca    9.0      24 May 2022 06:30    TPro  7.8  /  Alb  3.8  /  TBili  0.6  /  DBili  x   /  AST  39  /  ALT  26  /  AlkPhos  130      PT/INR - ( 23 May 2022 11:23 )   PT: 17.0 sec;   INR: 1.46 ratio    PTT - ( 23 May 2022 11:23 )  PTT:30.6 sec    Procalcitonin, Serum: 0.76 ng/mL (22 @ 06:27)    11:20 - VBG - pH: 7.410 | pCO2: 39    | pO2: 57    | Lactate: 2.30     ABG - ( 23 May 2022 16:25 )  pH, Arterial: 7.420 pH, Blood: x     /  pCO2: 39    /  pO2: 90    / HCO3: 25    / Base Excess: 0.8   /  SaO2: 98.2      Urinalysis Basic - ( 23 May 2022 17:00 )  Color: Yellow / Appearance: Clear / S.020 / pH: x  Gluc: x / Ketone: Small  / Bili: Negative / Urobili: 1 mg/dL   Blood: x / Protein: 100 / Nitrite: Negative   Leuk Esterase: Negative / RBC: 3-5 /HPF / WBC 0-2 /HPF   Sq Epi: x / Non Sq Epi: Few / Bacteria: Occasional    RADIOLOGY & ADDITIONAL TESTS:  - no new tests

## 2022-05-24 NOTE — PROGRESS NOTE ADULT - ASSESSMENT
56 year old male with pmh of alcohol abuse, htn, hld, cva on xarelto, lung cancer on chemo coming to the hospital with progressive shortness of breath     #acute hypoxic respiratory failure  - 2/2 pneumonia in immunocompromised patient (pneumonia probable gram negative) present on admission w/ sepsis present on admission   - in patient with lung cancer on chemo- follows with heme/onc Dr Sharpe  - w/ tachypnea, leukocytosis and lactic acidosis  - MICU consult appreciated  - s/p bipap changed to high flow weaning as tolerated  - pulmonary consult appreciated  - iv steroids  - duoneb  - supportive care  - ID consult appreciated- abx per ID    #HLD   - statin    #hx of cva  - xarelto     #tobacco abuse  - current 1ppd smoker    #HTN- Essential   - monitor blood pressure  - norvasc    #hx of alcohol abuse  - ciwa prn  - mvi, thiamine, folic acid

## 2022-05-24 NOTE — CONSULT NOTE ADULT - ASSESSMENT
This 56 year old male with pmh of alcohol abuse, htn, hld, cva on xarelto, lung cancer on chemo coming to the hospital with progressive shortness of breath since friday. patient states has had recent chemo as well. states currently having worsening shortness of breath. not on home 02. states has not taken covid vaccine. +fever +chills. sob worsens with exertion.  (23 May 2022 15:58)    he had fever of 101 on admission.  patient had a XR of the chest which showed:  Volume loss right thorax with significant interval increase in right lung parenchymal opacity. Clinical correlation and follow-up suggested.  Due to his penicillin allergies, he was started on Zithromax, Vanco, and Aztreonam.        Impression:  fevers  SOB  lung infiltrate  lung cancer  penicillin allergy  WBC elevation    Plan:  - unsure if this is worsening of his lung cancer.  agree with current antibiotics:  azithromycin  IVPB 500 milliGRAM(s) IV Intermittent every 24 hours  cefepime   IVPB 2000 milliGRAM(s) IV Intermittent every 12 hours  vancomycin  IVPB 750 milliGRAM(s) IV Intermittent every 12 hours      - follow up all outstanding cultures  - trend temperature and WBC curve  - repeat cultures from blood and all sources if febrile.      WBC elevation is reactive  - will follow and trend    check sputum cx if not already sent.

## 2022-05-24 NOTE — PROGRESS NOTE ADULT - SUBJECTIVE AND OBJECTIVE BOX
PULMONARY PROGRESS NOTE      BETH [id number]    Patient is a 56y old  Male who presents with a chief complaint of hypoxia (23 May 2022 15:58)      INTERVAL HPI/OVERNIGHT EVENTS:  feels slightly better  no chest pain  has cough, not spitting up  remains on HFNC 40/70 -95%  MEDICATIONS  (STANDING):  albuterol/ipratropium for Nebulization 3 milliLiter(s) Nebulizer every 6 hours  amLODIPine   Tablet 10 milliGRAM(s) Oral daily  atorvastatin 40 milliGRAM(s) Oral at bedtime  azithromycin  IVPB 500 milliGRAM(s) IV Intermittent every 24 hours  cefepime   IVPB 2000 milliGRAM(s) IV Intermittent every 12 hours  folic acid 1 milliGRAM(s) Oral daily  gabapentin 200 milliGRAM(s) Oral two times a day  methylPREDNISolone sodium succinate Injectable 40 milliGRAM(s) IV Push every 6 hours  multivitamin 1 Tablet(s) Oral daily  rivaroxaban 20 milliGRAM(s) Oral daily  thiamine 100 milliGRAM(s) Oral daily  vancomycin  IVPB 750 milliGRAM(s) IV Intermittent every 12 hours      MEDICATIONS  (PRN):  acetaminophen     Tablet .. 650 milliGRAM(s) Oral every 6 hours PRN Temp greater or equal to 38C (100.4F), Mild Pain (1 - 3)  ALBUTerol    90 MICROgram(s) HFA Inhaler 2 Puff(s) Inhalation every 6 hours PRN Shortness of Breath and/or Wheezing  aluminum hydroxide/magnesium hydroxide/simethicone Suspension 30 milliLiter(s) Oral every 4 hours PRN Dyspepsia  LORazepam   Injectable 2 milliGRAM(s) IV Push every 1 hour PRN Symptom-triggered: each CIWA -Ar score 8 or GREATER  melatonin 3 milliGRAM(s) Oral at bedtime PRN Insomnia  ondansetron Injectable 4 milliGRAM(s) IV Push every 8 hours PRN Nausea and/or Vomiting      Allergies    penicillins (Unknown)  shellfish (Unknown)    Intolerances        PAST MEDICAL & SURGICAL HISTORY:  Hypertension, unspecified type      Alcohol abuse      Poor historian      H/O fracture of right hip          SOCIAL HISTORY  Smoking History:       REVIEW OF SYSTEMS:    CONSTITUTIONAL:  No distress    HEENT:  Eyes:  No diplopia or blurred vision. ENT:  No earache, sore throat or runny nose.    CARDIOVASCULAR:  No pressure, squeezing, tightness, heaviness or aching about the chest; no palpitations.    RESPIRATORY:  see HPI    GASTROINTESTINAL:  No nausea, vomiting or diarrhea.    GENITOURINARY:  No dysuria, frequency or urgency.    NEUROLOGIC:  No paresthesias, fasciculations, seizures or weakness.    PSYCHIATRIC:  No disorder of thought or mood.    Vital Signs Last 24 Hrs  T(C): 36.9 (24 May 2022 12:00), Max: 37.4 (23 May 2022 16:10)  T(F): 98.4 (24 May 2022 12:00), Max: 99.3 (23 May 2022 16:10)  HR: 92 (24 May 2022 12:00) (75 - 104)  BP: 108/71 (24 May 2022 12:00) (95/59 - 121/64)  BP(mean): 79 (24 May 2022 12:00) (68 - 83)  RR: 24 (24 May 2022 12:00) (24 - 32)  SpO2: 97% (24 May 2022 12:00) (92% - 100%)    PHYSICAL EXAMINATION:    GENERAL: The patient is awake and alert , remains dyspneic, no paradox    HEENT: Head is normocephalic and atraumatic. Extraocular muscles are intact. Mucous membranes are moist.    NECK: Supple.    LUNGS: moderate air entry bilat i; respirations unlabored    HEART: Regular rate and rhythm without murmur.    ABDOMEN: Soft, nontender, and nondistended.      EXTREMITIES: Without any cyanosis, clubbing, rash, lesions or edema.    NEUROLOGIC: Grossly intact.    LABS:                        10.1   18.54 )-----------( 353      ( 24 May 2022 06:30 )             30.9         135  |  99  |  11.9  ----------------------------<  136<H>  4.1   |  23.0  |  0.55    Ca    9.0      24 May 2022 06:30    TPro  7.8  /  Alb  3.8  /  TBili  0.6  /  DBili  x   /  AST  39  /  ALT  26  /  AlkPhos  130<H>  05-23    PT/INR - ( 23 May 2022 11:23 )   PT: 17.0 sec;   INR: 1.46 ratio         PTT - ( 23 May 2022 11:23 )  PTT:30.6 sec  Urinalysis Basic - ( 23 May 2022 17:00 )    Color: Yellow / Appearance: Clear / S.020 / pH: x  Gluc: x / Ketone: Small  / Bili: Negative / Urobili: 1 mg/dL   Blood: x / Protein: 100 / Nitrite: Negative   Leuk Esterase: Negative / RBC: 3-5 /HPF / WBC 0-2 /HPF   Sq Epi: x / Non Sq Epi: Few / Bacteria: Occasional      ABG - ( 23 May 2022 16:25 )  pH, Arterial: 7.420 pH, Blood: x     /  pCO2: 39    /  pO2: 90    / HCO3: 25    / Base Excess: 0.8   /  SaO2: 98.2                        Procalcitonin, Serum: 0.76 ng/mL (22 @ 06:27)      MICROBIOLOGY:    RADIOLOGY & ADDITIONAL STUDIES:

## 2022-05-24 NOTE — PATIENT PROFILE ADULT - DOMESTIC TRAVEL HIGH RISK QUESTION
SW spoke to pt in the ED.  Pt came to ED due to groin pain and bleeding.  Pt's PCP is [facility].  Pt will go home at the point of discharge.  Pt currently has no known needs.  SW provided pt with needed referrals in regards to pt's social issues.  Pt accepted and SW placed a health home referral.  SW provided pt with supportive counseling.  Case Management will remain available if needs present prior to d/c. No

## 2022-05-25 NOTE — PHARMACOTHERAPY INTERVENTION NOTE - COMMENTS
Spoke with patients wife, Estrella    BARRY Syed  Cardiovascular and Thoracic Surgery  [phone]        
Therapeutic dose adjustment accepted, 1 gram to 2 grams. 
Vancomycin level ordered 
Blood cultures negative, MRSA/MSSA PCR not detected. No evidence of MRSA can discontinue vancomycin. Discussed with ID.
MRSA PCR

## 2022-05-25 NOTE — PROGRESS NOTE ADULT - SUBJECTIVE AND OBJECTIVE BOX
Patient is a 56y old  Male who presents with a chief complaint of hypoxia (24 May 2022 15:21)    Patient seen and examined at bedside.    ALLERGIES:  penicillins (Unknown)  shellfish (Unknown)    MEDICATIONS  (STANDING):  albuterol/ipratropium for Nebulization 3 milliLiter(s) Nebulizer every 6 hours  amLODIPine   Tablet 10 milliGRAM(s) Oral daily  atorvastatin 40 milliGRAM(s) Oral at bedtime  azithromycin  IVPB 500 milliGRAM(s) IV Intermittent every 24 hours  cefepime   IVPB 2000 milliGRAM(s) IV Intermittent every 12 hours  folic acid 1 milliGRAM(s) Oral daily  gabapentin 200 milliGRAM(s) Oral two times a day  methylPREDNISolone sodium succinate Injectable 40 milliGRAM(s) IV Push every 6 hours  multivitamin 1 Tablet(s) Oral daily  rivaroxaban 20 milliGRAM(s) Oral daily  thiamine 100 milliGRAM(s) Oral daily  vancomycin  IVPB 1000 milliGRAM(s) IV Intermittent every 12 hours    MEDICATIONS  (PRN):  acetaminophen     Tablet .. 650 milliGRAM(s) Oral every 6 hours PRN Temp greater or equal to 38C (100.4F), Mild Pain (1 - 3)  ALBUTerol    90 MICROgram(s) HFA Inhaler 2 Puff(s) Inhalation every 6 hours PRN Shortness of Breath and/or Wheezing  aluminum hydroxide/magnesium hydroxide/simethicone Suspension 30 milliLiter(s) Oral every 4 hours PRN Dyspepsia  LORazepam   Injectable 2 milliGRAM(s) IV Push every 1 hour PRN Symptom-triggered: each CIWA -Ar score 8 or GREATER  melatonin 3 milliGRAM(s) Oral at bedtime PRN Insomnia  ondansetron Injectable 4 milliGRAM(s) IV Push every 8 hours PRN Nausea and/or Vomiting    Vital Signs Last 24 Hrs  T(F): 97.5 (25 May 2022 07:30), Max: 97.9 (25 May 2022 00:00)  HR: 98 (25 May 2022 10:31) (78 - 99)  BP: 104/71 (25 May 2022 08:00) (95/57 - 133/77)  RR: 25 (25 May 2022 08:00) (22 - 25)  SpO2: 97% (25 May 2022 08:00) (94% - 100%)  I&O's Summary    24 May 2022 07:01  -  25 May 2022 07:00  --------------------------------------------------------  IN: 300 mL / OUT: 350 mL / NET: -50 mL    PHYSICAL EXAM:  General: NAD, Alert  ENT: MMM, no thrush  Neck: Supple, No JVD  Lungs: decreased breath sounds   Cardio: +s1/s2  Abdomen: Soft, Nontender, Nondistended; Bowel sounds present  Extremities: No calf tenderness      LABS:                        10.1   18.54 )-----------( 353      ( 24 May 2022 06:30 )             30.9         135  |  99  |  11.9  ----------------------------<  136  4.1   |  23.0  |  0.55    Ca    9.0      24 May 2022 06:30    TPro  7.8  /  Alb  3.8  /  TBili  0.6  /  DBili  x   /  AST  39  /  ALT  26  /  AlkPhos  130            PT/INR - ( 23 May 2022 11:23 )   PT: 17.0 sec;   INR: 1.46 ratio         PTT - ( 23 May 2022 11:23 )  PTT:30.6 sec    Procalcitonin, Serum: 0.76 ng/mL (22 @ 06:27)            11:20 - VBG - pH: 7.410 | pCO2: 39    | pO2: 57    | Lactate: 2.30     ABG - ( 23 May 2022 16:25 )  pH, Arterial: 7.420 pH, Blood: x     /  pCO2: 39    /  pO2: 90    / HCO3: 25    / Base Excess: 0.8   /  SaO2: 98.2      Urinalysis Basic - ( 23 May 2022 17:00 )  Color: Yellow / Appearance: Clear / S.020 / pH: x  Gluc: x / Ketone: Small  / Bili: Negative / Urobili: 1 mg/dL   Blood: x / Protein: 100 / Nitrite: Negative   Leuk Esterase: Negative / RBC: 3-5 /HPF / WBC 0-2 /HPF   Sq Epi: x / Non Sq Epi: Few / Bacteria: Occasional    Cultures  Culture - Urine (collected 23 May 2022 22:29)  Source: Clean Catch Clean Catch (Midstream)  Final Report (24 May 2022 19:11):    No growth    RADIOLOGY & ADDITIONAL TESTS:  - no new tests

## 2022-05-25 NOTE — CONSULT NOTE ADULT - PROBLEM SELECTOR RECOMMENDATION 9
Consulted for CT chest findings that were suspicious for fistula vs perforation.  CT scan reviewed by Dr. Knox.  Pt will likely need bronchoscopy.  Stop Xarelto.  Needs to be held for 2-3 days before bronchosopy.  Discussed with Dr. Knox.  Will follow

## 2022-05-25 NOTE — PROGRESS NOTE ADULT - ASSESSMENT
56 year old male with pmh of alcohol abuse, htn, hld, cva on xarelto, lung cancer on chemo coming to the hospital with progressive shortness of breath     #acute hypoxic respiratory failure  - 2/2 pneumonia in immunocompromised patient (pneumonia probable gram negative) present on admission w/ sepsis present on admission   - in patient with lung cancer on chemo- follows with heme/onc Dr Sharpe  - w/ tachypnea, leukocytosis  - MICU consult appreciated  - patient is a primary mouth breather - satting 100% on nonrebreather wean to 50% ventimask wean as tolerated  - pulmonary consult appreciated  - iv steroids  - duoneb  - supportive care  - ID consult appreciated- abx per ID    #HLD   - statin    #hx of cva  - xarelto     #tobacco abuse  - current 1ppd smoker    #HTN- Essential   - monitor blood pressure  - norvasc    #hx of alcohol abuse  - ciwa prn  - mvi, thiamine, folic acid

## 2022-05-25 NOTE — CONSULT NOTE ADULT - SUBJECTIVE AND OBJECTIVE BOX
Surgeon: Lisette    Consult requesting by: Glen    HISTORY OF PRESENT ILLNESS:  56y Male with pmhx of small cell lung cancer (follows w/ dr. basilio at [facility] last chemo treatment was in march per patient.  Radiation completed as well), COPD, ETOH abuse, active 1ppd smoker, who presents with a chief complaint of worsening SOB and cough. On arrival he was found to be febrile and  tachycardic.   Sepsis protocol initiated by ER and he was placed on empiric coverage w/ vancomycin and cefepime, as well as nebs/steroids .   He required BIPAP for increased work of breathing and has clinically improved. Now on 50% Venti mask. Able to obtain CT scan chest today that showed Irregular appearance in wall thickening of the distal trachea and proximal mainstem bronchi with new extraluminal and amorphous gas in the subcarinal region communicating with the left mainstem bronchus medially and possibly the right mainstem bronchus. Findings are suspicious for   fistula or perforation.  Thoracic surgery consulted for CT scan findings.    Pt lying in bed on 50% VM.  NAD.  Denies CP or SOB.  Poor historian.        PAST MEDICAL & SURGICAL HISTORY:  Hypertension, unspecified type      Alcohol abuse      Poor historian      H/O fracture of right hip      MEDICATIONS  (STANDING):  albuterol/ipratropium for Nebulization 3 milliLiter(s) Nebulizer every 6 hours  amLODIPine   Tablet 10 milliGRAM(s) Oral daily  atorvastatin 40 milliGRAM(s) Oral at bedtime  azithromycin  IVPB 500 milliGRAM(s) IV Intermittent every 24 hours  cefepime   IVPB 2000 milliGRAM(s) IV Intermittent every 12 hours  folic acid 1 milliGRAM(s) Oral daily  gabapentin 200 milliGRAM(s) Oral two times a day  methylPREDNISolone sodium succinate Injectable 40 milliGRAM(s) IV Push every 6 hours  multivitamin 1 Tablet(s) Oral daily  rivaroxaban 20 milliGRAM(s) Oral daily  thiamine 100 milliGRAM(s) Oral daily    MEDICATIONS  (PRN):  acetaminophen     Tablet .. 650 milliGRAM(s) Oral every 6 hours PRN Temp greater or equal to 38C (100.4F), Mild Pain (1 - 3)  ALBUTerol    90 MICROgram(s) HFA Inhaler 2 Puff(s) Inhalation every 6 hours PRN Shortness of Breath and/or Wheezing  aluminum hydroxide/magnesium hydroxide/simethicone Suspension 30 milliLiter(s) Oral every 4 hours PRN Dyspepsia  LORazepam   Injectable 2 milliGRAM(s) IV Push every 1 hour PRN Symptom-triggered: each CIWA -Ar score 8 or GREATER  melatonin 3 milliGRAM(s) Oral at bedtime PRN Insomnia  ondansetron Injectable 4 milliGRAM(s) IV Push every 8 hours PRN Nausea and/or Vomiting    Antiplatelet therapy:                           Last dose/amt:    Allergies    penicillins (Unknown)  shellfish (Unknown)    Intolerances    SOCIAL HISTORY:  Smoker: [ x] Yes  [ ] No    1ppd smoker x 30 years      ETOH use: [x ] Yes  [ ] No     " I used to drink a lot but I cut back"        FREQUENCY / QUANTITY:  Ilicit Drug use:  [ ] Yes  [ x] No  Occupation: none  Live with: rented room in a house with 5 other men  Assisted device use: denies use    FAMILY HISTORY:  FH: renal failure  - on HD    Review of Systems  CONSTITUTIONAL:  Fevers[ ] chills[ ] sweats[ ] fatigue[ x] weight loss[ ] weight gain [ ]                                     NEGATIVE [ ]   NEURO:  parathesias[ ] seizures [ ]  syncope [ ]  confusion [ ]                                                                                NEGATIVE[x ]   EYES: glasses[ ]  blurry vision[ ]  discharge[ ] pain[ ] glaucoma [ ]                                                                          NEGATIVE[ x]   ENMT:  difficulty hearing [ ]  vertigo[ ]  dysphagia[ ] epistaxis[ ] recent dental work [ ]                                    NEGATIVE[x ]   CV:  chest pain[ ] palpitations[ ] KING [ ] diaphoresis [ ]                                                                                           NEGATIVE[x ]   RESPIRATORY:  wheezing[ ] SOB[x ] cough [x ] sputum[ ] hemoptysis[ ]                                                                  NEGATIVE[ ]   GI:  nausea[ ]  vommiting [ ]  diarrhea[ ] constipation [ ] melena [ ]                                                                      NEGATIVE[ x]   : hematuria[ ]  dysuria[ ] urgency[ ] incontinence[ ]                                                                                            NEGATIVE[x ]   MUSKULOSKELETAL:  arthritis[ ]  joint swelling [ ] muscle weakness [ ]                                                                NEGATIVE[x ]   SKIN/BREAST:  rash[ ] itching [ ]  hair loss[ ] masses[ ]                                                                                              NEGATIVE[x ]   PSYCH:  dementia [ ] depresion [ ] anxiety[ ]                                                                                                               NEGATIVE[x ]   HEME/LYMPH:  bruises easily[ ] enlarged lymph nodes[ ] tender lymph nodes[ ]                                               NEGATIVE[x ]   ENDOCRINE:  cold intolerance[ ] heat intolerance[ ] polydipsia[ ]                                                                          NEGATIVE[x ]     PHYSICAL EXAM  Vital Signs Last 24 Hrs  T(C): 36.8 (25 May 2022 17:13), Max: 36.8 (25 May 2022 13:03)  T(F): 98.2 (25 May 2022 17:13), Max: 98.3 (25 May 2022 13:03)  HR: 98 (25 May 2022 17:13) (78 - 99)  BP: 114/67 (25 May 2022 17:13) (95/57 - 139/81)  BP(mean): 77 (25 May 2022 13:03) (65 - 81)  RR: 20 (25 May 2022 17:13) (20 - 25)  SpO2: 96% (25 May 2022 17:13) (94% - 100%)    CONSTITUTIONAL:                                                                       Neuro: WNL[x ] Normal exam oriented to person/place & time with no focal motor or sensory  deficits. Other                     Eyes: WNL[x ]   Normal exam of conjunctiva & lids, pupils equally reactive. Other     ENT: WNL[x ]    Normal exam of nasal/oral mucosa with absence of cyanosis. Other  Neck: WNL[x ]  Normal exam of jugular veins, trachea & thyroid. Other  Chest: WNL[ ] Normal lung exam with good air movement absence of wheezes, rales, or rhonchi: Other  Rhonchi and diminished                                                                              CV:  Auscultation: normal [ x] S3[ ] S4[ ] Irregular [ ] Rub[ ] Clicks[ ]    Murmurs none:[ ]systolic [ ]  diastolic [ ] holosystolic [ ]  Carotids: No Bruits[x ] Other                 Abdominal Aorta: normal [ ] nonpalpable[ ]Other                                                                                      GI:           WNL[x ] Normal exam of abdomen, liver & spleen with no noted masses or tenderness. Other                                                                                                        Extremities: WNL[x ] Normal no evidence of cyanosis or deformity Edema: none[x ]trace[ ]1+[ ]2+[ ]3+[ ]4+[ ]  Lower Extremity Pulses: Right[ pp] Left[pp ]  SKIN :WNL[x ] Normal exam to inspection & palption. Other:                                                          LABS:                        10.1   18.54 )-----------( 353      ( 24 May 2022 06:30 )             30.9     05-24    135  |  99  |  11.9  ----------------------------<  136<H>  4.1   |  23.0  |  0.55    Ca    9.0      24 May 2022 06:30        < from: CT Chest No Cont (05.25.22 @ 13:51) >  IMPRESSION:.    Irregular appearance in wall thickening of the distal trachea and   proximal mainstem bronchi with new extraluminal and amorphous gas in the   subcarinal region communicating with the left mainstem bronchus medially   and possibly the right mainstem bronchus. Findings are suspicious for   fistula or perforation. Discussed with Dr. Carroll on 5/25/2022 at 2:28 pm.    New diffuse right lung ground-glass opacities and patchy left lower lobe   multifocal consolidation likely secondary to aspiration or infection.    --- End of Report ---      JANY GUTHRIE MD; Attending Radiologist  This document has been electronically signed. May 25 2022  2:32PM    < end of copied text >

## 2022-05-25 NOTE — PROGRESS NOTE ADULT - ASSESSMENT
This 56 year old male with pmh of alcohol abuse, htn, hld, cva on xarelto, lung cancer on chemo coming to the hospital with progressive shortness of breath since friday. patient states has had recent chemo as well. states currently having worsening shortness of breath. not on home 02. states has not taken covid vaccine. +fever +chills. sob worsens with exertion.  (23 May 2022 15:58)    he had fever of 101 on admission.  patient had a XR of the chest which showed:  Volume loss right thorax with significant interval increase in right lung parenchymal opacity. Clinical correlation and follow-up suggested.  Due to his penicillin allergies, he was started on Zithromax, Vanco, and Aztreonam.        Impression:  fevers  SOB  lung infiltrate  lung cancer  penicillin allergy  WBC elevation    Plan:  - unsure if this is worsening of his lung cancer or pneumonia.     continue current antibiotics:  azithromycin  IVPB 500 milliGRAM(s) IV Intermittent every 24 hours  cefepime   IVPB 2000 milliGRAM(s) IV Intermittent every 12 hours  vancomycin  IVPB 1000 milliGRAM(s) IV Intermittent every 12 hours    MRSA swab of nares ordered., if patient is not MRSA colonized, will stop vancomycin.       - follow up all outstanding cultures  - trend temperature and WBC curve  - repeat cultures from blood and all sources if febrile.      WBC elevation is reactive  WBC Count: 18.54 K/uL (05-24-22 @ 06:30)  WBC Count: 20.46 K/uL (05-23-22 @ 11:23)    - will follow and trend

## 2022-05-25 NOTE — PROGRESS NOTE ADULT - SUBJECTIVE AND OBJECTIVE BOX
[facility]                                                INFECTIOUS DISEASES  =======================================================                               Mendez Johnson MD#  Eris Rodriguez MD*                                     Mukesh Burdick MD*    Hannah Yee MD*            Diplomates American Board of Internal Medicine & Infectious Diseases                  # [place] Office - Appt - Tel  [phone] Fax [phone]                * [place] Office - Appt - Tel [phone] Fax [phone]                                  Hospital Consult line:  [phone]  =======================================================    [id number]  CATHY REYES   follow up: SOB, lung infiltrate    patient seen an examined  remains on NRB today.  desats to 80s on room air        I have personally reviewed the labs and data; pertinent labs and data are listed in this note; please see below.   =======================================================  Past Medical & Surgical Hx:  =====================  PAST MEDICAL & SURGICAL HISTORY:  Hypertension, unspecified type  Alcohol abuse  Poor historian  H/O fracture of right hip      Problem List:  ==========  HEALTH ISSUES - PROBLEM Dx:  Acute hypoxemic respiratory failure  Multilobar lung infiltrate  Pneumonia  Radiation pneumonitis      Social Hx:  =======  no toxic habits currently    FAMILY HISTORY:  FH: renal failure  - on HD    no significant family history of immunosuppressive disorders in mother or father   =======================================================    REVIEW OF SYSTEMS:  CONSTITUTIONAL:  No Fever or chills  HEENT:  No diplopia or blurred vision.  No earache, sore throat or runny nose.  CARDIOVASCULAR:  No pressure, squeezing, strangling, tightness, heaviness or aching about the chest, neck, axilla or epigastrium.  RESPIRATORY:   + SOB  GASTROINTESTINAL:  No nausea, vomiting or diarrhea.  GENITOURINARY:  No dysuria, frequency or urgency. No Blood in urine  MUSCULOSKELETAL:  no joint aches, no muscle pain  SKIN:  No change in skin, hair or nails.  NEUROLOGIC:  No Headaches, seizures or weakness.  PSYCHIATRIC:  No disorder of thought or mood.  ENDOCRINE:  No heat or cold intolerance  HEMATOLOGICAL:  No easy bruising or bleeding.    =======================================================  Allergies  penicillins (Unknown)  shellfish (Unknown)         ======================================================  Physical Exam:  ============     General:  No acute distress. THIN FRAIL  Eye: Pupils are equal, round and reactive to light, Extraocular movements are intact, Normal conjunctiva.  HENT: Normocephalic, Oral mucosa is dry  + NRB  Neck: Supple, No lymphadenopathy.  Respiratory: Lungs with bronchial breath sounds.   Cardiovascular: Normal rate, Regular rhythm,   Gastrointestinal: Soft, Non-tender, Non-distended, Normal bowel sounds.  Genitourinary: No costovertebral angle tenderness.  Lymphatics: No lymphadenopathy neck,   Musculoskeletal: Normal range of motion, Normal strength.  Integumentary: No rash.  Neurologic: Alert, Oriented, No focal deficits, Cranial Nerves II-XII are grossly intact.  Psychiatric: Appropriate mood & affect.    =======================================================  Vitals:  ============  T(F): 98.3 (25 May 2022 13:03), Max: 98.3 (25 May 2022 13:03)  HR: 99 (25 May 2022 13:03)  BP: 109/68 (25 May 2022 13:03)  RR: 24 (25 May 2022 13:03)  SpO2: 98% (25 May 2022 13:03) (94% - 100%)  temp max in last 48H T(F): , Max: 99.3 (05-23-22 @ 16:10)    =======================================================  Current Antibiotics:  azithromycin  IVPB 500 milliGRAM(s) IV Intermittent every 24 hours  cefepime   IVPB 2000 milliGRAM(s) IV Intermittent every 12 hours  vancomycin  IVPB 1000 milliGRAM(s) IV Intermittent every 12 hours    Other medications:  albuterol/ipratropium for Nebulization 3 milliLiter(s) Nebulizer every 6 hours  amLODIPine   Tablet 10 milliGRAM(s) Oral daily  atorvastatin 40 milliGRAM(s) Oral at bedtime  folic acid 1 milliGRAM(s) Oral daily  gabapentin 200 milliGRAM(s) Oral two times a day  methylPREDNISolone sodium succinate Injectable 40 milliGRAM(s) IV Push every 6 hours  multivitamin 1 Tablet(s) Oral daily  rivaroxaban 20 milliGRAM(s) Oral daily  thiamine 100 milliGRAM(s) Oral daily      =======================================================  Labs:                        10.1   18.54 )-----------( 353      ( 24 May 2022 06:30 )             30.9     05-24    135  |  99  |  11.9  ----------------------------<  136<H>  4.1   |  23.0  |  0.55    Ca    9.0      24 May 2022 06:30        Culture - Sputum (collected 05-25-22 @ 12:44)  Source: .Sputum Sputum  Gram Stain (05-25-22 @ 14:38):    Few polymorphonuclear leukocytes per low power field    Few Squamous epithelial cells per low power field    Few Gram Negative Rods per oil power field    Rare Gram Positive Rods per oil power field    Rare Gram positive cocci in pairs per oil power field    Culture - Urine (collected 05-23-22 @ 22:29)  Source: Clean Catch Clean Catch (Midstream)  Final Report (05-24-22 @ 19:11):    No growth    Culture - Blood (collected 05-23-22 @ 11:22)  Source: .Blood Blood-Peripheral    Culture - Blood (collected 05-23-22 @ 11:21)  Source: .Blood Blood-Peripheral    Procalcitonin, Serum: 0.76 ng/mL (05-24-22 @ 06:27)    SARS-CoV-2: NotDetec (05-23-22 @ 12:00)      =======================================================         < from: Xray Chest 1 View-PORTABLE IMMEDIATE (05.23.22 @ 11:17) >  ACC: [id number] EXAM:  XR CHEST PORTABLE IMMED 1V                        PROCEDURE DATE:  05/23/2022    INTERPRETATION:  INDICATION: Sepsis  PRIORS: 12/14/2021.    VIEWS: Portable AP radiography of the chest performed.  FINDINGS: Volume loss right thorax. Heart size appears within normal limits. Significant interval increase in right lung parenchymal opacity. No evidence for acute left-sided infiltrate. No pleural effusion. No pneumothorax. No mediastinal shift. No acute osseousfractures.    IMPRESSION: Volume loss right thorax with significant interval increase in right lung parenchymal opacity. Clinical correlation and follow-up suggested.    --- End of Report ---      JAZZMINE CHAVARRIA MD; Attending Radiologist  This document has been electronically signed. May 23 2022 11:30AM    < end of copied text >

## 2022-05-25 NOTE — PHARMACOTHERAPY INTERVENTION NOTE - NSPHARMCOMMASP
ASP - Lab/ test recommended
ASP - Lab/ test recommended
ASP - De-escalation
ASP - Dose optimization/Non-Renal dose adjustment

## 2022-05-25 NOTE — CONSULT NOTE ADULT - ASSESSMENT
56y Male with pmhx of small cell lung cancer (follows w/ dr. basilio at [facility] last chemo treatment was in march per patient.  Radiation completed as well), COPD, ETOH abuse, active 1ppd smoker, who presents with a chief complaint of worsening SOB and cough. On arrival he was found to be febrile and  tachycardic.   Sepsis protocol initiated by ER and he was placed on empiric coverage w/ vancomycin and cefepime, as well as nebs/steroids .   He required BIPAP for increased work of breathing and has clinically improved. Now on 50% Venti mask. Able to obtain CT scan chest today that showed Irregular appearance in wall thickening of the distal trachea and proximal mainstem bronchi with new extraluminal and amorphous gas in the subcarinal region communicating with the left mainstem bronchus medially and possibly the right mainstem bronchus. Findings are suspicious for   fistula or perforation.  Thoracic surgery consulted for CT scan findings.

## 2022-05-26 NOTE — PROGRESS NOTE ADULT - ASSESSMENT
57y/o  male  with :    1- acute on chronic hypoxic resp failure   2- stage 3A NSCLA  -  BAL  squameous   s/p  RT 4/22 and chemo ( NO Durvalumab )  3- ct 5/25/2022  with possible  perforation or perforation  --   new diffuse right  ground glass opacities LLL consolidation   4- cachexia  BMI 15    -IV steroids   to taper after procedure  -   - antibiotics  - FOB-   inspection /  bal   in am   NPO after midnight -- would monitor  pressures and use low TV  strategy      Please  feel free to reach out with any questions or suggestions    MANDA SCHRADER    PULMONARY and CRITICAL CARE   [facility] Physician [facility]     [phone]

## 2022-05-26 NOTE — PROGRESS NOTE ADULT - ASSESSMENT
This 56 year old male with pmh of alcohol abuse, htn, hld, cva on xarelto, lung cancer on chemo coming to the hospital with progressive shortness of breath since friday. patient states has had recent chemo as well. states currently having worsening shortness of breath. not on home 02. states has not taken covid vaccine. +fever +chills. sob worsens with exertion.  (23 May 2022 15:58)    he had fever of 101 on admission.  patient had a XR of the chest which showed:  Volume loss right thorax with significant interval increase in right lung parenchymal opacity.    Due to his penicillin allergies, he was started on Zithromax, Vanco, and Aztreonam.      Impression:  fevers  SOB  lung infiltrate  lung cancer  penicillin allergy  WBC elevation    Plan:  - unsure if this is worsening of his lung cancer or pneumonia.     MRSA swab of nares is negative., OFF VANCOMYCIN    to complete 5 days of Azithromycin, thru 5/27/22  continue cefepime   IVPB 2000 milliGRAM(s) IV Intermittent every 12 hours  THRU and including 5/29/22    WBC elevation is reactive, improving  WBC Count: 15.20 K/uL (05-26-22 @ 07:02)  WBC Count: 18.54 K/uL (05-24-22 @ 06:30)  WBC Count: 20.46 K/uL (05-23-22 @ 11:23)  - will follow and trend       when ready for discharge to community,  can change Cefepime to VANTIN 200mg PO every 12 hours  THRU and including 5/29/22      Please call me back if I may be of further assistance.  Hospital Consult line:  [phone].  Thank you.

## 2022-05-26 NOTE — PROGRESS NOTE ADULT - ASSESSMENT
56y Male with pmhx of small cell lung cancer (follows w/ dr. basilio at [facility] last chemo treatment was in march per patient.  Radiation completed as well), COPD, ETOH abuse, active 1ppd smoker, who presents with a chief complaint of worsening SOB and cough. On arrival he was found to be febrile and  tachycardic.   Sepsis protocol initiated by ER and he was placed on empiric coverage w/ vancomycin and cefepime, as well as nebs/steroids .   He required BIPAP for increased work of breathing and has clinically improved. Now on 50% Venti mask. Able to obtain CT scan chest today that showed Irregular appearance in wall thickening of the distal trachea and proximal mainstem bronchi with new extraluminal and amorphous gas in the subcarinal region communicating with the left mainstem bronchus medially and possibly the right mainstem bronchus. Findings are suspicious for   fistula or perforation.  Thoracic surgery consulted for CT scan findings.

## 2022-05-26 NOTE — PHYSICAL THERAPY INITIAL EVALUATION ADULT - ADDITIONAL COMMENTS
pt independent without an AD, has ~3-REJI the home with a handrail per patient, states he has 4-5 roommates but they are not able to assist. Pt is not on home 02.

## 2022-05-26 NOTE — PROGRESS NOTE ADULT - SUBJECTIVE AND OBJECTIVE BOX
CATHY REYES    [id number]    56y      Male    CC: hypoxia    INTERVAL HPI/OVERNIGHT EVENTS: pt seen and examined. no acute events o/n. no complaints this am    REVIEW OF SYSTEMS:    CONSTITUTIONAL: No fever, weight loss  RESPIRATORY: No cough, wheezing, hemoptysis  CARDIOVASCULAR: No chest pain, palpitations  GASTROINTESTINAL: No abdominal or epigastric pain. No nausea, vomiting  NEUROLOGICAL: No headaches    Vital Signs Last 24 Hrs  T(C): 36.6 (26 May 2022 10:30), Max: 36.8 (25 May 2022 17:13)  T(F): 97.9 (26 May 2022 10:30), Max: 98.2 (25 May 2022 17:13)  HR: 97 (26 May 2022 10:30) (82 - 98)  BP: 106/72 (26 May 2022 10:30) (100/68 - 114/67)  BP(mean): --  RR: 18 (26 May 2022 10:30) (18 - 23)  SpO2: 93% (26 May 2022 10:30) (93% - 100%)    PHYSICAL EXAM:    GENERAL: NAD  HEENT: PERRL, +EOMI  NECK: soft, supple  CHEST/LUNG: Course breath sounds b/l; respirations unlabored   HEART: S1S2+, Regular rate and rhythm  ABDOMEN: Soft, Nontender, Nondistended; Bowel sounds present  SKIN: warm, dry  NEURO: AAOX3, grossly nonfocal  PSYCH: normal affect    LABS:                        10.7   15.20 )-----------( 357      ( 26 May 2022 07:02 )             33.7     05-26    139  |  100  |  12.7  ----------------------------<  131<H>  4.2   |  25.0  |  0.54    Ca    9.1      26 May 2022 07:02              MEDICATIONS  (STANDING):  albuterol/ipratropium for Nebulization 3 milliLiter(s) Nebulizer every 6 hours  amLODIPine   Tablet 10 milliGRAM(s) Oral daily  atorvastatin 40 milliGRAM(s) Oral at bedtime  azithromycin  IVPB 500 milliGRAM(s) IV Intermittent every 24 hours  cefepime   IVPB 2000 milliGRAM(s) IV Intermittent every 12 hours  folic acid 1 milliGRAM(s) Oral daily  gabapentin 200 milliGRAM(s) Oral two times a day  methylPREDNISolone sodium succinate Injectable 40 milliGRAM(s) IV Push every 6 hours  multivitamin 1 Tablet(s) Oral daily    MEDICATIONS  (PRN):  acetaminophen     Tablet .. 650 milliGRAM(s) Oral every 6 hours PRN Temp greater or equal to 38C (100.4F), Mild Pain (1 - 3)  ALBUTerol    90 MICROgram(s) HFA Inhaler 2 Puff(s) Inhalation every 6 hours PRN Shortness of Breath and/or Wheezing  aluminum hydroxide/magnesium hydroxide/simethicone Suspension 30 milliLiter(s) Oral every 4 hours PRN Dyspepsia  LORazepam   Injectable 2 milliGRAM(s) IV Push every 1 hour PRN Symptom-triggered: each CIWA -Ar score 8 or GREATER  melatonin 3 milliGRAM(s) Oral at bedtime PRN Insomnia  ondansetron Injectable 4 milliGRAM(s) IV Push every 8 hours PRN Nausea and/or Vomiting      RADIOLOGY & ADDITIONAL TESTS:   CATHY REYES    [id number]    56y      Male    CC: hypoxia    INTERVAL HPI/OVERNIGHT EVENTS: pt seen and examined. no acute events o/n. no complaints this am    REVIEW OF SYSTEMS:    CONSTITUTIONAL: No fever, weight loss  RESPIRATORY: No cough, wheezing, hemoptysis  CARDIOVASCULAR: No chest pain, palpitations  GASTROINTESTINAL: No abdominal or epigastric pain. No nausea, vomiting  NEUROLOGICAL: No headaches    Vital Signs Last 24 Hrs  T(C): 36.6 (26 May 2022 10:30), Max: 36.8 (25 May 2022 17:13)  T(F): 97.9 (26 May 2022 10:30), Max: 98.2 (25 May 2022 17:13)  HR: 97 (26 May 2022 10:30) (82 - 98)  BP: 106/72 (26 May 2022 10:30) (100/68 - 114/67)  BP(mean): --  RR: 18 (26 May 2022 10:30) (18 - 23)  SpO2: 93% (26 May 2022 10:30) (93% - 100%)    PHYSICAL EXAM:    GENERAL: NAD  HEENT: PERRL, +EOMI  NECK: soft, supple  CHEST/LUNG: Course breath sounds b/l; respirations unlabored   HEART: S1S2+, Regular rate and rhythm  ABDOMEN: Soft, Nontender, Nondistended; Bowel sounds present  SKIN: warm, dry  NEURO: AAOX3, grossly nonfocal  PSYCH: normal affect    LABS:                        10.7   15.20 )-----------( 357      ( 26 May 2022 07:02 )             33.7     05-26    139  |  100  |  12.7  ----------------------------<  131<H>  4.2   |  25.0  |  0.54    Ca    9.1      26 May 2022 07:02              MEDICATIONS  (STANDING):  albuterol/ipratropium for Nebulization 3 milliLiter(s) Nebulizer every 6 hours  amLODIPine   Tablet 10 milliGRAM(s) Oral daily  atorvastatin 40 milliGRAM(s) Oral at bedtime  azithromycin  IVPB 500 milliGRAM(s) IV Intermittent every 24 hours  cefepime   IVPB 2000 milliGRAM(s) IV Intermittent every 12 hours  folic acid 1 milliGRAM(s) Oral daily  gabapentin 200 milliGRAM(s) Oral two times a day  methylPREDNISolone sodium succinate Injectable 40 milliGRAM(s) IV Push every 6 hours  multivitamin 1 Tablet(s) Oral daily    MEDICATIONS  (PRN):  acetaminophen     Tablet .. 650 milliGRAM(s) Oral every 6 hours PRN Temp greater or equal to 38C (100.4F), Mild Pain (1 - 3)  ALBUTerol    90 MICROgram(s) HFA Inhaler 2 Puff(s) Inhalation every 6 hours PRN Shortness of Breath and/or Wheezing  aluminum hydroxide/magnesium hydroxide/simethicone Suspension 30 milliLiter(s) Oral every 4 hours PRN Dyspepsia  LORazepam   Injectable 2 milliGRAM(s) IV Push every 1 hour PRN Symptom-triggered: each CIWA -Ar score 8 or GREATER  melatonin 3 milliGRAM(s) Oral at bedtime PRN Insomnia  ondansetron Injectable 4 milliGRAM(s) IV Push every 8 hours PRN Nausea and/or Vomiting      RADIOLOGY & ADDITIONAL TESTS:  < from: CT Chest No Cont (05.25.22 @ 13:51) >    IMPRESSION:.    Irregular appearance in wall thickening of the distal trachea and   proximal mainstem bronchi with new extraluminal and amorphous gas in the   subcarinal region communicating with the left mainstem bronchus medially   and possibly the right mainstem bronchus. Findings are suspicious for   fistula or perforation. Discussed with Dr. Carroll on 5/25/2022 at 2:28 pm.    New diffuse right lung ground-glass opacities and patchy left lower lobe   multifocal consolidation likely secondary to aspiration or infection.    --- End of Report ---    < end of copied text >

## 2022-05-26 NOTE — PROGRESS NOTE ADULT - SUBJECTIVE AND OBJECTIVE BOX
Subjective: to follow with full note     Pertinent events of the past 24 hours: Uneventful, recovering as expected    VITAL SIGNS  Vital Signs Last 24 Hrs  T(C): 36.6 (22 @ 05:10), Max: 36.8 (22 @ 13:03)  T(F): 97.8 (22 @ 05:10), Max: 98.3 (22 @ 13:03)  HR: 87 (22 @ 05:10) (84 - 99)  BP: 109/69 (22 @ 05:10) (100/68 - 139/81)  RR: 18 (22 @ 05:10) (18 - 24)  SpO2: 99% (22 @ 05:10) (95% - 100%)  on (O2)              MEDICATIONS  acetaminophen     Tablet .. 650 milliGRAM(s) Oral every 6 hours PRN  ALBUTerol    90 MICROgram(s) HFA Inhaler 2 Puff(s) Inhalation every 6 hours PRN  albuterol/ipratropium for Nebulization 3 milliLiter(s) Nebulizer every 6 hours  aluminum hydroxide/magnesium hydroxide/simethicone Suspension 30 milliLiter(s) Oral every 4 hours PRN  amLODIPine   Tablet 10 milliGRAM(s) Oral daily  atorvastatin 40 milliGRAM(s) Oral at bedtime  azithromycin  IVPB 500 milliGRAM(s) IV Intermittent every 24 hours  cefepime   IVPB 2000 milliGRAM(s) IV Intermittent every 12 hours  folic acid 1 milliGRAM(s) Oral daily  gabapentin 200 milliGRAM(s) Oral two times a day  LORazepam   Injectable 2 milliGRAM(s) IV Push every 1 hour PRN  melatonin 3 milliGRAM(s) Oral at bedtime PRN  methylPREDNISolone sodium succinate Injectable 40 milliGRAM(s) IV Push every 6 hours  multivitamin 1 Tablet(s) Oral daily  ondansetron Injectable 4 milliGRAM(s) IV Push every 8 hours PRN  thiamine 100 milliGRAM(s) Oral daily      PHYSICAL EXAM  to follow with full note     @ 07:01  -   @ 07:00  --------------------------------------------------------  IN: 500 mL / OUT: 450 mL / NET: 50 mL    Weights:  Daily Height in cm: 182.9 (25 May 2022 13:45)    Daily Weight in k.3 (25 May 2022 13:45)  Admit Wt: Drug Dosing Weight  Height (cm): 182.9 (25 May 2022 13:45)  Weight (kg): 51.3 (25 May 2022 13:45)  BMI (kg/m2): 15.3 (25 May 2022 13:45)  BSA (m2): 1.67 (25 May 2022 13:45)    All laboratory results, radiology and medications reviewed.    LABS      139  |  100  |  12.7  ----------------------------<  131<H>  4.2   |  25.0  |  0.54    Ca    9.1      26 May 2022 07:02                                   10.7   15.20 )-----------( 357      ( 26 May 2022 07:02 )             33.7               Last CXR: < from: Xray Chest 1 View-PORTABLE IMMEDIATE (Xray Chest 1 View-PORTABLE IMMEDIATE .) (22 @ 15:00) >  IMPRESSION: Persistent diffuse right lung infiltrate consistent with   pneumonia, showing no significant change compared to the previous exam.    < end of copied text >    < from: CT Chest No Cont (22 @ 13:51) >  IMPRESSION:.    Irregular appearance in wall thickening of the distal trachea and   proximal mainstem bronchi with new extraluminal and amorphous gas in the   subcarinal region communicating with the left mainstem bronchus medially   and possibly the right mainstem bronchus. Findings are suspicious for   fistula or perforation. Discussed with Dr. Carroll on 2022 at 2:28 pm.    New diffuse right lung ground-glass opacities and patchy left lower lobe   multifocal consolidation likely secondary to aspiration or infection.    < end of copied text >    PAST MEDICAL & SURGICAL HISTORY:  Hypertension, unspecified type      Alcohol abuse      Poor historian      H/O fracture of right hip          Subjective: Patient states understanding of bronchoscopy for tomorrow and states "Im getting tired of them drawing blood so much" Otherwise reports that breathing feels better than yesterday. denies CP, palpitations, SOB, fever, chills, itchiness/rash, diaphoresis, vision changes, HA, dizziness/lightheadedness, numbness/tingling, abd pain, N/V    Pertinent events of the past 24 hours: Uneventful, recovering as expected    VITAL SIGNS  Vital Signs Last 24 Hrs  T(C): 36.6 (22 @ 05:10), Max: 36.8 (22 @ 13:03)  T(F): 97.8 (22 @ 05:10), Max: 98.3 (22 @ 13:03)  HR: 87 (22 @ 05:10) (84 - 99)  BP: 109/69 (22 @ 05:10) (100/68 - 139/81)  RR: 18 (22 @ 05:10) (18 - 24)  SpO2: 99% (22 @ 05:10) (95% - 100%)  on (O2)              MEDICATIONS  acetaminophen     Tablet .. 650 milliGRAM(s) Oral every 6 hours PRN  ALBUTerol    90 MICROgram(s) HFA Inhaler 2 Puff(s) Inhalation every 6 hours PRN  albuterol/ipratropium for Nebulization 3 milliLiter(s) Nebulizer every 6 hours  aluminum hydroxide/magnesium hydroxide/simethicone Suspension 30 milliLiter(s) Oral every 4 hours PRN  amLODIPine   Tablet 10 milliGRAM(s) Oral daily  atorvastatin 40 milliGRAM(s) Oral at bedtime  azithromycin  IVPB 500 milliGRAM(s) IV Intermittent every 24 hours  cefepime   IVPB 2000 milliGRAM(s) IV Intermittent every 12 hours  folic acid 1 milliGRAM(s) Oral daily  gabapentin 200 milliGRAM(s) Oral two times a day  LORazepam   Injectable 2 milliGRAM(s) IV Push every 1 hour PRN  melatonin 3 milliGRAM(s) Oral at bedtime PRN  methylPREDNISolone sodium succinate Injectable 40 milliGRAM(s) IV Push every 6 hours  multivitamin 1 Tablet(s) Oral daily  ondansetron Injectable 4 milliGRAM(s) IV Push every 8 hours PRN  thiamine 100 milliGRAM(s) Oral daily      PHYSICAL EXAM  Constitutional: NAD  Neuro: A+O x 3, non-focal, speech clear and intact  HEENT: NC/AT  CV: +S1S2, no murmurs or rub  Pulm/chest: nasal cannula, lung sounds diminished, no accessory muscle use noted  Abd: +BS, soft, NT, ND  Ext: MAGALLON x 4, no C/C/E, +pulses  Skin: warm, well perfused  Psych: calm, appropriate affect       @ 07:01  -   @ 07:00  --------------------------------------------------------  IN: 500 mL / OUT: 450 mL / NET: 50 mL    Weights:  Daily Height in cm: 182.9 (25 May 2022 13:45)    Daily Weight in k.3 (25 May 2022 13:45)  Admit Wt: Drug Dosing Weight  Height (cm): 182.9 (25 May 2022 13:45)  Weight (kg): 51.3 (25 May 2022 13:45)  BMI (kg/m2): 15.3 (25 May 2022 13:45)  BSA (m2): 1.67 (25 May 2022 13:45)    All laboratory results, radiology and medications reviewed.    LABS      139  |  100  |  12.7  ----------------------------<  131<H>  4.2   |  25.0  |  0.54    Ca    9.1      26 May 2022 07:02                                   10.7   15.20 )-----------( 357      ( 26 May 2022 07:02 )             33.7               Last CXR: < from: Xray Chest 1 View-PORTABLE IMMEDIATE (Xray Chest 1 View-PORTABLE IMMEDIATE .) (22 @ 15:00) >  IMPRESSION: Persistent diffuse right lung infiltrate consistent with   pneumonia, showing no significant change compared to the previous exam.    < end of copied text >    < from: CT Chest No Cont (22 @ 13:51) >  IMPRESSION:.    Irregular appearance in wall thickening of the distal trachea and   proximal mainstem bronchi with new extraluminal and amorphous gas in the   subcarinal region communicating with the left mainstem bronchus medially   and possibly the right mainstem bronchus. Findings are suspicious for   fistula or perforation. Discussed with Dr. Carroll on 2022 at 2:28 pm.    New diffuse right lung ground-glass opacities and patchy left lower lobe   multifocal consolidation likely secondary to aspiration or infection.    < end of copied text >    PAST MEDICAL & SURGICAL HISTORY:  Hypertension, unspecified type      Alcohol abuse      Poor historian      H/O fracture of right hip

## 2022-05-26 NOTE — PROGRESS NOTE ADULT - ASSESSMENT
56y/oM PMH EtOH abuse, HTN, HLD, CVA on Xarelto, Lung CA on chemo, active smoker presenting with progressive shortness of breath admitted with acute hypoxic respiratory failure     Acute hypoxic respiratory failure 2/2 pneumonia in immunocompromised patient, probable gram negative organism  sepsis, present on admission, improving   Lung CA on chemo   -titrating supplemental O2 as able, on NC today   -MICU consult appreciated   -pulm consult appreciated   -IV steroids   -nebs   -ID recs appreciated   -CT chest 5/25: irregular appearance in wall thickening of distal trachea and proximal mainstem bronchi with new extraluminal and amorphous gas in subcarinal region, communicating with L mainstem bronchus medially and poss R mainstem bronchus, suspicious for fistula or perforation; new diffuse right lung GGO and patchy LLL multifocal consolidation likely 2/2 aspiration or infection   -CTSx recs appreciated   -plan for bronch tomorrow 5/27 with Dr. Knox     HTN, HLD  -norvasc   -statin     Hx CVA   -xarelto on hold for bronch     Tobacco dependence   -current 1ppd smoker     hx EtOH abuse   suspected thiamine deficiency   -CIWA with PRN meds   -MVI, folic acid, thiamine     vte ppx; SCDs

## 2022-05-26 NOTE — PROGRESS NOTE ADULT - PROBLEM SELECTOR PLAN 1
Consulted for CT chest findings that were suspicious for fistula vs perforation.  CT scan reviewed by Dr. Knox.  Plan for bronch tomorrow 5/27  Maintain active type and screen and COVID PCR  NPO after midnight tonight  Hold Anticoagulation  Plan discussed with Dr. Knox and Thoracic Surgery team in AM rounds

## 2022-05-26 NOTE — PROGRESS NOTE ADULT - SUBJECTIVE AND OBJECTIVE BOX
[facility]                                                INFECTIOUS DISEASES  =======================================================                               Mendez Johnson MD#  Eris Rodriguez MD*                                     Mukesh Burdick MD*    Hannah Yee MD*            Diplomates American Board of Internal Medicine & Infectious Diseases                  # [place] Office - Appt - Tel  [phone] Fax [phone]                * [place] Office - Appt - Tel [phone] Fax [phone]                                  Hospital Consult line:  [phone]  =======================================================    [id number]  CATHY REYES  follow up: SOB, lung infiltrate    patient seen an examined  reports that he's breathing better.   no fevers  no chills.   eating well       I have personally reviewed the labs and data; pertinent labs and data are listed in this note; please see below.   =======================================================  Past Medical & Surgical Hx:  =====================  PAST MEDICAL & SURGICAL HISTORY:  Hypertension, unspecified type  Alcohol abuse  Poor historian  H/O fracture of right hip      Problem List:  ==========  HEALTH ISSUES - PROBLEM Dx:  Acute hypoxemic respiratory failure  Multilobar lung infiltrate  Pneumonia  Radiation pneumonitis      Social Hx:  =======  no toxic habits currently    FAMILY HISTORY:  FH: renal failure  - on HD    no significant family history of immunosuppressive disorders in mother or father   =======================================================    REVIEW OF SYSTEMS:  CONSTITUTIONAL:  No Fever or chills  HEENT:  No diplopia or blurred vision.  No earache, sore throat or runny nose.  CARDIOVASCULAR:  No pressure, squeezing, strangling, tightness, heaviness or aching about the chest, neck, axilla or epigastrium.  RESPIRATORY:   LESS SOB  GASTROINTESTINAL:  No nausea, vomiting or diarrhea.  GENITOURINARY:  No dysuria, frequency or urgency. No Blood in urine  MUSCULOSKELETAL:  no joint aches, no muscle pain  SKIN:  No change in skin, hair or nails.  NEUROLOGIC:  No Headaches, seizures or weakness.  PSYCHIATRIC:  No disorder of thought or mood.  ENDOCRINE:  No heat or cold intolerance  HEMATOLOGICAL:  No easy bruising or bleeding.    =======================================================  Allergies  penicillins (Unknown)  shellfish (Unknown)     ======================================================  Physical Exam:  ============     General:  No acute distress. THIN FRAIL  Eye: Pupils are equal, round and reactive to light, Extraocular movements are intact, Normal conjunctiva.  HENT: Normocephalic, Oral mucosa moist, poor dentition  + nasal cannula  Neck: Supple, No lymphadenopathy.  Respiratory: Lungs with bronchial breath sounds.   Cardiovascular: Normal rate, Regular rhythm,   Gastrointestinal: Soft, Non-tender, Non-distended, Normal bowel sounds.  Genitourinary: No costovertebral angle tenderness.  Lymphatics: No lymphadenopathy neck,   Musculoskeletal: Normal range of motion, Normal strength.  Integumentary: No rash.  Neurologic: Alert, Oriented, No focal deficits, Cranial Nerves II-XII are grossly intact.  Psychiatric: Appropriate mood & affect.    =======================================================  Vitals:  ============  T(F): 97.8 (26 May 2022 05:10), Max: 98.3 (25 May 2022 13:03)  HR: 82 (26 May 2022 08:15)  BP: 109/69 (26 May 2022 05:10)  RR: 18 (26 May 2022 08:15)  SpO2: 95% (26 May 2022 08:15) (95% - 100%)  temp max in last 48H T(F): , Max: 98.4 (05-24-22 @ 12:00)    =======================================================  Current Antibiotics:  azithromycin  IVPB 500 milliGRAM(s) IV Intermittent every 24 hours  cefepime   IVPB 2000 milliGRAM(s) IV Intermittent every 12 hours    Other medications:  albuterol/ipratropium for Nebulization 3 milliLiter(s) Nebulizer every 6 hours  amLODIPine   Tablet 10 milliGRAM(s) Oral daily  atorvastatin 40 milliGRAM(s) Oral at bedtime  folic acid 1 milliGRAM(s) Oral daily  gabapentin 200 milliGRAM(s) Oral two times a day  methylPREDNISolone sodium succinate Injectable 40 milliGRAM(s) IV Push every 6 hours  multivitamin 1 Tablet(s) Oral daily  thiamine 100 milliGRAM(s) Oral daily      =======================================================  Labs:                        10.7   15.20 )-----------( 357      ( 26 May 2022 07:02 )             33.7     05-26    139  |  100  |  12.7  ----------------------------<  131<H>  4.2   |  25.0  |  0.54    Ca    9.1      26 May 2022 07:02        Culture - Sputum (collected 05-25-22 @ 12:44)  Source: .Sputum Sputum  Gram Stain (05-25-22 @ 14:38):    Few polymorphonuclear leukocytes per low power field    Few Squamous epithelial cells per low power field    Few Gram Negative Rods per oil power field    Rare Gram Positive Rods per oil power field    Rare Gram positive cocci in pairs per oil power field    Culture - Urine (collected 05-23-22 @ 22:29)  Source: Clean Catch Clean Catch (Midstream)  Final Report (05-24-22 @ 19:11):    No growth    Culture - Blood (collected 05-23-22 @ 11:22)  Source: .Blood Blood-Peripheral    Culture - Blood (collected 05-23-22 @ 11:21)  Source: .Blood Blood-Peripheral    Procalcitonin, Serum: 0.76 ng/mL (05-24-22 @ 06:27)    SARS-CoV-2: NotDetec (05-23-22 @ 12:00)    =======================================================    < from: Xray Chest 1 View-PORTABLE IMMEDIATE (05.23.22 @ 11:17) >  ACC: [id number] EXAM:  XR CHEST PORTABLE IMMED 1V                        PROCEDURE DATE:  05/23/2022    INTERPRETATION:  INDICATION: Sepsis  PRIORS: 12/14/2021.    VIEWS: Portable AP radiography of the chest performed.  FINDINGS: Volume loss right thorax. Heart size appears within normal limits. Significant interval increase in right lung parenchymal opacity. No evidence for acute left-sided infiltrate. No pleural effusion. No pneumothorax. No mediastinal shift. No acute osseousfractures.    IMPRESSION: Volume loss right thorax with significant interval increase in right lung parenchymal opacity. Clinical correlation and follow-up suggested.    --- End of Report ---      JAZZMINE CHAVARRIA MD; Attending Radiologist  This document has been electronically signed. May 23 2022 11:30AM    < end of copied text >

## 2022-05-26 NOTE — PROGRESS NOTE ADULT - SUBJECTIVE AND OBJECTIVE BOX
PULMONARY   CONSULT   NOTE    AMY MORGAN  MRN     Patient is a 56y old  Male who presents with a chief complaint of hypoxia (26 May 2022 13:39)      BRIEF HOSPITAL COURSE: *56y Male with pmhx of small cell lung cancer (follows w/ dr. basilio at [facility] last chemo treatment was in march per patient.  Radiation completed as well), COPD, ETOH abuse, active 1ppd smoker, who presents with a chief complaint of worsening SOB and cough. On arrival he was found to be febrile and  tachycardic.   Sepsis protocol initiated by ER and he was placed on empiric coverage w/ vancomycin and cefepime, as well as nebs/steroids .   He required BIPAP for increased work of breathing and has clinically improved. Now on 50% Venti mask. Able to obtain CT scan chest today that showed Irregular appearance in wall thickening of the distal trachea and proximal mainstem bronchi with new extraluminal and amorphous gas in the subcarinal region communicating with the left mainstem bronchus medially and possibly the right mainstem bronchus. Findings are suspicious for   fistula or perforation.  Thoracic surgery consulted for CT scan findings.  **      Events last 24 hours: **ct with possible  perforation for    FOB in am   alert  conversant no chest pain   bronchial cough     REVIEW OF SYSTEMS     CONSTITUTIONAL   no fevers  no loss of appetite  no weight loss   HEENT  no sore throat   no ringing in ears  NECK   no pain   RESPIRATORY  see HPI   CARDIOVASCULAR  no chest  pain no palpitations   GASTROINTESTINAL no vomiting  no diarrhea    no   gerd   MUSCULOSKELETAL  no joint pains    no  back pain   SKIN   no rash   no itchiness   GENITOURINARY  no dysuria   HEME    no bleeding or bruising   ENDOCRINE     no   warmth   no  sweating   no  cold intolerance   NEUROLOGIC  no tremors  no seizures  no    weakness    PSYCHIATRIC   no mood disorder    no delirium   *    PAST MEDICAL & SURGICAL HISTORY:  Hypertension, unspecified type      Alcohol abuse      Poor historian      H/O fracture of right hip            Medications:  azithromycin  IVPB 500 milliGRAM(s) IV Intermittent every 24 hours  cefepime   IVPB 2000 milliGRAM(s) IV Intermittent every 12 hours    amLODIPine   Tablet 10 milliGRAM(s) Oral daily    ALBUTerol    90 MICROgram(s) HFA Inhaler 2 Puff(s) Inhalation every 6 hours PRN  albuterol/ipratropium for Nebulization 3 milliLiter(s) Nebulizer every 6 hours    acetaminophen     Tablet .. 650 milliGRAM(s) Oral every 6 hours PRN  gabapentin 200 milliGRAM(s) Oral two times a day  LORazepam   Injectable 2 milliGRAM(s) IV Push every 1 hour PRN  melatonin 3 milliGRAM(s) Oral at bedtime PRN  ondansetron Injectable 4 milliGRAM(s) IV Push every 8 hours PRN        aluminum hydroxide/magnesium hydroxide/simethicone Suspension 30 milliLiter(s) Oral every 4 hours PRN      atorvastatin 40 milliGRAM(s) Oral at bedtime  methylPREDNISolone sodium succinate Injectable 40 milliGRAM(s) IV Push every 6 hours    folic acid 1 milliGRAM(s) Oral daily  multivitamin 1 Tablet(s) Oral daily                ICU Vital Signs Last 24 Hrs  T(C): 36.6 (26 May 2022 10:30), Max: 36.8 (25 May 2022 17:13)  T(F): 97.9 (26 May 2022 10:30), Max: 98.2 (25 May 2022 17:13)  HR: 97 (26 May 2022 10:30) (82 - 98)  BP: 106/72 (26 May 2022 10:30) (103/62 - 114/67)  BP(mean): --  ABP: --  ABP(mean): --  RR: 18 (26 May 2022 10:30) (18 - 20)  SpO2: 93% (26 May 2022 10:30) (93% - 100%)          I&O's Detail    25 May 2022 07:01  -  26 May 2022 07:00  --------------------------------------------------------  IN:    IV PiggyBack: 250 mL    IV PiggyBack: 50 mL    Oral Fluid: 200 mL  Total IN: 500 mL    OUT:    Voided (mL): 450 mL  Total OUT: 450 mL    Total NET: 50 mL            LABS:                        10.7   15.20 )-----------( 357      ( 26 May 2022 07:02 )             33.7     05-26    139  |  100  |  12.7  ----------------------------<  131<H>  4.2   |  25.0  |  0.54    Ca    9.1      26 May 2022 07:02            CAPILLARY BLOOD GLUCOSE            CULTURES:  Culture Results:   Normal Respiratory Isidra present (05-25 @ 12:44)  Culture Results:   No growth (05-23 @ 22:29)  Rapid RVP Result: NotDetec (05-23 @ 12:00)  Culture Results:   No growth at 48 hours (05-23 @ 11:22)  Culture Results:   No growth at 48 hours (05-23 @ 11:21)      Physical Examination:    General: No acute distress.   frail male  alert conversant      HEENT: Pupils equal, reactive to light.  Symmetric. poor dentition     PULM:  bilateral air entry   bronchial cough   NECK: Supple, no lymphadenopathy, trachea midline    CVS: Regular rate and rhythm, no murmurs, rubs, or gallops    ABD: Soft, nondistended, nontender, normoactive bowel sounds, no masses    EXT: No edema, nontender    SKIN: Warm and well perfused, no rashes noted.    NEURO: Alert, oriented, interactive, nonfocal    DEVICES:     RADIOLOGY: **COMPARISON: 5/2/2022.    FINDINGS:    There is wall thickening and irregular appearance of the distal trachea   and proximal mainstem bronchi. There is new extraluminal and amorphous   gas in the subcarinal region communicating with the medial aspect of the   left mainstem bronchus (image 72, series 3) and possibly the right   mainstem bronchus (image 66, series 3).    Subcarinal lymphadenopathy not adequately assessed on this exam.   Unchanged right hilar lymphadenopathy.    Heart size normal. Nopericardial effusion.    Diffuse right upper, middle, and lower lobe ground-glass opacities.   Multifocal left lower lobe consolidative opacities.    No pleural effusion or pneumothorax.    Left renal hypodense lesion, likely cysts. Subcutaneous softtissues and   osseous structures unremarkable.    IMPRESSION:.    Irregular appearance in wall thickening of the distal trachea and   proximal mainstem bronchi with new extraluminal and amorphous gas in the   subcarinal region communicating with the left mainstem bronchus medially   and possibly the right mainstem bronchus. Findings are suspicious for   fistula or perforation. Discussed with Dr. Carroll on 5/25/2022 at 2:28 pm.    New diffuse right lung ground-glass opacities and patchy left lower lobe   multifocal consolidation likely secondary to aspiration or infection.    --- End of Report ---            JANY GUTHRIE MD; Attending Radiologist  This document has been electronically signed. May 25 20*    CRITICAL CARE TIME SPENT: ***

## 2022-05-27 NOTE — PROGRESS NOTE ADULT - SUBJECTIVE AND OBJECTIVE BOX
[facility] Division of Hospital Medicine  Ghulam Castellon MD    Chief Complaint:  Patient is a 56y old  Male who presents with a chief complaint of hypoxia (27 May 2022 10:03)      SUBJECTIVE / OVERNIGHT EVENTS:  Patient seen and examined at bedside. No acute events reported overnight. No new complaints.    Patient denies chest pain, SOB, abd pain, N/V, fever, chills, dysuria or any other complaints. All remainder ROS negative.     MEDICATIONS  (STANDING):  albuterol/ipratropium for Nebulization 3 milliLiter(s) Nebulizer every 6 hours  amLODIPine   Tablet 10 milliGRAM(s) Oral daily  atorvastatin 40 milliGRAM(s) Oral at bedtime  azithromycin  IVPB 500 milliGRAM(s) IV Intermittent every 24 hours  cefepime   IVPB 2000 milliGRAM(s) IV Intermittent every 12 hours  folic acid 1 milliGRAM(s) Oral daily  gabapentin 200 milliGRAM(s) Oral two times a day  methylPREDNISolone sodium succinate Injectable 40 milliGRAM(s) IV Push every 6 hours  multivitamin 1 Tablet(s) Oral daily  pantoprazole  Injectable 40 milliGRAM(s) IV Push daily    MEDICATIONS  (PRN):  acetaminophen     Tablet .. 650 milliGRAM(s) Oral every 6 hours PRN Temp greater or equal to 38C (100.4F), Mild Pain (1 - 3)  ALBUTerol    90 MICROgram(s) HFA Inhaler 2 Puff(s) Inhalation every 6 hours PRN Shortness of Breath and/or Wheezing  aluminum hydroxide/magnesium hydroxide/simethicone Suspension 30 milliLiter(s) Oral every 4 hours PRN Dyspepsia  LORazepam   Injectable 2 milliGRAM(s) IV Push every 1 hour PRN Symptom-triggered: each CIWA -Ar score 8 or GREATER  melatonin 3 milliGRAM(s) Oral at bedtime PRN Insomnia  ondansetron Injectable 4 milliGRAM(s) IV Push every 8 hours PRN Nausea and/or Vomiting        I&O's Summary      PHYSICAL EXAM:  Vital Signs Last 24 Hrs  T(C): 36.6 (27 May 2022 05:15), Max: 36.6 (26 May 2022 10:30)  T(F): 97.8 (27 May 2022 05:15), Max: 97.9 (26 May 2022 10:30)  HR: 92 (27 May 2022 08:23) (85 - 99)  BP: 106/68 (27 May 2022 05:15) (106/68 - 112/71)  BP(mean): --  RR: 18 (27 May 2022 05:15) (18 - 18)  SpO2: 95% (27 May 2022 08:23) (93% - 96%)        CONSTITUTIONAL: NAD  HEENT: NC/AT, PERRL, no JVD  RESPIRATORY: Coarse breath sounds b/l  CARDIOVASCULAR: RRR, S1/S2+, no m/g/r  ABDOMEN: Nontender to palpation, normoactive bowel sounds, no rebound/guarding  MUSCULOSKELETAL: No edema, cyanosis or deformities.  PSYCH: Calm, affect appropriate.  NEUROLOGY: Awake, alert, no focal neurological deficits.   SKIN: No rashes; no palpable lesions  VASC: Distal pulses palpable    LABS:                        10.2   15.38 )-----------( 370      ( 27 May 2022 05:42 )             31.7     05-27    136  |  100  |  12.5  ----------------------------<  155<H>  3.9   |  25.0  |  0.53    Ca    9.1      27 May 2022 05:42  Mg     1.8     05-27                Culture - Sputum (collected 25 May 2022 12:44)  Source: .Sputum Sputum  Gram Stain (25 May 2022 14:38):    Few polymorphonuclear leukocytes per low power field    Few Squamous epithelial cells per low power field    Few Gram Negative Rods per oil power field    Rare Gram Positive Rods per oil power field    Rare Gram positive cocci in pairs per oil power field  Preliminary Report (26 May 2022 09:45):    Normal Respiratory Isidra present      CAPILLARY BLOOD GLUCOSE            RADIOLOGY & ADDITIONAL TESTS:  Results Reviewed:   Imaging Personally Reviewed:  Electrocardiogram Personally Reviewed:

## 2022-05-27 NOTE — PROGRESS NOTE ADULT - ASSESSMENT
Acute hypoxemic resp failure  Stage 3A, NSCLA, Sq cell by BAL RUL, post RT ( finished 4/22, and chemo ? Feb -March), has not started Durvalumab  last PFT 2020, no air flow obstruction, continued nicotine addiction  On full dose AC for carotid stenosis, recent CVA   Oxygenation much improved, now off HFNC, 91% 4 L  CT scan with diffuse crazy paving infiltrates R, minimal GG L base, ? esophageal bronchial fistula suspected   FFb today, NPO, further input post  ABX per ID  wean medrol  02 per protocol

## 2022-05-27 NOTE — PROGRESS NOTE ADULT - ASSESSMENT
56y/oM PMH EtOH abuse, HTN, HLD, CVA on Xarelto, Lung CA on chemo, active smoker presenting with progressive shortness of breath admitted with acute hypoxic respiratory failure     Acute hypoxic respiratory failure 2/2 pneumonia in immunocompromised patient, probable gram negative organism  sepsis, present on admission, improving   Lung CA on chemo   - CT chest 5/25: irregular appearance in wall thickening of distal trachea and proximal mainstem bronchi with new extraluminal and amorphous gas in subcarinal region, communicating with L mainstem bronchus medially and poss R mainstem bronchus, suspicious for fistula or perforation; new diffuse right lung GGO and patchy LLL multifocal consolidation likely 2/2 aspiration or infection   - Supplemental oxygen prn  - MICU consult appreciated   - Pulm consult appreciated  - ID recs appreciated  - On IV steroids, bronchodilators.  - Will d/c Cefepime, Vancomycin and start Vantin which patient will complete on 5/29/22.   - CTSx recs appreciated   - plan for bronchoscopy today.     HTN, HLD  -norvasc   -statin     Hx CVA   -xarelto on hold for bronch     Tobacco dependence   -current 1ppd smoker     hx EtOH abuse   suspected thiamine deficiency   -CIWA with PRN meds   -MVI, folic acid, thiamine     PT - no needs.    vte ppx; SCDs

## 2022-05-27 NOTE — PROGRESS NOTE ADULT - SUBJECTIVE AND OBJECTIVE BOX
PULMONARY PROGRESS NOTE      BETH REYES-[id number]    Patient is a 56y old  Male who presents with a chief complaint of hypoxia (26 May 2022 15:10)      INTERVAL HPI/OVERNIGHT EVENTS:  alert, NAD  no cp  for FFB today  MEDICATIONS  (STANDING):  albuterol/ipratropium for Nebulization 3 milliLiter(s) Nebulizer every 6 hours  amLODIPine   Tablet 10 milliGRAM(s) Oral daily  atorvastatin 40 milliGRAM(s) Oral at bedtime  azithromycin  IVPB 500 milliGRAM(s) IV Intermittent every 24 hours  cefepime   IVPB 2000 milliGRAM(s) IV Intermittent every 12 hours  folic acid 1 milliGRAM(s) Oral daily  gabapentin 200 milliGRAM(s) Oral two times a day  methylPREDNISolone sodium succinate Injectable 40 milliGRAM(s) IV Push every 6 hours  multivitamin 1 Tablet(s) Oral daily  pantoprazole  Injectable 40 milliGRAM(s) IV Push daily      MEDICATIONS  (PRN):  acetaminophen     Tablet .. 650 milliGRAM(s) Oral every 6 hours PRN Temp greater or equal to 38C (100.4F), Mild Pain (1 - 3)  ALBUTerol    90 MICROgram(s) HFA Inhaler 2 Puff(s) Inhalation every 6 hours PRN Shortness of Breath and/or Wheezing  aluminum hydroxide/magnesium hydroxide/simethicone Suspension 30 milliLiter(s) Oral every 4 hours PRN Dyspepsia  LORazepam   Injectable 2 milliGRAM(s) IV Push every 1 hour PRN Symptom-triggered: each CIWA -Ar score 8 or GREATER  melatonin 3 milliGRAM(s) Oral at bedtime PRN Insomnia  ondansetron Injectable 4 milliGRAM(s) IV Push every 8 hours PRN Nausea and/or Vomiting      Allergies    penicillins (Unknown)  shellfish (Unknown)    Intolerances        PAST MEDICAL & SURGICAL HISTORY:  Hypertension, unspecified type      Alcohol abuse      Poor historian      H/O fracture of right hip          SOCIAL HISTORY  Smoking History:       REVIEW OF SYSTEMS:    CONSTITUTIONAL:  No distress    HEENT:  Eyes:  No diplopia or blurred vision. ENT:  No earache, sore throat or runny nose.    CARDIOVASCULAR:  No pressure, squeezing, tightness, heaviness or aching about the chest; no palpitations.    RESPIRATORY:  see HPI    GASTROINTESTINAL:  No nausea, vomiting or diarrhea.    GENITOURINARY:  No dysuria, frequency or urgency.    NEUROLOGIC:  No paresthesias, fasciculations, seizures or weakness.    PSYCHIATRIC:  No disorder of thought or mood.    Vital Signs Last 24 Hrs  T(C): 36.6 (27 May 2022 05:15), Max: 36.6 (26 May 2022 10:30)  T(F): 97.8 (27 May 2022 05:15), Max: 97.9 (26 May 2022 10:30)  HR: 92 (27 May 2022 08:23) (85 - 99)  BP: 106/68 (27 May 2022 05:15) (106/68 - 112/71)  BP(mean): --  RR: 18 (27 May 2022 05:15) (18 - 18)  SpO2: 95% (27 May 2022 08:23) (93% - 96%)    PHYSICAL EXAMINATION:    GENERAL: The patient is awake and alert in no apparent distress.     HEENT: Head is normocephalic and atraumatic. Extraocular muscles are intact. Mucous membranes are moist.    NECK: Supple.    LUNGS: moderate air entry with rhconhi; respirations unlabored    HEART: Regular rate and rhythm without murmur.    ABDOMEN: Soft, nontender, and nondistended.      EXTREMITIES: Without any cyanosis, clubbing, rash, lesions or edema.    NEUROLOGIC: Grossly intact.    LABS:                        10.2   15.38 )-----------( 370      ( 27 May 2022 05:42 )             31.7     05-27    136  |  100  |  12.5  ----------------------------<  155<H>  3.9   |  25.0  |  0.53    Ca    9.1      27 May 2022 05:42  Mg     1.8     05-27                          MICROBIOLOGY:    RADIOLOGY & ADDITIONAL STUDIES:  CT scan reviewed

## 2022-05-27 NOTE — BRIEF OPERATIVE NOTE - NSICDXBRIEFPROCEDURE_GEN_ALL_CORE_FT
PROCEDURES:  Flexible bronchoscopy 28-May-2022 08:21:06 w/ BAL   w/ brushing   w/ biopsy Nat Nguyễn

## 2022-05-28 NOTE — PROGRESS NOTE ADULT - ASSESSMENT
56y Male with pmhx of small cell lung cancer (follows w/ dr. basilio at [facility] last chemo treatment was in march per patient.  Radiation completed as well), COPD, ETOH abuse, active 1ppd smoker, who presents with a chief complaint of worsening SOB and cough. On arrival he was found to be febrile and  tachycardic.   Sepsis protocol initiated by ER and he was placed on empiric coverage w/ vancomycin and cefepime, as well as nebs/steroids .   He required BIPAP for increased work of breathing and has clinically improved. Now on 50% Venti mask. Able to obtain CT scan chest today that showed Irregular appearance in wall thickening of the distal trachea and proximal mainstem bronchi with new extraluminal and amorphous gas in the subcarinal region communicating with the left mainstem bronchus medially and possibly the right mainstem bronchus. Findings are suspicious for   fistula or perforation.  Thoracic surgery consulted for CT scan findings.    5/27 s/p bronchosopy   5/28 Ct esophagram ordered and recommending NPO given possibility of tracheoesophageal fistula

## 2022-05-28 NOTE — DIETITIAN INITIAL EVALUATION ADULT - ORAL INTAKE PTA/DIET HISTORY
Pt reports mild loss of appetite while on chemotherapy.  Daily intake PTA 3 meals daily, self prepared, ht 5'10, UBW 120lbs, shellfish allergy.  Pt has poor dentition, does not want meat cut up for him.

## 2022-05-28 NOTE — PROGRESS NOTE ADULT - SUBJECTIVE AND OBJECTIVE BOX
Subjective: no c/o incisional pain at this time. Denies CP, SOB, palpitations, N/V, other c/o.    T(C): 36.7 (05-28-22 @ 11:56), Max: 37.1 (05-28-22 @ 05:10)  HR: 104 (05-28-22 @ 11:56) (88 - 121)  BP: 100/66 (05-28-22 @ 11:56) (100/66 - 119/83)  ABP: --  ABP(mean): --  RR: 20 (05-28-22 @ 11:56) (18 - 20)  SpO2: 91% (05-28-22 @ 11:56) (91% - 100%)  Wt(kg): --  CVP(mm Hg): --  CO: --  CI: --  PA: --       I&O's Detail    27 May 2022 07:01  -  28 May 2022 07:00  --------------------------------------------------------  IN:  Total IN: 0 mL    OUT:    Voided (mL): 500 mL  Total OUT: 500 mL    Total NET: -500 mL          LABS: All Lab data reviewed and analyzed                        10.8   23.38 )-----------( 352      ( 28 May 2022 05:26 )             34.0     05-28    137  |  98  |  13.0  ----------------------------<  166<H>  4.1   |  25.0  |  0.58    Ca    9.0      28 May 2022 05:26  Mg     1.8     05-27    TPro  6.6  /  Alb  3.2<L>  /  TBili  0.4  /  DBili  x   /  AST  35  /  ALT  100<H>  /  AlkPhos  121<H>  05-28            CAPILLARY BLOOD GLUCOSE      POCT Blood Glucose.: 120 mg/dL (27 May 2022 17:16)           RADIOLOGY: - Reviewed and analyzed   CXR: pending    HOSPITAL MEDICATIONS: All medications reviewed and analyzed  MEDICATIONS  (STANDING):  albuterol/ipratropium for Nebulization 3 milliLiter(s) Nebulizer every 6 hours  amLODIPine   Tablet 10 milliGRAM(s) Oral daily  atorvastatin 40 milliGRAM(s) Oral at bedtime  cefpodoxime 200 milliGRAM(s) Oral every 12 hours  folic acid 1 milliGRAM(s) Oral daily  gabapentin 200 milliGRAM(s) Oral two times a day  methylPREDNISolone sodium succinate Injectable 40 milliGRAM(s) IV Push every 12 hours  multivitamin 1 Tablet(s) Oral daily  pantoprazole  Injectable 40 milliGRAM(s) IV Push daily    MEDICATIONS  (PRN):  acetaminophen     Tablet .. 650 milliGRAM(s) Oral every 6 hours PRN Temp greater or equal to 38C (100.4F), Mild Pain (1 - 3)  ALBUTerol    90 MICROgram(s) HFA Inhaler 2 Puff(s) Inhalation every 6 hours PRN Shortness of Breath and/or Wheezing  aluminum hydroxide/magnesium hydroxide/simethicone Suspension 30 milliLiter(s) Oral every 4 hours PRN Dyspepsia  LORazepam   Injectable 2 milliGRAM(s) IV Push every 1 hour PRN Symptom-triggered: each CIWA -Ar score 8 or GREATER  melatonin 3 milliGRAM(s) Oral at bedtime PRN Insomnia  ondansetron Injectable 4 milliGRAM(s) IV Push every 8 hours PRN Nausea and/or Vomiting          Lines:     Physical Exam    Neuro: A+O x 3, non-focal, speech clear and intact  HEENT: PERRL, EOMI, oral mucosa pink and moist  Neck: supple, no JVD  CV: regular rate, regular rhythm, +S1S2, no murmurs or rub  Pulm/chest: lung sounds CTA and equal bilaterally, no accessory muscle use noted  Abd: soft, NT, ND, +BS  Ext: MAGALLON x 4, no C/C/E  Skin: warm, well perfused       Case including assessment/plan of care discussed with   CT surgery attending.  Care time:  35    minutes of noncontinuos care time spent evaluating/treating, reviewing imaging, labs, discussing case with multidisciplinary team, discussing plans/goals of care with patient/family . Non-inclusive is procedure time.       56yMale with PMH     Flexible bronchoscopy        PAST MEDICAL & SURGICAL HISTORY:  Hypertension, unspecified type      Alcohol abuse      Poor historian      H/O fracture of right hip

## 2022-05-28 NOTE — PROGRESS NOTE ADULT - SUBJECTIVE AND OBJECTIVE BOX
CATHY REYES    [id number]    56y      Male    CC: hypoxia     INTERVAL HPI/OVERNIGHT EVENTS: pt seen and examined. s/p bronch yesterday     REVIEW OF SYSTEMS:    CONSTITUTIONAL: No fever, weight loss  RESPIRATORY: No wheezing, hemoptysis  CARDIOVASCULAR: No chest pain, palpitations  GASTROINTESTINAL: No abdominal or epigastric pain. No nausea, vomiting  NEUROLOGICAL: No headaches    Vital Signs Last 24 Hrs  T(C): 36.7 (28 May 2022 11:56), Max: 37.1 (28 May 2022 05:10)  T(F): 98 (28 May 2022 11:56), Max: 98.7 (28 May 2022 05:10)  HR: 104 (28 May 2022 11:56) (88 - 121)  BP: 100/66 (28 May 2022 11:56) (100/66 - 119/83)  BP(mean): --  RR: 20 (28 May 2022 11:56) (18 - 20)  SpO2: 91% (28 May 2022 11:56) (91% - 100%)    PHYSICAL EXAM:    GENERAL: NAD  HEENT: +EOMI  NECK: soft, supple  CHEST/LUNG: Course sounds b/l; respirations unlabored on 5LNC   HEART: S1S2+, Regular rate and rhythm  ABDOMEN: Soft, Nontender, Nondistended; Bowel sounds present  SKIN: warm, dry  NEURO: AAOX3, grossly non-focal  PSYCH: normal affect     LABS:                        10.8   23.38 )-----------( 352      ( 28 May 2022 05:26 )             34.0     05-28    137  |  98  |  13.0  ----------------------------<  166<H>  4.1   |  25.0  |  0.58    Ca    9.0      28 May 2022 05:26  Mg     1.8     05-27    TPro  6.6  /  Alb  3.2<L>  /  TBili  0.4  /  DBili  x   /  AST  35  /  ALT  100<H>  /  AlkPhos  121<H>  05-28            MEDICATIONS  (STANDING):  albuterol/ipratropium for Nebulization 3 milliLiter(s) Nebulizer every 6 hours  amLODIPine   Tablet 10 milliGRAM(s) Oral daily  atorvastatin 40 milliGRAM(s) Oral at bedtime  cefpodoxime 200 milliGRAM(s) Oral every 12 hours  folic acid 1 milliGRAM(s) Oral daily  gabapentin 200 milliGRAM(s) Oral two times a day  methylPREDNISolone sodium succinate Injectable 40 milliGRAM(s) IV Push every 12 hours  multivitamin 1 Tablet(s) Oral daily  pantoprazole  Injectable 40 milliGRAM(s) IV Push daily    MEDICATIONS  (PRN):  acetaminophen     Tablet .. 650 milliGRAM(s) Oral every 6 hours PRN Temp greater or equal to 38C (100.4F), Mild Pain (1 - 3)  ALBUTerol    90 MICROgram(s) HFA Inhaler 2 Puff(s) Inhalation every 6 hours PRN Shortness of Breath and/or Wheezing  aluminum hydroxide/magnesium hydroxide/simethicone Suspension 30 milliLiter(s) Oral every 4 hours PRN Dyspepsia  LORazepam   Injectable 2 milliGRAM(s) IV Push every 1 hour PRN Symptom-triggered: each CIWA -Ar score 8 or GREATER  melatonin 3 milliGRAM(s) Oral at bedtime PRN Insomnia  ondansetron Injectable 4 milliGRAM(s) IV Push every 8 hours PRN Nausea and/or Vomiting      RADIOLOGY & ADDITIONAL TESTS:

## 2022-05-28 NOTE — PROGRESS NOTE ADULT - SUBJECTIVE AND OBJECTIVE BOX
PULMONARY PROGRESS NOTE      BETH REYES-[id number]    Patient is a 56y old  Male who presents with a chief complaint of Pneumonia due to organism     (28 May 2022 07:49)      INTERVAL HPI/OVERNIGHT EVENTS:  alert, NAD  no CP or SOB  MEDICATIONS  (STANDING):  albuterol/ipratropium for Nebulization 3 milliLiter(s) Nebulizer every 6 hours  amLODIPine   Tablet 10 milliGRAM(s) Oral daily  atorvastatin 40 milliGRAM(s) Oral at bedtime  cefpodoxime 200 milliGRAM(s) Oral every 12 hours  folic acid 1 milliGRAM(s) Oral daily  gabapentin 200 milliGRAM(s) Oral two times a day  methylPREDNISolone sodium succinate Injectable 40 milliGRAM(s) IV Push every 8 hours  multivitamin 1 Tablet(s) Oral daily  pantoprazole  Injectable 40 milliGRAM(s) IV Push daily      MEDICATIONS  (PRN):  acetaminophen     Tablet .. 650 milliGRAM(s) Oral every 6 hours PRN Temp greater or equal to 38C (100.4F), Mild Pain (1 - 3)  ALBUTerol    90 MICROgram(s) HFA Inhaler 2 Puff(s) Inhalation every 6 hours PRN Shortness of Breath and/or Wheezing  aluminum hydroxide/magnesium hydroxide/simethicone Suspension 30 milliLiter(s) Oral every 4 hours PRN Dyspepsia  LORazepam   Injectable 2 milliGRAM(s) IV Push every 1 hour PRN Symptom-triggered: each CIWA -Ar score 8 or GREATER  melatonin 3 milliGRAM(s) Oral at bedtime PRN Insomnia  ondansetron Injectable 4 milliGRAM(s) IV Push every 8 hours PRN Nausea and/or Vomiting      Allergies    penicillins (Unknown)  shellfish (Unknown)    Intolerances        PAST MEDICAL & SURGICAL HISTORY:  Hypertension, unspecified type      Alcohol abuse      Poor historian      H/O fracture of right hip          SOCIAL HISTORY  Smoking History:       REVIEW OF SYSTEMS:    CONSTITUTIONAL:  No distress    HEENT:  Eyes:  No diplopia or blurred vision. ENT:  No earache, sore throat or runny nose.    CARDIOVASCULAR:  No pressure, squeezing, tightness, heaviness or aching about the chest; no palpitations.    RESPIRATORY:  see HPI    GASTROINTESTINAL:  No nausea, vomiting or diarrhea.    GENITOURINARY:  No dysuria, frequency or urgency.    NEUROLOGIC:  No paresthesias, fasciculations, seizures or weakness.    PSYCHIATRIC:  No disorder of thought or mood.    Vital Signs Last 24 Hrs  T(C): 36.5 (28 May 2022 09:23), Max: 37.1 (28 May 2022 05:10)  T(F): 97.7 (28 May 2022 09:23), Max: 98.7 (28 May 2022 05:10)  HR: 121 (28 May 2022 09:23) (88 - 121)  BP: 114/78 (28 May 2022 09:23) (109/67 - 119/83)  BP(mean): --  RR: 18 (28 May 2022 09:23) (18 - 19)  SpO2: 100% (28 May 2022 09:23) (93% - 100%)    PHYSICAL EXAMINATION:    GENERAL: The patient is awake and alert in no apparent distress.     HEENT: Head is normocephalic and atraumatic. Extraocular muscles are intact. Mucous membranes are moist.    NECK: Supple.    LUNGS: moderate aair entry with exp  rhonchi; respirations unlabored    HEART: Regular rate and rhythm without murmur.    ABDOMEN: Soft, nontender, and nondistended.      EXTREMITIES: Without any cyanosis, clubbing, rash, lesions or edema.    NEUROLOGIC: Grossly intact.    LABS:                        10.8   23.38 )-----------( 352      ( 28 May 2022 05:26 )             34.0     05-28    137  |  98  |  13.0  ----------------------------<  166<H>  4.1   |  25.0  |  0.58    Ca    9.0      28 May 2022 05:26  Mg     1.8     05-27    TPro  6.6  /  Alb  3.2<L>  /  TBili  0.4  /  DBili  x   /  AST  35  /  ALT  100<H>  /  AlkPhos  121<H>  05-28                        MICROBIOLOGY:    RADIOLOGY & ADDITIONAL STUDIES:

## 2022-05-28 NOTE — DIETITIAN INITIAL EVALUATION ADULT - ETIOLOGY
related to increased nutrition needs secondary to lung cancer, increased metabolic rate due to infection, with decreased intake of kcal and protein

## 2022-05-28 NOTE — DIETITIAN INITIAL EVALUATION ADULT - PERTINENT MEDS FT
MEDICATIONS  (STANDING):  albuterol/ipratropium for Nebulization 3 milliLiter(s) Nebulizer every 6 hours  amLODIPine   Tablet 10 milliGRAM(s) Oral daily  atorvastatin 40 milliGRAM(s) Oral at bedtime  cefpodoxime 200 milliGRAM(s) Oral every 12 hours  folic acid 1 milliGRAM(s) Oral daily  gabapentin 200 milliGRAM(s) Oral two times a day  methylPREDNISolone sodium succinate Injectable 40 milliGRAM(s) IV Push every 8 hours  multivitamin 1 Tablet(s) Oral daily  pantoprazole  Injectable 40 milliGRAM(s) IV Push daily    MEDICATIONS  (PRN):  acetaminophen     Tablet .. 650 milliGRAM(s) Oral every 6 hours PRN Temp greater or equal to 38C (100.4F), Mild Pain (1 - 3)  ALBUTerol    90 MICROgram(s) HFA Inhaler 2 Puff(s) Inhalation every 6 hours PRN Shortness of Breath and/or Wheezing  aluminum hydroxide/magnesium hydroxide/simethicone Suspension 30 milliLiter(s) Oral every 4 hours PRN Dyspepsia  LORazepam   Injectable 2 milliGRAM(s) IV Push every 1 hour PRN Symptom-triggered: each CIWA -Ar score 8 or GREATER  melatonin 3 milliGRAM(s) Oral at bedtime PRN Insomnia  ondansetron Injectable 4 milliGRAM(s) IV Push every 8 hours PRN Nausea and/or Vomiting

## 2022-05-28 NOTE — PROGRESS NOTE ADULT - ASSESSMENT
Acute hypoxemic resp failure secondary to Pna vs RT pneumonitis  Stage 3A, NSCLA, Sq cell by BAL RUL, post RT ( finished 4/22, and chemo ? Feb -March), has not started Durvalumab  last PFT 2020, no air flow obstruction, continued nicotine addiction  On full dose AC for carotid stenosis, recent CVA   Oxygenation much improved, now off HFNC, 91 % 5 L  CT scan with diffuse crazy paving infiltrates R, minimal GG L base,  ? esophageal tracheal fistula  Bronchoscopy notes without mention of fistula   ABX per ID  wean medrol  02 per protocol  mobilize  smoking cessation stressed  HOB elevated  prognosis guarded Acute hypoxemic resp failure secondary to Pna vs RT pneumonitis  Stage 3A, NSCLA, Sq cell by BAL RUL, post RT ( finished 4/22, and chemo ? Feb -March), has not started Durvalumab  last PFT 2020, no air flow obstruction, continued nicotine addiction  On full dose AC for carotid stenosis, recent CVA   Oxygenation much improved, now off HFNC, 91 % 5 L  CT scan with diffuse crazy paving infiltrates R, minimal GG L base,  ? esophageal tracheal fistula  Bronchoscopy notes without mention of fistula , bronchial wash with GNR- Identification pending  ABX per ID  wean medrol  02 per protocol  mobilize  smoking cessation stressed  HOB elevated  prognosis guarded Acute hypoxemic resp failure secondary to Pna vs RT pneumonitis  Stage 3A, NSCLA, Sq cell by BAL RUL, post RT ( finished 4/22, and chemo ? Feb -March), has not started Durvalumab  last PFT 2020, no air flow obstruction, continued nicotine addiction  On full dose AC for carotid stenosis, recent CVA   Oxygenation much improved, now off HFNC, 91 % 5 L  CT scan with diffuse crazy paving infiltrates R, minimal GG L base,  ? esophageal tracheal fistula  Bronchoscopy notes without mention of fistula , bronchial wash with GNR- Identification pending  Given dilated esophagus on CT, keep nocturnal aspiration precautions, check esophagram  ABX per ID  wean medrol  02 per protocol  mobilize  smoking cessation stressed  HOB elevated  prognosis guarded

## 2022-05-28 NOTE — PROGRESS NOTE ADULT - ASSESSMENT
56y/oM PMH EtOH abuse, HTN, HLD, CVA on Xarelto, Lung CA on chemo, active smoker presenting with progressive shortness of breath admitted with acute hypoxic respiratory failure     Acute hypoxic respiratory failure 2/2 pneumonia in immunocompromised patient, probable gram negative organism  sepsis, present on admission, improving   Lung CA on chemo   -titrating supplemental O2 as able, on NC today   -MICU consult appreciated   -pulm consult appreciated   -IV steroids   -nebs   -ID recs appreciated   -CT chest 5/25: irregular appearance in wall thickening of distal trachea and proximal mainstem bronchi with new extraluminal and amorphous gas in subcarinal region, communicating with L mainstem bronchus medially and poss R mainstem bronchus, suspicious for fistula or perforation; new diffuse right lung GGO and patchy LLL multifocal consolidation likely 2/2 aspiration or infection   -CTSx recs appreciated   -vantin to complete 5/29  -s/p bronch 5/27  -CT esophagram ordered   -NPO     HTN, HLD  -norvasc, statin; holding while npo    Hx CVA   -xarelto on hold for bronch     Tobacco dependence   -current 1ppd smoker   -cessation encouraged     hx EtOH abuse   suspected thiamine deficiency   -CIWA with PRN meds   -MVI, folic acid, thiamine, when clear for PO     vte ppx; SCDs ; resume xarelto when clear by ctsx

## 2022-05-28 NOTE — DIETITIAN INITIAL EVALUATION ADULT - PERTINENT LABORATORY DATA
05-28    137  |  98  |  13.0  ----------------------------<  166<H>  4.1   |  25.0  |  0.58    Ca    9.0      28 May 2022 05:26  Mg     1.8     05-27    TPro  6.6  /  Alb  3.2<L>  /  TBili  0.4  /  DBili  x   /  AST  35  /  ALT  100<H>  /  AlkPhos  121<H>  05-28  POCT Blood Glucose.: 120 mg/dL (05-27-22 @ 17:16)

## 2022-05-29 NOTE — PROGRESS NOTE ADULT - ASSESSMENT
Acute hypoxemic resp failure secondary to Pna vs RT pneumonitis  Stage 3A, NSCLA, Sq cell by BAL RUL, post RT ( finished 4/22, and chemo ? Feb -March), has not started Durvalumab  last PFT 2020, no air flow obstruction, continued nicotine addiction  On full dose AC for carotid stenosis, recent CVA   Oxygenation much improved, now off HFNC, 95 % 5 L- wean per  protocol  CT scan with diffuse crazy paving infiltrates R, minimal GG L base,  ? esophageal tracheal fistula  Bronchoscopy notes without mention of fistula , bronchial wash with GNR- Identification pending  Spoke with T surgery, await CT esophagram, NPO  ABX per ID  wean medrol  smoking cessation stressed  HOB elevated  prognosis guarded Acute hypoxemic resp failure secondary to Pna vs RT pneumonitis  Stage 3A, NSCLA, Sq cell by BAL RUL, post RT ( finished 4/22, and chemo ? Feb -March), has not started Durvalumab  last PFT 2020, no air flow obstruction, continued nicotine addiction  On full dose AC for carotid stenosis, recent CVA   Oxygenation much improved, now off HFNC, 95 % 5 L- wean per  protocol  CT scan with diffuse crazy paving infiltrates R, minimal GG L base,  ? esophageal tracheal fistula  Bronchoscopy notes without mention of fistula , bronchial wash with GNR- No growth  Spoke with T surgery, await CT esophagram, NPO  ABX per ID  wean medrol  smoking cessation stressed  HOB elevated  prognosis guarded

## 2022-05-29 NOTE — PROGRESS NOTE ADULT - SUBJECTIVE AND OBJECTIVE BOX
CATHY REYES    [id number]    56y      Male    CC: hypoxia    INTERVAL HPI/OVERNIGHT EVENTS: pt seen and examined. pending ct esophagram     REVIEW OF SYSTEMS:    CONSTITUTIONAL: No fever, weight loss  RESPIRATORY: No wheezing, hemoptysis  CARDIOVASCULAR: No chest pain, palpitations  GASTROINTESTINAL: No abdominal or epigastric pain. No nausea, vomiting  NEUROLOGICAL: No headaches    Vital Signs Last 24 Hrs  T(C): 36.4 (29 May 2022 08:53), Max: 36.7 (28 May 2022 11:56)  T(F): 97.6 (29 May 2022 08:53), Max: 98 (28 May 2022 11:56)  HR: 87 (29 May 2022 08:53) (87 - 108)  BP: 122/76 (29 May 2022 08:53) (100/66 - 125/80)  BP(mean): --  RR: 18 (29 May 2022 08:53) (16 - 20)  SpO2: 97% (29 May 2022 08:53) (90% - 97%)    PHYSICAL EXAM:    GENERAL: NAD  HEENT: PERRL, +EOMI  NECK: soft, supple  CHEST/LUNG: fair b/l air entry; respirations unlabored on 6LNC  HEART: S1S2+, Regular rate and rhythm  ABDOMEN: Soft, Nontender, Nondistended; Bowel sounds present  SKIN: warm, dry  NEURO: AAOX3, no focal deficits  PSYCH: normal affect    LABS:                        10.7   18.19 )-----------( 327      ( 29 May 2022 05:26 )             33.3     05-29    135  |  98  |  13.7  ----------------------------<  127<H>  4.1   |  26.0  |  0.52    Ca    9.2      29 May 2022 05:26  Mg     1.7     05-29    TPro  6.2<L>  /  Alb  3.1<L>  /  TBili  0.2<L>  /  DBili  x   /  AST  20  /  ALT  71<H>  /  AlkPhos  115  05-29            MEDICATIONS  (STANDING):  albuterol/ipratropium for Nebulization 3 milliLiter(s) Nebulizer every 6 hours  amLODIPine   Tablet 10 milliGRAM(s) Oral daily  atorvastatin 40 milliGRAM(s) Oral at bedtime  cefpodoxime 200 milliGRAM(s) Oral every 12 hours  dextrose 5% + sodium chloride 0.9%. 1000 milliLiter(s) (75 mL/Hr) IV Continuous <Continuous>  folic acid 1 milliGRAM(s) Oral daily  gabapentin 200 milliGRAM(s) Oral two times a day  methylPREDNISolone sodium succinate Injectable 40 milliGRAM(s) IV Push every 12 hours  multivitamin 1 Tablet(s) Oral daily  pantoprazole  Injectable 40 milliGRAM(s) IV Push daily    MEDICATIONS  (PRN):  acetaminophen     Tablet .. 650 milliGRAM(s) Oral every 6 hours PRN Temp greater or equal to 38C (100.4F), Mild Pain (1 - 3)  ALBUTerol    90 MICROgram(s) HFA Inhaler 2 Puff(s) Inhalation every 6 hours PRN Shortness of Breath and/or Wheezing  aluminum hydroxide/magnesium hydroxide/simethicone Suspension 30 milliLiter(s) Oral every 4 hours PRN Dyspepsia  LORazepam   Injectable 2 milliGRAM(s) IV Push every 1 hour PRN Symptom-triggered: each CIWA -Ar score 8 or GREATER  melatonin 3 milliGRAM(s) Oral at bedtime PRN Insomnia  ondansetron Injectable 4 milliGRAM(s) IV Push every 8 hours PRN Nausea and/or Vomiting      RADIOLOGY & ADDITIONAL TESTS:

## 2022-05-29 NOTE — PROVIDER CONTACT NOTE (OTHER) - ACTION/TREATMENT ORDERED:
As per AAYUSH Portillo, pt must continue NPO due to test results today.
This RN will follow orders as placed

## 2022-05-29 NOTE — PROGRESS NOTE ADULT - SUBJECTIVE AND OBJECTIVE BOX
Subjective: Pt lying in bed.  NAD noted.                       T(F): 97.6 (05-29-22 @ 08:53), Max: 98 (05-28-22 @ 11:56)  HR: 87 (05-29-22 @ 08:53) (87 - 108)  BP: 122/76 (05-29-22 @ 08:53) (100/66 - 125/80)  RR: 18 (05-29-22 @ 08:53) (16 - 20)  SpO2: 97% (05-29-22 @ 08:53) (90% - 97%)    Allergies:  penicillins (Unknown)  shellfish (Unknown)      05-29    135  |  98  |  13.7  ----------------------------<  127<H>  4.1   |  26.0  |  0.52    Ca    9.2      29 May 2022 05:26  Mg     1.7     05-29    TPro  6.2<L>  /  Alb  3.1<L>  /  TBili  0.2<L>  /  DBili  x   /  AST  20  /  ALT  71<H>  /  AlkPhos  115  05-29                               10.7   18.19 )-----------( 327      ( 29 May 2022 05:26 )             33.3         I&O's Detail    28 May 2022 07:01  -  29 May 2022 07:00  --------------------------------------------------------  IN:  Total IN: 0 mL    OUT:    Voided (mL): 500 mL  Total OUT: 500 mL    Total NET: -500 mL      Active Medications:  acetaminophen     Tablet .. 650 milliGRAM(s) Oral every 6 hours PRN  ALBUTerol    90 MICROgram(s) HFA Inhaler 2 Puff(s) Inhalation every 6 hours PRN  albuterol/ipratropium for Nebulization 3 milliLiter(s) Nebulizer every 6 hours  aluminum hydroxide/magnesium hydroxide/simethicone Suspension 30 milliLiter(s) Oral every 4 hours PRN  amLODIPine   Tablet 10 milliGRAM(s) Oral daily  atorvastatin 40 milliGRAM(s) Oral at bedtime  cefpodoxime 200 milliGRAM(s) Oral every 12 hours  folic acid 1 milliGRAM(s) Oral daily  gabapentin 200 milliGRAM(s) Oral two times a day  LORazepam   Injectable 2 milliGRAM(s) IV Push every 1 hour PRN  magnesium sulfate  IVPB 2 Gram(s) IV Intermittent once  melatonin 3 milliGRAM(s) Oral at bedtime PRN  methylPREDNISolone sodium succinate Injectable 40 milliGRAM(s) IV Push every 12 hours  multivitamin 1 Tablet(s) Oral daily  ondansetron Injectable 4 milliGRAM(s) IV Push every 8 hours PRN  pantoprazole  Injectable 40 milliGRAM(s) IV Push daily      Physical Exam:    Neuro: A+O x 3.  CV: regular rate, regular rhythm.  Pulm/chest: lung sounds CTA and equal bilaterally.  Abd: soft, NT, ND, +BS                     PAST MEDICAL & SURGICAL HISTORY:  Hypertension, unspecified type      Alcohol abuse      Poor historian      H/O fracture of right hip

## 2022-05-29 NOTE — PROGRESS NOTE ADULT - SUBJECTIVE AND OBJECTIVE BOX
PULMONARY PROGRESS NOTE      BETH REYES-[id number]    Patient is a 56y old  Male who presents with a chief complaint of hypoxia (28 May 2022 13:50)      INTERVAL HPI/OVERNIGHT EVENTS:  alert, NAD  no CP or sob  now NPO  MEDICATIONS  (STANDING):  albuterol/ipratropium for Nebulization 3 milliLiter(s) Nebulizer every 6 hours  amLODIPine   Tablet 10 milliGRAM(s) Oral daily  atorvastatin 40 milliGRAM(s) Oral at bedtime  cefpodoxime 200 milliGRAM(s) Oral every 12 hours  folic acid 1 milliGRAM(s) Oral daily  gabapentin 200 milliGRAM(s) Oral two times a day  magnesium sulfate  IVPB 2 Gram(s) IV Intermittent once  methylPREDNISolone sodium succinate Injectable 40 milliGRAM(s) IV Push every 12 hours  multivitamin 1 Tablet(s) Oral daily  pantoprazole  Injectable 40 milliGRAM(s) IV Push daily      MEDICATIONS  (PRN):  acetaminophen     Tablet .. 650 milliGRAM(s) Oral every 6 hours PRN Temp greater or equal to 38C (100.4F), Mild Pain (1 - 3)  ALBUTerol    90 MICROgram(s) HFA Inhaler 2 Puff(s) Inhalation every 6 hours PRN Shortness of Breath and/or Wheezing  aluminum hydroxide/magnesium hydroxide/simethicone Suspension 30 milliLiter(s) Oral every 4 hours PRN Dyspepsia  LORazepam   Injectable 2 milliGRAM(s) IV Push every 1 hour PRN Symptom-triggered: each CIWA -Ar score 8 or GREATER  melatonin 3 milliGRAM(s) Oral at bedtime PRN Insomnia  ondansetron Injectable 4 milliGRAM(s) IV Push every 8 hours PRN Nausea and/or Vomiting      Allergies    penicillins (Unknown)  shellfish (Unknown)    Intolerances        PAST MEDICAL & SURGICAL HISTORY:  Hypertension, unspecified type      Alcohol abuse      Poor historian      H/O fracture of right hip          SOCIAL HISTORY  Smoking History:       REVIEW OF SYSTEMS:    CONSTITUTIONAL:  No distress    HEENT:  Eyes:  No diplopia or blurred vision. ENT:  No earache, sore throat or runny nose.    CARDIOVASCULAR:  No pressure, squeezing, tightness, heaviness or aching about the chest; no palpitations.    RESPIRATORY:  see HPI    GASTROINTESTINAL:  No nausea, vomiting or diarrhea.    GENITOURINARY:  No dysuria, frequency or urgency.    NEUROLOGIC:  No paresthesias, fasciculations, seizures or weakness.    PSYCHIATRIC:  No disorder of thought or mood.    Vital Signs Last 24 Hrs  T(C): 36.4 (29 May 2022 08:53), Max: 36.7 (28 May 2022 11:56)  T(F): 97.6 (29 May 2022 08:53), Max: 98 (28 May 2022 11:56)  HR: 87 (29 May 2022 08:53) (87 - 108)  BP: 122/76 (29 May 2022 08:53) (100/66 - 125/80)  BP(mean): --  RR: 18 (29 May 2022 08:53) (16 - 20)  SpO2: 97% (29 May 2022 08:53) (90% - 97%)    PHYSICAL EXAMINATION:    GENERAL: The patient is awake and alert in no apparent distress.     HEENT: Head is normocephalic and atraumatic. Extraocular muscles are intact. Mucous membranes are moist.    NECK: Supple.    LUNGS: moderate air entry bilat  without wheezing, rales or rhonchi; respirations unlabored    HEART: Regular rate and rhythm without murmur.    ABDOMEN: Soft, nontender, and nondistended.      EXTREMITIES: Without any cyanosis, clubbing, rash, lesions or edema.    NEUROLOGIC: Grossly intact.    LABS:                        10.7   18.19 )-----------( 327      ( 29 May 2022 05:26 )             33.3     05-29    135  |  98  |  13.7  ----------------------------<  127<H>  4.1   |  26.0  |  0.52    Ca    9.2      29 May 2022 05:26  Mg     1.7     05-29    TPro  6.2<L>  /  Alb  3.1<L>  /  TBili  0.2<L>  /  DBili  x   /  AST  20  /  ALT  71<H>  /  AlkPhos  115  05-29                        MICROBIOLOGY:    RADIOLOGY & ADDITIONAL STUDIES:

## 2022-05-29 NOTE — PROGRESS NOTE ADULT - ASSESSMENT
56y Male with pmhx of small cell lung cancer (follows w/ dr. basilio at [facility] last chemo treatment was in march per patient.  Radiation completed as well), COPD, ETOH abuse, active 1ppd smoker, who presents with a chief complaint of worsening SOB and cough. On arrival he was found to be febrile and  tachycardic.   Sepsis protocol initiated by ER and he was placed on empiric coverage w/ vancomycin and cefepime, as well as nebs/steroids .   He required BIPAP for increased work of breathing and has clinically improved. Now on 50% Venti mask. Able to obtain CT scan chest today that showed Irregular appearance in wall thickening of the distal trachea and proximal mainstem bronchi with new extraluminal and amorphous gas in the subcarinal region communicating with the left mainstem bronchus medially and possibly the right mainstem bronchus. Findings are suspicious for   fistula or perforation.  Thoracic surgery consulted for CT scan findings.    5/27 s/p bronchosopy   5/28 Ct esophagram ordered and recommending NPO given possibility of tracheoesophageal fistula

## 2022-05-29 NOTE — PROGRESS NOTE ADULT - ASSESSMENT
56y/oM PMH EtOH abuse, HTN, HLD, CVA on Xarelto, Lung CA on chemo, active smoker presenting with progressive shortness of breath admitted with acute hypoxic respiratory failure     Acute hypoxic respiratory failure 2/2 pneumonia in immunocompromised patient, probable gram negative organism  sepsis, present on admission, improving   Lung CA on chemo   -titrating supplemental O2 as able, on NC today   -MICU consult appreciated   -pulm consult appreciated   -IV steroids   -nebs   -ID recs appreciated   -CT chest 5/25: irregular appearance in wall thickening of distal trachea and proximal mainstem bronchi with new extraluminal and amorphous gas in subcarinal region, communicating with L mainstem bronchus medially and poss R mainstem bronchus, suspicious for fistula or perforation; new diffuse right lung GGO and patchy LLL multifocal consolidation likely 2/2 aspiration or infection   -CTSx recs appreciated   -vantin to complete 5/29  -s/p bronch 5/27  -CT esophagram ordered   -NPO   -IVF    HTN, HLD  -norvasc, statin; holding while npo    Hx CVA   -xarelto on hold for bronch     Tobacco dependence   -current 1ppd smoker   -cessation encouraged     hx EtOH abuse   suspected thiamine deficiency   -CIWA with PRN meds   -MVI, folic acid, thiamine, when clear for PO     vte ppx; SCDs ; resume xarelto when clear by ctsx

## 2022-05-29 NOTE — PROGRESS NOTE ADULT - PROBLEM SELECTOR PLAN 1
S/P bronchoscopy 5/27.  Plan for CT esophogram today to rule out tracheoesophageal fistula.  Pt is NPO.  Will continue to follow.  Discussed with Dr. Mulligan.

## 2022-05-30 NOTE — PROGRESS NOTE ADULT - ASSESSMENT
- Stage 3A NSCLC (squamous) - s/p RT, chemo, plan for Durvalumab but not yet started  - R-sided pneumonitis, possibly due to radiation  - L lung tree-in-bud opacities, likely PNA  - Hypoxic respiratory failure  - Recent CVA, carotid stenosis  - CT esophagram without TE fistula  - Active smoker    Recommendations:  Pt had bronchoscopy on 5/27 with biopsy performed. C/w ABx empirically, f/u culture data. Will require prolonged steroid taper. Would discharge patient on Prednisone 30mg and will continue taper as outpatient. Would start Bactrim DS TIW for PJP PPx. Pending speech/swallow evaluation. If no evidence of aspiration will be discharged per primary team. Will repeat CT chest in 4-6 weeks time as outpatient.    D/w hospitalist Dr. Rosalie Spicer M.D.  Pulmonary & Critical Care Medicine  [facility]  Pulmonary and [facility] at [place]  [address]  [place] [address]  T: [phone]  F: [phone]

## 2022-05-30 NOTE — PROGRESS NOTE ADULT - ASSESSMENT
56y/oM PMH EtOH abuse, HTN, HLD, CVA on Xarelto, Lung CA on chemo, active smoker presenting with progressive shortness of breath admitted with acute hypoxic respiratory failure     Acute hypoxic respiratory failure 2/2 pneumonia in immunocompromised patient, probable gram negative organism  sepsis, present on admission, improving   Lung CA on chemo   -titrating supplemental O2 as able, on NC today   -MICU consult appreciated   -pulm consult appreciated   -IV steroids   -nebs   -ID recs appreciated   -CT chest 5/25: irregular appearance in wall thickening of distal trachea and proximal mainstem bronchi with new extraluminal and amorphous gas in subcarinal region, communicating with L mainstem bronchus medially and poss R mainstem bronchus, suspicious for fistula or perforation; new diffuse right lung GGO and patchy LLL multifocal consolidation likely 2/2 aspiration or infection   -CTSx recs appreciated   -vantin to complete 5/29  -s/p bronch 5/27  -CT c/a/p with PO contrast: no oral contrast extravasation or oral contrast within airways suggestive of tracheoesophageal fistula; impacted LLL airways and tree-in-bud/confluent opacities within LLL, slightly increased from 5/25 suggestive of aspiration pna; patchy ggo through R lung unchanged, poss pneumonitis, irregular solid opacity superior segment of RLL unchanged   -modified diet ordered   -f/u SLP     HTN, HLD  -norvasc, statin    Hx CVA   -xarelto     Tobacco dependence   -current 1ppd smoker   -cessation encouraged   -nicotine patch    hx EtOH abuse   suspected thiamine deficiency   -CIWA with PRN meds   -MVI, folic acid, thiamine    vte ppx; SCDs , xarelto

## 2022-05-30 NOTE — PROGRESS NOTE ADULT - PROBLEM SELECTOR PLAN 1
S/P bronchoscopy 5/27.  CT esophagram yesterday without evidence of TE fistula.  Showed impacted LLL suggestive of aspiration pna.  Recommend swallow eval for dietary recommendations.  Aspiration precautions.  Will follow up bronchoscopy biopsies .  Discussed with Dr. Mulligan.

## 2022-05-30 NOTE — PROGRESS NOTE ADULT - SUBJECTIVE AND OBJECTIVE BOX
Subjective: Pt lying in bed.  NAD noted.  Denies CP or SOB.                         T(F): 98.2 (05-30-22 @ 16:48), Max: 98.2 (05-30-22 @ 16:48)  HR: 107 (05-30-22 @ 16:48) (85 - 107)  BP: 122/76 (05-30-22 @ 16:48) (104/67 - 122/76)  RR: 18 (05-30-22 @ 16:48) (18 - 18)  SpO2: 95% (05-30-22 @ 16:48) (95% - 97%)    Allergies:  penicillins (Unknown)  shellfish (Unknown)      05-30    137  |  101  |  11.0  ----------------------------<  123<H>  4.4   |  24.0  |  0.49<L>    Ca    9.0      30 May 2022 05:35  Mg     1.8     05-30    TPro  6.2<L>  /  Alb  3.1<L>  /  TBili  0.2<L>  /  DBili  x   /  AST  20  /  ALT  71<H>  /  AlkPhos  115  05-29                               10.5   16.63 )-----------( 321      ( 30 May 2022 05:35 )             33.1           < from: CT Abdomen and Pelvis w/ Oral Cont (05.29.22 @ 14:00) >    IMPRESSION:  There is no oral contrast extravasation, or oral contrast within the   airways suggestive of tracheoesophageal fistula.    Impacted left lower lobe airways and tree-in-bud/confluent opacities   within left lower lobe are slightly increased in comparison with   5/25/2022, suggestive of aspiration pneumonia. Patchy groundglass   opacities throughout the right lung are unchanged possibly representing   pneumonitis. Irregular solid opacity superior segment of right lower lobe   is unchanged.    --- End of Report ---  JEFFREY MOORE MD; Attending Radiologist  This document has been electronically signed. May 29 2022  2:32PM    < end of copied text >    I&O's Detail    29 May 2022 07:01  -  30 May 2022 07:00  --------------------------------------------------------  IN:    dextrose 5% + sodium chloride 0.9%: 1000 mL  Total IN: 1000 mL    OUT:    Voided (mL): 1200 mL  Total OUT: 1200 mL    Total NET: -200 mL      Active Medications:  acetaminophen     Tablet .. 650 milliGRAM(s) Oral every 6 hours PRN  ALBUTerol    90 MICROgram(s) HFA Inhaler 2 Puff(s) Inhalation every 6 hours PRN  albuterol/ipratropium for Nebulization 3 milliLiter(s) Nebulizer every 6 hours  aluminum hydroxide/magnesium hydroxide/simethicone Suspension 30 milliLiter(s) Oral every 4 hours PRN  amLODIPine   Tablet 10 milliGRAM(s) Oral daily  atorvastatin 40 milliGRAM(s) Oral at bedtime  cefTRIAXone   IVPB 1000 milliGRAM(s) IV Intermittent every 24 hours  dextrose 5% + sodium chloride 0.9%. 1000 milliLiter(s) IV Continuous <Continuous>  folic acid 1 milliGRAM(s) Oral daily  gabapentin 200 milliGRAM(s) Oral two times a day  LORazepam   Injectable 2 milliGRAM(s) IV Push every 1 hour PRN  melatonin 3 milliGRAM(s) Oral at bedtime PRN  methylPREDNISolone sodium succinate Injectable 40 milliGRAM(s) IV Push daily  multivitamin 1 Tablet(s) Oral daily  nicotine - 21 mG/24Hr(s) Patch 1 patch Transdermal daily  ondansetron Injectable 4 milliGRAM(s) IV Push every 8 hours PRN  pantoprazole  Injectable 40 milliGRAM(s) IV Push daily  rivaroxaban 20 milliGRAM(s) Oral daily      Physical Exam:    Neuro: AAOX3.     Pulm: Diminished BLL     CV: RRR    Abd: Soft/NT/ND. +BS                  PAST MEDICAL & SURGICAL HISTORY:  Hypertension, unspecified type      Alcohol abuse      Poor historian      H/O fracture of right hip                 Controlled Substance Refill Request  Medication Name:   Requested Prescriptions     Pending Prescriptions Disp Refills     dextroamphetamine-amphetamine (ADDERALL XR) 25 MG 24 hr capsule 30 capsule 0     Sig: Take 1 capsule (25 mg total) by mouth daily.     Date Last Fill: 11/6/2020  Requested Pharmacy: [facility]  Submit electronically to pharmacy  Controlled Substance Agreement on file:   Encounter-Level CSA Scan Date:    There are no encounter-level csa scan date.        Last office visit:  8/28/2020

## 2022-05-30 NOTE — PROGRESS NOTE ADULT - SUBJECTIVE AND OBJECTIVE BOX
CATHY REYES    [id number]    56y      Male    CC: hypoxia     INTERVAL HPI/OVERNIGHT EVENTS: pt seen and examined. asking to eat     REVIEW OF SYSTEMS:    CONSTITUTIONAL: No fever  RESPIRATORY: No cough, wheezing, hemoptysis  CARDIOVASCULAR: No chest pain, palpitations  GASTROINTESTINAL: No abdominal or epigastric pain. No nausea, vomiting  NEUROLOGICAL: No headaches    Vital Signs Last 24 Hrs  T(C): 36.6 (30 May 2022 09:38), Max: 36.6 (29 May 2022 17:27)  T(F): 97.8 (30 May 2022 09:38), Max: 97.8 (29 May 2022 17:27)  HR: 100 (30 May 2022 09:38) (85 - 106)  BP: 110/74 (30 May 2022 09:38) (104/67 - 110/74)  BP(mean): --  RR: 18 (30 May 2022 09:38) (18 - 18)  SpO2: 96% (30 May 2022 09:38) (95% - 97%)    PHYSICAL EXAM:    GENERAL: NAD  HEENT: PERRL, +EOMI  NECK: soft, supple  CHEST/LUNG: fair b/l air entry; respirations unlabored on 6LNC  HEART: S1S2+, Regular rate and rhythm  ABDOMEN: Soft, Nontender, Nondistended; Bowel sounds present  SKIN: warm, dry  NEURO: AAOX3, no focal deficits  PSYCH: normal affect    LABS:                        10.5   16.63 )-----------( 321      ( 30 May 2022 05:35 )             33.1     05-30    137  |  101  |  11.0  ----------------------------<  123<H>  4.4   |  24.0  |  0.49<L>    Ca    9.0      30 May 2022 05:35  Mg     1.8     05-30    TPro  6.2<L>  /  Alb  3.1<L>  /  TBili  0.2<L>  /  DBili  x   /  AST  20  /  ALT  71<H>  /  AlkPhos  115  05-29            MEDICATIONS  (STANDING):  albuterol/ipratropium for Nebulization 3 milliLiter(s) Nebulizer every 6 hours  amLODIPine   Tablet 10 milliGRAM(s) Oral daily  atorvastatin 40 milliGRAM(s) Oral at bedtime  cefTRIAXone   IVPB 1000 milliGRAM(s) IV Intermittent every 24 hours  dextrose 5% + sodium chloride 0.9%. 1000 milliLiter(s) (75 mL/Hr) IV Continuous <Continuous>  folic acid 1 milliGRAM(s) Oral daily  gabapentin 200 milliGRAM(s) Oral two times a day  methylPREDNISolone sodium succinate Injectable 40 milliGRAM(s) IV Push every 12 hours  multivitamin 1 Tablet(s) Oral daily  pantoprazole  Injectable 40 milliGRAM(s) IV Push daily    MEDICATIONS  (PRN):  acetaminophen     Tablet .. 650 milliGRAM(s) Oral every 6 hours PRN Temp greater or equal to 38C (100.4F), Mild Pain (1 - 3)  ALBUTerol    90 MICROgram(s) HFA Inhaler 2 Puff(s) Inhalation every 6 hours PRN Shortness of Breath and/or Wheezing  aluminum hydroxide/magnesium hydroxide/simethicone Suspension 30 milliLiter(s) Oral every 4 hours PRN Dyspepsia  LORazepam   Injectable 2 milliGRAM(s) IV Push every 1 hour PRN Symptom-triggered: each CIWA -Ar score 8 or GREATER  melatonin 3 milliGRAM(s) Oral at bedtime PRN Insomnia  ondansetron Injectable 4 milliGRAM(s) IV Push every 8 hours PRN Nausea and/or Vomiting      RADIOLOGY & ADDITIONAL TESTS:    < from: CT Chest w/ Oral Cont (05.29.22 @ 13:59) >  IMPRESSION:  There is no oral contrast extravasation, or oral contrast within the   airways suggestive of tracheoesophageal fistula.    Impacted left lower lobe airways and tree-in-bud/confluent opacities   within left lower lobe are slightly increased in comparison with   5/25/2022, suggestive of aspiration pneumonia. Patchy groundglass   opacities throughout the right lung are unchanged possibly representing   pneumonitis. Irregular solid opacity superior segment of right lower lobe   is unchanged.    --- End of Report ---    < end of copied text >

## 2022-05-30 NOTE — PROGRESS NOTE ADULT - SUBJECTIVE AND OBJECTIVE BOX
PULMONARY PROGRESS NOTE      CATHY REYES  [id number]    Patient is a 56y old  Male who presents with a chief complaint of hypoxia (30 May 2022 13:05)        INTERVAL HPI/OVERNIGHT EVENTS:  Pt seen and examined at the bedside. No acute events overnight. He denies worsening SOB or cough.     MEDICATIONS  (STANDING):  albuterol/ipratropium for Nebulization 3 milliLiter(s) Nebulizer every 6 hours  amLODIPine   Tablet 10 milliGRAM(s) Oral daily  atorvastatin 40 milliGRAM(s) Oral at bedtime  cefTRIAXone   IVPB 1000 milliGRAM(s) IV Intermittent every 24 hours  dextrose 5% + sodium chloride 0.9%. 1000 milliLiter(s) (75 mL/Hr) IV Continuous <Continuous>  folic acid 1 milliGRAM(s) Oral daily  gabapentin 200 milliGRAM(s) Oral two times a day  methylPREDNISolone sodium succinate Injectable 40 milliGRAM(s) IV Push daily  multivitamin 1 Tablet(s) Oral daily  nicotine - 21 mG/24Hr(s) Patch 1 patch Transdermal daily  pantoprazole  Injectable 40 milliGRAM(s) IV Push daily  rivaroxaban 20 milliGRAM(s) Oral daily    MEDICATIONS  (PRN):  acetaminophen     Tablet .. 650 milliGRAM(s) Oral every 6 hours PRN Temp greater or equal to 38C (100.4F), Mild Pain (1 - 3)  ALBUTerol    90 MICROgram(s) HFA Inhaler 2 Puff(s) Inhalation every 6 hours PRN Shortness of Breath and/or Wheezing  aluminum hydroxide/magnesium hydroxide/simethicone Suspension 30 milliLiter(s) Oral every 4 hours PRN Dyspepsia  LORazepam   Injectable 2 milliGRAM(s) IV Push every 1 hour PRN Symptom-triggered: each CIWA -Ar score 8 or GREATER  melatonin 3 milliGRAM(s) Oral at bedtime PRN Insomnia  ondansetron Injectable 4 milliGRAM(s) IV Push every 8 hours PRN Nausea and/or Vomiting    Allergies    penicillins (Unknown)  shellfish (Unknown)    Intolerances      PAST MEDICAL & SURGICAL HISTORY:  Hypertension, unspecified type      Alcohol abuse      Poor historian      H/O fracture of right hip            REVIEW OF SYSTEMS:  Negative except as noted in HPI      Vital Signs Last 24 Hrs  T(C): 36.6 (30 May 2022 09:38), Max: 36.6 (29 May 2022 17:27)  T(F): 97.8 (30 May 2022 09:38), Max: 97.8 (29 May 2022 17:27)  HR: 100 (30 May 2022 09:38) (85 - 106)  BP: 110/74 (30 May 2022 09:38) (104/67 - 110/74)  BP(mean): --  RR: 18 (30 May 2022 09:38) (18 - 18)  SpO2: 95% (30 May 2022 14:56) (95% - 97%)      PHYSICAL EXAMINATION:  GEN: In no apparent distress  HEENT: NC/AT  NECK: Supple, non-tender  CV: +S1, S2  RESPIRATORY: B;/L coarse breath sounds, non-labored breathing  ABDOMEN: Soft, non-tender  EXTREMITIES: No pedal edema B/L  SKIN: No open wounds  PSYCH: Normal affect      LABS:                        10.5   16.63 )-----------( 321      ( 30 May 2022 05:35 )             33.1     05-30    137  |  101  |  11.0  ----------------------------<  123<H>  4.4   |  24.0  |  0.49<L>    Ca    9.0      30 May 2022 05:35  Mg     1.8     05-30    TPro  6.2<L>  /  Alb  3.1<L>  /  TBili  0.2<L>  /  DBili  x   /  AST  20  /  ALT  71<H>  /  AlkPhos  115  05-29                        MICROBIOLOGY:  Culture - Acid Fast - Tissue w/Smear (05.27.22 @ 22:10)    Specimen Source: .Tissue left main stem bx culture    Acid Fast Bacilli Smear:   No acid fast bacilli seen by fluorochrome stain        RADIOLOGY & ADDITIONAL STUDIES:  < from: CT Chest w/ Oral Cont (05.29.22 @ 13:59) >  IMPRESSION:  There is no oral contrast extravasation, or oral contrast within the   airways suggestive of tracheoesophageal fistula.    Impacted left lower lobe airways and tree-in-bud/confluent opacities   within left lower lobe are slightly increased in comparison with   5/25/2022, suggestive of aspiration pneumonia. Patchy groundglass   opacities throughout the right lung are unchanged possibly representing   pneumonitis. Irregular solid opacity superior segment of right lower lobe   is unchanged.    --- End of Report ---            JEFFREY MOORE MD; Attending Radiologist  This document has been electronically signed. May 29 2022  2:32PM    < end of copied text >    ~~~~~~~~~~~~~~~~~~~~~~~~~~~~~~~~~~~~~~~~~~~~~~~~~~~~~~~~~~~~~~    ECHO:

## 2022-05-30 NOTE — PROGRESS NOTE ADULT - ASSESSMENT
56y Male with pmhx of small cell lung cancer (follows w/ dr. basilio at [facility] last chemo treatment was in march per patient.  Radiation completed as well), COPD, ETOH abuse, active 1ppd smoker, who presents with a chief complaint of worsening SOB and cough. On arrival he was found to be febrile and  tachycardic.   Sepsis protocol initiated by ER and he was placed on empiric coverage w/ vancomycin and cefepime, as well as nebs/steroids .   He required BIPAP for increased work of breathing and has clinically improved. Now on 50% Venti mask. Able to obtain CT scan chest today that showed Irregular appearance in wall thickening of the distal trachea and proximal mainstem bronchi with new extraluminal and amorphous gas in the subcarinal region communicating with the left mainstem bronchus medially and possibly the right mainstem bronchus. Findings are suspicious for   fistula or perforation.  Thoracic surgery consulted for CT scan findings.    5/27 s/p bronchosopy   5/28 Ct esophagram ordered and recommending NPO given possibility of tracheoesophageal fistula   5/29 CT esophagram> No evidence of TE fistula.  Impacted LLL airways and tree in bud confluent opacities suggestive of aspiration pneumonia.

## 2022-05-31 NOTE — SWALLOW BEDSIDE ASSESSMENT ADULT - ADDITIONAL RECOMMENDATIONS
No further follow-up warranted at bedside. Consider MBS, if there are concerns with silent aspiration

## 2022-05-31 NOTE — PROGRESS NOTE ADULT - PROBLEM SELECTOR PLAN 1
S/P bronchoscopy 5/27.  CT esophagram 5/28 without evidence of TE fistula.  Showed impacted LLL suggestive of aspiration pna.  Recommend swallow eval for dietary recommendations.  Aspiration precautions.  Will follow up bronchoscopy biopsies.  Discussed with Dr. Mulligan. S/P bronchoscopy 5/27.  CT esophagram 5/28 without evidence of TE fistula.  Showed impacted LLL suggestive of aspiration pna.  Swallow eval without overt evidence of aspiration, rec advancing diet to easy to chew/thin liquids   Continue aspiration precautions.  Will follow up bronchoscopy biopsies.  Cultures remain negative to date   Discussed with Dr. Mulligan.

## 2022-05-31 NOTE — SWALLOW BEDSIDE ASSESSMENT ADULT - ORAL PHASE
impacted by dentition/Decreased anterior-posterior movement of the bolus/Delayed oral transit time Within functional limits

## 2022-05-31 NOTE — PROGRESS NOTE ADULT - ASSESSMENT
56y Male with pmhx of small cell lung cancer (follows w/ dr. basilio at [facility] last chemo treatment was in march per patient.  Radiation completed as well), COPD, ETOH abuse, active 1ppd smoker, who presents with a chief complaint of worsening SOB and cough. On arrival he was found to be febrile and  tachycardic.   Sepsis protocol initiated by ER and he was placed on empiric coverage w/ vancomycin and cefepime, as well as nebs/steroids .   He required BIPAP for increased work of breathing and has clinically improved. Now on 50% Venti mask. Able to obtain CT scan chest today that showed Irregular appearance in wall thickening of the distal trachea and proximal mainstem bronchi with new extraluminal and amorphous gas in the subcarinal region communicating with the left mainstem bronchus medially and possibly the right mainstem bronchus. Findings are suspicious for   fistula or perforation.  Thoracic surgery consulted for CT scan findings.    5/27 s/p bronchosopy   5/28 Ct esophagram ordered and recommending NPO given possibility of tracheoesophageal fistula   5/29 CT esophagram> No evidence of TE fistula.  Impacted LLL airways and tree in bud confluent opacities suggestive of aspiration pneumonia.

## 2022-05-31 NOTE — SWALLOW BEDSIDE ASSESSMENT ADULT - SWALLOW EVAL: DIAGNOSIS
Mild oral dysphagia for regular solids, impacted by incomplete dentition. Pharyngeal stage of swallow clinically unremarkable post intake for assessed PO (puree, easy to chew solids, regular solids & thin fluids) with no overt s/s aspiration

## 2022-05-31 NOTE — PROGRESS NOTE ADULT - ASSESSMENT
56y/oM PMH EtOH abuse, HTN, HLD, CVA on Xarelto, Lung CA on chemo, active smoker presenting with progressive shortness of breath admitted with acute hypoxic respiratory failure     Acute hypoxic respiratory failure 2/2 pneumonia in immunocompromised patient, probable gram negative organism  sepsis, present on admission, improving   Lung CA on chemo   -titrating supplemental O2 as able, on NC today   -MICU consult appreciated   -pulm consult appreciated   -IV steroids , dc on prednisone 30mg for prolonged taper   -nebs   -ID recs appreciated   -CT chest 5/25: irregular appearance in wall thickening of distal trachea and proximal mainstem bronchi with new extraluminal and amorphous gas in subcarinal region, communicating with L mainstem bronchus medially and poss R mainstem bronchus, suspicious for fistula or perforation; new diffuse right lung GGO and patchy LLL multifocal consolidation likely 2/2 aspiration or infection   -CTSx recs appreciated   -vantin to complete 5/29  -s/p bronch 5/27  -CT c/a/p with PO contrast: no oral contrast extravasation or oral contrast within airways suggestive of tracheoesophageal fistula; impacted LLL airways and tree-in-bud/confluent opacities within LLL, slightly increased from 5/25 suggestive of aspiration pna; patchy ggo through R lung unchanged, poss pneumonitis, irregular solid opacity superior segment of RLL unchanged   -SLP: easy to chew, thin liquids  -will need bactrim ds TIW for pcp ppx on dc   -chest pt, percussion vest, IS ordered ; goal for ~3LNC in order to dc home with portable O2 concentrator     HTN, HLD  -norvasc, statin    Hx CVA   -xarelto     Tobacco dependence   -current 1ppd smoker   -cessation encouraged   -nicotine patch    hx EtOH abuse   suspected thiamine deficiency   -CIWA with PRN meds   -MVI, folic acid, thiamine    vte ppx; SCDs , xarelto    dispo: home pending improvement in supplemental O2; currently on 5-6LNC

## 2022-05-31 NOTE — PROGRESS NOTE ADULT - SUBJECTIVE AND OBJECTIVE BOX
CATHY REYES    [id number]    56y      Male    CC: sob    INTERVAL HPI/OVERNIGHT EVENTS: pt seen and examined. no acute events reported o/n    REVIEW OF SYSTEMS:    CONSTITUTIONAL: No fever, weight loss, or fatigue  RESPIRATORY: No wheezing, hemoptysis  CARDIOVASCULAR: No chest pain, palpitations  GASTROINTESTINAL: No abdominal or epigastric pain. No nausea, vomiting  NEUROLOGICAL: No headaches    Vital Signs Last 24 Hrs  T(C): 36.6 (31 May 2022 12:03), Max: 37.2 (31 May 2022 05:09)  T(F): 97.8 (31 May 2022 12:03), Max: 98.9 (31 May 2022 05:09)  HR: 94 (31 May 2022 13:51) (94 - 107)  BP: 116/62 (31 May 2022 12:03) (109/73 - 122/76)  BP(mean): --  RR: 18 (31 May 2022 12:03) (18 - 18)  SpO2: 97% (31 May 2022 13:51) (92% - 97%)    PHYSICAL EXAM:    GENERAL: NAD  HEENT: +EOMI  NECK: soft, supple  CHEST/LUNG: course sounds b/l; respirations unlabored on 5LNC  HEART: S1S2+, Regular rate and rhythm  ABDOMEN: Soft, Nontender, Nondistended; Bowel sounds present  SKIN: warm, dry  NEURO: AAOX3, grossly non-focal  PSYCH: normal affect    LABS:                        10.5   16.63 )-----------( 321      ( 30 May 2022 05:35 )             33.1     05-31    134<L>  |  98  |  11.7  ----------------------------<  90  4.2   |  24.0  |  0.52    Ca    9.4      31 May 2022 06:16  Mg     1.6     05-31              MEDICATIONS  (STANDING):  albuterol/ipratropium for Nebulization 3 milliLiter(s) Nebulizer every 6 hours  amLODIPine   Tablet 10 milliGRAM(s) Oral daily  atorvastatin 40 milliGRAM(s) Oral at bedtime  cefTRIAXone   IVPB 1000 milliGRAM(s) IV Intermittent every 24 hours  folic acid 1 milliGRAM(s) Oral daily  gabapentin 200 milliGRAM(s) Oral two times a day  methylPREDNISolone sodium succinate Injectable 40 milliGRAM(s) IV Push daily  multivitamin 1 Tablet(s) Oral daily  nicotine - 21 mG/24Hr(s) Patch 1 patch Transdermal daily  pantoprazole  Injectable 40 milliGRAM(s) IV Push daily  rivaroxaban 20 milliGRAM(s) Oral daily    MEDICATIONS  (PRN):  acetaminophen     Tablet .. 650 milliGRAM(s) Oral every 6 hours PRN Temp greater or equal to 38C (100.4F), Mild Pain (1 - 3)  ALBUTerol    90 MICROgram(s) HFA Inhaler 2 Puff(s) Inhalation every 6 hours PRN Shortness of Breath and/or Wheezing  aluminum hydroxide/magnesium hydroxide/simethicone Suspension 30 milliLiter(s) Oral every 4 hours PRN Dyspepsia  melatonin 3 milliGRAM(s) Oral at bedtime PRN Insomnia  ondansetron Injectable 4 milliGRAM(s) IV Push every 8 hours PRN Nausea and/or Vomiting      RADIOLOGY & ADDITIONAL TESTS:

## 2022-05-31 NOTE — PROGRESS NOTE ADULT - SUBJECTIVE AND OBJECTIVE BOX
Subjective: to follow with full note     Pertinent events of the past 24 hours: Uneventful, recovering as expected    VITAL SIGNS  Vital Signs Last 24 Hrs  T(C): 37.2 (05-31-22 @ 05:09), Max: 37.2 (05-31-22 @ 05:09)  T(F): 98.9 (05-31-22 @ 05:09), Max: 98.9 (05-31-22 @ 05:09)  HR: 98 (05-31-22 @ 05:09) (96 - 107)  BP: 118/78 (05-31-22 @ 05:09) (109/73 - 122/76)  RR: 18 (05-31-22 @ 05:09) (18 - 18)  SpO2: 95% (05-31-22 @ 05:09) (92% - 96%)  on (O2)              MEDICATIONS  acetaminophen     Tablet .. 650 milliGRAM(s) Oral every 6 hours PRN  ALBUTerol    90 MICROgram(s) HFA Inhaler 2 Puff(s) Inhalation every 6 hours PRN  albuterol/ipratropium for Nebulization 3 milliLiter(s) Nebulizer every 6 hours  aluminum hydroxide/magnesium hydroxide/simethicone Suspension 30 milliLiter(s) Oral every 4 hours PRN  amLODIPine   Tablet 10 milliGRAM(s) Oral daily  atorvastatin 40 milliGRAM(s) Oral at bedtime  cefTRIAXone   IVPB 1000 milliGRAM(s) IV Intermittent every 24 hours  dextrose 5% + sodium chloride 0.9%. 1000 milliLiter(s) IV Continuous <Continuous>  folic acid 1 milliGRAM(s) Oral daily  gabapentin 200 milliGRAM(s) Oral two times a day  magnesium sulfate  IVPB 2 Gram(s) IV Intermittent once  melatonin 3 milliGRAM(s) Oral at bedtime PRN  methylPREDNISolone sodium succinate Injectable 40 milliGRAM(s) IV Push daily  multivitamin 1 Tablet(s) Oral daily  nicotine - 21 mG/24Hr(s) Patch 1 patch Transdermal daily  ondansetron Injectable 4 milliGRAM(s) IV Push every 8 hours PRN  pantoprazole  Injectable 40 milliGRAM(s) IV Push daily  rivaroxaban 20 milliGRAM(s) Oral daily      PHYSICAL EXAM  to follow with full note    05-30 @ 07:01  -  05-31 @ 07:00  --------------------------------------------------------  IN: 0 mL / OUT: 700 mL / NET: -700 mL    Weights:  Daily     Daily   Admit Wt: Drug Dosing Weight  Height (cm): 182.9 (25 May 2022 13:45)  Weight (kg): 51.3 (25 May 2022 13:45)  BMI (kg/m2): 15.3 (25 May 2022 13:45)  BSA (m2): 1.67 (25 May 2022 13:45)    All laboratory results, radiology and medications reviewed.    LABS  05-31    134<L>  |  98  |  11.7  ----------------------------<  90  4.2   |  24.0  |  0.52    Ca    9.4      31 May 2022 06:16  Mg     1.6     05-31                                   10.5   16.63 )-----------( 321      ( 30 May 2022 05:35 )             33.1                 Last CXR: < from: Xray Chest 1 View-PORTABLE IMMEDIATE (Xray Chest 1 View-PORTABLE IMMEDIATE .) (05.24.22 @ 15:00) >  FINDINGS:  A portable view of the chest demonstrates diffuse right lung infiltrate,   consistent with pneumonia, without significant change compared to the   prior exam. The left lung is clear. No effusions are seen. No distinct   hilar or mediastinal lesions are detected. Heart size is normal, without   CHF.    IMPRESSION: Persistent diffuse right lung infiltrate consistent with   pneumonia, showing no significant change compared to the previous exam.    < end of copied text >    < from: CT Chest No Cont (05.25.22 @ 13:51) >  IMPRESSION:.    Irregular appearance in wall thickening of the distal trachea and   proximal mainstem bronchi with new extraluminal and amorphous gas in the   subcarinal region communicating with the left mainstem bronchus medially   and possibly the right mainstem bronchus. Findings are suspicious for   fistula or perforation. Discussed with Dr. Carroll on 5/25/2022 at 2:28 pm.    New diffuse right lung ground-glass opacities and patchy left lower lobe   multifocal consolidation likely secondary to aspiration or infection.    < end of copied text >  < from: CT Abdomen and Pelvis w/ Oral Cont (05.29.22 @ 14:00) >  IMPRESSION:  There is no oral contrast extravasation, or oral contrast within the   airways suggestive of tracheoesophageal fistula.    Impacted left lower lobe airways and tree-in-bud/confluent opacities   within left lower lobe are slightly increased in comparison with   5/25/2022, suggestive of aspiration pneumonia. Patchy groundglass   opacities throughout the right lung are unchanged possibly representing   pneumonitis. Irregular solid opacity superior segment of right lower lobe   is unchanged.    < end of copied text >    Last EKG:    PAST MEDICAL & SURGICAL HISTORY:  Hypertension, unspecified type      Alcohol abuse      Poor historian      H/O fracture of right hip          Subjective: Patient states 'I think im going home today or tomorrow with the oxygen" Smoking cessation encouraged. Advised patient not to smoke with oxygen on. NAD denies CP, palpitations, SOB, fever, chills, itchiness/rash, diaphoresis, vision changes, HA, dizziness/lightheadedness, numbness/tingling, abd pain, N/V.     Pertinent events of the past 24 hours: Uneventful, recovering as expected    VITAL SIGNS  Vital Signs Last 24 Hrs  T(C): 37.2 (05-31-22 @ 05:09), Max: 37.2 (05-31-22 @ 05:09)  T(F): 98.9 (05-31-22 @ 05:09), Max: 98.9 (05-31-22 @ 05:09)  HR: 98 (05-31-22 @ 05:09) (96 - 107)  BP: 118/78 (05-31-22 @ 05:09) (109/73 - 122/76)  RR: 18 (05-31-22 @ 05:09) (18 - 18)  SpO2: 95% (05-31-22 @ 05:09) (92% - 96%)  on (O2)              MEDICATIONS  acetaminophen     Tablet .. 650 milliGRAM(s) Oral every 6 hours PRN  ALBUTerol    90 MICROgram(s) HFA Inhaler 2 Puff(s) Inhalation every 6 hours PRN  albuterol/ipratropium for Nebulization 3 milliLiter(s) Nebulizer every 6 hours  aluminum hydroxide/magnesium hydroxide/simethicone Suspension 30 milliLiter(s) Oral every 4 hours PRN  amLODIPine   Tablet 10 milliGRAM(s) Oral daily  atorvastatin 40 milliGRAM(s) Oral at bedtime  cefTRIAXone   IVPB 1000 milliGRAM(s) IV Intermittent every 24 hours  dextrose 5% + sodium chloride 0.9%. 1000 milliLiter(s) IV Continuous <Continuous>  folic acid 1 milliGRAM(s) Oral daily  gabapentin 200 milliGRAM(s) Oral two times a day  magnesium sulfate  IVPB 2 Gram(s) IV Intermittent once  melatonin 3 milliGRAM(s) Oral at bedtime PRN  methylPREDNISolone sodium succinate Injectable 40 milliGRAM(s) IV Push daily  multivitamin 1 Tablet(s) Oral daily  nicotine - 21 mG/24Hr(s) Patch 1 patch Transdermal daily  ondansetron Injectable 4 milliGRAM(s) IV Push every 8 hours PRN  pantoprazole  Injectable 40 milliGRAM(s) IV Push daily  rivaroxaban 20 milliGRAM(s) Oral daily      PHYSICAL EXAM  Constitutional: NAD  Neuro: A+O x 3, non-focal, speech clear and intact  HEENT: NC/AT  CV: +S1S2, no murmurs or rub  Pulm/chest: 5L NC, breath sounds rhonchi, no accessory muscle use noted  Abd: +BS, soft, NT, ND  Ext: MAGALLON x 4, no C/C/E  Skin: warm, well perfused  Psych: calm, appropriate affect    05-30 @ 07:01  -  05-31 @ 07:00  --------------------------------------------------------  IN: 0 mL / OUT: 700 mL / NET: -700 mL    Weights:  Daily     Daily   Admit Wt: Drug Dosing Weight  Height (cm): 182.9 (25 May 2022 13:45)  Weight (kg): 51.3 (25 May 2022 13:45)  BMI (kg/m2): 15.3 (25 May 2022 13:45)  BSA (m2): 1.67 (25 May 2022 13:45)    All laboratory results, radiology and medications reviewed.    LABS  05-31    134<L>  |  98  |  11.7  ----------------------------<  90  4.2   |  24.0  |  0.52    Ca    9.4      31 May 2022 06:16  Mg     1.6     05-31                                   10.5   16.63 )-----------( 321      ( 30 May 2022 05:35 )             33.1                 Last CXR: < from: Xray Chest 1 View-PORTABLE IMMEDIATE (Xray Chest 1 View-PORTABLE IMMEDIATE .) (05.24.22 @ 15:00) >  FINDINGS:  A portable view of the chest demonstrates diffuse right lung infiltrate,   consistent with pneumonia, without significant change compared to the   prior exam. The left lung is clear. No effusions are seen. No distinct   hilar or mediastinal lesions are detected. Heart size is normal, without   CHF.    IMPRESSION: Persistent diffuse right lung infiltrate consistent with   pneumonia, showing no significant change compared to the previous exam.    < end of copied text >    < from: CT Chest No Cont (05.25.22 @ 13:51) >  IMPRESSION:.    Irregular appearance in wall thickening of the distal trachea and   proximal mainstem bronchi with new extraluminal and amorphous gas in the   subcarinal region communicating with the left mainstem bronchus medially   and possibly the right mainstem bronchus. Findings are suspicious for   fistula or perforation. Discussed with Dr. Carroll on 5/25/2022 at 2:28 pm.    New diffuse right lung ground-glass opacities and patchy left lower lobe   multifocal consolidation likely secondary to aspiration or infection.    < end of copied text >  < from: CT Abdomen and Pelvis w/ Oral Cont (05.29.22 @ 14:00) >  IMPRESSION:  There is no oral contrast extravasation, or oral contrast within the   airways suggestive of tracheoesophageal fistula.    Impacted left lower lobe airways and tree-in-bud/confluent opacities   within left lower lobe are slightly increased in comparison with   5/25/2022, suggestive of aspiration pneumonia. Patchy groundglass   opacities throughout the right lung are unchanged possibly representing   pneumonitis. Irregular solid opacity superior segment of right lower lobe   is unchanged.    < end of copied text >    Last EKG:    PAST MEDICAL & SURGICAL HISTORY:  Hypertension, unspecified type      Alcohol abuse      Poor historian      H/O fracture of right hip

## 2022-05-31 NOTE — PROGRESS NOTE ADULT - SUBJECTIVE AND OBJECTIVE BOX
PULMONARY PROGRESS NOTE      CATHY REYES  MRN-[id number]    Patient is a 56y old  Male who presents with a chief complaint of hypoxia (31 May 2022 08:58)      INTERVAL HPI/OVERNIGHT EVENTS:    Pt is awake and alert  Feels better  Mild cough    MEDICATIONS  (STANDING):  albuterol/ipratropium for Nebulization 3 milliLiter(s) Nebulizer every 6 hours  amLODIPine   Tablet 10 milliGRAM(s) Oral daily  atorvastatin 40 milliGRAM(s) Oral at bedtime  cefTRIAXone   IVPB 1000 milliGRAM(s) IV Intermittent every 24 hours  dextrose 5% + sodium chloride 0.9%. 1000 milliLiter(s) (75 mL/Hr) IV Continuous <Continuous>  folic acid 1 milliGRAM(s) Oral daily  gabapentin 200 milliGRAM(s) Oral two times a day  methylPREDNISolone sodium succinate Injectable 40 milliGRAM(s) IV Push daily  multivitamin 1 Tablet(s) Oral daily  nicotine - 21 mG/24Hr(s) Patch 1 patch Transdermal daily  pantoprazole  Injectable 40 milliGRAM(s) IV Push daily  rivaroxaban 20 milliGRAM(s) Oral daily      MEDICATIONS  (PRN):  acetaminophen     Tablet .. 650 milliGRAM(s) Oral every 6 hours PRN Temp greater or equal to 38C (100.4F), Mild Pain (1 - 3)  ALBUTerol    90 MICROgram(s) HFA Inhaler 2 Puff(s) Inhalation every 6 hours PRN Shortness of Breath and/or Wheezing  aluminum hydroxide/magnesium hydroxide/simethicone Suspension 30 milliLiter(s) Oral every 4 hours PRN Dyspepsia  melatonin 3 milliGRAM(s) Oral at bedtime PRN Insomnia  ondansetron Injectable 4 milliGRAM(s) IV Push every 8 hours PRN Nausea and/or Vomiting      Allergies    penicillins (Unknown)  shellfish (Unknown)    Intolerances        PAST MEDICAL & SURGICAL HISTORY:  Hypertension, unspecified type      Alcohol abuse      Poor historian      H/O fracture of right hip            REVIEW OF SYSTEMS: As above but offers no new complaints    Vital Signs Last 24 Hrs  T(C): 37.2 (31 May 2022 05:09), Max: 37.2 (31 May 2022 05:09)  T(F): 98.9 (31 May 2022 05:09), Max: 98.9 (31 May 2022 05:09)  HR: 94 (31 May 2022 08:58) (94 - 107)  BP: 118/78 (31 May 2022 05:09) (109/73 - 122/76)  BP(mean): --  RR: 18 (31 May 2022 05:09) (18 - 18)  SpO2: 95% (31 May 2022 05:09) (92% - 95%)    PHYSICAL EXAMINATION:    GENERAL: The patient is awake and alert in no apparent distress.     HEENT: Head is normocephalic and atraumatic. Extraocular muscles are intact. Mucous membranes are moist.    NECK: Supple.    LUNGS: Clear to auscultation without wheezing, rales but b/l rhonchi; respirations unlabored    HEART: Regular rate and rhythm without murmur.    ABDOMEN: Soft, nontender, and nondistended.      EXTREMITIES: Without any cyanosis, clubbing, rash, lesions or edema.    NEUROLOGIC: Grossly intact.    LABS:                        10.5   16.63 )-----------( 321      ( 30 May 2022 05:35 )             33.1     05-31    134<L>  |  98  |  11.7  ----------------------------<  90  4.2   |  24.0  |  0.52    Ca    9.4      31 May 2022 06:16  Mg     1.6     05-31      MICROBIOLOGY:    COVID-19 PCR . (05.26.22 @ 13:38)    COVID-19 PCR: NotDetec: You can help in the fight against COVID-19. [facility] may contact  you to see if you are interested in voluntarily participating in one of  our clinical trials.  Testing is performed using polymerase chain reaction (PCR) or  transcription mediated amplification (TMA). This COVID-19 (SARS-CoV-2)  nucleic acid amplification test was validated by [employer] and is  in use under the FDA Emergency Use Authorization (EUA) for clinical labs  CLIA-certified to perform high complexity testing. Test results should be  correlated with clinical presentation, patient history, and epidemiology.    Culture - Fungal, Tissue (05.27.22 @ 22:10)    Specimen Source: .Tissue left main stem bx culture    Culture Results:   Testing in progress    Culture - Acid Fast - Tissue w/Smear (05.27.22 @ 22:10)    Specimen Source: .Tissue left main stem bx culture    Acid Fast Bacilli Smear:   No acid fast bacilli seen by fluorochrome stain        RADIOLOGY & ADDITIONAL STUDIES:    ACC: [id number] EXAM:  CT ABDOMEN AND PELVIS OC                        ACC: [id number] EXAM:  CT CHEST OC                          PROCEDURE DATE:  05/29/2022          INTERPRETATION:  CLINICAL INFORMATION: Lung cancer, pneumonia, COPD,   irregular tracheal contour, evaluate for tracheal esophageal fistula    COMPARISON: CT chest 5/25/2022. CT chest abdomen and pelvis 2/10/2022    CONTRAST/COMPLICATIONS:  IV Contrast: NONE  Oral Contrast: Gastroview  Complications: None reported at time of study completion    PROCEDURE:  CT of the Chest, Abdomen and Pelvis was performed.  Dual phase, arterial and portal venous phase imaging was performed   through the upper abdomen.  Sagittal and coronal reformats were performed.    FINDINGS:  CHEST:  LUNGS ANDLARGE AIRWAYS: Again noted, there is wall thickening,   nodularity and irregularity of the lower trachea/ani/proximal main   bronchi. Extraluminal and amorphous gas in the subcarinal region,   communicating with the medial aspect of the main bronchi is again noted   and possibly represents interval cavitation of the previously noted   subcarinal tumor eroding into the airways. There is no oral contrast   extravasation, or oral contrast within the airways suggestive of   tracheoesophageal fistula. Impacted left lower lobe airways and   tree-in-bud/confluent opacities within left lower lobe are slightly   increased in comparison with 5/25/2022, suggestive of aspiration   pneumonia. Patchy groundglass opacities throughout the right lung are   unchanged possibly representing pneumonitis. Irregular solid opacity   measuring approximately 2.4 cm on series 3 image 83 within superior   segment of right lower lobe is unchanged.  PLEURA: No pleural effusion.  VESSELS: Multivessel coronary calcifications.  HEART: Heart size is normal. No pericardial effusion.  MEDIASTINUM AND RUSS: Stable subcentimeter mediastinal and hilar lymph   nodes. Esophageal wall thickening.  CHEST WALL AND LOWER NECK: Within normal limits.    ABDOMEN AND PELVIS:  LIVER: Within normal limits.  BILE DUCTS: Normal caliber.  GALLBLADDER: Within normal limits.  SPLEEN: Within normal limits.  PANCREAS: Within normal limits.  ADRENALS: Within normal limits.  KIDNEYS/URETERS: Left renal cyst. No hydronephrosis.    BLADDER: Within normal limits.  REPRODUCTIVE ORGANS: Prostate within normal limits.    BOWEL: No bowel obstruction. Appendix is normal.  PERITONEUM: No ascites.  VESSELS: Aortic calcifications.  RETROPERITONEUM/LYMPH NODES: No lymphadenopathy.  ABDOMINAL WALL: Within normal limits.  BONES: Status post right hip ORIF.    IMPRESSION:  There is no oral contrast extravasation, or oral contrast within the   airways suggestive of tracheoesophageal fistula.    Impacted left lower lobe airways and tree-in-bud/confluent opacities   within left lower lobe are slightly increased in comparison with   5/25/2022, suggestive of aspiration pneumonia. Patchy groundglass   opacities throughout the right lung are unchanged possibly representing   pneumonitis. Irregular solid opacity superior segment of right lower lobe   is unchanged.          JEFFREY MOORE MD; Attending Radiologist  This document has been electronically signed. May 29 2022  2:32PM      ECHO:

## 2022-05-31 NOTE — SWALLOW BEDSIDE ASSESSMENT ADULT - SLP GENERAL OBSERVATIONS
Pt received & seen seated upright in bed, awake/alert, 02 NC baseline sats: 90-93%), grossly oriented, 0/10 pain

## 2022-05-31 NOTE — SWALLOW BEDSIDE ASSESSMENT ADULT - COMMENTS
As per MD note: "56y Male with pmhx of small cell lung cancer (follows w/ dr. basilio at [facility] last chemo treatment was in march per patient.  Radiation completed as well), COPD, ETOH abuse, active 1ppd smoker, who presents with a chief complaint of worsening SOB and cough. On arrival he was found to be febrile and  tachycardic.   Sepsis protocol initiated by ER and he was placed on empiric coverage w/ vancomycin and cefepime, as well as nebs/steroids. He required BIPAP for increased work of breathing and has clinically improved. Now on 50% Venti mask. Able to obtain CT scan chest today that showed Irregular appearance in wall thickening of the distal trachea and proximal mainstem bronchi with new extraluminal and amorphous gas in the subcarinal region communicating with the left mainstem bronchus medially and possibly the right mainstem bronchus. Findings are suspicious for   fistula or perforation.  Thoracic surgery consulted for CT scan findings.  5/27 s/p bronchosopy   5/28 Ct esophagram ordered and recommending NPO given possibility of tracheoesophageal fistula   5/29 CT esophagram> No evidence of TE fistula.  Impacted LLL airways and tree in bud confluent opacities suggestive of aspiration pneumonia."

## 2022-05-31 NOTE — PROGRESS NOTE ADULT - ASSESSMENT
Stage 3A NSCLC (squamous) - s/p RT, chemo, plan for Durvalumab but not yet started  R-sided pneumonitis, possibly due to radiation  L lung tree-in-bud opacities, likely PNA  Hypoxic respiratory failure on NCO2  Recent CVA, carotid stenosis  CT esophagram without TE fistula  Active smoker  S/p bronchoscopy; results pending    Recommendations:    Pt had bronchoscopy on 5/27 with biopsy performed  ABx empirically  F/u culture/bx  Will require prolonged steroid taper; could discharge patient on Prednisone 30mg and will continue taper as outpatient with Bactrim DS TIW for PCP PPx.   Post speech/swallow evaluation  Repeat CT chest in 4-6 weeks as outpatient  OOB/ambulate  PFTs as outpt  Pulm f/u post d/c

## 2022-06-01 NOTE — PROGRESS NOTE ADULT - PROVIDER SPECIALTY LIST ADULT
Hospitalist
Infectious Disease
Hospitalist
Infectious Disease
Pulmonology
Hospitalist
Hospitalist
Pulmonology
Pulmonology
Thoracic Surgery
Hospitalist
Thoracic Surgery
Pulmonology

## 2022-06-01 NOTE — PROGRESS NOTE ADULT - ASSESSMENT
- Stage 3A NSCLC (squamous) - s/p RT, chemo, plan for Durvalumab but not yet started  - R-sided pneumonitis, possibly due to radiation  - L lung tree-in-bud opacities, likely PNA  - Hypoxic respiratory failure improving  - Recent CVA, carotid stenosis  - CT esophagram without TE fistula  - Active smoker    Improved clinically, 2 L 95%  need to mobilize  smoking cessation noted  bronchoscopy results noted  suspect combination of RT and aspiration  slow prednisone taper to 30 mg x 5 days , than 20 mg x 5 days , than 10 mg daily  Finish abx  aspiration precautions  smoking cessation stressed  prognosis guarded  pulmonary/oncology follow up as out pt, follow up CT 4-6 weeks           - Stage 3A NSCLC (squamous) - s/p RT, chemo, plan for Durvalumab but not yet started  - R-sided pneumonitis, possibly due to radiation  - L lung tree-in-bud opacities, likely PNA  - Hypoxic respiratory failure improving  - Recent CVA, carotid stenosis  - CT esophagram without TE fistula  - Active smoker    Improved clinically, 2 L 95%  need to mobilize  smoking cessation noted  bronchoscopy results noted  suspect combination of RT and aspiration  slow prednisone taper to 30 mg x 5 days , than 20 mg x 5 days , than 10 mg daily till follow up  Finish abx  aspiration precautions  smoking cessation stressed  prognosis guarded  home nebulizer- duo neb bid and prn  pulmonary/oncology follow up as out pt, follow up CT 4-6 weeks

## 2022-06-01 NOTE — PROGRESS NOTE ADULT - REASON FOR ADMISSION
hypoxia

## 2022-06-01 NOTE — DISCHARGE NOTE PROVIDER - DETAILS OF MALNUTRITION DIAGNOSIS/DIAGNOSES
This patient has been assessed with a concern for Malnutrition and was treated during this hospitalization for the following Nutrition diagnosis/diagnoses:     -  05/28/2022: Severe protein-calorie malnutrition   -  05/28/2022: Underweight (BMI < 19)

## 2022-06-01 NOTE — DISCHARGE NOTE PROVIDER - NSDCMRMEDTOKEN_GEN_ALL_CORE_FT
amLODIPine 10 mg oral tablet: 1 tab(s) orally once a day  atorvastatin 40 mg oral tablet: 1 tab(s) orally once a day  CeleBREX 200 mg oral capsule: 1 cap(s) orally 2 times a day  Compazine 10 mg oral tablet: 1 tab(s) orally 4 times a day, As Needed  gabapentin 100 mg oral tablet: 200 milligram(s) orally every 12 hours  Incruse Ellipta 62.5 mcg/inh inhalation powder: 1 puff(s) inhaled every 24 hours  Ventolin HFA 90 mcg/inh inhalation aerosol: 2 puff(s) inhaled every 6 hours, As Needed  Xarelto 20 mg oral tablet: 1 tab(s) orally once a day (in the evening)   amLODIPine 10 mg oral tablet: 1 tab(s) orally once a day  atorvastatin 40 mg oral tablet: 1 tab(s) orally once a day  Bactrim  mg-160 mg oral tablet: 1 tab(s) orally 3 times a week   Compazine 10 mg oral tablet: 1 tab(s) orally 4 times a day, As Needed  folic acid 1 mg oral tablet: 1 tab(s) orally once a day  gabapentin 100 mg oral capsule: 2 cap(s) orally 2 times a day  Incruse Ellipta 62.5 mcg/inh inhalation powder: 1 puff(s) inhaled every 24 hours  Multiple Vitamins oral tablet: 1 tab(s) orally once a day  nicotine 21 mg/24 hr transdermal film, extended release: 1 patch transdermal once a day   pantoprazole 40 mg oral delayed release tablet: 1 tab(s) orally once a day   Ventolin HFA 90 mcg/inh inhalation aerosol: 2 puff(s) inhaled every 6 hours, As Needed  Xarelto 20 mg oral tablet: 1 tab(s) orally once a day (in the evening)   amLODIPine 10 mg oral tablet: 1 tab(s) orally once a day  atorvastatin 40 mg oral tablet: 1 tab(s) orally once a day  Bactrim  mg-160 mg oral tablet: 1 tab(s) orally 3 times a week   Compazine 10 mg oral tablet: 1 tab(s) orally 4 times a day, As Needed  folic acid 1 mg oral tablet: 1 tab(s) orally once a day  gabapentin 100 mg oral capsule: 2 cap(s) orally 2 times a day  Incruse Ellipta 62.5 mcg/inh inhalation powder: 1 puff(s) inhaled every 24 hours  Multiple Vitamins oral tablet: 1 tab(s) orally once a day  nicotine 21 mg/24 hr transdermal film, extended release: 1 patch transdermal once a day   pantoprazole 40 mg oral delayed release tablet: 1 tab(s) orally once a day   predniSONE 10 mg oral tablet: 3 tabs orally once a day x 4 days  2 tab once a day x 5 days  1 tab once a day until follow up  Ventolin HFA 90 mcg/inh inhalation aerosol: 2 puff(s) inhaled every 6 hours, As Needed  Xarelto 20 mg oral tablet: 1 tab(s) orally once a day (in the evening)

## 2022-06-01 NOTE — DISCHARGE NOTE PROVIDER - PROVIDER TOKENS
PROVIDER:[TOKEN:[[id number]],FOLLOWUP:[1 month]],PROVIDER:[TOKEN:[[id number]],FOLLOWUP:[Routine]] PROVIDER:[TOKEN:[[id number]],FOLLOWUP:[1 month]],PROVIDER:[TOKEN:[[id number]],FOLLOWUP:[Routine]],PROVIDER:[TOKEN:[[id number]]] PROVIDER:[TOKEN:[[id number]],FOLLOWUP:[2 weeks]],PROVIDER:[TOKEN:[[id number]],FOLLOWUP:[Routine]],PROVIDER:[TOKEN:[[id number]]]

## 2022-06-01 NOTE — PROGRESS NOTE ADULT - NUTRITIONAL ASSESSMENT
This patient has been assessed with a concern for Malnutrition and has been determined to have a diagnosis/diagnoses of Severe protein-calorie malnutrition and Underweight (BMI < 19).    This patient is being managed with:   Diet DASH/TLC-  Sodium & Cholesterol Restricted  Easy to Chew (EASYTOCHEW)  Supplement Feeding Modality:  Oral  Ensure Enlive Cans or Servings Per Day:  1       Frequency:  Three Times a day  Entered: May 31 2022  3:07PM    
This patient has been assessed with a concern for Malnutrition and has been determined to have a diagnosis/diagnoses of Severe protein-calorie malnutrition and Underweight (BMI < 19).    This patient is being managed with:   Diet DASH/TLC-  Sodium & Cholesterol Restricted  Easy to Chew (EASYTOCHEW)  Supplement Feeding Modality:  Oral  Ensure Enlive Cans or Servings Per Day:  1       Frequency:  Three Times a day  Entered: May 31 2022  3:07PM    
This patient has been assessed with a concern for Malnutrition and has been determined to have a diagnosis/diagnoses of Severe protein-calorie malnutrition and Underweight (BMI < 19).    This patient is being managed with:   Diet NPO-  Entered: May 28 2022 12:03PM    
This patient has been assessed with a concern for Malnutrition and has been determined to have a diagnosis/diagnoses of Severe protein-calorie malnutrition and Underweight (BMI < 19).    This patient is being managed with:   Diet Pureed-  DASH/TLC {Sodium & Cholesterol Restricted} (DASH)  Entered: May 30 2022  9:58AM    
This patient has been assessed with a concern for Malnutrition and has been determined to have a diagnosis/diagnoses of Severe protein-calorie malnutrition and Underweight (BMI < 19).    This patient is being managed with:   Diet NPO-  Entered: May 28 2022 12:03PM    
This patient has been assessed with a concern for Malnutrition and has been determined to have a diagnosis/diagnoses of Severe protein-calorie malnutrition and Underweight (BMI < 19).    This patient is being managed with:   Diet DASH/TLC-  Sodium & Cholesterol Restricted  Easy to Chew (EASYTOCHEW)  Supplement Feeding Modality:  Oral  Ensure Enlive Cans or Servings Per Day:  1       Frequency:  Three Times a day  Entered: May 31 2022  3:07PM    
This patient has been assessed with a concern for Malnutrition and has been determined to have a diagnosis/diagnoses of Severe protein-calorie malnutrition and Underweight (BMI < 19).    This patient is being managed with:   Diet NPO-  Entered: May 28 2022 12:03PM    
This patient has been assessed with a concern for Malnutrition and has been determined to have a diagnosis/diagnoses of Severe protein-calorie malnutrition and Underweight (BMI < 19).    This patient is being managed with:   Diet Pureed-  DASH/TLC {Sodium & Cholesterol Restricted} (DASH)  Entered: May 30 2022  9:58AM

## 2022-06-01 NOTE — PROGRESS NOTE ADULT - SUBJECTIVE AND OBJECTIVE BOX
Subjective: Patient seen to evaluate respiratory status post bronch. Patient states "with less oxygen I might be able to leave later" Patient additionally complains of cough, but denies sputum production. denies CP, palpitations, SOB, fever, chills, itchiness/rash, diaphoresis, vision changes, HA, dizziness/lightheadedness, numbness/tingling, abd pain, N/V    Pertinent events of the past 24 hours: Uneventful, recovering as expected    VITAL SIGNS  Vital Signs Last 24 Hrs  T(C): 37 (06-01-22 @ 05:15), Max: 37 (06-01-22 @ 05:15)  T(F): 98.6 (06-01-22 @ 05:15), Max: 98.6 (06-01-22 @ 05:15)  HR: 101 (06-01-22 @ 05:15) (94 - 108)  BP: 124/78 (06-01-22 @ 05:15) (106/65 - 124/78)  RR: 18 (06-01-22 @ 05:15) (18 - 18)  SpO2: 96% (06-01-22 @ 05:15) (94% - 97%)  on (O2)                MEDICATIONS  acetaminophen     Tablet .. 650 milliGRAM(s) Oral every 6 hours PRN  ALBUTerol    90 MICROgram(s) HFA Inhaler 2 Puff(s) Inhalation every 6 hours PRN  albuterol/ipratropium for Nebulization 3 milliLiter(s) Nebulizer every 6 hours  aluminum hydroxide/magnesium hydroxide/simethicone Suspension 30 milliLiter(s) Oral every 4 hours PRN  amLODIPine   Tablet 10 milliGRAM(s) Oral daily  atorvastatin 40 milliGRAM(s) Oral at bedtime  folic acid 1 milliGRAM(s) Oral daily  gabapentin 200 milliGRAM(s) Oral two times a day  melatonin 3 milliGRAM(s) Oral at bedtime PRN  methylPREDNISolone sodium succinate Injectable 40 milliGRAM(s) IV Push daily  multivitamin 1 Tablet(s) Oral daily  nicotine - 21 mG/24Hr(s) Patch 1 patch Transdermal daily  ondansetron Injectable 4 milliGRAM(s) IV Push every 8 hours PRN  pantoprazole  Injectable 40 milliGRAM(s) IV Push daily  rivaroxaban 20 milliGRAM(s) Oral daily      PHYSICAL EXAM  Constitutional: NAD  Neuro: A+O x 3, non-focal, speech clear and intact  HEENT: NC/AT  CV: +S1S2, no murmurs or rub  Pulm/chest: 2L NC, breath sounds rhonchi bilaterally, no accessory muscle use noted  Abd: +BS, soft, NT, ND  Ext: MAGALLON x 4, no C/C/E  Skin: warm, well perfused  Psych: calm, appropriate affect    05-31 @ 07:01  -  06-01 @ 07:00  --------------------------------------------------------  IN: 0 mL / OUT: 250 mL / NET: -250 mL    Weights:  Daily     Daily   Admit Wt: Drug Dosing Weight  Height (cm): 182.9 (25 May 2022 13:45)  Weight (kg): 51.3 (25 May 2022 13:45)  BMI (kg/m2): 15.3 (25 May 2022 13:45)  BSA (m2): 1.67 (25 May 2022 13:45)    All laboratory results, radiology and medications reviewed.    LABS  06-01    137  |  98  |  14.4  ----------------------------<  99  3.9   |  25.0  |  0.55    Ca    9.0      01 Jun 2022 06:19  Mg     1.6     06-01           Last CXR: < from: Xray Chest 1 View-PORTABLE IMMEDIATE (Xray Chest 1 View-PORTABLE IMMEDIATE .) (05.24.22 @ 15:00) >    FINDINGS:  A portable view of the chest demonstrates diffuse right lung infiltrate,   consistent with pneumonia, without significant change compared to the   prior exam. The left lung is clear. No effusions are seen. No distinct   hilar or mediastinal lesions are detected. Heart size is normal, without   CHF.    IMPRESSION: Persistent diffuse right lung infiltrate consistent with   pneumonia, showing no significant change compared to the previous exam.    < end of copied text >  < from: CT Chest No Cont (05.25.22 @ 13:51) >    IMPRESSION:.    Irregular appearance in wall thickening of the distal trachea and   proximal mainstem bronchi with new extraluminal and amorphous gas in the   subcarinal region communicating with the left mainstem bronchus medially   and possibly the right mainstem bronchus. Findings are suspicious for   fistula or perforation. Discussed with Dr. Carroll on 5/25/2022 at 2:28 pm.    New diffuse right lung ground-glass opacities and patchy left lower lobe   multifocal consolidation likely secondary to aspiration or infection.    < end of copied text >  < from: CT Abdomen and Pelvis w/ Oral Cont (05.29.22 @ 14:00) >  IMPRESSION:  There is no oral contrast extravasation, or oral contrast within the   airways suggestive of tracheoesophageal fistula.    Impacted left lower lobe airways and tree-in-bud/confluent opacities   within left lower lobe are slightly increased in comparison with   5/25/2022, suggestive of aspiration pneumonia. Patchy groundglass   opacities throughout the right lung are unchanged possibly representing   pneumonitis. Irregular solid opacity superior segment of right lower lobe   is unchanged.    < end of copied text >      Last EKG:    PAST MEDICAL & SURGICAL HISTORY:  Hypertension, unspecified type      Alcohol abuse      Poor historian      H/O fracture of right hip

## 2022-06-01 NOTE — PROGRESS NOTE ADULT - PROBLEM SELECTOR PROBLEM 4
Radiation pneumonitis

## 2022-06-01 NOTE — PROGRESS NOTE ADULT - TIME BILLING
reviewing labs, notes, orders, radiographic studies, as well as counseling and coordinating care with the relevant multidisciplinary team, including with pt and PMD
chart reviewed  patient
greater than 50% of time spent reviewing labs, notes, orders and radiographs, coordinating care  discussed with pt
reviewing labs, notes, orders, radiographic studies, as well as counseling and coordinating care with the relevant multidisciplinary team, including with the primary and consulting providers.
greater than 50% of time spent reviewing labs, notes, orders and radiographs, coordinating care  discussed with pt, T surg, PMD
greater than 50% of time spent reviewing labs, notes, orders and radiographs, coordinating care  discussed with pt

## 2022-06-01 NOTE — DISCHARGE NOTE PROVIDER - ATTENDING DISCHARGE PHYSICAL EXAMINATION:
Vital Signs Last 24 Hrs  T(C): 36.3 (02 Jun 2022 05:04), Max: 36.9 (01 Jun 2022 12:31)  T(F): 97.4 (02 Jun 2022 05:04), Max: 98.4 (01 Jun 2022 12:31)  HR: 87 (02 Jun 2022 05:04) (87 - 108)  BP: 119/67 (02 Jun 2022 05:04) (105/66 - 119/67)  BP(mean): --  RR: 18 (02 Jun 2022 05:04) (18 - 18)  SpO2: 98% (02 Jun 2022 05:04) (86% - 98%) Vital Signs Last 24 Hrs  T(C): 36.3 (02 Jun 2022 05:04), Max: 36.9 (01 Jun 2022 12:31)  T(F): 97.4 (02 Jun 2022 05:04), Max: 98.4 (01 Jun 2022 12:31)  HR: 87 (02 Jun 2022 05:04) (87 - 108)  BP: 119/67 (02 Jun 2022 05:04) (105/66 - 119/67)  BP(mean): --  RR: 18 (02 Jun 2022 05:04) (18 - 18)  SpO2: 98% (02 Jun 2022 05:04) (86% - 98%)    GENERAL: NAD  HEENT: +EOMI  NECK: soft, supple  CHEST/LUNG: course sounds b/l; respirations unlabored on 2LNC  HEART: S1S2+, Regular rate and rhythm  ABDOMEN: Soft, Nontender, Nondistended; Bowel sounds present  SKIN: warm, dry  NEURO: AAOX3, grossly non-focal  PSYCH: normal affect

## 2022-06-01 NOTE — DISCHARGE NOTE PROVIDER - CARE PROVIDER_API CALL
Aba Peter (DO)  Critical Care Medicine; Internal Medicine; Pulmonary Disease  [address] [facility], [address]  [place] [address]  Phone: [phone]  Fax: [phone]  Follow Up Time: 1 month    Onesimo Knox (MD)  Surgery; Thoracic Surgery  [address] [place] [address]  Phone: [phone]  Fax: [phone]  Follow Up Time: Routine   Aba Peter (DO)  Critical Care Medicine; Internal Medicine; Pulmonary Disease  [address] [facility], [address]  [place] [address]  Phone: [phone]  Fax: [phone]  Follow Up Time: 1 month    Onesimo Knox)  Surgery; Thoracic Surgery  [address] [place] [address]  Phone: [phone]  Fax: [phone]  Follow Up Time: Routine    Khushi John)  Hematology; Internal Medicine; Medical Oncology  [address] [place] [address]  Phone: [phone]  Fax: [phone]  Follow Up Time:    Aba Peter (DO)  Critical Care Medicine; Internal Medicine; Pulmonary Disease  [address] [facility], [address]  [place] [address]  Phone: [phone]  Fax: [phone]  Follow Up Time: 2 weeks    Onesimo Knox)  Surgery; Thoracic Surgery  [address] [place] [address]  Phone: [phone]  Fax: [phone]  Follow Up Time: Routine    Khushi John)  Hematology; Internal Medicine; Medical Oncology  [address] [place] [address]  Phone: [phone]  Fax: [phone]  Follow Up Time:

## 2022-06-01 NOTE — PROGRESS NOTE ADULT - PROBLEM SELECTOR PLAN 1
S/P bronchoscopy 5/27.  CT esophagram 5/28 without evidence of TE fistula.  Showed impacted LLL suggestive of aspiration pna.  Swallow eval without overt evidence of aspiration, rec advancing diet to easy to chew/thin liquids   Continue aspiration precautions.  bronch culture with few prevotella timonensis, unclear significance ?  Cytology still pending.  Decreasing O2 requirements   Discussed with Dr. Mulligan/Lisette in Thoracic Surgery rounds

## 2022-06-01 NOTE — PROGRESS NOTE ADULT - ASSESSMENT
56y Male with pmhx of small cell lung cancer (follows w/ dr. basilio at [facility] last chemo treatment was in march per patient.  Radiation completed as well), COPD, ETOH abuse, active 1ppd smoker, who presents with a chief complaint of worsening SOB and cough. On arrival he was found to be febrile and  tachycardic.   Sepsis protocol initiated by ER and he was placed on empiric coverage w/ vancomycin and cefepime, as well as nebs/steroids .   He required BIPAP for increased work of breathing and has clinically improved. Now on 50% Venti mask. Able to obtain CT scan chest today that showed Irregular appearance in wall thickening of the distal trachea and proximal mainstem bronchi with new extraluminal and amorphous gas in the subcarinal region communicating with the left mainstem bronchus medially and possibly the right mainstem bronchus. Findings are suspicious for   fistula or perforation.  Thoracic surgery consulted for CT scan findings.    5/27 s/p bronchosopy   5/28 Ct esophagram ordered and recommending NPO given possibility of tracheoesophageal fistula   5/29 CT esophagram> No evidence of TE fistula.  Impacted LLL airways and tree in bud confluent opacities suggestive of aspiration pneumonia.

## 2022-06-01 NOTE — DISCHARGE NOTE PROVIDER - CARE PROVIDERS DIRECT ADDRESSES
[email or web address]

## 2022-06-01 NOTE — DISCHARGE NOTE PROVIDER - HOSPITAL COURSE
56y Male with PMH of small cell lung cancer (follows w/ dr. Sharpe at [facility] last chemo treatment was in march per patient.  Radiation completed as well), COPD, ETOH abuse, active 1ppd smoker, who presents with a chief complaint of worsening SOB and cough. On arrival he was found to be febrile and  tachycardic.   Sepsis protocol initiated by ER and he was placed on empiric coverage w/ vancomycin and cefepime, as well as nebs/steroids and admitted for further treatment. During hospitalization, pt required BIPAP for increased work of breathing and had clinically improved and later placed on 50% Venti mask. CT scan chest was ordered during hospitalization showed Irregular appearance in wall thickening of the distal trachea and proximal mainstem bronchi with new extraluminal and amorphous gas in the subcarinal region communicating with the left mainstem bronchus medially and possibly the right mainstem bronchus. CT surgery was consulted giving Findings are suspicious for fistula/perforation and recommended bronch, which was preformed on 5/27.  Pt was followed by ID and Pulmonary through hospital.  PT completed antibiotics and 5/29, and gradually tolerated weaning oxygen.  PT still required oxygen and home o2 evaluation was done and pt was approved for oxygen to go back to shelter.    Pulm recs, ID recS 56y Male with PMH of small cell lung cancer (follows w/ dr. Sharpe at [facility] last chemo treatment was in march per patient.  Radiation completed as well), COPD, ETOH abuse, active 1ppd smoker, who presents with a chief complaint of worsening SOB and cough. On arrival he was found to be febrile and  tachycardic.   Sepsis protocol initiated by ER and he was placed on empiric coverage w/ vancomycin and cefepime, as well as nebs/steroids and admitted for further treatment. During hospitalization, pt required BIPAP for increased work of breathing and had clinically improved and later placed on 50% Venti mask. CT scan chest was ordered during hospitalization showed Irregular appearance in wall thickening of the distal trachea and proximal mainstem bronchi with new extraluminal and amorphous gas in the subcarinal region communicating with the left mainstem bronchus medially and possibly the right mainstem bronchus. CT surgery was consulted giving Findings are suspicious for fistula/perforation and recommended bronch, which was preformed on 5/27.  CT c/a/p with PO contrast: no oral contrast extravasation or oral contrast within airways suggestive of tracheoesophageal fistula; impacted LLL airways and tree-in-bud/confluent opacities within LLL, slightly increased from 5/25 suggestive of aspiration pna; patchy ggo through R lung unchanged, poss pneumonitis, irregular solid opacity superior segment of RLL unchanged.   Cleared to start diet. Seen by speech/swallow, clear for easy to chew, thin liquids   Pt was followed by ID and Pulmonary through hospital.  PT completed antibiotics and 5/29, and gradually tolerated weaning oxygen.  PT still required oxygen and home o2 evaluation was done and pt was approved for oxygen to go back to shelter. Pt to dc with prednisone taper, bactrim ds TIW for PCP ppx. To f/u with pulm and heme/onc outpatient.      56y Male with PMH of small cell lung cancer (follows w/ Dr. Sharpe at [facility] last chemo treatment was in march per patient.  Radiation completed as well), COPD, ETOH abuse, active 1ppd smoker, who presents with a chief complaint of worsening SOB and cough. On arrival he was found to be febrile and  tachycardic.   Sepsis protocol initiated by ER and he was placed on empiric coverage w/ vancomycin and cefepime, as well as nebs/steroids and admitted for further treatment. During hospitalization, pt required BIPAP for increased work of breathing and had clinically improved and later placed on 50% Venti mask. CT scan chest was ordered during hospitalization showed Irregular appearance in wall thickening of the distal trachea and proximal mainstem bronchi with new extraluminal and amorphous gas in the subcarinal region communicating with the left mainstem bronchus medially and possibly the right mainstem bronchus. CT surgery was consulted giving Findings are suspicious for fistula/perforation and recommended bronch, which was preformed on 5/27.  CT c/a/p with PO contrast: no oral contrast extravasation or oral contrast within airways suggestive of tracheoesophageal fistula; impacted LLL airways and tree-in-bud/confluent opacities within LLL, slightly increased from 5/25 suggestive of aspiration pna; patchy ggo through R lung unchanged, poss pneumonitis, irregular solid opacity superior segment of RLL unchanged.   Cleared to start diet. Seen by speech/swallow, clear for easy to chew, thin liquids   Pt was followed by ID and Pulmonary through hospital.  PT completed antibiotics and 5/29, and gradually tolerated weaning oxygen.  PT still required oxygen and home o2 evaluation was done and pt was approved for oxygen to go back to shelter. Pt to dc with prednisone taper, bactrim ds TIW for PCP ppx. To f/u with pulm and heme/onc outpatient.

## 2022-06-01 NOTE — PROGRESS NOTE ADULT - ASSESSMENT
56y/oM PMH EtOH abuse, HTN, HLD, CVA on Xarelto, Lung CA on chemo, active smoker presenting with progressive shortness of breath admitted with acute hypoxic respiratory failure     Acute hypoxic respiratory failure 2/2 pneumonia in immunocompromised patient, probable gram negative organism  sepsis, present on admission, improving   Lung CA on chemo   -titrating supplemental O2 as able, on NC today   -MICU consult appreciated   -pulm consult appreciated   -nebs   -ID recs appreciated   -CT chest 5/25: irregular appearance in wall thickening of distal trachea and proximal mainstem bronchi with new extraluminal and amorphous gas in subcarinal region, communicating with L mainstem bronchus medially and poss R mainstem bronchus, suspicious for fistula or perforation; new diffuse right lung GGO and patchy LLL multifocal consolidation likely 2/2 aspiration or infection   -CTSx recs appreciated   -vantin to complete 5/29  -s/p bronch 5/27  -CT c/a/p with PO contrast: no oral contrast extravasation or oral contrast within airways suggestive of tracheoesophageal fistula; impacted LLL airways and tree-in-bud/confluent opacities within LLL, slightly increased from 5/25 suggestive of aspiration pna; patchy ggo through R lung unchanged, poss pneumonitis, irregular solid opacity superior segment of RLL unchanged   -SLP: easy to chew, thin liquids  -will need bactrim ds TIW for pcp ppx on dc   -chest pt, percussion vest, IS ordered ; goal for ~3LNC in order to dc home with portable O2 concentrator   -slow prednisone taper     HTN, HLD  -norvasc, statin    Hx CVA   -xarelto     Tobacco dependence   -current 1ppd smoker   -cessation encouraged   -nicotine patch    hx EtOH abuse   suspected thiamine deficiency   -CIWA with PRN meds   -MVI, folic acid, thiamine    vte ppx; SCDs , xarelto    dispo: home pending improvement in supplemental O2

## 2022-06-01 NOTE — PROGRESS NOTE ADULT - SUBJECTIVE AND OBJECTIVE BOX
PULMONARY PROGRESS NOTE      BETH [id number]    Patient is a 56y old  Male who presents with a chief complaint of hypoxia (01 Jun 2022 12:19)      INTERVAL HPI/OVERNIGHT EVENTS:    feels better  no overnight issues  MEDICATIONS  (STANDING):  albuterol/ipratropium for Nebulization 3 milliLiter(s) Nebulizer every 6 hours  amLODIPine   Tablet 10 milliGRAM(s) Oral daily  atorvastatin 40 milliGRAM(s) Oral at bedtime  folic acid 1 milliGRAM(s) Oral daily  gabapentin 200 milliGRAM(s) Oral two times a day  methylPREDNISolone sodium succinate Injectable 40 milliGRAM(s) IV Push daily  multivitamin 1 Tablet(s) Oral daily  nicotine - 21 mG/24Hr(s) Patch 1 patch Transdermal daily  pantoprazole  Injectable 40 milliGRAM(s) IV Push daily  rivaroxaban 20 milliGRAM(s) Oral daily      MEDICATIONS  (PRN):  acetaminophen     Tablet .. 650 milliGRAM(s) Oral every 6 hours PRN Temp greater or equal to 38C (100.4F), Mild Pain (1 - 3)  ALBUTerol    90 MICROgram(s) HFA Inhaler 2 Puff(s) Inhalation every 6 hours PRN Shortness of Breath and/or Wheezing  aluminum hydroxide/magnesium hydroxide/simethicone Suspension 30 milliLiter(s) Oral every 4 hours PRN Dyspepsia  melatonin 3 milliGRAM(s) Oral at bedtime PRN Insomnia  ondansetron Injectable 4 milliGRAM(s) IV Push every 8 hours PRN Nausea and/or Vomiting      Allergies    penicillins (Unknown)  shellfish (Unknown)    Intolerances        PAST MEDICAL & SURGICAL HISTORY:  Hypertension, unspecified type      Alcohol abuse      Poor historian      H/O fracture of right hip          SOCIAL HISTORY  Smoking History:       REVIEW OF SYSTEMS:    CONSTITUTIONAL:  No distress    HEENT:  Eyes:  No diplopia or blurred vision. ENT:  No earache, sore throat or runny nose.    CARDIOVASCULAR:  No pressure, squeezing, tightness, heaviness or aching about the chest; no palpitations.    RESPIRATORY:  see HPI    GASTROINTESTINAL:  No nausea, vomiting or diarrhea.    GENITOURINARY:  No dysuria, frequency or urgency.    NEUROLOGIC:  No paresthesias, fasciculations, seizures or weakness.    PSYCHIATRIC:  No disorder of thought or mood.    Vital Signs Last 24 Hrs  T(C): 37 (01 Jun 2022 05:15), Max: 37 (01 Jun 2022 05:15)  T(F): 98.6 (01 Jun 2022 05:15), Max: 98.6 (01 Jun 2022 05:15)  HR: 101 (01 Jun 2022 05:15) (100 - 108)  BP: 124/78 (01 Jun 2022 05:15) (106/65 - 124/78)  BP(mean): --  RR: 18 (01 Jun 2022 05:15) (18 - 18)  SpO2: 96% (01 Jun 2022 05:15) (94% - 97%)    PHYSICAL EXAMINATION:    GENERAL: The patient is awake and alert in no apparent distress.     HEENT: Head is normocephalic and atraumatic. Extraocular muscles are intact. Mucous membranes are moist.    NECK: Supple.    LUNGS: moderate air entry bilat  respirations unlabored    HEART: Regular rate and rhythm without murmur.    ABDOMEN: Soft, nontender, and nondistended.      EXTREMITIES: Without any cyanosis, clubbing, rash, lesions or edema.    NEUROLOGIC: Grossly intact.    LABS:    06-01    137  |  98  |  14.4  ----------------------------<  99  3.9   |  25.0  |  0.55    Ca    9.0      01 Jun 2022 06:19  Mg     1.6     06-01                          MICROBIOLOGY:    RADIOLOGY & ADDITIONAL STUDIES:  CT scan reviewed

## 2022-06-01 NOTE — PROGRESS NOTE ADULT - SUBJECTIVE AND OBJECTIVE BOX
CATHY REYES    [id number]    56y      Male    CC: sob    INTERVAL HPI/OVERNIGHT EVENTS: pt seen and examined. anxious to go home. titrating supplemental o2    REVIEW OF SYSTEMS:    CONSTITUTIONAL: No fever, weight loss  RESPIRATORY: No wheezing, hemoptysis  CARDIOVASCULAR: No chest pain, palpitations  GASTROINTESTINAL: No abdominal or epigastric pain. No nausea, vomiting  NEUROLOGICAL: No headaches    Vital Signs Last 24 Hrs  T(C): 36.9 (01 Jun 2022 12:31), Max: 37 (01 Jun 2022 05:15)  T(F): 98.4 (01 Jun 2022 12:31), Max: 98.6 (01 Jun 2022 05:15)  HR: 108 (01 Jun 2022 12:31) (100 - 108)  BP: 105/66 (01 Jun 2022 12:31) (105/66 - 124/78)  BP(mean): --  RR: 18 (01 Jun 2022 12:31) (18 - 18)  SpO2: 95% (01 Jun 2022 12:31) (94% - 97%)    PHYSICAL EXAM:    GENERAL: NAD  HEENT: +EOMI  NECK: soft, supple  CHEST/LUNG: course sounds b/l; respirations unlabored on 3LNC  HEART: S1S2+, Regular rate and rhythm  ABDOMEN: Soft, Nontender, Nondistended; Bowel sounds present  SKIN: warm, dry  NEURO: AAOX3, grossly non-focal  PSYCH: normal affect    LABS:    06-01    137  |  98  |  14.4  ----------------------------<  99  3.9   |  25.0  |  0.55    Ca    9.0      01 Jun 2022 06:19  Mg     1.6     06-01              MEDICATIONS  (STANDING):  albuterol/ipratropium for Nebulization 3 milliLiter(s) Nebulizer every 6 hours  amLODIPine   Tablet 10 milliGRAM(s) Oral daily  atorvastatin 40 milliGRAM(s) Oral at bedtime  folic acid 1 milliGRAM(s) Oral daily  gabapentin 200 milliGRAM(s) Oral two times a day  magnesium sulfate  IVPB 2 Gram(s) IV Intermittent once  methylPREDNISolone sodium succinate Injectable 40 milliGRAM(s) IV Push daily  multivitamin 1 Tablet(s) Oral daily  nicotine - 21 mG/24Hr(s) Patch 1 patch Transdermal daily  pantoprazole  Injectable 40 milliGRAM(s) IV Push daily  rivaroxaban 20 milliGRAM(s) Oral daily    MEDICATIONS  (PRN):  acetaminophen     Tablet .. 650 milliGRAM(s) Oral every 6 hours PRN Temp greater or equal to 38C (100.4F), Mild Pain (1 - 3)  ALBUTerol    90 MICROgram(s) HFA Inhaler 2 Puff(s) Inhalation every 6 hours PRN Shortness of Breath and/or Wheezing  aluminum hydroxide/magnesium hydroxide/simethicone Suspension 30 milliLiter(s) Oral every 4 hours PRN Dyspepsia  melatonin 3 milliGRAM(s) Oral at bedtime PRN Insomnia  ondansetron Injectable 4 milliGRAM(s) IV Push every 8 hours PRN Nausea and/or Vomiting      RADIOLOGY & ADDITIONAL TESTS:

## 2022-06-02 NOTE — DISCHARGE NOTE NURSING/CASE MANAGEMENT/SOCIAL WORK - PATIENT PORTAL LINK FT
You can access the FollowMyHealth Patient Portal offered by [facility] by registering at the following website: [email or web address]. By joining [email or web address]’s FollowMyHealth portal, you will also be able to view your health information using other applications (apps) compatible with our system.

## 2022-06-13 NOTE — DATA REVIEWED
[FreeTextEntry1] : 5/27/22 Specimen(s) Submitted\par 1. LUNG, LEFT MAINSTEM, BRONCHOALVEOLAR LAVAGE\par 2. LUNG, LEFT MAINSTEM, BRONCHIAL BRUSH\par \par Final Diagnosis\par 1. LUNG, LEFT MAINSTEM, BRONCHOALVEOLAR LAVAGE\par ATYPICAL FINDINGS.\par \par Cytology slide and cell block show scattered atypical squamous epithelial\par \par cells displaying irregular chromatin, in a background of acute\par inflammatory cells,   alveolar macrophages  and degenerated bronchial cells.\par \par 2. LUNG, LEFT MAINSTEM, BRONCHIAL BRUSH\par SUSPICIOUS FOR MALIGNANCY.\par Suspicious for Non-Small Cell Carcinoma.\par \par Cytology slides display a paucicellular specimen composed of rare,\par scattered clusters of highly atypical epithelial cells displaying\par anisonucleosis, irregular chromatin and prominent nucleoli.\par Cell block: reveals acellular debris, non contributory to the diagnosis.\par \par Case discussed at the daily cytopathology  conference on\par 06/03/2022.\par \par Collected Date/Time:                   5/27/2022 14:58 EDT\par Received Date/Time:                    5/27/2022 14:58 EDT\par \par Addendum Report - Auth (Verified)\par \par Addendum\par \par Findings were discussed with Dr Mulligan.\par \par The biopsy fragment ( 0.4 cm) is acellular tissue with necrosis.\par No Tumor cells were identified.\par \par Verified by: Rafael Payton M.D.\par (Electronic Signature)\par Reported on: 06/03/22 17:52 EDT, [facility], [address] [place] [address]\par _________________________________________________________________\par \par Addendum Report - Auth (Verified)\par Addendum\par \par Multiple additional micro sections ( 8 levels) show similar findings of\par acellular necrotic material, not otherwise diagnostic.\par \par \par \par \par \par CT Esophagram/Abdomen and Pelvis w/ Oral Cont (05.29.22 @ 14:00) \par \par IMPRESSION:\par There is no oral contrast extravasation, or oral contrast within the airways suggestive of tracheoesophageal fistula.\par \par Impacted left lower lobe airways and tree-in-bud/confluent opacities within left lower lobe are slightly increased in comparison with 5/25/2022, suggestive of aspiration pneumonia. \par Patchy groundglass opacities throughout the right lung are unchanged possibly representing pneumonitis. Irregular solid opacity superior segment of right lower lobe is unchanged.\par \par --- End of Report ---\par \par \par \par \par \par CT Chest No Cont (05.25.22 @ 13:51) \par IMPRESSION:.\par Irregular appearance in wall thickening of the distal trachea and proximal mainstem bronchi with new extraluminal and amorphous gas in the subcarinal region communicating with the left mainstem bronchus medially and possibly the right mainstem bronchus. Findings are suspicious for fistula or perforation. Discussed with Dr. Carroll on 5/25/2022 at 2:28 pm.\par \par New diffuse right lung ground-glass opacities and patchy left lower lobe multifocal consolidation likely secondary to aspiration or infection.\par \par --- End of Report ---\par \par

## 2022-06-13 NOTE — ASSESSMENT
[FreeTextEntry1] : Gamaliel is a 57-year-old male with a recent hospitalization for shortness of breath with an abnormal CT scan demonstrating a possible tracheoesophageal fistula. He underwent bronchoscopy and several radiographic studies, which did not demonstrate a fistula. From my standpoint, no further intervention is necessary. I have recommended he follow with oncology and in the future.\par \par Thank you for allowing me to participate in the care of your patient.\par \par 30 minutes was spent during this encounter.\par \par Onesimo Knox MD\par Department of Cardiovascular and Thoracic Surgery\par \par [employer] [employer] [facility]\par

## 2022-06-13 NOTE — HISTORY OF PRESENT ILLNESS
[FreeTextEntry1] : Mr. REYES is a 56 year old male referred by [facility] who presents for consultation regarding . His past medical history includes small cell lung cancer (follows w/ dr. Sharpe at [facility] - last chemo treatment was in March per patient, radiation completed as well), COPD, ETOH abuse, active 1ppd smoker.\par \par He presented to the [facility] ED on 5/23/22 with worsening SOB and cough. On arrival he was found to be febrile and  tachycardic. Sepsis protocol initiated by ER and he was placed on empiric coverage w/ vancomycin and cefepime, as well as nebs/steroids . He required BiPAP for increased work of breathing and clinically improved. CT scan chest showed Irregular appearance in wall thickening of the distal trachea and proximal mainstem bronchi with new extraluminal and amorphous gas in the subcarinal region communicating with the left mainstem bronchus medially and possibly the right mainstem bronchus. Findings are suspicious for fistula or perforation. New diffuse right lung ground-glass opacities and patchy left lower lobe multifocal consolidation likely secondary to aspiration or infection. Thoracic surgery consulted for CT scan findings.\par \par He underwent flexible bronchoscopy with brushing/biopsy 5/28/22. Biopsy suspicious for non-small cell carcinoma. \par \par Today, he reports\par  \par \par

## 2022-06-22 ENCOUNTER — APPOINTMENT (OUTPATIENT)
Dept: HEMATOLOGY ONCOLOGY | Facility: CLINIC | Age: 57
End: 2022-06-22

## 2022-06-25 LAB
CULTURE RESULTS: SIGNIFICANT CHANGE UP
CULTURE RESULTS: SIGNIFICANT CHANGE UP
SPECIMEN SOURCE: SIGNIFICANT CHANGE UP
SPECIMEN SOURCE: SIGNIFICANT CHANGE UP

## 2022-06-27 ENCOUNTER — APPOINTMENT (OUTPATIENT)
Dept: NEUROLOGY | Facility: CLINIC | Age: 57
End: 2022-06-27

## 2022-06-27 ENCOUNTER — APPOINTMENT (OUTPATIENT)
Age: 57
End: 2022-06-27

## 2022-07-11 ENCOUNTER — APPOINTMENT (OUTPATIENT)
Age: 57
End: 2022-07-11

## 2022-07-25 ENCOUNTER — APPOINTMENT (OUTPATIENT)
Age: 57
End: 2022-07-25

## 2022-07-27 ENCOUNTER — APPOINTMENT (OUTPATIENT)
Dept: NEUROLOGY | Facility: CLINIC | Age: 57
End: 2022-07-27

## 2022-07-27 NOTE — PROGRESS NOTE ADULT - SUBJECTIVE AND OBJECTIVE BOX
Patient is a 53y old  Male who presents with a chief complaint of alcohol withdrawal  and fall (18 Dec 2018 07:22)    HOSPITALIZATION DAY:  INTERVAL/OVERNIGHT EVENTS:  Patient examined at beside. No acute events over night. As per nurse, patient was stable overnight . Patient reports feeling the same, with complaints about increased knee pain, eager to get to rehab. Patient is tolerating PO diet w/o nausea/vomiting/diarrhea/abdominal pain. Patient is voiding actively and last BM was yesterday, normal. Patient is able to ambulate only OOB to chair with PT, but wishes to walk if pain was absent, pain control with current regimen. Patient denies chest pain, calf pain, abdominal pain, headaches, fever, chills, dysuria, hematuria, diarrhea, constipation, SOB, palpitations. Rest of ROS not contributory except for above.    Physical Exam:   General: NAD  Head:  Atraumatic, Normocephalic  Respiratory: Clear to auscultation bilaterally; No rales, rhonchi, wheezing, or rubs  CV: Regular rate and rhythm; No murmurs, rubs, or gallops  Gastrointestinal: Soft, Nontender, Nondistended; Bowel sounds present  Ext:  2+ Peripheral Pulses, No clubbing, cyanosis, or edema. No calf tenderness. Tenderness to palpation over R- hip and pain w/ active movement of RLE. Surgical dressing on R HIP, mild ecchymosis. Dressing clean/dry/intact   No evidence of withdrawal    Neuro: AAOX3 Motor Strength 4/5 B/L upper and LLE extremities; strength 2/5 on RLE limited assesment 2/2 pain , sensation intact    LABS    12-17    133<L>  |  98  |  8.0  ----------------------------<  102  4.1   |  23.0  |  0.62    Ca    8.9      17 Dec 2018 08:14    MEDICATIONS  (STANDING):  cholecalciferol 1000 Unit(s) Oral daily  docusate sodium 100 milliGRAM(s) Oral three times a day  enoxaparin Injectable 40 milliGRAM(s) SubCutaneous daily  folic acid 1 milliGRAM(s) Oral daily  multivitamin 1 Tablet(s) Oral daily  polyethylene glycol 3350 17 Gram(s) Oral daily  thiamine 100 milliGRAM(s) Oral daily    MEDICATIONS  (PRN):  acetaminophen   Tablet .. 650 milliGRAM(s) Oral every 6 hours PRN Mild Pain (1 - 3)  bisacodyl Suppository 10 milliGRAM(s) Rectal daily PRN If no bowel movement  ketorolac   Injectable 15 milliGRAM(s) IV Push once PRN Severe Pain (7 - 10)  magnesium hydroxide Suspension 30 milliLiter(s) Oral daily PRN Constipation  ondansetron Injectable 4 milliGRAM(s) IV Push every 6 hours PRN Nausea and/or Vomiting  oxyCODONE    IR 10 milliGRAM(s) Oral every 3 hours PRN Moderate Pain (4 - 6)  oxyCODONE    IR 5 milliGRAM(s) Oral every 3 hours PRN Mild Pain (1 - 3)  polyethylene glycol 3350 17 Gram(s) Oral daily PRN Constipation 15

## 2022-08-01 ENCOUNTER — APPOINTMENT (OUTPATIENT)
Dept: PULMONOLOGY | Facility: CLINIC | Age: 57
End: 2022-08-01

## 2022-08-08 ENCOUNTER — APPOINTMENT (OUTPATIENT)
Age: 57
End: 2022-08-08

## 2022-08-10 ENCOUNTER — APPOINTMENT (OUTPATIENT)
Dept: RADIATION ONCOLOGY | Facility: CLINIC | Age: 57
End: 2022-08-10

## 2022-08-17 ENCOUNTER — APPOINTMENT (OUTPATIENT)
Dept: RADIATION ONCOLOGY | Facility: CLINIC | Age: 57
End: 2022-08-17

## 2022-11-08 NOTE — ED ADULT NURSE NOTE - PMH
[FreeTextEntry1] : 72 year old male, s/p EBUS June, 2017, which revealed Level 7 and level 10 lymph nodes positive for metastatic adenocarcinoma (cT1N2; Clinical Stage IIIA). He received neoadjuvant chemo/RT and subsequently underwent Left VATS, LLL Lobectomy, MLND, hilar node dissection on 11/5/2017; Pathology revealed mucinous adenocarcinoma, ypT2N0. \par \par He was hospitalized for management of SVT/AFib/Aflutter in February 2019 when workup revealed pericardial effusion and is now s/p subxiphoid anterior pericardiectomy, cardiac decortication on 2/25/19. He was started on Eliquis for AFlutter.\par \par Flexible bronchoscopy, bronchoalveolar lavage, uniportal Right VATS, pneumonolysis,  wedge resection of the RUL with Adrienne-Strips, and intercostal nerve block and MLN biopsy on 12/8/21. 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 This is a new lung primary.\par \par Here today with follow up CT chest. \par \par I have independently reviewed the medical records and imaging at the time of this office consultation, and discussed the following interpretations with the patient:\par - CT scan showed no evidence of recurrence, recommended patient to return to office in 3 months with CT Chest without contrast.\par - Continue follow up with PCP and Pulm. \par \par I, JEANNINE AllenIM, personally performed the evaluation and management (E/M) services for this established patient. That E/M includes conducting the examination, assessing all new/exacerbated conditions, and establishing a new plan of care. Today, My ACP, Ruchi Hidalgo, was here to observe my evaluation and management services for this patient to be followed going forward.\par \par  Alcohol abuse    Hypertension, unspecified type    Poor historian

## 2024-02-05 NOTE — PROGRESS NOTE ADULT - PROBLEM SELECTOR PROBLEM 1
Acute hypoxemic respiratory failure
no
Acute hypoxemic respiratory failure

## 2024-09-03 NOTE — REASON FOR VISIT
[Routine On-Treatment] : a routine on-treatment visit for [Lung Cancer] : lung cancer DISPLAY PLAN FREE TEXT

## 2024-11-05 NOTE — PHYSICAL THERAPY INITIAL EVALUATION ADULT - PLANNED THERAPY INTERVENTIONS, PT EVAL
150.7 motor coordination training/balance training/bed mobility training/gait training/strengthening/transfer training

## 2024-11-08 NOTE — ED ADULT TRIAGE NOTE - BSA (M2)
The patient's goals for the shift include      The clinical goals for the shift include      O   2.04

## 2025-02-17 NOTE — DISCHARGE NOTE PROVIDER - NSDCCPCAREPLAN_GEN_ALL_CORE_FT
Attending Only PRINCIPAL DISCHARGE DIAGNOSIS  Diagnosis: Pneumonia  Assessment and Plan of Treatment:       SECONDARY DISCHARGE DIAGNOSES  Diagnosis: Dysphagia  Assessment and Plan of Treatment:     Diagnosis: Tobacco dependence  Assessment and Plan of Treatment:     Diagnosis: Hypertension  Assessment and Plan of Treatment:      PRINCIPAL DISCHARGE DIAGNOSIS  Diagnosis: Pneumonia  Assessment and Plan of Treatment:       SECONDARY DISCHARGE DIAGNOSES  Diagnosis: Dysphagia  Assessment and Plan of Treatment:     Diagnosis: Tobacco dependence  Assessment and Plan of Treatment:     Diagnosis: Hypertension  Assessment and Plan of Treatment:     Diagnosis: Lung cancer  Assessment and Plan of Treatment:

## 2025-04-28 NOTE — ED ADULT TRIAGE NOTE - HEIGHT IN INCHES
Needs updated PHQ-9 and ROYCE scores. Please reach out to patient to complete those.     Refills sent.     Thanks,  Samira Webb DNP, NP-C    0

## (undated) DEVICE — SSH-BRONCHOSCOPE 2043333: Type: DURABLE MEDICAL EQUIPMENT